# Patient Record
Sex: MALE | Race: WHITE | NOT HISPANIC OR LATINO | Employment: OTHER | URBAN - METROPOLITAN AREA
[De-identification: names, ages, dates, MRNs, and addresses within clinical notes are randomized per-mention and may not be internally consistent; named-entity substitution may affect disease eponyms.]

---

## 2017-01-20 ENCOUNTER — OFFICE VISIT (OUTPATIENT)
Dept: CARDIOLOGY | Facility: MEDICAL CENTER | Age: 67
End: 2017-01-20
Payer: MEDICARE

## 2017-01-20 VITALS
OXYGEN SATURATION: 96 % | BODY MASS INDEX: 38.3 KG/M2 | HEIGHT: 75 IN | DIASTOLIC BLOOD PRESSURE: 80 MMHG | SYSTOLIC BLOOD PRESSURE: 120 MMHG | WEIGHT: 308 LBS | HEART RATE: 64 BPM

## 2017-01-20 DIAGNOSIS — R00.2 PALPITATIONS: ICD-10-CM

## 2017-01-20 DIAGNOSIS — I10 ESSENTIAL HYPERTENSION: ICD-10-CM

## 2017-01-20 DIAGNOSIS — E78.5 DYSLIPIDEMIA: ICD-10-CM

## 2017-01-20 DIAGNOSIS — I25.10 CORONARY ARTERY DISEASE DUE TO CALCIFIED CORONARY LESION: ICD-10-CM

## 2017-01-20 DIAGNOSIS — I25.84 CORONARY ARTERY DISEASE DUE TO CALCIFIED CORONARY LESION: ICD-10-CM

## 2017-01-20 DIAGNOSIS — G47.33 OBSTRUCTIVE SLEEP APNEA: ICD-10-CM

## 2017-01-20 PROCEDURE — 99204 OFFICE O/P NEW MOD 45 MIN: CPT | Performed by: INTERNAL MEDICINE

## 2017-01-20 RX ORDER — ROSUVASTATIN CALCIUM 20 MG/1
20 TABLET, COATED ORAL EVERY EVENING
Qty: 30 TAB | Refills: 11 | Status: SHIPPED | OUTPATIENT
Start: 2017-01-20 | End: 2017-02-06 | Stop reason: SDUPTHER

## 2017-01-20 ASSESSMENT — ENCOUNTER SYMPTOMS
BLURRED VISION: 0
COUGH: 0
ABDOMINAL PAIN: 0
SPEECH CHANGE: 0
EYE DISCHARGE: 0
PALPITATIONS: 0
NAUSEA: 0
FALLS: 1
BRUISES/BLEEDS EASILY: 0
NERVOUS/ANXIOUS: 0
FEVER: 0
VOMITING: 0
LOSS OF CONSCIOUSNESS: 0
EYE PAIN: 0
MYALGIAS: 0
HEMOPTYSIS: 0
DEPRESSION: 0
WHEEZING: 0
CHILLS: 0

## 2017-01-20 NOTE — Clinical Note
[facility]   [address], [facility] [address]  [place] [address]  Phone: [phone]  Fax: [phone]              Kun Lisa  1950    Encounter Date: 1/20/2017    Marbin Haddad M.D.          PROGRESS NOTE:  Subjective:   Kun Lisa is a 66 y.o. male who presents today for new patient evaluation because of history of coronary artery disease. He also has hypertension and dyslipidemia in addition to type II diabetes.    In 1997, he apparently had a small heart attack. He underwent evaluation and ultimately coronary angiography. During that procedure he received a stent to the LAD.    He has had no chest discomfort. He does note that over the last few months he's had some intermittent palpitations. Since August, he noted palpitations about once every month or 2. These episodes only last about 10 seconds or so. He notes a rapid heartbeat which is associated with some lightheadedness. He's had no near-syncope or syncope.    He has had no dyspnea on exertion, PND or orthopnea. He is on CPAP therapy at night. He occasionally notes some edema.    Past Medical History   Diagnosis Date   • Past heart attack    • CAD (coronary artery disease)    • Hyperlipidemia    • Hypertension    • Palpitations    • Obstructive sleep apnea    • Diabetes (CMS-[facility])      Past Surgical History   Procedure Laterality Date   • Stent placement       LAD   • Angioplasty       Family History   Problem Relation Age of Onset   • Stroke Father 57     History   Smoking status   • Former Smoker -- 1.00 packs/day for 30 years   • Types: Cigarettes   • Quit date: 06/25/2002   Smokeless tobacco   • Never Used     Allergies   Allergen Reactions   • Metformin Unspecified     Stomach upset and acid reflux     Outpatient Encounter Prescriptions as of 1/20/2017   Medication Sig Dispense Refill   • GLIPIZIDE XL 10 MG TABLET SR 24 HR TAKE 1 TABLET ORALLY DAILY 90 Tab 0   • metoprolol SR (TOPROL XL)  "50 MG TABLET SR 24 HR Take 1 Tab by mouth every day. 90 Tab 2   • ramipril (ALTACE) 10 MG capsule Take 1 Cap by mouth every day. 90 Cap 2   • simvastatin (ZOCOR) 20 MG Tab Take 1 Tab by mouth every evening. 90 Tab 2   • sitagliptin (JANUVIA) 100 MG Tab Take 1 Tab by mouth every day. 90 Tab 2   • tramadol (ULTRAM) 50 MG Tab Take 1 Tab by mouth every four hours as needed. 90 Tab 2   • niacin 250 MG Tab Take 250 mg by mouth every day.     • aspirin (ASA) 81 MG Chew Tab chewable tablet Take 81 mg by mouth every day.     • Glucosamine-Chondroitin (GLUCOSAMINE CHONDROITIN COMPLX) 500-250 MG Cap Take  by mouth.     • nitroglycerin (NITROSTAT) 0.4 MG SL Tab Place 0.4 mg under tongue every 5 minutes as needed for Chest Pain.       No facility-administered encounter medications on file as of 1/20/2017.     Review of Systems   Constitutional: Positive for malaise/fatigue (on occasion he'll note fatigue with exertion.). Negative for fever and chills.   HENT: Negative for congestion.    Eyes: Negative for blurred vision, pain and discharge.   Respiratory: Negative for cough, hemoptysis and wheezing.    Cardiovascular: Negative for chest pain and palpitations.   Gastrointestinal: Negative for nausea, vomiting and abdominal pain.   Musculoskeletal: Positive for falls. Negative for myalgias and joint pain.   Skin: Negative for itching and rash.   Neurological: Negative for speech change and loss of consciousness.   Endo/Heme/Allergies: Does not bruise/bleed easily.   Psychiatric/Behavioral: Negative for depression. The patient is not nervous/anxious.    All other systems reviewed and are negative.       Objective:   /80 mmHg  Pulse 64  Ht 1.905 m (6' 3\")  Wt 139.708 kg (308 lb)  BMI 38.50 kg/m2  SpO2 96%    Physical Exam   Constitutional: He is oriented to person, place, and time. He appears well-developed. No distress.   HENT:   Mouth/Throat: Mucous membranes are normal.   Eyes: Conjunctivae and EOM are normal.   Neck: " No JVD present. No tracheal deviation present. No thyroid mass and no thyromegaly present.   Cardiovascular: Normal rate, regular rhythm and intact distal pulses.    No murmur heard.  Pulmonary/Chest: Effort normal and breath sounds normal. No respiratory distress. He exhibits no tenderness.   Abdominal: Soft. There is no tenderness.   Musculoskeletal: Normal range of motion. He exhibits edema (trace pretibial).   Neurological: He is alert and oriented to person, place, and time. He has normal strength. He displays no tremor.   Skin: Skin is warm and dry. He is not diaphoretic.   Psychiatric: He has a normal mood and affect. His behavior is normal.   Vitals reviewed.    Lab Results   Component Value Date/Time    CHOLESTEROL, 11/08/2016 09:17 AM    LDL 78 11/08/2016 09:17 AM    HDL 44 11/08/2016 09:17 AM    TRIGLYCERIDES 179* 11/08/2016 09:17 AM       Lab Results   Component Value Date/Time    SODIUM 135 11/08/2016 09:17 AM    POTASSIUM 4.9 11/08/2016 09:17 AM    CHLORIDE 102 11/08/2016 09:17 AM    CO2 26 11/08/2016 09:17 AM    GLUCOSE 170* 11/08/2016 09:17 AM    BUN 13 11/08/2016 09:17 AM    CREATININE 0.94 11/08/2016 09:17 AM     Lab Results   Component Value Date/Time    ALKALINE PHOSPHATASE 48 11/08/2016 09:17 AM    AST(SGOT) 18 11/08/2016 09:17 AM    ALT(SGPT) 29 11/08/2016 09:17 AM    TOTAL BILIRUBIN 0.6 11/08/2016 09:17 AM      No results found for: BNPBTYPENAT       Assessment:     1. Coronary artery disease due to calcified coronary lesion: LAD stent in 1997     2. Essential hypertension     3. Dyslipidemia     4. Palpitations         Medical Decision Making:  Today's Assessment / Status / Plan:     Mr. Lisa is having difficulty with palpitations which are significantly symptomatic. He'll be further evaluated with a event monitor and echocardiogram. He did have a exercise tolerance test which was probably a myocardial perfusion scan last year and we will obtain those results for review. His blood  pressure is under excellent control. His lipid status is under fair control though he is not on guideline mediated therapy. At this time, he will discontinue simvastatin and niacin. We will start him on rosuvastatin 20 mg daily. He will follow-up in about 6 weeks with lab work prior.    Given his obstructive sleep apnea and palpitations. He will also be evaluated for nocturnal hypoxemia with an overnight pulse oximetry.        Kit Torres PA-C  [address]  [address]  [facility] [address]  VIA In Basket

## 2017-01-20 NOTE — MR AVS SNAPSHOT
"        Kun Madsen Lisa   2017 10:15 AM   Office Visit   MRN: [id number]    Department:  [facility]   Dept Phone:  [phone]    Description:  Male : 1950   Provider:  Marbin Haddad M.D.           Reason for Visit     New Patient           Allergies as of 2017     Allergen Noted Reactions    Metformin 10/25/2016   Unspecified    Stomach upset and acid reflux      You were diagnosed with     Coronary artery disease due to calcified coronary lesion   [6291198]       Essential hypertension   [6079629]       Dyslipidemia   [741771]       Palpitations   [785.1.ICD-9-CM]       Obstructive sleep apnea   [181365]         Vital Signs     Blood Pressure Pulse Height Weight Body Mass Index Oxygen Saturation    120/80 mmHg 64 1.905 m (6' 3\") 139.708 kg (308 lb) 38.50 kg/m2 96%    Smoking Status                   Former Smoker           Basic Information     Date Of Birth Sex Race Ethnicity Preferred Language    1950 Male White Non- English      Problem List              ICD-10-CM Priority Class Noted - Resolved    Type 2 diabetes mellitus with neurologic complication (CMS-HCC) E11.49   10/25/2016 - Present    Dyslipidemia E78.5   10/25/2016 - Present    Essential hypertension I10   10/25/2016 - Present    Arrhythmia I49.9   10/25/2016 - Present    Neuropathy (CMS-HCC) G62.9   10/25/2016 - Present    Preventative health care Z00.00   10/25/2016 - Present    Left foot pain M79.672   10/25/2016 - Present    Morbid obesity due to excess calories (CMS-HCC) E66.01   10/27/2016 - Present    Obstructive sleep apnea syndrome G47.33   2016 - Present    Coronary artery disease due to calcified coronary lesion: LAD stent in  I25.10, I25.84   2017 - Present    Palpitations R00.2   2017 - Present      Health Maintenance        Date Due Completion Dates    RETINAL SCREENING 1969 ---    IMM DTaP/Tdap/Td Vaccine (1 - Tdap) 1970 ---    COLONOSCOPY 2001 ---    IMM " ZOSTER VACCINE 6/19/2011 ---    IMM PNEUMOCOCCAL 65+ (ADULT) LOW/MEDIUM RISK SERIES (1 of 2 - PCV13) 6/19/2016 ---    A1C SCREENING 5/8/2017 11/8/2016    FASTING LIPID PROFILE 11/8/2017 11/8/2016    URINE ACR / MICROALBUMIN 11/8/2017 11/8/2016    SERUM CREATININE 11/8/2017 11/8/2016    DIABETES MONOFILAMENT / LE EXAM 11/22/2017 11/22/2016            Current Immunizations     Influenza Vaccine Adult HD 10/25/2016  4:23 PM      Below and/or attached are the medications your provider expects you to take. Review all of your home medications and newly ordered medications with your provider and/or pharmacist. Follow medication instructions as directed by your provider and/or pharmacist. Please keep your medication list with you and share with your provider. Update the information when medications are discontinued, doses are changed, or new medications (including over-the-counter products) are added; and carry medication information at all times in the event of emergency situations     Allergies:  METFORMIN - Unspecified               Medications  Valid as of: January 20, 2017 - 10:58 AM    Generic Name Brand Name Tablet Size Instructions for use    Aspirin (Chew Tab) ASA 81 MG Take 81 mg by mouth every day.        GlipiZIDE (TABLET SR 24 HR) GLIPIZIDE XL 10 MG TAKE 1 TABLET ORALLY DAILY        Glucosamine-Chondroitin (Cap) Glucosamine-Chondroitin 500-250 MG Take  by mouth.        Metoprolol Succinate (TABLET SR 24 HR) TOPROL XL 50 MG Take 1 Tab by mouth every day.        Niacin (Tab) niacin 250 MG Take 250 mg by mouth every day.        Nitroglycerin (SL Tab) NITROSTAT 0.4 MG Place 0.4 mg under tongue every 5 minutes as needed for Chest Pain.        Ramipril (Cap) ALTACE 10 MG Take 1 Cap by mouth every day.        Rosuvastatin Calcium (Tab) CRESTOR 20 MG Take 1 Tab by mouth every evening.        Simvastatin (Tab) ZOCOR 20 MG Take 1 Tab by mouth every evening.        SITagliptin Phosphate (Tab) JANUVIA 100 MG Take 1 Tab by  mouth every day.        TraMADol HCl (Tab) ULTRAM 50 MG Take 1 Tab by mouth every four hours as needed.        .                 Medicines prescribed today were sent to:     [facility] #[id number] - [place], [place] - [address]    [address] [place] [address]    Phone: [phone] Fax: [phone]    Open 24 Hours?: No    [facility] - [place], [place] - [address] AT PORTAL TO REGISTERED [facility] S[facility]    [address] [facility] [address]    Phone: [phone] Fax: [phone]    Open 24 Hours?: No      Medication refill instructions:       If your prescription bottle indicates you have medication refills left, it is not necessary to call your provider’s office. Please contact your pharmacy and they will refill your medication.    If your prescription bottle indicates you do not have any refills left, you may request refills at any time through one of the following ways: The online [employer] system (except Urgent Care), by calling your provider’s office, or by asking your pharmacy to contact your provider’s office with a refill request. Medication refills are processed only during regular business hours and may not be available until the next business day. Your provider may request additional information or to have a follow-up visit with you prior to refilling your medication.   *Please Note: Medication refills are assigned a new Rx number when refilled electronically. Your pharmacy may indicate that no refills were authorized even though a new prescription for the same medication is available at the pharmacy. Please request the medicine by name with the pharmacy before contacting your provider for a refill.        Your To Do List     Future Labs/Procedures Complete By Expires    COMP METABOLIC PANEL  As directed 1/20/2018    Echocardiogram Comp w/o Cont  As directed 1/20/2018    Scheduling Instructions:    Within the next month    LIPID PROFILE  As directed 1/20/2018    [facility] /  Event Monitor  As directed 1/20/2018         [employer] Access Code: Activation code not generated  Current [employer] Status: Active

## 2017-01-20 NOTE — PROGRESS NOTES
Subjective:   Kun Lisa is a 66 y.o. male who presents today for new patient evaluation because of history of coronary artery disease. He also has hypertension and dyslipidemia in addition to type II diabetes.    In 1997, he apparently had a small heart attack. He underwent evaluation and ultimately coronary angiography. During that procedure he received a stent to the LAD.    He has had no chest discomfort. He does note that over the last few months he's had some intermittent palpitations. Since August, he noted palpitations about once every month or 2. These episodes only last about 10 seconds or so. He notes a rapid heartbeat which is associated with some lightheadedness. He's had no near-syncope or syncope.    He has had no dyspnea on exertion, PND or orthopnea. He is on CPAP therapy at night. He occasionally notes some edema.    Past Medical History   Diagnosis Date   • Past heart attack    • CAD (coronary artery disease)    • Hyperlipidemia    • Hypertension    • Palpitations    • Obstructive sleep apnea    • Diabetes (CMS-[facility])      Past Surgical History   Procedure Laterality Date   • Stent placement       LAD   • Angioplasty       Family History   Problem Relation Age of Onset   • Stroke Father 57     History   Smoking status   • Former Smoker -- 1.00 packs/day for 30 years   • Types: Cigarettes   • Quit date: 06/25/2002   Smokeless tobacco   • Never Used     Allergies   Allergen Reactions   • Metformin Unspecified     Stomach upset and acid reflux     Outpatient Encounter Prescriptions as of 1/20/2017   Medication Sig Dispense Refill   • GLIPIZIDE XL 10 MG TABLET SR 24 HR TAKE 1 TABLET ORALLY DAILY 90 Tab 0   • metoprolol SR (TOPROL XL) 50 MG TABLET SR 24 HR Take 1 Tab by mouth every day. 90 Tab 2   • ramipril (ALTACE) 10 MG capsule Take 1 Cap by mouth every day. 90 Cap 2   • simvastatin (ZOCOR) 20 MG Tab Take 1 Tab by mouth every evening. 90 Tab 2   • sitagliptin (JANUVIA) 100 MG Tab Take 1 Tab by  "mouth every day. 90 Tab 2   • tramadol (ULTRAM) 50 MG Tab Take 1 Tab by mouth every four hours as needed. 90 Tab 2   • niacin 250 MG Tab Take 250 mg by mouth every day.     • aspirin (ASA) 81 MG Chew Tab chewable tablet Take 81 mg by mouth every day.     • Glucosamine-Chondroitin (GLUCOSAMINE CHONDROITIN COMPLX) 500-250 MG Cap Take  by mouth.     • nitroglycerin (NITROSTAT) 0.4 MG SL Tab Place 0.4 mg under tongue every 5 minutes as needed for Chest Pain.       No facility-administered encounter medications on file as of 1/20/2017.     Review of Systems   Constitutional: Positive for malaise/fatigue (on occasion he'll note fatigue with exertion.). Negative for fever and chills.   HENT: Negative for congestion.    Eyes: Negative for blurred vision, pain and discharge.   Respiratory: Negative for cough, hemoptysis and wheezing.    Cardiovascular: Negative for chest pain and palpitations.   Gastrointestinal: Negative for nausea, vomiting and abdominal pain.   Musculoskeletal: Positive for falls. Negative for myalgias and joint pain.   Skin: Negative for itching and rash.   Neurological: Negative for speech change and loss of consciousness.   Endo/Heme/Allergies: Does not bruise/bleed easily.   Psychiatric/Behavioral: Negative for depression. The patient is not nervous/anxious.    All other systems reviewed and are negative.       Objective:   /80 mmHg  Pulse 64  Ht 1.905 m (6' 3\")  Wt 139.708 kg (308 lb)  BMI 38.50 kg/m2  SpO2 96%    Physical Exam   Constitutional: He is oriented to person, place, and time. He appears well-developed. No distress.   HENT:   Mouth/Throat: Mucous membranes are normal.   Eyes: Conjunctivae and EOM are normal.   Neck: No JVD present. No tracheal deviation present. No thyroid mass and no thyromegaly present.   Cardiovascular: Normal rate, regular rhythm and intact distal pulses.    No murmur heard.  Pulmonary/Chest: Effort normal and breath sounds normal. No respiratory distress. " He exhibits no tenderness.   Abdominal: Soft. There is no tenderness.   Musculoskeletal: Normal range of motion. He exhibits edema (trace pretibial).   Neurological: He is alert and oriented to person, place, and time. He has normal strength. He displays no tremor.   Skin: Skin is warm and dry. He is not diaphoretic.   Psychiatric: He has a normal mood and affect. His behavior is normal.   Vitals reviewed.    Lab Results   Component Value Date/Time    CHOLESTEROL, 11/08/2016 09:17 AM    LDL 78 11/08/2016 09:17 AM    HDL 44 11/08/2016 09:17 AM    TRIGLYCERIDES 179* 11/08/2016 09:17 AM       Lab Results   Component Value Date/Time    SODIUM 135 11/08/2016 09:17 AM    POTASSIUM 4.9 11/08/2016 09:17 AM    CHLORIDE 102 11/08/2016 09:17 AM    CO2 26 11/08/2016 09:17 AM    GLUCOSE 170* 11/08/2016 09:17 AM    BUN 13 11/08/2016 09:17 AM    CREATININE 0.94 11/08/2016 09:17 AM     Lab Results   Component Value Date/Time    ALKALINE PHOSPHATASE 48 11/08/2016 09:17 AM    AST(SGOT) 18 11/08/2016 09:17 AM    ALT(SGPT) 29 11/08/2016 09:17 AM    TOTAL BILIRUBIN 0.6 11/08/2016 09:17 AM      No results found for: BNPBTYPENAT       Assessment:     1. Coronary artery disease due to calcified coronary lesion: LAD stent in 1997     2. Essential hypertension     3. Dyslipidemia     4. Palpitations         Medical Decision Making:  Today's Assessment / Status / Plan:     Mr. Lisa is having difficulty with palpitations which are significantly symptomatic. He'll be further evaluated with a event monitor and echocardiogram. He did have a exercise tolerance test which was probably a myocardial perfusion scan last year and we will obtain those results for review. His blood pressure is under excellent control. His lipid status is under fair control though he is not on guideline mediated therapy. At this time, he will discontinue simvastatin and niacin. We will start him on rosuvastatin 20 mg daily. He will follow-up in about 6 weeks with  lab work prior.    Given his obstructive sleep apnea and palpitations. He will also be evaluated for nocturnal hypoxemia with an overnight pulse oximetry.

## 2017-01-24 ENCOUNTER — TELEPHONE (OUTPATIENT)
Dept: CARDIOLOGY | Facility: MEDICAL CENTER | Age: 67
End: 2017-01-24

## 2017-01-24 NOTE — TELEPHONE ENCOUNTER
Patient enrolled in the [employer] program. Left detailed message on voicemail.  >Pending Baseline.

## 2017-02-06 ENCOUNTER — NON-PROVIDER VISIT (OUTPATIENT)
Dept: CARDIOLOGY | Facility: MEDICAL CENTER | Age: 67
End: 2017-02-06
Attending: INTERNAL MEDICINE
Payer: MEDICARE

## 2017-02-06 DIAGNOSIS — E78.5 DYSLIPIDEMIA: ICD-10-CM

## 2017-02-06 DIAGNOSIS — I25.84 CORONARY ARTERY DISEASE DUE TO CALCIFIED CORONARY LESION: ICD-10-CM

## 2017-02-06 DIAGNOSIS — I25.10 CORONARY ARTERY DISEASE DUE TO CALCIFIED CORONARY LESION: ICD-10-CM

## 2017-02-06 DIAGNOSIS — I47.29 NSVT (NONSUSTAINED VENTRICULAR TACHYCARDIA) (HCC): ICD-10-CM

## 2017-02-06 DIAGNOSIS — I10 ESSENTIAL HYPERTENSION: ICD-10-CM

## 2017-02-06 DIAGNOSIS — R00.2 PALPITATIONS: ICD-10-CM

## 2017-02-06 RX ORDER — RAMIPRIL 10 MG/1
10 CAPSULE ORAL DAILY
Qty: 90 CAP | Refills: 3 | Status: SHIPPED | OUTPATIENT
Start: 2017-02-06 | End: 2017-08-29 | Stop reason: SDUPTHER

## 2017-02-06 RX ORDER — METOPROLOL SUCCINATE 50 MG/1
50 TABLET, EXTENDED RELEASE ORAL DAILY
Qty: 90 TAB | Refills: 3 | Status: SHIPPED | OUTPATIENT
Start: 2017-02-06 | End: 2017-05-10 | Stop reason: SDUPTHER

## 2017-02-06 RX ORDER — ROSUVASTATIN CALCIUM 20 MG/1
20 TABLET, COATED ORAL EVERY EVENING
Qty: 90 TAB | Refills: 3 | Status: SHIPPED | OUTPATIENT
Start: 2017-02-06 | End: 2017-06-02

## 2017-03-03 ENCOUNTER — HOSPITAL ENCOUNTER (OUTPATIENT)
Dept: CARDIOLOGY | Facility: MEDICAL CENTER | Age: 67
End: 2017-03-03
Attending: INTERNAL MEDICINE
Payer: MEDICARE

## 2017-03-03 DIAGNOSIS — R00.2 PALPITATIONS: ICD-10-CM

## 2017-03-03 DIAGNOSIS — I25.84 CORONARY ARTERY DISEASE DUE TO CALCIFIED CORONARY LESION: ICD-10-CM

## 2017-03-03 DIAGNOSIS — I25.10 CORONARY ARTERY DISEASE DUE TO CALCIFIED CORONARY LESION: ICD-10-CM

## 2017-03-03 DIAGNOSIS — I10 ESSENTIAL HYPERTENSION: ICD-10-CM

## 2017-03-03 PROCEDURE — 93306 TTE W/DOPPLER COMPLETE: CPT | Mod: 26 | Performed by: INTERNAL MEDICINE

## 2017-03-03 PROCEDURE — 93306 TTE W/DOPPLER COMPLETE: CPT

## 2017-03-07 LAB
LV EJECT FRACT  99904: 60
LV EJECT FRACT MOD 2C 99903: 70.79
LV EJECT FRACT MOD 4C 99902: 68.63
LV EJECT FRACT MOD BP 99901: 69.5

## 2017-03-10 DIAGNOSIS — I25.84 CORONARY ARTERY DISEASE DUE TO CALCIFIED CORONARY LESION: ICD-10-CM

## 2017-03-10 DIAGNOSIS — I25.10 CORONARY ARTERY DISEASE DUE TO CALCIFIED CORONARY LESION: ICD-10-CM

## 2017-03-10 DIAGNOSIS — E78.5 DYSLIPIDEMIA: ICD-10-CM

## 2017-03-10 DIAGNOSIS — I10 ESSENTIAL HYPERTENSION: ICD-10-CM

## 2017-03-14 ENCOUNTER — HOSPITAL ENCOUNTER (OUTPATIENT)
Dept: LAB | Facility: MEDICAL CENTER | Age: 67
End: 2017-03-14
Attending: INTERNAL MEDICINE
Payer: MEDICARE

## 2017-03-14 DIAGNOSIS — E78.5 DYSLIPIDEMIA: ICD-10-CM

## 2017-03-14 DIAGNOSIS — I25.10 CORONARY ARTERY DISEASE DUE TO CALCIFIED CORONARY LESION: ICD-10-CM

## 2017-03-14 DIAGNOSIS — R00.2 PALPITATIONS: ICD-10-CM

## 2017-03-14 DIAGNOSIS — I10 ESSENTIAL HYPERTENSION: ICD-10-CM

## 2017-03-14 DIAGNOSIS — I25.84 CORONARY ARTERY DISEASE DUE TO CALCIFIED CORONARY LESION: ICD-10-CM

## 2017-03-14 LAB
ALBUMIN SERPL BCP-MCNC: 4.1 G/DL (ref 3.2–4.9)
ALBUMIN/GLOB SERPL: 1.5 G/DL
ALP SERPL-CCNC: 50 U/L (ref 30–99)
ALT SERPL-CCNC: 30 U/L (ref 2–50)
ANION GAP SERPL CALC-SCNC: 9 MMOL/L (ref 0–11.9)
AST SERPL-CCNC: 18 U/L (ref 12–45)
BILIRUB SERPL-MCNC: 0.5 MG/DL (ref 0.1–1.5)
BUN SERPL-MCNC: 12 MG/DL (ref 8–22)
CALCIUM SERPL-MCNC: 9.2 MG/DL (ref 8.5–10.5)
CHLORIDE SERPL-SCNC: 101 MMOL/L (ref 96–112)
CHOLEST SERPL-MCNC: 151 MG/DL (ref 100–199)
CO2 SERPL-SCNC: 26 MMOL/L (ref 20–33)
CREAT SERPL-MCNC: 0.85 MG/DL (ref 0.5–1.4)
GLOBULIN SER CALC-MCNC: 2.8 G/DL (ref 1.9–3.5)
GLUCOSE SERPL-MCNC: 223 MG/DL (ref 65–99)
HDLC SERPL-MCNC: 36 MG/DL
LDLC SERPL CALC-MCNC: 55 MG/DL
POTASSIUM SERPL-SCNC: 4.3 MMOL/L (ref 3.6–5.5)
PROT SERPL-MCNC: 6.9 G/DL (ref 6–8.2)
SODIUM SERPL-SCNC: 136 MMOL/L (ref 135–145)
TRIGL SERPL-MCNC: 298 MG/DL (ref 0–149)

## 2017-03-14 PROCEDURE — 36415 COLL VENOUS BLD VENIPUNCTURE: CPT

## 2017-03-14 PROCEDURE — 80061 LIPID PANEL: CPT

## 2017-03-14 PROCEDURE — 80053 COMPREHEN METABOLIC PANEL: CPT

## 2017-03-24 ENCOUNTER — TELEPHONE (OUTPATIENT)
Dept: CARDIOLOGY | Facility: MEDICAL CENTER | Age: 67
End: 2017-03-24

## 2017-03-24 ENCOUNTER — OFFICE VISIT (OUTPATIENT)
Dept: CARDIOLOGY | Facility: MEDICAL CENTER | Age: 67
End: 2017-03-24
Payer: MEDICARE

## 2017-03-24 VITALS
BODY MASS INDEX: 39.04 KG/M2 | HEIGHT: 75 IN | WEIGHT: 314 LBS | DIASTOLIC BLOOD PRESSURE: 82 MMHG | SYSTOLIC BLOOD PRESSURE: 136 MMHG | HEART RATE: 60 BPM

## 2017-03-24 DIAGNOSIS — E78.5 DYSLIPIDEMIA: ICD-10-CM

## 2017-03-24 DIAGNOSIS — I25.10 CORONARY ARTERY DISEASE DUE TO CALCIFIED CORONARY LESION: ICD-10-CM

## 2017-03-24 DIAGNOSIS — I10 ESSENTIAL HYPERTENSION: ICD-10-CM

## 2017-03-24 DIAGNOSIS — I25.84 CORONARY ARTERY DISEASE DUE TO CALCIFIED CORONARY LESION: ICD-10-CM

## 2017-03-24 DIAGNOSIS — R00.2 PALPITATIONS: ICD-10-CM

## 2017-03-24 PROCEDURE — 1036F TOBACCO NON-USER: CPT | Performed by: INTERNAL MEDICINE

## 2017-03-24 PROCEDURE — G8482 FLU IMMUNIZE ORDER/ADMIN: HCPCS | Performed by: INTERNAL MEDICINE

## 2017-03-24 PROCEDURE — 4040F PNEUMOC VAC/ADMIN/RCVD: CPT | Mod: 8P | Performed by: INTERNAL MEDICINE

## 2017-03-24 PROCEDURE — G8432 DEP SCR NOT DOC, RNG: HCPCS | Performed by: INTERNAL MEDICINE

## 2017-03-24 PROCEDURE — G8598 ASA/ANTIPLAT THER USED: HCPCS | Performed by: INTERNAL MEDICINE

## 2017-03-24 PROCEDURE — G8419 CALC BMI OUT NRM PARAM NOF/U: HCPCS | Performed by: INTERNAL MEDICINE

## 2017-03-24 PROCEDURE — 99214 OFFICE O/P EST MOD 30 MIN: CPT | Performed by: INTERNAL MEDICINE

## 2017-03-24 PROCEDURE — 1101F PT FALLS ASSESS-DOCD LE1/YR: CPT | Mod: 8P | Performed by: INTERNAL MEDICINE

## 2017-03-24 PROCEDURE — 3017F COLORECTAL CA SCREEN DOC REV: CPT | Mod: 8P | Performed by: INTERNAL MEDICINE

## 2017-03-24 NOTE — PROGRESS NOTES
Subjective:   Kun Lisa is a 66 y.o. male who presents today for follow-up evaluation because of history of coronary artery disease. He also has hypertension and dyslipidemia in addition to type II diabetes.    In 1997, he apparently had a small heart attack. He underwent evaluation and ultimately coronary angiography. During that procedure he received a stent to the LAD.    He has had no chest discomfort. He does note that over the last few months he's had some intermittent palpitations. Since August, he noted palpitations about once every month or 2. These episodes only last about 10 seconds or so. He notes a rapid heartbeat which is associated with some lightheadedness. He's had no near-syncope or syncope.    He is noted no edema recently. He continues on CPAP therapy. He's had no dyspnea on exertion, PND or orthopnea.        Past Medical History   Diagnosis Date   • Past heart attack    • CAD (coronary artery disease)    • Hyperlipidemia    • Hypertension    • Palpitations    • Obstructive sleep apnea    • Diabetes (CMS-[facility])      Past Surgical History   Procedure Laterality Date   • Stent placement       LAD   • Angioplasty       Family History   Problem Relation Age of Onset   • Stroke Father 57     History   Smoking status   • Former Smoker -- 1.00 packs/day for 30 years   • Types: Cigarettes   • Quit date: 06/25/2002   Smokeless tobacco   • Never Used     Allergies   Allergen Reactions   • Metformin Unspecified     Stomach upset and acid reflux     Outpatient Encounter Prescriptions as of 3/24/2017   Medication Sig Dispense Refill   • rosuvastatin (CRESTOR) 20 MG Tab Take 1 Tab by mouth every evening. 90 Tab 3   • metoprolol SR (TOPROL XL) 50 MG TABLET SR 24 HR Take 1 Tab by mouth every day. 90 Tab 3   • ramipril (ALTACE) 10 MG capsule Take 1 Cap by mouth every day. 90 Cap 3   • GLIPIZIDE XL 10 MG TABLET SR 24 HR TAKE 1 TABLET ORALLY DAILY 90 Tab 0   • simvastatin (ZOCOR) 20 MG Tab Take 1 Tab by  "mouth every evening. 90 Tab 2   • sitagliptin (JANUVIA) 100 MG Tab Take 1 Tab by mouth every day. 90 Tab 2   • tramadol (ULTRAM) 50 MG Tab Take 1 Tab by mouth every four hours as needed. 90 Tab 2   • niacin 250 MG Tab Take 250 mg by mouth every day.     • aspirin (ASA) 81 MG Chew Tab chewable tablet Take 81 mg by mouth every day.     • Glucosamine-Chondroitin (GLUCOSAMINE CHONDROITIN COMPLX) 500-250 MG Cap Take  by mouth.     • nitroglycerin (NITROSTAT) 0.4 MG SL Tab Place 0.4 mg under tongue every 5 minutes as needed for Chest Pain.       No facility-administered encounter medications on file as of 3/24/2017.     ROS     Objective:   /82 mmHg  Pulse 60  Ht 1.905 m (6' 3\")  Wt 142.429 kg (314 lb)  BMI 39.25 kg/m2    Physical Exam   Neck: No JVD present.   Cardiovascular: Normal rate and regular rhythm.  Exam reveals no gallop.    No murmur heard.  Pulmonary/Chest: Effort normal. He has no rales.   Abdominal: Soft. There is no tenderness.   Musculoskeletal: He exhibits no edema.     Lab Results   Component Value Date/Time    CHOLESTEROL, 03/14/2017 08:46 AM    LDL 55 03/14/2017 08:46 AM    HDL 36* 03/14/2017 08:46 AM    TRIGLYCERIDES 298* 03/14/2017 08:46 AM       Lab Results   Component Value Date/Time    SODIUM 136 03/14/2017 08:46 AM    POTASSIUM 4.3 03/14/2017 08:46 AM    CHLORIDE 101 03/14/2017 08:46 AM    CO2 26 03/14/2017 08:46 AM    GLUCOSE 223* 03/14/2017 08:46 AM    BUN 12 03/14/2017 08:46 AM    CREATININE 0.85 03/14/2017 08:46 AM     Lab Results   Component Value Date/Time    ALKALINE PHOSPHATASE 50 03/14/2017 08:46 AM    AST(SGOT) 18 03/14/2017 08:46 AM    ALT(SGPT) 30 03/14/2017 08:46 AM    TOTAL BILIRUBIN 0.5 03/14/2017 08:46 AM          Assessment:     1. Essential hypertension     2. Palpitations     3. Coronary artery disease due to calcified coronary lesion: LAD stent in 1997     4. Dyslipidemia         Medical Decision Making:  Today's Assessment / Status / Plan:     Mr. Lisa is " clinically stable. He did have an event monitor and we are trying to get those results for review. His lipid status appears to be under fairly good control though he has not started rosuvastatin. He is going to start this within the next few days. He'll have lab work in about 6 weeks in follow-up in a couple of months. We did discuss getting activity tracker and 10,000 steps daily. We also discussed better aerobic conditioning at least 3 days a week.    Addendum: A partial number strips from the patient's event monitor were received. Full evaluation will be available next week. However, patient did have an episode of nonsustained VT. He'll be further evaluated with a myocardial perfusion scan.

## 2017-03-24 NOTE — Clinical Note
[facility]   [address] [facility] [address]  [place] [address]  Phone: [phone]  Fax: [phone]              uKn Lisa  1950    Encounter Date: 3/24/2017    Marbin Haddad M.D.          PROGRESS NOTE:  Subjective:   Kun Lisa is a 66 y.o. male who presents today for follow-up evaluation because of history of coronary artery disease. He also has hypertension and dyslipidemia in addition to type II diabetes.    In 1997, he apparently had a small heart attack. He underwent evaluation and ultimately coronary angiography. During that procedure he received a stent to the LAD.    He has had no chest discomfort. He does note that over the last few months he's had some intermittent palpitations. Since August, he noted palpitations about once every month or 2. These episodes only last about 10 seconds or so. He notes a rapid heartbeat which is associated with some lightheadedness. He's had no near-syncope or syncope.    He is noted no edema recently. He continues on CPAP therapy. He's had no dyspnea on exertion, PND or orthopnea.        Past Medical History   Diagnosis Date   • Past heart attack    • CAD (coronary artery disease)    • Hyperlipidemia    • Hypertension    • Palpitations    • Obstructive sleep apnea    • Diabetes (CMS-[facility])      Past Surgical History   Procedure Laterality Date   • Stent placement       LAD   • Angioplasty       Family History   Problem Relation Age of Onset   • Stroke Father 57     History   Smoking status   • Former Smoker -- 1.00 packs/day for 30 years   • Types: Cigarettes   • Quit date: 06/25/2002   Smokeless tobacco   • Never Used     Allergies   Allergen Reactions   • Metformin Unspecified     Stomach upset and acid reflux     Outpatient Encounter Prescriptions as of 3/24/2017   Medication Sig Dispense Refill   • rosuvastatin (CRESTOR) 20 MG Tab Take 1 Tab by mouth every evening. 90 Tab 3   • metoprolol SR (TOPROL  "XL) 50 MG TABLET SR 24 HR Take 1 Tab by mouth every day. 90 Tab 3   • ramipril (ALTACE) 10 MG capsule Take 1 Cap by mouth every day. 90 Cap 3   • GLIPIZIDE XL 10 MG TABLET SR 24 HR TAKE 1 TABLET ORALLY DAILY 90 Tab 0   • simvastatin (ZOCOR) 20 MG Tab Take 1 Tab by mouth every evening. 90 Tab 2   • sitagliptin (JANUVIA) 100 MG Tab Take 1 Tab by mouth every day. 90 Tab 2   • tramadol (ULTRAM) 50 MG Tab Take 1 Tab by mouth every four hours as needed. 90 Tab 2   • niacin 250 MG Tab Take 250 mg by mouth every day.     • aspirin (ASA) 81 MG Chew Tab chewable tablet Take 81 mg by mouth every day.     • Glucosamine-Chondroitin (GLUCOSAMINE CHONDROITIN COMPLX) 500-250 MG Cap Take  by mouth.     • nitroglycerin (NITROSTAT) 0.4 MG SL Tab Place 0.4 mg under tongue every 5 minutes as needed for Chest Pain.       No facility-administered encounter medications on file as of 3/24/2017.     ROS     Objective:   /82 mmHg  Pulse 60  Ht 1.905 m (6' 3\")  Wt 142.429 kg (314 lb)  BMI 39.25 kg/m2    Physical Exam   Neck: No JVD present.   Cardiovascular: Normal rate and regular rhythm.  Exam reveals no gallop.    No murmur heard.  Pulmonary/Chest: Effort normal. He has no rales.   Abdominal: Soft. There is no tenderness.   Musculoskeletal: He exhibits no edema.     Lab Results   Component Value Date/Time    CHOLESTEROL, 03/14/2017 08:46 AM    LDL 55 03/14/2017 08:46 AM    HDL 36* 03/14/2017 08:46 AM    TRIGLYCERIDES 298* 03/14/2017 08:46 AM       Lab Results   Component Value Date/Time    SODIUM 136 03/14/2017 08:46 AM    POTASSIUM 4.3 03/14/2017 08:46 AM    CHLORIDE 101 03/14/2017 08:46 AM    CO2 26 03/14/2017 08:46 AM    GLUCOSE 223* 03/14/2017 08:46 AM    BUN 12 03/14/2017 08:46 AM    CREATININE 0.85 03/14/2017 08:46 AM     Lab Results   Component Value Date/Time    ALKALINE PHOSPHATASE 50 03/14/2017 08:46 AM    AST(SGOT) 18 03/14/2017 08:46 AM    ALT(SGPT) 30 03/14/2017 08:46 AM    TOTAL BILIRUBIN 0.5 03/14/2017 08:46 " AM          Assessment:     1. Essential hypertension     2. Palpitations     3. Coronary artery disease due to calcified coronary lesion: LAD stent in 1997     4. Dyslipidemia         Medical Decision Making:  Today's Assessment / Status / Plan:     Mr. Lisa is clinically stable. He did have an event monitor and we are trying to get those results for review. His lipid status appears to be under fairly good control though he has not started rosuvastatin. He is going to start this within the next few days. He'll have lab work in about 6 weeks in follow-up in a couple of months. We did discuss getting activity tracker and 10,000 steps daily. We also discussed better aerobic conditioning at least 3 days a week.    Addendum: A partial number strips from the patient's event monitor were received. Full evaluation will be available next week. However, patient did have an episode of nonsustained VT. He'll be further evaluated with a myocardial perfusion scan.          Kit Torres PA-C  [address]  [address]  [place] [address]  VIA In Basket

## 2017-03-24 NOTE — MR AVS SNAPSHOT
"        Kun Madsen Lisa   3/24/2017 9:30 AM   Office Visit   MRN: [id number]    Department:  [facility]   Dept Phone:  [phone]    Description:  Male : 1950   Provider:  Marbin Haddad M.D.           Reason for Visit     Follow-Up           Allergies as of 3/24/2017     Allergen Noted Reactions    Metformin 10/25/2016   Unspecified    Stomach upset and acid reflux      You were diagnosed with     Essential hypertension   [7565744]       Palpitations   [785.1.ICD-9-CM]       Coronary artery disease due to calcified coronary lesion   [2225059]       Dyslipidemia   [627959]         Vital Signs     Blood Pressure Pulse Height Weight Body Mass Index Smoking Status    136/82 mmHg 60 1.905 m (6' 3\") 142.429 kg (314 lb) 39.25 kg/m2 Former Smoker      Basic Information     Date Of Birth Sex Race Ethnicity Preferred Language    1950 Male White Non- English      Your appointments     2017 11:45 AM   FOLLOW UP with Marbin Haddad M.D.   [facility] (--)    [address]  [place] [address]   [phone]              Problem List              ICD-10-CM Priority Class Noted - Resolved    Type 2 diabetes mellitus with neurologic complication (CMS-HCC) E11.49   10/25/2016 - Present    Dyslipidemia E78.5   10/25/2016 - Present    Essential hypertension I10   10/25/2016 - Present    Arrhythmia I49.9   10/25/2016 - Present    Neuropathy (CMS-HCC) G62.9   10/25/2016 - Present    Preventative health care Z00.00   10/25/2016 - Present    Left foot pain M79.672   10/25/2016 - Present    Morbid obesity due to excess calories (CMS-HCC) E66.01   10/27/2016 - Present    Obstructive sleep apnea syndrome G47.33   2016 - Present    Coronary artery disease due to calcified coronary lesion: LAD stent in  I25.10, I25.84   2017 - Present    Palpitations R00.2   2017 - Present      Health Maintenance        Date Due Completion Dates   " RETINAL SCREENING 6/19/1969 ---    IMM DTaP/Tdap/Td Vaccine (1 - Tdap) 6/19/1970 ---    COLONOSCOPY 6/19/2001 ---    IMM ZOSTER VACCINE 6/19/2011 ---    IMM PNEUMOCOCCAL 65+ (ADULT) LOW/MEDIUM RISK SERIES (1 of 2 - PCV13) 6/19/2016 ---    A1C SCREENING 5/8/2017 11/8/2016    URINE ACR / MICROALBUMIN 11/8/2017 11/8/2016    DIABETES MONOFILAMENT / LE EXAM 11/22/2017 11/22/2016    FASTING LIPID PROFILE 3/14/2018 3/14/2017, 11/8/2016    SERUM CREATININE 3/14/2018 3/14/2017, 11/8/2016            Current Immunizations     Influenza Vaccine Adult HD 10/25/2016  4:23 PM      Below and/or attached are the medications your provider expects you to take. Review all of your home medications and newly ordered medications with your provider and/or pharmacist. Follow medication instructions as directed by your provider and/or pharmacist. Please keep your medication list with you and share with your provider. Update the information when medications are discontinued, doses are changed, or new medications (including over-the-counter products) are added; and carry medication information at all times in the event of emergency situations     Allergies:  METFORMIN - Unspecified               Medications  Valid as of: March 24, 2017 -  9:58 AM    Generic Name Brand Name Tablet Size Instructions for use    Aspirin (Chew Tab) ASA 81 MG Take 81 mg by mouth every day.        GlipiZIDE (TABLET SR 24 HR) GLIPIZIDE XL 10 MG TAKE 1 TABLET ORALLY DAILY        Glucosamine-Chondroitin (Cap) Glucosamine-Chondroitin 500-250 MG Take  by mouth.        Metoprolol Succinate (TABLET SR 24 HR) TOPROL XL 50 MG Take 1 Tab by mouth every day.        Niacin (Tab) niacin 250 MG Take 250 mg by mouth every day.        Nitroglycerin (SL Tab) NITROSTAT 0.4 MG Place 0.4 mg under tongue every 5 minutes as needed for Chest Pain.        Ramipril (Cap) ALTACE 10 MG Take 1 Cap by mouth every day.        Rosuvastatin Calcium (Tab) CRESTOR 20 MG Take 1 Tab by mouth every  evening.        Simvastatin (Tab) ZOCOR 20 MG Take 1 Tab by mouth every evening.        SITagliptin Phosphate (Tab) JANUVIA 100 MG Take 1 Tab by mouth every day.        TraMADol HCl (Tab) ULTRAM 50 MG Take 1 Tab by mouth every four hours as needed.        .                 Medicines prescribed today were sent to:     [facility]Y #[id number] - [place], [place] - [address]    [address] [place] [address]    Phone: [phone] Fax: [phone]    Open 24 Hours?: No    [facility] [place], [place] - [address] AT PORTAL TO REGISTERED [facility] SITES    [address] [facility] [address]    Phone: [phone] Fax: [phone]    Open 24 Hours?: No      Medication refill instructions:       If your prescription bottle indicates you have medication refills left, it is not necessary to call your provider’s office. Please contact your pharmacy and they will refill your medication.    If your prescription bottle indicates you do not have any refills left, you may request refills at any time through one of the following ways: The online [employer] system (except Urgent Care), by calling your provider’s office, or by asking your pharmacy to contact your provider’s office with a refill request. Medication refills are processed only during regular business hours and may not be available until the next business day. Your provider may request additional information or to have a follow-up visit with you prior to refilling your medication.   *Please Note: Medication refills are assigned a new Rx number when refilled electronically. Your pharmacy may indicate that no refills were authorized even though a new prescription for the same medication is available at the pharmacy. Please request the medicine by name with the pharmacy before contacting your provider for a refill.        Your To Do List     Future Labs/Procedures Complete By Expires    HEPATIC FUNCTION PANEL  As directed 3/24/2018    LIPID  PROFILE  As directed 3/24/2018         [employer] Access Code: Activation code not generated  Current [employer] Status: Active

## 2017-03-24 NOTE — TELEPHONE ENCOUNTER
Partial [employer] report reviewed by Dr. Haddad. Per Dr. Haddad, patient needs to have a MPI and follow up after.    Called patient, he states that he recently had a stress test with Dr. Albin Sandhu in [place]. He would like Dr. Haddad to review that stress test before agreeing to have another one.    Records request faxed to [facility] at fax # [phone].    NESSA TREVNIO

## 2017-04-04 ENCOUNTER — HOSPITAL ENCOUNTER (OUTPATIENT)
Dept: RADIOLOGY | Facility: MEDICAL CENTER | Age: 67
End: 2017-04-04
Attending: INTERNAL MEDICINE
Payer: MEDICARE

## 2017-04-04 DIAGNOSIS — I25.84 CORONARY ARTERY DISEASE DUE TO CALCIFIED CORONARY LESION: ICD-10-CM

## 2017-04-04 DIAGNOSIS — I25.10 CORONARY ARTERY DISEASE DUE TO CALCIFIED CORONARY LESION: ICD-10-CM

## 2017-04-04 PROCEDURE — A9502 TC99M TETROFOSMIN: HCPCS

## 2017-04-04 PROCEDURE — 700111 HCHG RX REV CODE 636 W/ 250 OVERRIDE (IP)

## 2017-04-04 RX ORDER — REGADENOSON 0.08 MG/ML
INJECTION, SOLUTION INTRAVENOUS
Status: COMPLETED
Start: 2017-04-04 | End: 2017-04-04

## 2017-04-04 RX ADMIN — REGADENOSON 0.4 MG: 0.08 INJECTION, SOLUTION INTRAVENOUS at 08:49

## 2017-04-12 ENCOUNTER — TELEPHONE (OUTPATIENT)
Dept: CARDIOLOGY | Facility: MEDICAL CENTER | Age: 67
End: 2017-04-12

## 2017-04-12 NOTE — TELEPHONE ENCOUNTER
MPI from 4/4/2017 reviewed by Dr. Haddad. Per Dr. Haddad, MPI is abnormal and patient's appointment should be moved up.    Called patient, he states that he is feeling well and has previously had an abnormal MPI (report from 5/3/2016 is in media file).    Patient does agree to move his appointment up. Patient scheduled to see Dr. Haddad tomorrow (4/13/2017) at 3pm.    NESSA TREVINO

## 2017-04-13 ENCOUNTER — OFFICE VISIT (OUTPATIENT)
Dept: CARDIOLOGY | Facility: MEDICAL CENTER | Age: 67
End: 2017-04-13
Payer: MEDICARE

## 2017-04-13 VITALS
DIASTOLIC BLOOD PRESSURE: 70 MMHG | SYSTOLIC BLOOD PRESSURE: 118 MMHG | HEART RATE: 64 BPM | HEIGHT: 75 IN | BODY MASS INDEX: 38.79 KG/M2 | WEIGHT: 312 LBS

## 2017-04-13 DIAGNOSIS — I25.84 CORONARY ARTERY DISEASE DUE TO CALCIFIED CORONARY LESION: ICD-10-CM

## 2017-04-13 DIAGNOSIS — I10 ESSENTIAL HYPERTENSION: ICD-10-CM

## 2017-04-13 DIAGNOSIS — E78.5 DYSLIPIDEMIA: ICD-10-CM

## 2017-04-13 DIAGNOSIS — I25.10 CORONARY ARTERY DISEASE DUE TO CALCIFIED CORONARY LESION: ICD-10-CM

## 2017-04-13 DIAGNOSIS — I47.29 NONSUSTAINED VENTRICULAR TACHYCARDIA (HCC): ICD-10-CM

## 2017-04-13 DIAGNOSIS — R00.2 PALPITATIONS: ICD-10-CM

## 2017-04-13 PROCEDURE — G8598 ASA/ANTIPLAT THER USED: HCPCS | Performed by: INTERNAL MEDICINE

## 2017-04-13 PROCEDURE — 1036F TOBACCO NON-USER: CPT | Performed by: INTERNAL MEDICINE

## 2017-04-13 PROCEDURE — 1101F PT FALLS ASSESS-DOCD LE1/YR: CPT | Mod: 8P | Performed by: INTERNAL MEDICINE

## 2017-04-13 PROCEDURE — G8419 CALC BMI OUT NRM PARAM NOF/U: HCPCS | Performed by: INTERNAL MEDICINE

## 2017-04-13 PROCEDURE — 3017F COLORECTAL CA SCREEN DOC REV: CPT | Mod: 8P | Performed by: INTERNAL MEDICINE

## 2017-04-13 PROCEDURE — 4040F PNEUMOC VAC/ADMIN/RCVD: CPT | Mod: 8P | Performed by: INTERNAL MEDICINE

## 2017-04-13 PROCEDURE — G8432 DEP SCR NOT DOC, RNG: HCPCS | Performed by: INTERNAL MEDICINE

## 2017-04-13 PROCEDURE — 99214 OFFICE O/P EST MOD 30 MIN: CPT | Performed by: INTERNAL MEDICINE

## 2017-04-13 RX ORDER — METOPROLOL TARTRATE 50 MG/1
75 TABLET, FILM COATED ORAL 2 TIMES DAILY
Qty: 90 TAB | Refills: 11 | Status: SHIPPED | OUTPATIENT
Start: 2017-04-13 | End: 2017-05-10 | Stop reason: CLARIF

## 2017-04-13 NOTE — MR AVS SNAPSHOT
"        Kun Madsen Lisa   2017 3:00 PM   Office Visit   MRN: [id number]    Department:  [facility]   Dept Phone:  [phone]    Description:  Male : 1950   Provider:  Marbin Haddad M.D.           Reason for Visit     Follow-Up           Allergies as of 2017     Allergen Noted Reactions    Metformin 10/25/2016   Unspecified    Stomach upset and acid reflux      You were diagnosed with     Coronary artery disease due to calcified coronary lesion   [9228216]       Essential hypertension   [2770363]       Dyslipidemia   [971757]       Palpitations   [785.1.ICD-9-CM]       Nonsustained ventricular tachycardia (CMS-[facility])   [546117]         Vital Signs     Blood Pressure Pulse Height Weight Body Mass Index Smoking Status    118/70 mmHg 64 1.905 m (6' 3\") 141.522 kg (312 lb) 39.00 kg/m2 Former Smoker      Basic Information     Date Of Birth Sex Race Ethnicity Preferred Language    1950 Male White Non- English      Your appointments     2017  9:00 AM   PREVIOUS PATIENT with SALMA Augustine   [facility] (--)    [address]  [place] [address]   [phone]            2017 11:45 AM   FOLLOW UP with Marbin Haddad M.D.   [facility] (--)    [address]  [place] [address]   [phone]              Problem List              ICD-10-CM Priority Class Noted - Resolved    Type 2 diabetes mellitus with neurologic complication (CMS-HCC) E11.49   10/25/2016 - Present    Dyslipidemia E78.5   10/25/2016 - Present    Essential hypertension I10   10/25/2016 - Present    Arrhythmia I49.9   10/25/2016 - Present    Neuropathy (CMS-HCC) G62.9   10/25/2016 - Present    Preventative health care Z00.00   10/25/2016 - Present    Left foot pain M79.672   10/25/2016 - Present    Morbid obesity due to excess calories (CMS-HCC) E66.01   10/27/2016 - Present    Obstructive " sleep apnea syndrome G47.33   11/22/2016 - Present    Coronary artery disease due to calcified coronary lesion: LAD stent in 1997 I25.10, I25.84   1/20/2017 - Present    Palpitations R00.2   1/20/2017 - Present      Health Maintenance        Date Due Completion Dates    RETINAL SCREENING 6/19/1969 ---    IMM DTaP/Tdap/Td Vaccine (1 - Tdap) 6/19/1970 ---    COLONOSCOPY 6/19/2001 ---    IMM ZOSTER VACCINE 6/19/2011 ---    IMM PNEUMOCOCCAL 65+ (ADULT) LOW/MEDIUM RISK SERIES (1 of 2 - PCV13) 6/19/2016 ---    A1C SCREENING 5/8/2017 11/8/2016    URINE ACR / MICROALBUMIN 11/8/2017 11/8/2016    DIABETES MONOFILAMENT / LE EXAM 11/22/2017 11/22/2016    FASTING LIPID PROFILE 3/14/2018 3/14/2017, 11/8/2016    SERUM CREATININE 3/14/2018 3/14/2017, 11/8/2016            Current Immunizations     Influenza Vaccine Adult HD 10/25/2016  4:23 PM      Below and/or attached are the medications your provider expects you to take. Review all of your home medications and newly ordered medications with your provider and/or pharmacist. Follow medication instructions as directed by your provider and/or pharmacist. Please keep your medication list with you and share with your provider. Update the information when medications are discontinued, doses are changed, or new medications (including over-the-counter products) are added; and carry medication information at all times in the event of emergency situations     Allergies:  METFORMIN - Unspecified               Medications  Valid as of: April 13, 2017 -  3:26 PM    Generic Name Brand Name Tablet Size Instructions for use    Aspirin (Chew Tab) ASA 81 MG Take 81 mg by mouth every day.        GlipiZIDE (TABLET SR 24 HR) GLIPIZIDE XL 10 MG TAKE 1 TABLET ORALLY DAILY        Glucosamine-Chondroitin (Cap) Glucosamine-Chondroitin 500-250 MG Take  by mouth.        Metoprolol Tartrate (Tab) LOPRESSOR 50 MG Take 1.5 Tabs by mouth 2 times a day.        Niacin (Tab) niacin 250 MG Take 250 mg by mouth every  day.        Nitroglycerin (SL Tab) NITROSTAT 0.4 MG Place 0.4 mg under tongue every 5 minutes as needed for Chest Pain.        Ramipril (Cap) ALTACE 10 MG Take 1 Cap by mouth every day.        Rosuvastatin Calcium (Tab) CRESTOR 20 MG Take 1 Tab by mouth every evening.        Simvastatin (Tab) ZOCOR 20 MG Take 1 Tab by mouth every evening.        SITagliptin Phosphate (Tab) JANUVIA 100 MG Take 1 Tab by mouth every day.        TraMADol HCl (Tab) ULTRAM 50 MG Take 1 Tab by mouth every four hours as needed.        .                 Medicines prescribed today were sent to:     [facility]/PHARMACY #[id number] - [place], [place] - [address]    [address] [place] [address]    Phone: [phone] Fax: [phone]    Open 24 Hours?: No    [facility] - [place], [place] - [address] AT PORTAL TO REGISTERED [facility] SITES    [address] [facility] [address]    Phone: [phone] Fax: [phone]    Open 24 Hours?: No      Medication refill instructions:       If your prescription bottle indicates you have medication refills left, it is not necessary to call your provider’s office. Please contact your pharmacy and they will refill your medication.    If your prescription bottle indicates you do not have any refills left, you may request refills at any time through one of the following ways: The online [employer] system (except Urgent Care), by calling your provider’s office, or by asking your pharmacy to contact your provider’s office with a refill request. Medication refills are processed only during regular business hours and may not be available until the next business day. Your provider may request additional information or to have a follow-up visit with you prior to refilling your medication.   *Please Note: Medication refills are assigned a new Rx number when refilled electronically. Your pharmacy may indicate that no refills were authorized even though a new prescription for the same medication  is available at the pharmacy. Please request the medicine by name with the pharmacy before contacting your provider for a refill.           MyChart Access Code: Activation code not generated  Current MyChart Status: Active

## 2017-04-13 NOTE — Clinical Note
[facility]   [address]  [place] [address]  Phone: [phone]  Fax: [phone]              Kun Lisa  1950    Encounter Date: 4/13/2017    Marbin Haddad M.D.          PROGRESS NOTE:  Subjective:   Kun Lisa is a 66 y.o. male who presents today for follow-up evaluation because of history of coronary artery disease. He also has hypertension and dyslipidemia in addition to type II diabetes.    In 1997, he apparently had a small heart attack. He underwent evaluation and ultimately coronary angiography. During that procedure he received a stent to the LAD.    He was having palpitations and his event monitor showed some nonsustained VT. He has therefore undergone evaluation with myocardial perfusion scan.    He continues to have palpitations up to about 15 seconds. They are associated with some lightheadedness. He has had no syncopal episodes.    He's had no chest or neck discomfort. He denies any dyspnea on exertion, PND or orthopnea. He occasionally notes some edema.            Past Medical History   Diagnosis Date   • Past heart attack    • CAD (coronary artery disease)    • Hyperlipidemia    • Hypertension    • Palpitations    • Obstructive sleep apnea    • Diabetes (CMS-[facility])      Past Surgical History   Procedure Laterality Date   • Stent placement       LAD   • Angioplasty       Family History   Problem Relation Age of Onset   • Stroke Father 57     History   Smoking status   • Former Smoker -- 1.00 packs/day for 30 years   • Types: Cigarettes   • Quit date: 06/25/2002   Smokeless tobacco   • Never Used     Allergies   Allergen Reactions   • Metformin Unspecified     Stomach upset and acid reflux     Outpatient Encounter Prescriptions as of 4/13/2017   Medication Sig Dispense Refill   • rosuvastatin (CRESTOR) 20 MG Tab Take 1 Tab by mouth every evening. 90 Tab 3   • metoprolol SR (TOPROL XL) 50 MG TABLET SR 24 HR Take 1 Tab  "by mouth every day. 90 Tab 3   • ramipril (ALTACE) 10 MG capsule Take 1 Cap by mouth every day. 90 Cap 3   • GLIPIZIDE XL 10 MG TABLET SR 24 HR TAKE 1 TABLET ORALLY DAILY 90 Tab 0   • simvastatin (ZOCOR) 20 MG Tab Take 1 Tab by mouth every evening. 90 Tab 2   • sitagliptin (JANUVIA) 100 MG Tab Take 1 Tab by mouth every day. 90 Tab 2   • tramadol (ULTRAM) 50 MG Tab Take 1 Tab by mouth every four hours as needed. 90 Tab 2   • niacin 250 MG Tab Take 250 mg by mouth every day.     • aspirin (ASA) 81 MG Chew Tab chewable tablet Take 81 mg by mouth every day.     • Glucosamine-Chondroitin (GLUCOSAMINE CHONDROITIN COMPLX) 500-250 MG Cap Take  by mouth.     • nitroglycerin (NITROSTAT) 0.4 MG SL Tab Place 0.4 mg under tongue every 5 minutes as needed for Chest Pain.       No facility-administered encounter medications on file as of 4/13/2017.     ROS       Objective:   /70 mmHg  Pulse 64  Ht 1.905 m (6' 3\")  Wt 141.522 kg (312 lb)  BMI 39.00 kg/m2    Physical Exam   Neck: No JVD present.   Cardiovascular: Normal rate and regular rhythm.  Exam reveals no gallop.    No murmur heard.  Pulmonary/Chest: Effort normal. He has no rales.   Abdominal: Soft. There is no tenderness.   Musculoskeletal: He exhibits edema (trace to 1+ pretibial).     Lab Results   Component Value Date/Time    CHOLESTEROL, 03/14/2017 08:46 AM    LDL 55 03/14/2017 08:46 AM    HDL 36* 03/14/2017 08:46 AM    TRIGLYCERIDES 298* 03/14/2017 08:46 AM       Lab Results   Component Value Date/Time    SODIUM 136 03/14/2017 08:46 AM    POTASSIUM 4.3 03/14/2017 08:46 AM    CHLORIDE 101 03/14/2017 08:46 AM    CO2 26 03/14/2017 08:46 AM    GLUCOSE 223* 03/14/2017 08:46 AM    BUN 12 03/14/2017 08:46 AM    CREATININE 0.85 03/14/2017 08:46 AM     Lab Results   Component Value Date/Time    ALKALINE PHOSPHATASE 50 03/14/2017 08:46 AM    AST(SGOT) 18 03/14/2017 08:46 AM    ALT(SGPT) 30 03/14/2017 08:46 AM    TOTAL BILIRUBIN 0.5 03/14/2017 08:46 AM     "     Myocardial perfusion scan:  NUCLEAR IMAGING INTERPRETATION   Small sized, partially reversible, decreased uptake of moderate severity in    the apical inferior (RCA), mid inferior (RCA) segments during post stress    images.    Medium sized, partially reversible, decreased uptake of moderate severity in    the apical lateral (LCX), inferolateral (LCX) and mid anterolateral (LCX)    segments during post stress images.    Normal left ventricular wall motion.  LV ejection fraction = 62%.   ECG INTERPRETATION   Negative stress ECG for ischemia.        Assessment:     1. Coronary artery disease due to calcified coronary lesion: LAD stent in 1997     2. Essential hypertension     3. Dyslipidemia     4. Palpitations         Medical Decision Making:  Today's Assessment / Status / Plan:     Mr. Lisa is having difficulty with palpitations which are likely due to nonsustained ventricular tachycardia. His perfusion scan shows significant ischemia. At this time, he'll be scheduled for cardiac catheterization. We will increase metoprolol to 75 mg daily. We will try to obtain the cardiac catheterization within the next week.    Addendum: Mr. Lisa wants to think about whether or not he wants to proceed with cardiac catheterization and revascularization as necessary. We will increase his medical therapy and he will follow-up in a couple weeks.      Kit Torres PA-C  [address]  [address]  [place] [address]  VIA In Basket

## 2017-04-13 NOTE — PROGRESS NOTES
Subjective:   Kun Lisa is a 66 y.o. male who presents today for follow-up evaluation because of history of coronary artery disease. He also has hypertension and dyslipidemia in addition to type II diabetes.    In 1997, he apparently had a small heart attack. He underwent evaluation and ultimately coronary angiography. During that procedure he received a stent to the LAD.    He was having palpitations and his event monitor showed some nonsustained VT. He has therefore undergone evaluation with myocardial perfusion scan.    He continues to have palpitations up to about 15 seconds. They are associated with some lightheadedness. He has had no syncopal episodes.    He's had no chest or neck discomfort. He denies any dyspnea on exertion, PND or orthopnea. He occasionally notes some edema.            Past Medical History   Diagnosis Date   • Past heart attack    • CAD (coronary artery disease)    • Hyperlipidemia    • Hypertension    • Palpitations    • Obstructive sleep apnea    • Diabetes (CMS-[facility])      Past Surgical History   Procedure Laterality Date   • Stent placement       LAD   • Angioplasty       Family History   Problem Relation Age of Onset   • Stroke Father 57     History   Smoking status   • Former Smoker -- 1.00 packs/day for 30 years   • Types: Cigarettes   • Quit date: 06/25/2002   Smokeless tobacco   • Never Used     Allergies   Allergen Reactions   • Metformin Unspecified     Stomach upset and acid reflux     Outpatient Encounter Prescriptions as of 4/13/2017   Medication Sig Dispense Refill   • rosuvastatin (CRESTOR) 20 MG Tab Take 1 Tab by mouth every evening. 90 Tab 3   • metoprolol SR (TOPROL XL) 50 MG TABLET SR 24 HR Take 1 Tab by mouth every day. 90 Tab 3   • ramipril (ALTACE) 10 MG capsule Take 1 Cap by mouth every day. 90 Cap 3   • GLIPIZIDE XL 10 MG TABLET SR 24 HR TAKE 1 TABLET ORALLY DAILY 90 Tab 0   • simvastatin (ZOCOR) 20 MG Tab Take 1 Tab by mouth every evening. 90 Tab 2   •  "sitagliptin (JANUVIA) 100 MG Tab Take 1 Tab by mouth every day. 90 Tab 2   • tramadol (ULTRAM) 50 MG Tab Take 1 Tab by mouth every four hours as needed. 90 Tab 2   • niacin 250 MG Tab Take 250 mg by mouth every day.     • aspirin (ASA) 81 MG Chew Tab chewable tablet Take 81 mg by mouth every day.     • Glucosamine-Chondroitin (GLUCOSAMINE CHONDROITIN COMPLX) 500-250 MG Cap Take  by mouth.     • nitroglycerin (NITROSTAT) 0.4 MG SL Tab Place 0.4 mg under tongue every 5 minutes as needed for Chest Pain.       No facility-administered encounter medications on file as of 4/13/2017.     ROS       Objective:   /70 mmHg  Pulse 64  Ht 1.905 m (6' 3\")  Wt 141.522 kg (312 lb)  BMI 39.00 kg/m2    Physical Exam   Neck: No JVD present.   Cardiovascular: Normal rate and regular rhythm.  Exam reveals no gallop.    No murmur heard.  Pulmonary/Chest: Effort normal. He has no rales.   Abdominal: Soft. There is no tenderness.   Musculoskeletal: He exhibits edema (trace to 1+ pretibial).     Lab Results   Component Value Date/Time    CHOLESTEROL, 03/14/2017 08:46 AM    LDL 55 03/14/2017 08:46 AM    HDL 36* 03/14/2017 08:46 AM    TRIGLYCERIDES 298* 03/14/2017 08:46 AM       Lab Results   Component Value Date/Time    SODIUM 136 03/14/2017 08:46 AM    POTASSIUM 4.3 03/14/2017 08:46 AM    CHLORIDE 101 03/14/2017 08:46 AM    CO2 26 03/14/2017 08:46 AM    GLUCOSE 223* 03/14/2017 08:46 AM    BUN 12 03/14/2017 08:46 AM    CREATININE 0.85 03/14/2017 08:46 AM     Lab Results   Component Value Date/Time    ALKALINE PHOSPHATASE 50 03/14/2017 08:46 AM    AST(SGOT) 18 03/14/2017 08:46 AM    ALT(SGPT) 30 03/14/2017 08:46 AM    TOTAL BILIRUBIN 0.5 03/14/2017 08:46 AM     Myocardial perfusion scan:  NUCLEAR IMAGING INTERPRETATION   Small sized, partially reversible, decreased uptake of moderate severity in    the apical inferior (RCA), mid inferior (RCA) segments during post stress    images.    Medium sized, partially reversible, " decreased uptake of moderate severity in    the apical lateral (LCX), inferolateral (LCX) and mid anterolateral (LCX)    segments during post stress images.    Normal left ventricular wall motion.  LV ejection fraction = 62%.   ECG INTERPRETATION   Negative stress ECG for ischemia.        Assessment:     1. Coronary artery disease due to calcified coronary lesion: LAD stent in 1997     2. Essential hypertension     3. Dyslipidemia     4. Palpitations         Medical Decision Making:  Today's Assessment / Status / Plan:     Mr. Lisa is having difficulty with palpitations which are likely due to nonsustained ventricular tachycardia. His perfusion scan shows significant ischemia. At this time, he'll be scheduled for cardiac catheterization. We will increase metoprolol to 75 mg daily. We will try to obtain the cardiac catheterization within the next week.    Addendum: Mr. Lisa wants to think about whether or not he wants to proceed with cardiac catheterization and revascularization as necessary. We will increase his medical therapy and he will follow-up in a couple weeks.

## 2017-04-19 PROCEDURE — 93272 ECG/REVIEW INTERPRET ONLY: CPT | Performed by: INTERNAL MEDICINE

## 2017-04-19 NOTE — TELEPHONE ENCOUNTER
Had to contact our rep from the [employer] company as the final report was never posted to their website.  The final report was printed and given to Dr. Haddad for review.  EOS printed and given to the  Pending signature.

## 2017-04-20 ENCOUNTER — TELEPHONE (OUTPATIENT)
Dept: CARDIOLOGY | Facility: MEDICAL CENTER | Age: 67
End: 2017-04-20

## 2017-04-20 NOTE — TELEPHONE ENCOUNTER
Called patient and advised him of Dr. Haddad's answers to his questions. Patient verbalized understanding, he will keep his appointment for tomorrow.    NESSA TREVINO

## 2017-04-20 NOTE — TELEPHONE ENCOUNTER
----- Message from Marbin Haddad M.D. sent at 4/19/2017 12:54 PM PDT -----  Regarding: RE: MyChart Messages from Patient  #1. The reason for scheduling the patient with SM: This was because I have no appointments available for the next 3 months and I feel he should have the procedure sooner. If he decides to proceed with the procedure then SM can get it scheduled.     #2 The reason to proceed to coronary angiography is because nonsustained ventricular tachycardia this frequently caused by ischemia. This means not enough blood flow to the heart muscle. Cardiac catheterization will determine whether or not the patient is a candidate for revascularization therapy. This is the best therapy for ischemia causing nonsustained VT. If he is not a candidate, then he may need to undergo electrophysiologic evaluation. That could lead to a AICD.    #3 If the patient does not feel that his care is adequate and that our communication is not optimal. We will be glad to refer him to another cardiologist in our group or outside a group at his discretion.    ----- Message -----     From: Khadra Nathan R.N.     Sent: 4/19/2017  10:59 AM       To: Marbin Haddad M.D.  Subject: MyChart Messages from Patient                    Hi Kun Rachel sent the [facility] messages below with multiple questions about your recommendation that he have an angiogram.     He also wanted to see you for follow up an not an APRN, so he is scheduled to see you on 4/21/2017.    JNS RN    ++++++++++++++++++++++++++++++++++++++++++++++++++  ----- Message -----      From: Kun Lisa      Sent: 4/19/2017  10:37 AM        To: Jann Aguirre/Richi   Subject: Procedure Question                                 I have some questions about the procedures you recommended for me after my wearing of a heart monitor and a chemical stress test. When these questions are answered, I will be able to make a decision about this recommendation.   First, I  want to know why you scheduled an appointment for me with JOE RAMÍREZ on 4/27/17. If this is to answer my questions, I feel I should be meeting with my doctor rather than someone I've never met.   Tammie limits my message length, but I have more questions.     Here are my questions.     1) Is this procedure associated with my arrhythmia? I ask because I've learned that arrhythmia indicates an electrical problem, not a blood flow problem. My impression is that angioplasty and stent implants are meant to solve flow problems.     2) Is it possible that the surgeon will not be able to perform the procedure through my wrist, but may be forced to enter through my groin. If so, would this require an extended hospital stay.     More questions:  3)You have recommended angioplasty and two stent implants. This is after diagnosis through monitor and stress tests, could other issues exist in this arena after these tests that would require additional work.    Finally, I have problems with the communication between us, you and me. I feel that our communication has not been effective or complete. I need improved communication with you in order to trust your judgment. Please reschedule my appointment to meet with you, not JOE

## 2017-04-21 ENCOUNTER — OFFICE VISIT (OUTPATIENT)
Dept: CARDIOLOGY | Facility: MEDICAL CENTER | Age: 67
End: 2017-04-21
Payer: MEDICARE

## 2017-04-21 ENCOUNTER — TELEPHONE (OUTPATIENT)
Dept: CARDIOLOGY | Facility: MEDICAL CENTER | Age: 67
End: 2017-04-21

## 2017-04-21 VITALS
BODY MASS INDEX: 39.04 KG/M2 | WEIGHT: 314 LBS | HEART RATE: 60 BPM | HEIGHT: 75 IN | DIASTOLIC BLOOD PRESSURE: 78 MMHG | SYSTOLIC BLOOD PRESSURE: 122 MMHG

## 2017-04-21 DIAGNOSIS — R00.2 PALPITATIONS: ICD-10-CM

## 2017-04-21 DIAGNOSIS — I25.84 CORONARY ARTERY DISEASE DUE TO CALCIFIED CORONARY LESION: ICD-10-CM

## 2017-04-21 DIAGNOSIS — I10 ESSENTIAL HYPERTENSION: ICD-10-CM

## 2017-04-21 DIAGNOSIS — I25.10 CORONARY ARTERY DISEASE DUE TO CALCIFIED CORONARY LESION: ICD-10-CM

## 2017-04-21 DIAGNOSIS — E78.5 DYSLIPIDEMIA: ICD-10-CM

## 2017-04-21 PROCEDURE — 1101F PT FALLS ASSESS-DOCD LE1/YR: CPT | Mod: 8P | Performed by: INTERNAL MEDICINE

## 2017-04-21 PROCEDURE — G8419 CALC BMI OUT NRM PARAM NOF/U: HCPCS | Performed by: INTERNAL MEDICINE

## 2017-04-21 PROCEDURE — 99214 OFFICE O/P EST MOD 30 MIN: CPT | Performed by: INTERNAL MEDICINE

## 2017-04-21 PROCEDURE — G8432 DEP SCR NOT DOC, RNG: HCPCS | Performed by: INTERNAL MEDICINE

## 2017-04-21 PROCEDURE — 3017F COLORECTAL CA SCREEN DOC REV: CPT | Mod: 8P | Performed by: INTERNAL MEDICINE

## 2017-04-21 PROCEDURE — G8598 ASA/ANTIPLAT THER USED: HCPCS | Performed by: INTERNAL MEDICINE

## 2017-04-21 PROCEDURE — 1036F TOBACCO NON-USER: CPT | Performed by: INTERNAL MEDICINE

## 2017-04-21 PROCEDURE — 4040F PNEUMOC VAC/ADMIN/RCVD: CPT | Mod: 8P | Performed by: INTERNAL MEDICINE

## 2017-04-21 NOTE — Clinical Note
[facility]   [address], [address]  [place] [address]  Phone: [phone]  Fax: [phone]              Kun Lisa  1950    Encounter Date: 4/21/2017    Marbin Haddad M.D.          PROGRESS NOTE:  Subjective:   Kun Lisa is a 66 y.o. male who presents today for follow-up evaluation because of history of coronary artery disease. He also has hypertension and dyslipidemia in addition to type II diabetes.    In 1997, he apparently had a small heart attack. He underwent evaluation and ultimately coronary angiography. During that procedure he received a stent to the LAD.    He was having palpitations and his event monitor showed some nonsustained VT. He has therefore undergone evaluation with myocardial perfusion scan.    He continues to have palpitations up to about 15 seconds. They are associated with some lightheadedness. He has had no syncopal episodes.    He's had no chest or neck discomfort. He denies any dyspnea on exertion, PND or orthopnea. He occasionally notes some edema.    At the time of his last visit, coronary angiography and revascularization was recommended because of his nonsustained VT. However, he wanted to consider whether or not to proceed with this procedure. He returned today for follow-up in order to get answers for his multiple concerns.    Past Medical History   Diagnosis Date   • Past heart attack    • CAD (coronary artery disease)    • Hyperlipidemia    • Hypertension    • Palpitations    • Obstructive sleep apnea    • Diabetes (CMS-[facility])      Past Surgical History   Procedure Laterality Date   • Stent placement       LAD   • Angioplasty       Family History   Problem Relation Age of Onset   • Stroke Father 57     History   Smoking status   • Former Smoker -- 1.00 packs/day for 30 years   • Types: Cigarettes   • Quit date: 06/25/2002   Smokeless tobacco   • Never Used     Allergies   Allergen Reactions   • Metformin  "Unspecified     Stomach upset and acid reflux     Outpatient Encounter Prescriptions as of 4/21/2017   Medication Sig Dispense Refill   • metoprolol (LOPRESSOR) 50 MG Tab Take 1.5 Tabs by mouth 2 times a day. 90 Tab 11   • rosuvastatin (CRESTOR) 20 MG Tab Take 1 Tab by mouth every evening. 90 Tab 3   • ramipril (ALTACE) 10 MG capsule Take 1 Cap by mouth every day. 90 Cap 3   • GLIPIZIDE XL 10 MG TABLET SR 24 HR TAKE 1 TABLET ORALLY DAILY 90 Tab 0   • simvastatin (ZOCOR) 20 MG Tab Take 1 Tab by mouth every evening. 90 Tab 2   • sitagliptin (JANUVIA) 100 MG Tab Take 1 Tab by mouth every day. 90 Tab 2   • tramadol (ULTRAM) 50 MG Tab Take 1 Tab by mouth every four hours as needed. 90 Tab 2   • niacin 250 MG Tab Take 250 mg by mouth every day.     • aspirin (ASA) 81 MG Chew Tab chewable tablet Take 81 mg by mouth every day.     • Glucosamine-Chondroitin (GLUCOSAMINE CHONDROITIN COMPLX) 500-250 MG Cap Take  by mouth.     • nitroglycerin (NITROSTAT) 0.4 MG SL Tab Place 0.4 mg under tongue every 5 minutes as needed for Chest Pain.       No facility-administered encounter medications on file as of 4/21/2017.     ROS       Objective:   /78 mmHg  Pulse 60  Ht 1.905 m (6' 3\")  Wt 142.429 kg (314 lb)  BMI 39.25 kg/m2    Physical Exam   Neck: No JVD present.   Cardiovascular: Normal rate and regular rhythm.  Exam reveals no gallop.    No murmur heard.  Pulmonary/Chest: Effort normal. He has no rales.   Abdominal: Soft. There is no tenderness.   Musculoskeletal: He exhibits edema (trace to 1+ pretibial).     Lab Results   Component Value Date/Time    CHOLESTEROL, 03/14/2017 08:46 AM    LDL 55 03/14/2017 08:46 AM    HDL 36* 03/14/2017 08:46 AM    TRIGLYCERIDES 298* 03/14/2017 08:46 AM       Lab Results   Component Value Date/Time    SODIUM 136 03/14/2017 08:46 AM    POTASSIUM 4.3 03/14/2017 08:46 AM    CHLORIDE 101 03/14/2017 08:46 AM    CO2 26 03/14/2017 08:46 AM    GLUCOSE 223* 03/14/2017 08:46 AM    BUN 12 " 03/14/2017 08:46 AM    CREATININE 0.85 03/14/2017 08:46 AM     Lab Results   Component Value Date/Time    ALKALINE PHOSPHATASE 50 03/14/2017 08:46 AM    AST(SGOT) 18 03/14/2017 08:46 AM    ALT(SGPT) 30 03/14/2017 08:46 AM    TOTAL BILIRUBIN 0.5 03/14/2017 08:46 AM     Myocardial perfusion scan:  NUCLEAR IMAGING INTERPRETATION   Small sized, partially reversible, decreased uptake of moderate severity in    the apical inferior (RCA), mid inferior (RCA) segments during post stress    images.    Medium sized, partially reversible, decreased uptake of moderate severity in    the apical lateral (LCX), inferolateral (LCX) and mid anterolateral (LCX)    segments during post stress images.    Normal left ventricular wall motion.  LV ejection fraction = 62%.   ECG INTERPRETATION   Negative stress ECG for ischemia.        Assessment:     1. Coronary artery disease due to calcified coronary lesion: LAD stent in 1997     2. Essential hypertension     3. Dyslipidemia         Medical Decision Making:  Today's Assessment / Status / Plan:     Mr. Lisa is having difficulty with palpitations which are likely due to nonsustained ventricular tachycardia. His perfusion scan shows significant ischemia. We had an extensive discussion about the pathophysiology of ventricular dysrhythmias versus supraventricular dysrhythmias. We also discussed perfusion imaging and evidence of ischemia and/or infarction. In addition, Mr. Lisa was concerned about therapy for any possible findings. We discussed the possibility of percutaneous intervention, coronary angiography and further EP evaluation if no revascularization could be performed. After discussion,  has decided to proceed with coronary angiography and revascularization as necessary. This will be scheduled for next week.      Kit Torres, STU  [address]  [address]  [facility] [address]  VIA In Basket

## 2017-04-21 NOTE — MR AVS SNAPSHOT
"        Kun Lisa   2017 10:15 AM   Office Visit   MRN: [id number]    Department:  [facility]   Dept Phone:  [phone]    Description:  Male : 1950   Provider:  Marbin Haddad M.D.           Reason for Visit     Follow-Up           Allergies as of 2017     Allergen Noted Reactions    Metformin 10/25/2016   Unspecified    Stomach upset and acid reflux      You were diagnosed with     Coronary artery disease due to calcified coronary lesion   [0476770]       Essential hypertension   [5931195]       Dyslipidemia   [118364]         Vital Signs     Blood Pressure Pulse Height Weight Body Mass Index Smoking Status    122/78 mmHg 60 1.905 m (6' 3\") 142.429 kg (314 lb) 39.25 kg/m2 Former Smoker      Basic Information     Date Of Birth Sex Race Ethnicity Preferred Language    1950 Male White Non- English      Your appointments     2017  1:45 PM   Scheduled Pre Admission with [facility]   [facility] [place] (--)    [address]  [place] [address]   [phone]            2017  9:00 AM   PREVIOUS PATIENT with SALMA Augustine   [facility] (--)    [address], [facility]  [place] [address]   [phone]            2017 11:45 AM   FOLLOW UP with Marbin Haddad M.D.   [facility] (--)    [address], [address] [facility] [address]  [place] [address]   [phone]              Problem List              ICD-10-CM Priority Class Noted - Resolved    Type 2 diabetes mellitus with neurologic complication (CMS-[facility]) E11.49   10/25/2016 - Present    Dyslipidemia E78.5   10/25/2016 - Present    Essential hypertension I10   10/25/2016 - Present    Arrhythmia I49.9   10/25/2016 - Present    Neuropathy (CMS-[facility]) G62.9   10/25/2016 - Present    Preventative health care Z00.00   10/25/2016 - Present    Left foot pain M79.672   10/25/2016 - Present    Morbid obesity due to excess calories " (CMS-HCC) E66.01   10/27/2016 - Present    Obstructive sleep apnea syndrome G47.33   11/22/2016 - Present    Coronary artery disease due to calcified coronary lesion: LAD stent in 1997 I25.10, I25.84   1/20/2017 - Present    Palpitations R00.2   1/20/2017 - Present      Health Maintenance        Date Due Completion Dates    RETINAL SCREENING 6/19/1969 ---    IMM DTaP/Tdap/Td Vaccine (1 - Tdap) 6/19/1970 ---    COLONOSCOPY 6/19/2001 ---    IMM ZOSTER VACCINE 6/19/2011 ---    IMM PNEUMOCOCCAL 65+ (ADULT) LOW/MEDIUM RISK SERIES (1 of 2 - PCV13) 6/19/2016 ---    A1C SCREENING 5/8/2017 11/8/2016    URINE ACR / MICROALBUMIN 11/8/2017 11/8/2016    DIABETES MONOFILAMENT / LE EXAM 11/22/2017 11/22/2016    FASTING LIPID PROFILE 3/14/2018 3/14/2017, 11/8/2016    SERUM CREATININE 3/14/2018 3/14/2017, 11/8/2016            Current Immunizations     Influenza Vaccine Adult HD 10/25/2016  4:23 PM      Below and/or attached are the medications your provider expects you to take. Review all of your home medications and newly ordered medications with your provider and/or pharmacist. Follow medication instructions as directed by your provider and/or pharmacist. Please keep your medication list with you and share with your provider. Update the information when medications are discontinued, doses are changed, or new medications (including over-the-counter products) are added; and carry medication information at all times in the event of emergency situations     Allergies:  METFORMIN - Unspecified               Medications  Valid as of: April 21, 2017 - 10:56 AM    Generic Name Brand Name Tablet Size Instructions for use    Aspirin (Chew Tab) ASA 81 MG Take 81 mg by mouth every day.        GlipiZIDE (TABLET SR 24 HR) GLIPIZIDE XL 10 MG TAKE 1 TABLET ORALLY DAILY        Glucosamine-Chondroitin (Cap) Glucosamine-Chondroitin 500-250 MG Take  by mouth.        Metoprolol Tartrate (Tab) LOPRESSOR 50 MG Take 1.5 Tabs by mouth 2 times a day.           Niacin (Tab) niacin 250 MG Take 250 mg by mouth every day.        Nitroglycerin (SL Tab) NITROSTAT 0.4 MG Place 0.4 mg under tongue every 5 minutes as needed for Chest Pain.        Ramipril (Cap) ALTACE 10 MG Take 1 Cap by mouth every day.        Rosuvastatin Calcium (Tab) CRESTOR 20 MG Take 1 Tab by mouth every evening.        Simvastatin (Tab) ZOCOR 20 MG Take 1 Tab by mouth every evening.        SITagliptin Phosphate (Tab) JANUVIA 100 MG Take 1 Tab by mouth every day.        TraMADol HCl (Tab) ULTRAM 50 MG Take 1 Tab by mouth every four hours as needed.        .                 Medicines prescribed today were sent to:     [id number] - [place], [place] - [address]    [address] [place] [address]    Phone: [phone] Fax: [phone]    Open 24 Hours?: No    [facility] - [place], [place] - [address] AT PORTAL TO REGISTERED [facility] [address]    [address] [facility] [address]    Phone: [phone] Fax: [phone]    Open 24 Hours?: No      Medication refill instructions:       If your prescription bottle indicates you have medication refills left, it is not necessary to call your provider’s office. Please contact your pharmacy and they will refill your medication.    If your prescription bottle indicates you do not have any refills left, you may request refills at any time through one of the following ways: The online [employer] system (except Urgent Care), by calling your provider’s office, or by asking your pharmacy to contact your provider’s office with a refill request. Medication refills are processed only during regular business hours and may not be available until the next business day. Your provider may request additional information or to have a follow-up visit with you prior to refilling your medication.   *Please Note: Medication refills are assigned a new Rx number when refilled electronically. Your pharmacy may indicate that no refills were authorized  even though a new prescription for the same medication is available at the pharmacy. Please request the medicine by name with the pharmacy before contacting your provider for a refill.           MyChart Access Code: Activation code not generated  Current [employer] Status: Active

## 2017-04-21 NOTE — TELEPHONE ENCOUNTER
Patient is scheduled on 4-27-17 for [facility] w/poss with Dr. Avila at [facility]. Patient was told hold Glipizide and Januvia am of procedure. Patient was told to check in at 6:00am for a 7:30 procedure.

## 2017-04-21 NOTE — PROGRESS NOTES
Subjective:   Kun Lisa is a 66 y.o. male who presents today for follow-up evaluation because of history of coronary artery disease. He also has hypertension and dyslipidemia in addition to type II diabetes.    In 1997, he apparently had a small heart attack. He underwent evaluation and ultimately coronary angiography. During that procedure he received a stent to the LAD.    He was having palpitations and his event monitor showed some nonsustained VT. He has therefore undergone evaluation with myocardial perfusion scan.    He continues to have palpitations up to about 15 seconds. They are associated with some lightheadedness. He has had no syncopal episodes.    He's had no chest or neck discomfort. He denies any dyspnea on exertion, PND or orthopnea. He occasionally notes some edema.    At the time of his last visit, coronary angiography and revascularization was recommended because of his nonsustained VT. However, he wanted to consider whether or not to proceed with this procedure. He returned today for follow-up in order to get answers for his multiple concerns.    Past Medical History   Diagnosis Date   • Past heart attack    • CAD (coronary artery disease)    • Hyperlipidemia    • Hypertension    • Palpitations    • Obstructive sleep apnea    • Diabetes ([facility])      Past Surgical History   Procedure Laterality Date   • Stent placement       LAD   • Angioplasty       Family History   Problem Relation Age of Onset   • Stroke Father 57     History   Smoking status   • Former Smoker -- 1.00 packs/day for 30 years   • Types: Cigarettes   • Quit date: 06/25/2002   Smokeless tobacco   • Never Used     Allergies   Allergen Reactions   • Metformin Unspecified     Stomach upset and acid reflux     Outpatient Encounter Prescriptions as of 4/21/2017   Medication Sig Dispense Refill   • metoprolol (LOPRESSOR) 50 MG Tab Take 1.5 Tabs by mouth 2 times a day. 90 Tab 11   • rosuvastatin (CRESTOR) 20 MG Tab Take 1 Tab  "by mouth every evening. 90 Tab 3   • ramipril (ALTACE) 10 MG capsule Take 1 Cap by mouth every day. 90 Cap 3   • GLIPIZIDE XL 10 MG TABLET SR 24 HR TAKE 1 TABLET ORALLY DAILY 90 Tab 0   • simvastatin (ZOCOR) 20 MG Tab Take 1 Tab by mouth every evening. 90 Tab 2   • sitagliptin (JANUVIA) 100 MG Tab Take 1 Tab by mouth every day. 90 Tab 2   • tramadol (ULTRAM) 50 MG Tab Take 1 Tab by mouth every four hours as needed. 90 Tab 2   • niacin 250 MG Tab Take 250 mg by mouth every day.     • aspirin (ASA) 81 MG Chew Tab chewable tablet Take 81 mg by mouth every day.     • Glucosamine-Chondroitin (GLUCOSAMINE CHONDROITIN COMPLX) 500-250 MG Cap Take  by mouth.     • nitroglycerin (NITROSTAT) 0.4 MG SL Tab Place 0.4 mg under tongue every 5 minutes as needed for Chest Pain.       No facility-administered encounter medications on file as of 4/21/2017.     ROS       Objective:   /78 mmHg  Pulse 60  Ht 1.905 m (6' 3\")  Wt 142.429 kg (314 lb)  BMI 39.25 kg/m2    Physical Exam   Neck: No JVD present.   Cardiovascular: Normal rate and regular rhythm.  Exam reveals no gallop.    No murmur heard.  Pulmonary/Chest: Effort normal. He has no rales.   Abdominal: Soft. There is no tenderness.   Musculoskeletal: He exhibits edema (trace to 1+ pretibial).     Lab Results   Component Value Date/Time    CHOLESTEROL, 03/14/2017 08:46 AM    LDL 55 03/14/2017 08:46 AM    HDL 36* 03/14/2017 08:46 AM    TRIGLYCERIDES 298* 03/14/2017 08:46 AM       Lab Results   Component Value Date/Time    SODIUM 136 03/14/2017 08:46 AM    POTASSIUM 4.3 03/14/2017 08:46 AM    CHLORIDE 101 03/14/2017 08:46 AM    CO2 26 03/14/2017 08:46 AM    GLUCOSE 223* 03/14/2017 08:46 AM    BUN 12 03/14/2017 08:46 AM    CREATININE 0.85 03/14/2017 08:46 AM     Lab Results   Component Value Date/Time    ALKALINE PHOSPHATASE 50 03/14/2017 08:46 AM    AST(SGOT) 18 03/14/2017 08:46 AM    ALT(SGPT) 30 03/14/2017 08:46 AM    TOTAL BILIRUBIN 0.5 03/14/2017 08:46 AM "     Myocardial perfusion scan:  NUCLEAR IMAGING INTERPRETATION   Small sized, partially reversible, decreased uptake of moderate severity in    the apical inferior (RCA), mid inferior (RCA) segments during post stress    images.    Medium sized, partially reversible, decreased uptake of moderate severity in    the apical lateral (LCX), inferolateral (LCX) and mid anterolateral (LCX)    segments during post stress images.    Normal left ventricular wall motion.  LV ejection fraction = 62%.   ECG INTERPRETATION   Negative stress ECG for ischemia.        Assessment:     1. Coronary artery disease due to calcified coronary lesion: LAD stent in 1997     2. Essential hypertension     3. Dyslipidemia         Medical Decision Making:  Today's Assessment / Status / Plan:     Mr. Lisa is having difficulty with palpitations which are likely due to nonsustained ventricular tachycardia. His perfusion scan shows significant ischemia. We had an extensive discussion about the pathophysiology of ventricular dysrhythmias versus supraventricular dysrhythmias. We also discussed perfusion imaging and evidence of ischemia and/or infarction. In addition, Mr. Lisa was concerned about therapy for any possible findings. We discussed the possibility of percutaneous intervention, coronary angiography and further EP evaluation if no revascularization could be performed. After discussion,  has decided to proceed with coronary angiography and revascularization as necessary. This will be scheduled for next week.

## 2017-04-25 ENCOUNTER — HOSPITAL ENCOUNTER (OUTPATIENT)
Dept: RADIOLOGY | Facility: MEDICAL CENTER | Age: 67
End: 2017-04-25
Attending: INTERNAL MEDICINE | Admitting: INTERNAL MEDICINE
Payer: MEDICARE

## 2017-04-25 DIAGNOSIS — Z01.811 PRE-OPERATIVE RESPIRATORY EXAMINATION: ICD-10-CM

## 2017-04-25 DIAGNOSIS — Z01.810 PRE-OPERATIVE CARDIOVASCULAR EXAMINATION: ICD-10-CM

## 2017-04-25 DIAGNOSIS — Z01.812 PRE-OPERATIVE LABORATORY EXAMINATION: ICD-10-CM

## 2017-04-25 LAB
ANION GAP SERPL CALC-SCNC: 10 MMOL/L (ref 0–11.9)
APTT PPP: 25.7 SEC (ref 24.7–36)
BUN SERPL-MCNC: 16 MG/DL (ref 8–22)
CALCIUM SERPL-MCNC: 9.7 MG/DL (ref 8.5–10.5)
CHLORIDE SERPL-SCNC: 98 MMOL/L (ref 96–112)
CO2 SERPL-SCNC: 25 MMOL/L (ref 20–33)
CREAT SERPL-MCNC: 0.91 MG/DL (ref 0.5–1.4)
ERYTHROCYTE [DISTWIDTH] IN BLOOD BY AUTOMATED COUNT: 45.5 FL (ref 35.9–50)
GFR SERPL CREATININE-BSD FRML MDRD: >60 ML/MIN/1.73 M 2
GLUCOSE SERPL-MCNC: 204 MG/DL (ref 65–99)
HCT VFR BLD AUTO: 45.9 % (ref 42–52)
HGB BLD-MCNC: 16 G/DL (ref 14–18)
INR PPP: 0.95 (ref 0.87–1.13)
MCH RBC QN AUTO: 32.3 PG (ref 27–33)
MCHC RBC AUTO-ENTMCNC: 34.9 G/DL (ref 33.7–35.3)
MCV RBC AUTO: 92.7 FL (ref 81.4–97.8)
PLATELET # BLD AUTO: 236 K/UL (ref 164–446)
PMV BLD AUTO: 10.4 FL (ref 9–12.9)
POTASSIUM SERPL-SCNC: 4.1 MMOL/L (ref 3.6–5.5)
PROTHROMBIN TIME: 13 SEC (ref 12–14.6)
RBC # BLD AUTO: 4.95 M/UL (ref 4.7–6.1)
SODIUM SERPL-SCNC: 133 MMOL/L (ref 135–145)
WBC # BLD AUTO: 8.7 K/UL (ref 4.8–10.8)

## 2017-04-25 PROCEDURE — 36415 COLL VENOUS BLD VENIPUNCTURE: CPT

## 2017-04-25 PROCEDURE — 80048 BASIC METABOLIC PNL TOTAL CA: CPT

## 2017-04-25 PROCEDURE — 71020 DX-CHEST-2 VIEWS: CPT

## 2017-04-25 PROCEDURE — 85610 PROTHROMBIN TIME: CPT

## 2017-04-25 PROCEDURE — 85730 THROMBOPLASTIN TIME PARTIAL: CPT

## 2017-04-25 PROCEDURE — 85027 COMPLETE CBC AUTOMATED: CPT

## 2017-04-25 RX ORDER — BACLOFEN 20 MG/1
20 TABLET ORAL PRN
COMMUNITY
End: 2019-08-13 | Stop reason: SDUPTHER

## 2017-04-25 RX ORDER — DIPHENHYDRAMINE HCL 25 MG
50 TABLET ORAL NIGHTLY PRN
COMMUNITY
End: 2017-08-29

## 2017-04-26 LAB — EKG IMPRESSION: NORMAL

## 2017-04-27 ENCOUNTER — HOSPITAL ENCOUNTER (OUTPATIENT)
Facility: MEDICAL CENTER | Age: 67
End: 2017-04-28
Attending: INTERNAL MEDICINE | Admitting: INTERNAL MEDICINE
Payer: MEDICARE

## 2017-04-27 DIAGNOSIS — I25.10 CORONARY ARTERY DISEASE DUE TO CALCIFIED CORONARY LESION: ICD-10-CM

## 2017-04-27 DIAGNOSIS — I25.84 CORONARY ARTERY DISEASE DUE TO CALCIFIED CORONARY LESION: ICD-10-CM

## 2017-04-27 PROBLEM — R94.39 ABNORMAL NUCLEAR STRESS TEST: Status: ACTIVE | Noted: 2017-04-27

## 2017-04-27 LAB
EKG IMPRESSION: NORMAL
EKG IMPRESSION: NORMAL
GLUCOSE BLD-MCNC: 138 MG/DL (ref 65–99)
GLUCOSE BLD-MCNC: 184 MG/DL (ref 65–99)
GLUCOSE BLD-MCNC: 193 MG/DL (ref 65–99)
GLUCOSE BLD-MCNC: 196 MG/DL (ref 65–99)

## 2017-04-27 PROCEDURE — C9600 PERC DRUG-EL COR STENT SING: HCPCS | Mod: LD

## 2017-04-27 PROCEDURE — C9602 PERC D-E COR STENT ATHER S: HCPCS | Mod: LC

## 2017-04-27 PROCEDURE — 700102 HCHG RX REV CODE 250 W/ 637 OVERRIDE(OP): Performed by: NURSE PRACTITIONER

## 2017-04-27 PROCEDURE — G0378 HOSPITAL OBSERVATION PER HR: HCPCS

## 2017-04-27 PROCEDURE — A9270 NON-COVERED ITEM OR SERVICE: HCPCS | Performed by: NURSE PRACTITIONER

## 2017-04-27 PROCEDURE — 99153 MOD SED SAME PHYS/QHP EA: CPT

## 2017-04-27 PROCEDURE — 93010 ELECTROCARDIOGRAM REPORT: CPT | Mod: 76 | Performed by: INTERNAL MEDICINE

## 2017-04-27 PROCEDURE — 93005 ELECTROCARDIOGRAM TRACING: CPT | Performed by: INTERNAL MEDICINE

## 2017-04-27 PROCEDURE — 304952 HCHG R 2 PADS

## 2017-04-27 PROCEDURE — 700102 HCHG RX REV CODE 250 W/ 637 OVERRIDE(OP)

## 2017-04-27 PROCEDURE — 99152 MOD SED SAME PHYS/QHP 5/>YRS: CPT

## 2017-04-27 PROCEDURE — C1874 STENT, COATED/COV W/DEL SYS: HCPCS

## 2017-04-27 PROCEDURE — 93454 CORONARY ARTERY ANGIO S&I: CPT

## 2017-04-27 PROCEDURE — C1725 CATH, TRANSLUMIN NON-LASER: HCPCS

## 2017-04-27 PROCEDURE — C1769 GUIDE WIRE: HCPCS

## 2017-04-27 PROCEDURE — 82962 GLUCOSE BLOOD TEST: CPT

## 2017-04-27 PROCEDURE — A9270 NON-COVERED ITEM OR SERVICE: HCPCS

## 2017-04-27 PROCEDURE — 700111 HCHG RX REV CODE 636 W/ 250 OVERRIDE (IP)

## 2017-04-27 PROCEDURE — C1887 CATHETER, GUIDING: HCPCS

## 2017-04-27 PROCEDURE — C1894 INTRO/SHEATH, NON-LASER: HCPCS

## 2017-04-27 PROCEDURE — 700101 HCHG RX REV CODE 250

## 2017-04-27 PROCEDURE — 700105 HCHG RX REV CODE 258: Performed by: INTERNAL MEDICINE

## 2017-04-27 PROCEDURE — 307093 HCHG TR BAND RADIAL

## 2017-04-27 PROCEDURE — 360979 HCHG DIAGNOSTIC CATH

## 2017-04-27 RX ORDER — MIDAZOLAM HYDROCHLORIDE 1 MG/ML
INJECTION INTRAMUSCULAR; INTRAVENOUS
Status: COMPLETED
Start: 2017-04-27 | End: 2017-04-27

## 2017-04-27 RX ORDER — DEXTROSE MONOHYDRATE 25 G/50ML
25 INJECTION, SOLUTION INTRAVENOUS
Status: DISCONTINUED | OUTPATIENT
Start: 2017-04-27 | End: 2017-04-28 | Stop reason: HOSPADM

## 2017-04-27 RX ORDER — AMOXICILLIN 250 MG
2 CAPSULE ORAL 2 TIMES DAILY
Status: DISCONTINUED | OUTPATIENT
Start: 2017-04-27 | End: 2017-04-28 | Stop reason: HOSPADM

## 2017-04-27 RX ORDER — ASPIRIN 81 MG/1
81 TABLET, CHEWABLE ORAL DAILY
Status: DISCONTINUED | OUTPATIENT
Start: 2017-04-27 | End: 2017-04-28 | Stop reason: HOSPADM

## 2017-04-27 RX ORDER — ACETAMINOPHEN 325 MG/1
650 TABLET ORAL EVERY 6 HOURS PRN
Status: DISCONTINUED | OUTPATIENT
Start: 2017-04-27 | End: 2017-04-28 | Stop reason: HOSPADM

## 2017-04-27 RX ORDER — NITROGLYCERIN 0.4 MG/1
0.4 TABLET SUBLINGUAL
Status: DISCONTINUED | OUTPATIENT
Start: 2017-04-27 | End: 2017-04-28 | Stop reason: HOSPADM

## 2017-04-27 RX ORDER — BIVALIRUDIN 250 MG/5ML
INJECTION, POWDER, LYOPHILIZED, FOR SOLUTION INTRAVENOUS
Status: COMPLETED
Start: 2017-04-27 | End: 2017-04-27

## 2017-04-27 RX ORDER — ADENOSINE 3 MG/ML
INJECTION, SOLUTION INTRAVENOUS
Status: COMPLETED
Start: 2017-04-27 | End: 2017-04-27

## 2017-04-27 RX ORDER — HEPARIN SODIUM,PORCINE 1000/ML
VIAL (ML) INJECTION
Status: COMPLETED
Start: 2017-04-27 | End: 2017-04-27

## 2017-04-27 RX ORDER — VERAPAMIL HYDROCHLORIDE 2.5 MG/ML
INJECTION, SOLUTION INTRAVENOUS
Status: COMPLETED
Start: 2017-04-27 | End: 2017-04-27

## 2017-04-27 RX ORDER — BACLOFEN 10 MG/1
10 TABLET ORAL 3 TIMES DAILY PRN
Status: DISCONTINUED | OUTPATIENT
Start: 2017-04-27 | End: 2017-04-28 | Stop reason: HOSPADM

## 2017-04-27 RX ORDER — SODIUM CHLORIDE 9 MG/ML
INJECTION, SOLUTION INTRAVENOUS
Status: DISCONTINUED | OUTPATIENT
Start: 2017-04-27 | End: 2017-04-28 | Stop reason: HOSPADM

## 2017-04-27 RX ORDER — SODIUM CHLORIDE 9 MG/ML
INJECTION, SOLUTION INTRAVENOUS CONTINUOUS
Status: ACTIVE | OUTPATIENT
Start: 2017-04-27 | End: 2017-04-27

## 2017-04-27 RX ORDER — DIPHENHYDRAMINE HCL 25 MG
25 TABLET ORAL EVERY 6 HOURS PRN
Status: DISCONTINUED | OUTPATIENT
Start: 2017-04-27 | End: 2017-04-28 | Stop reason: HOSPADM

## 2017-04-27 RX ORDER — RAMIPRIL 5 MG/1
10 CAPSULE ORAL DAILY
Status: DISCONTINUED | OUTPATIENT
Start: 2017-04-28 | End: 2017-04-28 | Stop reason: HOSPADM

## 2017-04-27 RX ORDER — GLIPIZIDE 10 MG/1
10 TABLET, FILM COATED, EXTENDED RELEASE ORAL
Status: DISCONTINUED | OUTPATIENT
Start: 2017-04-27 | End: 2017-04-28 | Stop reason: HOSPADM

## 2017-04-27 RX ORDER — BISACODYL 10 MG
10 SUPPOSITORY, RECTAL RECTAL
Status: DISCONTINUED | OUTPATIENT
Start: 2017-04-27 | End: 2017-04-28 | Stop reason: HOSPADM

## 2017-04-27 RX ORDER — POLYETHYLENE GLYCOL 3350 17 G/17G
1 POWDER, FOR SOLUTION ORAL
Status: DISCONTINUED | OUTPATIENT
Start: 2017-04-27 | End: 2017-04-28 | Stop reason: HOSPADM

## 2017-04-27 RX ORDER — LIDOCAINE HYDROCHLORIDE 20 MG/ML
INJECTION, SOLUTION INFILTRATION; PERINEURAL
Status: COMPLETED
Start: 2017-04-27 | End: 2017-04-27

## 2017-04-27 RX ORDER — ROSUVASTATIN CALCIUM 20 MG/1
20 TABLET, COATED ORAL EVERY EVENING
Status: DISCONTINUED | OUTPATIENT
Start: 2017-04-27 | End: 2017-04-28 | Stop reason: HOSPADM

## 2017-04-27 RX ADMIN — BIVALIRUDIN 0.2 MG/KG/HR: 250 INJECTION, POWDER, LYOPHILIZED, FOR SOLUTION INTRAVENOUS at 09:59

## 2017-04-27 RX ADMIN — ASPIRIN 325 MG: 325 TABLET, DELAYED RELEASE ORAL at 06:30

## 2017-04-27 RX ADMIN — SITAGLIPTIN 100 MG: 100 TABLET, FILM COATED ORAL at 13:46

## 2017-04-27 RX ADMIN — LIDOCAINE HYDROCHLORIDE: 20 INJECTION, SOLUTION INFILTRATION; PERINEURAL at 08:31

## 2017-04-27 RX ADMIN — FENTANYL CITRATE 100 MCG: 50 INJECTION, SOLUTION INTRAMUSCULAR; INTRAVENOUS at 09:59

## 2017-04-27 RX ADMIN — VERAPAMIL HYDROCHLORIDE 5 MG: 2.5 INJECTION, SOLUTION INTRAVENOUS at 08:31

## 2017-04-27 RX ADMIN — ROSUVASTATIN CALCIUM 20 MG: 20 TABLET ORAL at 20:23

## 2017-04-27 RX ADMIN — BIVALIRUDIN 250 MG: 250 INJECTION, POWDER, LYOPHILIZED, FOR SOLUTION INTRAVENOUS at 09:58

## 2017-04-27 RX ADMIN — SODIUM CHLORIDE: 9 INJECTION, SOLUTION INTRAVENOUS at 13:04

## 2017-04-27 RX ADMIN — HEPARIN SODIUM: 1000 INJECTION, SOLUTION INTRAVENOUS; SUBCUTANEOUS at 08:31

## 2017-04-27 RX ADMIN — NITROGLYCERIN 10 ML: 20 INJECTION INTRAVENOUS at 08:31

## 2017-04-27 RX ADMIN — HEPARIN SODIUM 2000 UNITS: 200 INJECTION, SOLUTION INTRAVENOUS at 08:30

## 2017-04-27 RX ADMIN — TICAGRELOR 180 MG: 90 TABLET ORAL at 10:00

## 2017-04-27 RX ADMIN — MIDAZOLAM 2 MG: 1 INJECTION INTRAMUSCULAR; INTRAVENOUS at 09:59

## 2017-04-27 RX ADMIN — MIDAZOLAM 1 MG: 1 INJECTION INTRAMUSCULAR; INTRAVENOUS at 10:00

## 2017-04-27 ASSESSMENT — PAIN SCALES - GENERAL
PAINLEVEL_OUTOF10: 0

## 2017-04-27 NOTE — IP AVS SNAPSHOT
Magnetecs Access Code: Activation code not generated  Current Magnetecs Status: Active    [employer]  A secure, online tool to manage your health information     [employer]’s Magnetecs® is a secure, online tool that connects you to your personalized health information from the privacy of your home -- day or night - making it very easy for you to manage your healthcare. Once the activation process is completed, you can even access your medical information using the Magnetecs jolynn, which is available for free in the Apple Jolynn store or Google Play store.     Magnetecs provides the following levels of access (as shown below):   My Chart Features   [facility] Primary Care Doctor [facility]  Specialists [facility]  Urgent  Care Non-[facility]  Primary Care  Doctor   Email your healthcare team securely and privately 24/7 X X X X   Manage appointments: schedule your next appointment; view details of past/upcoming appointments X      Request prescription refills. X      View recent personal medical records, including lab and immunizations X X X X   View health record, including health history, allergies, medications X X X X   Read reports about your outpatient visits, procedures, consult and ER notes X X X X   See your discharge summary, which is a recap of your hospital and/or ER visit that includes your diagnosis, lab results, and care plan. X X       How to register for Magnetecs:  1. Go to  [email or web address].  2. Click on the Sign Up Now box, which takes you to the New Member Sign Up page. You will need to provide the following information:  a. Enter your Magnetecs Access Code exactly as it appears at the top of this page. (You will not need to use this code after you’ve completed the sign-up process. If you do not sign up before the expiration date, you must request a new code.)   b. Enter your date of birth.   c. Enter your home email address.   d. Click Submit, and follow the next screen’s instructions.  3. Create a Magnetecs ID. This will  be your [employer] login ID and cannot be changed, so think of one that is secure and easy to remember.  4. Create a [employer] password. You can change your password at any time.  5. Enter your Password Reset Question and Answer. This can be used at a later time if you forget your password.   6. Enter your e-mail address. This allows you to receive e-mail notifications when new information is available in [employer].  7. Click Sign Up. You can now view your health information.    For assistance activating your [employer] account, call [phone]

## 2017-04-27 NOTE — IP AVS SNAPSHOT
" Home Care Instructions                                                                                                                  Name:Kun Lisa  Medical Record Number:[id number]  CSN: [id number]    YOB: 1950   Age: 66 y.o.  Sex: male  HT:1.905 m (6' 3\") WT: 142.7 kg (314 lb 9.5 oz)          Admit Date: 4/27/2017     Discharge Date:   Today's Date: 4/28/2017  Attending Doctor:  Eric Avila M.D.                  Allergies:  Metformin            Discharge Instructions         Discharge Instructions    Discharged to home by car with friend. Discharged via wheelchair, hospital escort: Yes.  Special equipment needed: Not Applicable    Be sure to schedule a follow-up appointment with your primary care doctor or any specialists as instructed.     Discharge Plan:   Influenza Vaccine Indication: Not indicated: Previously immunized this influenza season and > 8 years of age    I understand that a diet low in cholesterol, fat, and sodium is recommended for good health. Unless I have been given specific instructions below for another diet, I accept this instruction as my diet prescription.   Other diet: Heart Healthy    Special Instructions:  Call and make an appointment with Cardiology office before 30 day free trial of brilinta runs out.   Diagnosis:  Acute Coronary Syndrome (ACS) is a diagnosis that encompasses cardiac-related chest pain and heart attack. ACS occurs when the blood flow to the heart muscle is severely reduced or cut off completely due to a slow process called atherosclerosis.  Atherosclerosis is a disease in which the coronary arteries become narrow from a buildup of fat, cholesterol, and other substances that combine to form plaque. If the plaque breaks, a blood clot will form and block the blood flow to the heart muscle. This lack of blood flow can cause damage or death to the heart muscle which is called a heart attack or Myocardial Infarction (MI). There are two different types " of MIs:  ST Elevation Myocardial Infarction or STEMI (the most severe type of heart attack) and Non-ST Elevation Myocardial Infarction or NSTEMI.    Treatment Plan:  · Cardiac Diet  - Low fat, low salt, low cholesterol   · Cardiac Rehab  - Your doctor has ordered you a referral to [facility].  Call [phone] to schedule an appointment.  · Attend my follow-up appointment with my Cardiologist.  · Take my medications as prescribed by my doctor  · Exercise daily  · lower my blood pressure    Medications:  Certain medications are used to treat ACS.  Remember to always take medications as prescribed and never stop talking medications unless told by your doctor.    You have been prescribed the following medicatons:    Aspirin - Aspirin is used as a blood thinning medication and you will require this medication indefinitely.  Anti-platelet/blood thinner - Your Anti-platelet/Blood thinning medication is called Ticagrelor, and is used in combination with aspirin to prevent clots from forming in your heart and/or around your stent.  Your doctor will determine how long you need to be on this medicine.    · Is patient discharged on Warfarin / Coumadin?   No     · Is patient Post Blood Transfusion?  No    Depression / Suicide Risk    As you are discharged from this [facility] facility, it is important to learn how to keep safe from harming yourself.    Recognize the warning signs:  · Abrupt changes in personality, positive or negative- including increase in energy   · Giving away possessions  · Change in eating patterns- significant weight changes-  positive or negative  · Change in sleeping patterns- unable to sleep or sleeping all the time   · Unwillingness or inability to communicate  · Depression  · Unusual sadness, discouragement and loneliness  · Talk of wanting to die  · Neglect of personal appearance   · Rebelliousness- reckless behavior  · Withdrawal from people/activities they love  · Confusion- inability to  concentrate     If you or a loved one observes any of these behaviors or has concerns about self-harm, here's what you can do:  · Talk about it- your feelings and reasons for harming yourself  · Remove any means that you might use to hurt yourself (examples: pills, rope, extension cords, firearm)  · Get professional help from the community (Mental Health, Substance Abuse, psychological counseling)  · Do not be alone:Call your Safe Contact- someone whom you trust who will be there for you.  · Call your local CRISIS HOTLINE 524-9936 or 105-796-2612  · Call your local Children's Mobile Crisis Response Team [place] (946) 721-2929 or www.Neurescue  · Call the toll free National Suicide Prevention Hotlines   · National Suicide Prevention Lifeline 508-418-SDHB (5370)  · National Hope Line Network 800-SUICIDE (755-1386)    Carotid Angioplasty With Stent, Care After  Refer to this sheet in the next few weeks. These instructions provide you with information about caring for yourself after your procedure. Your health care provider may also give you more specific instructions. Your treatment has been planned according to current medical practices, but problems sometimes occur. Call your health care provider if you have any problems or questions after your procedure.  WHAT TO EXPECT AFTER THE PROCEDURE  After your procedure, it is typical to have the following:  · Bruising at the catheter site that usually fades within 1-2 weeks.  · Blood collecting in the tissue (hematoma) that may be painful to the touch. It should usually decrease in size and tenderness within 1-2 weeks.  HOME CARE INSTRUCTIONS  · Take medicines only as directed by your health care provider. Blood thinners may be prescribed after your procedure to improve blood flow through the stent.  · You may shower 24-48 hours after the procedure or as directed by your health care provider. Remove the bandage (dressing) and gently wash the site with plain  soap and water. Pat the area dry with a clean towel. Do not rub the site, because this may cause bleeding.  · Do not take baths, swim, or use a hot tub until your health care provider approves.  · Check your insertion site every day for redness, swelling, or drainage.  · Do not apply powder or lotion to the site.  · Do not lift over 10 lb (4.5 kg) for 5 days after your procedure or as directed by your health care provider.  · Ask your health care provider when it is okay to:  ¨ Return to work or school.  ¨ Resume usual physical activities or sports.  ¨ Resume sexual activity.  · Eat a heart-healthy diet. This should include plenty of fresh fruits and vegetables. Meat should be lean cuts. Avoid the following types of food:  ¨ Food that is high in salt.  ¨ Canned or highly processed food.  ¨ Food that is high in saturated fat or sugar.  ¨ Fried food.  · Make any other lifestyle changes as recommended by your health care provider. These may include:  ¨ Not using any tobacco products, including cigarettes, chewing tobacco, or electronic cigarettes. If you need help quitting, ask your health care provider.  ¨ Managing your weight.  ¨ Getting regular exercise.  ¨ Managing your blood pressure.  ¨ Limiting your alcohol intake.  ¨ Managing other health problems, such as diabetes and sleep apnea.  · Keep all follow-up visits as directed by your health care provider. This is important.  SEEK MEDICAL CARE IF:  · You have a fever.  · You have chills.  · You have increased bleeding from the catheter insertion site. Hold pressure on the site.  SEEK IMMEDIATE MEDICAL CARE IF:  · You have vision changes or loss of vision.  · You have numbness or weakness on one side of your body.  · You have difficulty talking, or you have slurred speech or cannot speak (aphasia).  · You feel confused or have difficulty remembering.  · You have unusual pain at the catheter insertion site.  · You have redness, warmth, or swelling at the catheter  insertion site.  · You have drainage (other than a small amount of blood on the dressing) from the catheter insertion site.  · The catheter insertion site is bleeding, and the bleeding does not stop after 30 minutes of holding steady pressure on the site.  These symptoms may represent a serious problem that is an emergency. Do not wait to see if the symptoms will go away. Get medical help right away. Call your local emergency services (911 in [place] Do not drive yourself to the hospital.     This information is not intended to replace advice given to you by your health care provider. Make sure you discuss any questions you have with your health care provider.     Document Released: 03/09/2007 Document Revised: 01/08/2016 Document Reviewed: 07/06/2015  [employer] Interactive Patient Education ©2016 [employer]    Ticagrelor oral tablet  What is this medicine?  TICAGRELOR (ABBY ka GREL or) helps to prevent blood clots. This medicine is used to prevent heart attack, stroke, or other vascular events in people who have had a recent heart attack or who have severe chest pain.  This medicine may be used for other purposes; ask your health care provider or pharmacist if you have questions.  COMMON BRAND NAME(S): MICKEY  What should I tell my health care provider before I take this medicine?  They need to know if you have any of these conditions:  -bleeding disorder  -bleeding in the brain  -liver disease  -planned surgery  -stomach or intestinal ulcers  -stroke or transient ischemic attack  -an unusual or allergic reaction to ticagrelor, other medicines, foods, dyes, or preservatives  -pregnant or trying to get pregnant  -breast-feeding  How should I use this medicine?  Take this medicine by mouth with a glass of water. Follow the directions on the prescription label. You can take it with or without food. If it upsets your stomach, take it with food. Take your medicine at regular intervals. Do not take it more often than  directed. Do not stop taking except on your doctor's advice.  Talk to you pediatrician regarding the use of this medicine in children. Special care may be needed.  Overdosage: If you think you've taken too much of this medicine contact a poison control center or emergency room at once.  Overdosage: If you think you have taken too much of this medicine contact a poison control center or emergency room at once.  NOTE: This medicine is only for you. Do not share this medicine with others.  What if I miss a dose?  If you miss a dose, take it as soon as you can. If it is almost time for your next dose, take only that dose. Do not take double or extra doses.  What may interact with this medicine?  -certain antibiotics like clarithromycin and telithromycin  -certain medicines for fungal infections like itraconazole, ketoconazole, and voriconazole  -certain medicines for HIV infection like atazanavir, indinavir, nelfinavir, ritonavir, and saquinavir  -certain medicines for seizures like carbamazepine, phenobarbital, and phenytoin  -certain medicines that treat or prevent blood clots like warfarin  -dexamethasone  -digoxin  -lovastatin  -nefazodone  -rifampin  -simvastatin  This list may not describe all possible interactions. Give your health care provider a list of all the medicines, herbs, non-prescription drugs, or dietary supplements you use. Also tell them if you smoke, drink alcohol, or use illegal drugs. Some items may interact with your medicine.  What should I watch for while using this medicine?  Visit your doctor or health care professional for regular check ups. Do not stop taking you medicine unless your doctor tells you to.  If you are going to have surgery or dental work, tell your doctor or health care professional that you are taking this medicine.  You should take aspirin every day with this medicine. Do not take more than 100 mg each day. Talk to your doctor if you have questions.  What side effects may I  notice from receiving this medicine?  Side effects that you should report to your doctor or health care professional as soon as possible:  -allergic reactions like skin rash, itching or hives, swelling of the face, lips, or tongue  -black, tarry stools  -breathing problems  -fast or irregular heartbeat, feeling faint or light-headed, falls  -red or dark-brown urine  -red spots on the skin  -spitting up blood or brown material that looks like coffee grounds  -unusual bleeding from the eye, gums, or nose   Side effects that usually do not require medical attention (Report these to your doctor or health care professional if they continue or are bothersome.):  -breast enlargement in both males and females  -diarrhea  -dizziness  -headache  -tiredness  -upset stomach  This list may not describe all possible side effects. Call your doctor for medical advice about side effects. You may report side effects to FDA at 7-429-FDA-2313.  Where should I keep my medicine?  Keep out of the reach of children.  Store at room temperature of 59 to 86 degrees F (15 to 30 degrees C). Throw away any unused medicine after the expiration date.  NOTE: This sheet is a summary. It may not cover all possible information. If you have questions about this medicine, talk to your doctor, pharmacist, or health care provider.  © 2014, [employer]. (6/5/2013 1:25:07 PM)      Your appointments     Jun 02, 2017 11:45 AM   FOLLOW UP with Marbin Haddad M.D.   [facility] (--)    [address], [facility] [address]  [facility] [address]   [phone]                 Discharge Medication Instructions:    Below are the medications your physician expects you to take upon discharge:    Review all your home medications and newly ordered medications with your doctor and/or pharmacist. Follow medication instructions as directed by your doctor and/or pharmacist.    Please keep your medication list with you and share with  your physician.               Medication List      START taking these medications        Instructions    Morning Afternoon Evening Bedtime    ticagrelor 90 MG Tabs tablet   Last time this was given:  90 mg on 4/28/2017  9:00 AM   Commonly known as:  BRILINTA   Next Dose Due:  Tonight          Take 1 Tab by mouth 2 Times a Day.   Dose:  90 mg                          CONTINUE taking these medications        Instructions    Morning Afternoon Evening Bedtime    aspirin 81 MG Chew chewable tablet   Last time this was given:  81 mg on 4/28/2017  8:15 AM   Commonly known as:  ASA   Next Dose Due:  Tomorrow 4/29/2017        Take 81 mg by mouth every day.   Dose:  81 mg                        baclofen 20 MG tablet   Commonly known as:  LIORESAL        Take 20 mg by mouth as needed. Indications: Muscle Spasticity   Dose:  20 mg                        DAILY MULTIVITAMIN PO        Take 1 Tab by mouth every day.   Dose:  1 Tab                        diphenhydrAMINE 25 MG Tabs   Commonly known as:  BENADRYL        Take 50 mg by mouth at bedtime as needed for Sleep.   Dose:  50 mg                        GLIPIZIDE XL 10 MG Tb24   Last time this was given:  10 mg on 4/28/2017  5:50 AM   Generic drug:  glipiZIDE SR   Next Dose Due:  Tomorrow 4/29/2017        TAKE 1 TABLET ORALLY DAILY                        GLUCOSAMINE CHONDROITIN COMPLX 500-250 MG Caps   Generic drug:  Glucosamine-Chondroitin        Take 2 Tabs by mouth every day.   Dose:  2 Tab                        metoprolol 50 MG Tabs   Commonly known as:  LOPRESSOR        Take 1.5 Tabs by mouth 2 times a day.   Dose:  75 mg                        NITROSTAT 0.4 MG Subl   Generic drug:  nitroglycerin        Place 0.4 mg under tongue every 5 minutes as needed for Chest Pain.   Dose:  0.4 mg                        ramipril 10 MG capsule   Last time this was given:  10 mg on 4/28/2017  8:15 AM   Commonly known as:  ALTACE   Next Dose Due:  Tomorrow 4/29/2017        Take 1 Cap by  mouth every day.   Dose:  10 mg                        rosuvastatin 20 MG Tabs   Last time this was given:  20 mg on 4/27/2017  8:23 PM   Commonly known as:  CRESTOR   Next Dose Due:  Tonight 4/28/2017        Take 1 Tab by mouth every evening.   Dose:  20 mg                        sitagliptin 100 MG Tabs   Last time this was given:  100 mg on 4/27/2017  1:46 PM   Commonly known as:  JANUVIA   Next Dose Due:  Tomorrow 4/29/2017        Take 1 Tab by mouth every day.   Dose:  100 mg                        tramadol 50 MG Tabs   Commonly known as:  ULTRAM        Take 1 Tab by mouth every four hours as needed.   Dose:  50 mg                             Where to Get Your Medications      Information about where to get these medications is not yet available     ! Ask your nurse or doctor about these medications    - ticagrelor 90 MG Tabs tablet            Instructions           Diet / Nutrition:    Follow any diet instructions given to you by your doctor or the dietician, including how much salt (sodium) you are allowed each day.    If you are overweight, talk to your doctor about a weight reduction plan.    Activity:    Remain physically active following your doctor's instructions about exercise and activity.    Rest often.     Any time you become even a little tired or short of breath, SIT DOWN and rest.    Worsening Symptoms:    Report any of the following signs and symptoms to the doctor's office immediately:    *Pain of jaw, arm, or neck  *Chest pain not relieved by medication                               *Dizziness or loss of consciousness  *Difficulty breathing even when at rest   *More tired than usual                                       *Bleeding drainage or swelling of surgical site  *Swelling of feet, ankles, legs or stomach                 *Fever (>100ºF)  *Pink or blood tinged sputum  *Weight gain (3lbs/day or 5lbs /week)           *Shock from internal defibrillator (if applicable)  *Palpitations or  irregular heartbeats                *Cool and/or numb extremities    Stroke Awareness    Common Risk Factors for Stroke include:    Age  Atrial Fibrillation  Carotid Artery Stenosis  Diabetes Mellitus  Excessive alcohol consumption  High blood pressure  Overweight   Physical inactivity  Smoking    Warning signs and symptoms of a stroke include:    *Sudden numbness or weakness of the face, arm or leg (especially on one side of the body).  *Sudden confusion, trouble speaking or understanding.  *Sudden trouble seeing in one or both eyes.  *Sudden trouble walking, dizziness, loss of balance or coordination.Sudden severe headache with no known cause.    It is very important to get treatment quickly when a stroke occurs. If you experience any of the above warning signs, call 911 immediately.                   Disclaimer         Quit Smoking / Tobacco Use:    I understand the use of any tobacco products increases my chance of suffering from future heart disease or stroke and could cause other illnesses which may shorten my life. Quitting the use of tobacco products is the single most important thing I can do to improve my health. For further information on smoking / tobacco cessation call a Toll Free Quit Line at 1-552.605.4014 (*[employer] [place]) or 1-750.692.7918 (American Lung Association) or you can access the web based program at www.lungusa.org.    [place] Tobacco Users Help Line:  (866) 218-8501       Toll Free: 1-844.159.2284  Quit Tobacco Program [facility] (767)780-2613    Crisis Hotline:    [place] Crisis Hotline:  8-828-KSICRKS or 1-895.939.5285    [place] Crisis Hotline:    1-667.943.3163 or 695-839-5415    Discharge Survey:   Thank you for choosing [facility]. We hope we did everything we could to make your hospital stay a pleasant one. You may be receiving a phone survey and we would appreciate your time and participation in answering the questions. Your input is very  valuable to us in our efforts to improve our service to our patients and their families.        My signature on this form indicates that:    1. I have reviewed and understand the above information.  2. My questions regarding this information have been answered to my satisfaction.  3. I have formulated a plan with my discharge nurse to obtain my prescribed medications for home.                  Disclaimer         __________________________________                     __________       ________                       Patient Signature                                                 Date                    Time

## 2017-04-27 NOTE — IP AVS SNAPSHOT
" <p align=\"LEFT\"><IMG SRC=\"[email or web address]\" alt=\"Image\" WIDTH=\"50%\" HEIGHT=\"200\" BORDER=\"\"></p>                   Name:Kun Lisa  Medical Record Number:[id number]  CSN: [id number]    YOB: 1950   Age: 66 y.o.  Sex: male  HT:1.905 m (6' 3\") WT: 142.7 kg (314 lb 9.5 oz)          Admit Date: 4/27/2017     Discharge Date:   Today's Date: 4/28/2017  Attending Doctor:  Eric Avila M.D.                  Allergies:  Metformin          Your appointments     Jun 02, 2017 11:45 AM   FOLLOW UP with Marbin Haddad M.D.   [facility] (--)    [address], [facility] [address]  [facility] [address]   [phone]                 Medication List      Take these Medications        Instructions    aspirin 81 MG Chew chewable tablet   Commonly known as:  ASA    Take 81 mg by mouth every day.   Dose:  81 mg       baclofen 20 MG tablet   Commonly known as:  LIORESAL    Take 20 mg by mouth as needed. Indications: Muscle Spasticity   Dose:  20 mg       DAILY MULTIVITAMIN PO    Take 1 Tab by mouth every day.   Dose:  1 Tab       diphenhydrAMINE 25 MG Tabs   Commonly known as:  BENADRYL    Take 50 mg by mouth at bedtime as needed for Sleep.   Dose:  50 mg       GLIPIZIDE XL 10 MG Tb24   Generic drug:  glipiZIDE SR    TAKE 1 TABLET ORALLY DAILY       GLUCOSAMINE CHONDROITIN COMPLX 500-250 MG Caps   Generic drug:  Glucosamine-Chondroitin    Take 2 Tabs by mouth every day.   Dose:  2 Tab       metoprolol 50 MG Tabs   Commonly known as:  LOPRESSOR    Take 1.5 Tabs by mouth 2 times a day.   Dose:  75 mg       NITROSTAT 0.4 MG Subl   Generic drug:  nitroglycerin    Place 0.4 mg under tongue every 5 minutes as needed for Chest Pain.   Dose:  0.4 mg       ramipril 10 MG capsule   Commonly known as:  ALTACE    Take 1 Cap by mouth every day.   Dose:  10 mg       rosuvastatin 20 MG Tabs   Commonly known as:  CRESTOR    Take 1 Tab by mouth every evening.   Dose:  20 mg       sitagliptin " 100 MG Tabs   Commonly known as:  JANUVIA    Take 1 Tab by mouth every day.   Dose:  100 mg       ticagrelor 90 MG Tabs tablet   Commonly known as:  BRILINTA    Take 1 Tab by mouth 2 Times a Day.   Dose:  90 mg       tramadol 50 MG Tabs   Commonly known as:  ULTRAM    Take 1 Tab by mouth every four hours as needed.   Dose:  50 mg

## 2017-04-27 NOTE — PROCEDURES
DATE OF SERVICE:  04/27/2017    REFERRING PHYSICIAN:  Marbin Haddad MD    PROCEDURES:  1.  Coronary angiography.  2.  PTCA/stent placement of the distal circumflex artery.    PREPROCEDURE DIAGNOSES:  History of coronary artery disease with prior stent   placement, nonsustained ventricular tachycardia, abnormal myocardial perfusion   scan.    POSTPROCEDURE DIAGNOSES:  1.  Coronary artery disease including patent stent in the mid left anterior   descending artery with nonobstructive in-stent restenosis, high-grade distal   circumflex artery of a codominant system supplying excellent collaterals to   mid to distal right coronary artery with long chronic total occlusion of the   ostial to mid right coronary artery.  2.  Successful percutaneous transluminal coronary angioplasty/stent placement   of the distal circumflex artery with 2.5x16 mm Synergy drug-eluting stent.    INDICATION:  The patient is a 66-year-old male with past medical history   significant for remote stent placement with history of coronary artery   disease.  He has been experiencing nonsustained ventricular tachycardia and   underwent a myocardial perfusion study, which showed large inferior, lateral   and apical ischemia.  He was scheduled for cardiac catheterization.    DESCRIPTION OF PROCEDURE:  After informed consent was signed by the patient,   patient was brought to the cardiac catheterization laboratory.  He was prepped   and draped in usual sterile manner.  The right wrist area was anesthetized   with 2% Xylocaine.  A 6-Kenyan sheath was inserted into the right radial   artery using modified Seldinger technique.  A JL 3.5 catheter was positioned   into the left main coronary artery.  Coronary angiography was performed.  This   was exchanged for a JR4 catheter, which was positioned into the right   coronary artery.  Coronary angiography was performed.  This catheter was   exchanged for EBU 3.5 guide catheter, which was positioned into the left  main   coronary artery.  IV Angiomax was started.  A  150 wire was used to cross   the identified stenosis.  This stenosis was predilated with 2.5x8 mm Emerge   balloon.  A 2.5x16 mm Synergy drug-eluting stent was successfully positioned   and deployed.  This stent was postdilated with 2.5x12 mm NC Emerge balloon.    The patient tolerated the procedure well.  At the end of procedure, all wires,   balloons, guide, and sheaths were removed.  Hemoband was placed on the right   wrist.  He was given oral Brilinta and transferred to PPU in stable condition.    HEMODYNAMIC DATA:  Hemodynamic data shows aortic pressures of 100/70 with mean   of 80 mmHg.    AORTIC VALVE:  Aortic valve was not crossed.    LEFT VENTRICULOGRAM:  Left ventriculogram was not performed.    ANGIOGRAM:  Left main coronary artery:  Left main coronary artery is a short large-caliber   vessel free of disease.  Left anterior descending artery:  Left anterior descending artery is a long   moderate caliber vessel, which wraps around the apex.  Proximal portion of the   vessel, there is ectasia noted.  Mid portion of the vessel, there is a patent   stent with diffuse in-stent restenosis of 50%.  The remainder of the left   anterior descending artery is mildly tortuous and free of disease.  There are   moderate caliber first diagonal branch and 2 small caliber second and third   diagonal branches noted free of disease.  Left circumflex artery:  Left circumflex is a very large codominant vessel   with proximal ectasia/aneurysm.  There is an eccentric focal 30-40% stenosis   noted.  There is a small-to-moderate OM branch with ostial concentric 70-80%   stenosis.  Distally, there are small bifurcating left posterior lateral branch   noted free of disease, moderate caliber OM1 and OM2 branches noted free of   disease and distal circumflex containing concentric 90% stenosis.  Beyond this   stenosis, there is a moderate-to-large posterior descending artery  and   excellent collaterals to the [id number] right coronary artery.    PERCUTANEOUS INTERVENTION:  Distal circumflex artery concentric 90% stenosis   with 0% residual, pre-dilatation with 2.5x8 mm Emerge balloon stent with   2.5x16 mm Synergy drug-eluting stent, post-dilatation with 2.5x12 mm NC Emerge   balloon.    IMPRESSION:  1.  Coronary artery disease including patent stent in the mid left anterior   descending artery with nonobstructive in-stent restenosis, high-grade distal   circumflex artery of a codominant system supplying excellent collaterals to   mid to distal right coronary artery with long chronic total occlusion of the   ostial to mid right coronary artery.  2.  Successful percutaneous transluminal coronary angioplasty/stent placement   of the distal circumflex artery with 2.5x16 mm Synergy drug-eluting stent.    RECOMMENDATIONS:  Recommend medical therapy and consider PCI of the   small-to-moderate obtuse marginal branch if he experiences chest pain in the   future.       ____________________________________     MD WINNIE ADRIAN / CATHERINE    DD:  04/27/2017 10:05:31  DT:  04/27/2017 10:27:33    D#:  [id number]  Job#:  [id number]

## 2017-04-27 NOTE — PROGRESS NOTES
Pt need constant reminders to limit movement to right wrist s/p cardiac cath after hemoband removal. Pt with + right radial pulse, slight swelling.

## 2017-04-27 NOTE — OR NURSING
1012 patient arrived from cath lab s/p [facility] rt radial approach, pt aaox4, denies pain, s.o.b, vss, currently sb 50s poc discussed with patient agreeable.  1110 report given to nelly TREVINO [id number] bed 1 all questions answered patient transported to room with all his personal belongings.

## 2017-04-27 NOTE — IP AVS SNAPSHOT
4/28/2017    Kun Lisa  [id number]  [place] [address]    Dear Kun:    [facility] wants to ensure your discharge home is safe and you or your loved ones have had all of your questions answered regarding your care after you leave the hospital.    Below is a list of resources and contact information should you have any questions regarding your hospital stay, follow-up instructions, or active medical symptoms.    Questions or Concerns Regarding… Contact   Medical Questions Related to Your Discharge  (7 days a week, 8am-5pm) Contact a Nurse Care Coordinator   [phone]   Medical Questions Not Related to Your Discharge  (24 hours a day / 7 days a week)  Contact the Nurse Health Line   [phone]    Medications or Discharge Instructions Refer to your discharge packet   or contact your [facility] Primary Care Provider   [phone]   Follow-up Appointment(s) Schedule your appointment via [employer]   or contact Scheduling [phone]   Billing Review your statement via [employer]  or contact Billing [phone]   Medical Records Review your records via [employer]   or contact Medical Records [phone]     You may receive a telephone call within two days of discharge. This call is to make certain you understand your discharge instructions and have the opportunity to have any questions answered. You can also easily access your medical information, test results and upcoming appointments via the [employer] free online health management tool. You can learn more and sign up at [employer]. For assistance setting up your [employer] account, please call [phone].    Once again, we want to ensure your discharge home is safe and that you have a clear understanding of any next steps in your care. If you have any questions or concerns, please do not hesitate to contact us, we are here for you. Thank you for choosing [facility] for your healthcare needs.    Sincerely,    Your [facility] Healthcare Team

## 2017-04-27 NOTE — CARE PLAN
Problem: Nutritional:  Goal: Patient to verbalize or demonstrate understanding of diet  Outcome: MET Date Met:  04/27/17  Pt refused diet education. Pt wanted handout only.

## 2017-04-28 VITALS
HEART RATE: 58 BPM | RESPIRATION RATE: 18 BRPM | TEMPERATURE: 97.6 F | OXYGEN SATURATION: 97 % | SYSTOLIC BLOOD PRESSURE: 141 MMHG | WEIGHT: 314.6 LBS | DIASTOLIC BLOOD PRESSURE: 73 MMHG | HEIGHT: 75 IN | BODY MASS INDEX: 39.12 KG/M2

## 2017-04-28 LAB
ANION GAP SERPL CALC-SCNC: 7 MMOL/L (ref 0–11.9)
BUN SERPL-MCNC: 13 MG/DL (ref 8–22)
CALCIUM SERPL-MCNC: 9 MG/DL (ref 8.5–10.5)
CHLORIDE SERPL-SCNC: 100 MMOL/L (ref 96–112)
CO2 SERPL-SCNC: 22 MMOL/L (ref 20–33)
CREAT SERPL-MCNC: 0.85 MG/DL (ref 0.5–1.4)
ERYTHROCYTE [DISTWIDTH] IN BLOOD BY AUTOMATED COUNT: 44.1 FL (ref 35.9–50)
GFR SERPL CREATININE-BSD FRML MDRD: >60 ML/MIN/1.73 M 2
GLUCOSE BLD-MCNC: 160 MG/DL (ref 65–99)
GLUCOSE SERPL-MCNC: 191 MG/DL (ref 65–99)
HCT VFR BLD AUTO: 42 % (ref 42–52)
HGB BLD-MCNC: 14.8 G/DL (ref 14–18)
MCH RBC QN AUTO: 32.2 PG (ref 27–33)
MCHC RBC AUTO-ENTMCNC: 35.2 G/DL (ref 33.7–35.3)
MCV RBC AUTO: 91.5 FL (ref 81.4–97.8)
PLATELET # BLD AUTO: 191 K/UL (ref 164–446)
PMV BLD AUTO: 10.3 FL (ref 9–12.9)
POTASSIUM SERPL-SCNC: 4.7 MMOL/L (ref 3.6–5.5)
RBC # BLD AUTO: 4.59 M/UL (ref 4.7–6.1)
SODIUM SERPL-SCNC: 129 MMOL/L (ref 135–145)
WBC # BLD AUTO: 9.8 K/UL (ref 4.8–10.8)

## 2017-04-28 PROCEDURE — G0378 HOSPITAL OBSERVATION PER HR: HCPCS

## 2017-04-28 PROCEDURE — 85027 COMPLETE CBC AUTOMATED: CPT

## 2017-04-28 PROCEDURE — 700102 HCHG RX REV CODE 250 W/ 637 OVERRIDE(OP): Performed by: NURSE PRACTITIONER

## 2017-04-28 PROCEDURE — 80048 BASIC METABOLIC PNL TOTAL CA: CPT

## 2017-04-28 PROCEDURE — A9270 NON-COVERED ITEM OR SERVICE: HCPCS | Performed by: INTERNAL MEDICINE

## 2017-04-28 PROCEDURE — 82962 GLUCOSE BLOOD TEST: CPT

## 2017-04-28 PROCEDURE — 97535 SELF CARE MNGMENT TRAINING: CPT

## 2017-04-28 PROCEDURE — A9270 NON-COVERED ITEM OR SERVICE: HCPCS | Performed by: NURSE PRACTITIONER

## 2017-04-28 PROCEDURE — 36415 COLL VENOUS BLD VENIPUNCTURE: CPT

## 2017-04-28 PROCEDURE — 700102 HCHG RX REV CODE 250 W/ 637 OVERRIDE(OP): Performed by: INTERNAL MEDICINE

## 2017-04-28 RX ADMIN — RAMIPRIL 10 MG: 5 CAPSULE ORAL at 08:15

## 2017-04-28 RX ADMIN — TICAGRELOR 90 MG: 90 TABLET ORAL at 09:00

## 2017-04-28 RX ADMIN — GLIPIZIDE 10 MG: 10 TABLET, FILM COATED, EXTENDED RELEASE ORAL at 05:50

## 2017-04-28 RX ADMIN — ASPIRIN 81 MG: 81 TABLET, CHEWABLE ORAL at 08:15

## 2017-04-28 ASSESSMENT — LIFESTYLE VARIABLES
EVER_SMOKED: YES
ALCOHOL_USE: NO

## 2017-04-28 ASSESSMENT — PAIN SCALES - GENERAL: PAINLEVEL_OUTOF10: 0

## 2017-04-28 NOTE — PROGRESS NOTES
Assumed care at 0715. Bedside report received from Night RN Amanda. Patient's chart and MAR reviewed. 12 hour chart check complete. Assessment complete, pt has no complaints of pain at this time. Pt is awake walking around room. Pt is A & O x 4. Patient was updated on plan of care for the day. Questions answered and concerns addressed.  Pt denies any additional needs at this time. White board updated. Call light, phone and personal belongings within reach. Bed alarm on and working appropriately. Pt anxious to go home. Refused breakfast, states he wants to go home and has things to do today.

## 2017-04-28 NOTE — PROGRESS NOTES
Bedside report received by day URIEL Lopez. Patient lying in bed watching TV at this time. POC discussed, verbalized understanding. No immediate concerns for patient at this time. All safety measures in place.

## 2017-04-28 NOTE — PROGRESS NOTES
IV and tele box removed. Discharge instructions and medications discussed with patient, all questions answered, pt verbalized understanding. Follow up appointments scheduled. Discharge instructions, prescriptions and personal belongings with patient. Patient walked down to car by self. Patient off unit without incident.

## 2017-04-28 NOTE — PROGRESS NOTES
Cardiovascular Nurse Navigator ([id number]) Note:    Reviewed ACS medications:  Dual Antiplatelet Therapy (DAPT):  aspirin + ticagrelor    Beta-Blocker:  metoprolol--home medication  Statin:  rosuvastatin    Confirmed and/or placed referral to [facility] and follow-up appt. with Cardiology arranged.   Bedside nursing to continually provide patient education on ACS meds, signs and symptoms to monitor for, and risk factor modification. Also at discharge please complete the “ACS” special instructions on the AVS.  Thank you and please call with questions.

## 2017-04-28 NOTE — DISCHARGE INSTRUCTIONS
Discharge Instructions    Discharged to home by car with friend. Discharged via wheelchair, hospital escort: Yes.  Special equipment needed: Not Applicable    Be sure to schedule a follow-up appointment with your primary care doctor or any specialists as instructed.     Discharge Plan:   Influenza Vaccine Indication: Not indicated: Previously immunized this influenza season and > 8 years of age    I understand that a diet low in cholesterol, fat, and sodium is recommended for good health. Unless I have been given specific instructions below for another diet, I accept this instruction as my diet prescription.   Other diet: Heart Healthy    Special Instructions:  Call and make an appointment with Cardiology office before 30 day free trial of brilinta runs out.   Diagnosis:  Acute Coronary Syndrome (ACS) is a diagnosis that encompasses cardiac-related chest pain and heart attack. ACS occurs when the blood flow to the heart muscle is severely reduced or cut off completely due to a slow process called atherosclerosis.  Atherosclerosis is a disease in which the coronary arteries become narrow from a buildup of fat, cholesterol, and other substances that combine to form plaque. If the plaque breaks, a blood clot will form and block the blood flow to the heart muscle. This lack of blood flow can cause damage or death to the heart muscle which is called a heart attack or Myocardial Infarction (MI). There are two different types of MIs:  ST Elevation Myocardial Infarction or STEMI (the most severe type of heart attack) and Non-ST Elevation Myocardial Infarction or NSTEMI.    Treatment Plan:  · Cardiac Diet  - Low fat, low salt, low cholesterol   · Cardiac Rehab  - Your doctor has ordered you a referral to [facility].  Call [phone] to schedule an appointment.  · Attend my follow-up appointment with my Cardiologist.  · Take my medications as prescribed by my doctor  · Exercise daily  · lower my blood  pressure    Medications:  Certain medications are used to treat ACS.  Remember to always take medications as prescribed and never stop talking medications unless told by your doctor.    You have been prescribed the following medicatons:    Aspirin - Aspirin is used as a blood thinning medication and you will require this medication indefinitely.  Anti-platelet/blood thinner - Your Anti-platelet/Blood thinning medication is called Ticagrelor, and is used in combination with aspirin to prevent clots from forming in your heart and/or around your stent.  Your doctor will determine how long you need to be on this medicine.    · Is patient discharged on Warfarin / Coumadin?   No     · Is patient Post Blood Transfusion?  No    Depression / Suicide Risk    As you are discharged from this [facility], it is important to learn how to keep safe from harming yourself.    Recognize the warning signs:  · Abrupt changes in personality, positive or negative- including increase in energy   · Giving away possessions  · Change in eating patterns- significant weight changes-  positive or negative  · Change in sleeping patterns- unable to sleep or sleeping all the time   · Unwillingness or inability to communicate  · Depression  · Unusual sadness, discouragement and loneliness  · Talk of wanting to die  · Neglect of personal appearance   · Rebelliousness- reckless behavior  · Withdrawal from people/activities they love  · Confusion- inability to concentrate     If you or a loved one observes any of these behaviors or has concerns about self-harm, here's what you can do:  · Talk about it- your feelings and reasons for harming yourself  · Remove any means that you might use to hurt yourself (examples: pills, rope, extension cords, firearm)  · Get professional help from the community (Mental Health, Substance Abuse, psychological counseling)  · Do not be alone:Call your Safe Contact- someone whom you trust who will be there for  you.  · Call your local CRISIS HOTLINE 220-5163 or 636-230-3580  · Call your local Children's Mobile Crisis Response Team [place] (064) 323-0351 or www."[employer]  · Call the toll free National Suicide Prevention Hotlines   · National Suicide Prevention Lifeline 459-985-MLGT (9966)  · National Hope Line Network 800-SUICIDE (445-5879)    Carotid Angioplasty With Stent, Care After  Refer to this sheet in the next few weeks. These instructions provide you with information about caring for yourself after your procedure. Your health care provider may also give you more specific instructions. Your treatment has been planned according to current medical practices, but problems sometimes occur. Call your health care provider if you have any problems or questions after your procedure.  WHAT TO EXPECT AFTER THE PROCEDURE  After your procedure, it is typical to have the following:  · Bruising at the catheter site that usually fades within 1-2 weeks.  · Blood collecting in the tissue (hematoma) that may be painful to the touch. It should usually decrease in size and tenderness within 1-2 weeks.  HOME CARE INSTRUCTIONS  · Take medicines only as directed by your health care provider. Blood thinners may be prescribed after your procedure to improve blood flow through the stent.  · You may shower 24-48 hours after the procedure or as directed by your health care provider. Remove the bandage (dressing) and gently wash the site with plain soap and water. Pat the area dry with a clean towel. Do not rub the site, because this may cause bleeding.  · Do not take baths, swim, or use a hot tub until your health care provider approves.  · Check your insertion site every day for redness, swelling, or drainage.  · Do not apply powder or lotion to the site.  · Do not lift over 10 lb (4.5 kg) for 5 days after your procedure or as directed by your health care provider.  · Ask your health care provider when it is okay to:  ¨ Return to  work or school.  ¨ Resume usual physical activities or sports.  ¨ Resume sexual activity.  · Eat a heart-healthy diet. This should include plenty of fresh fruits and vegetables. Meat should be lean cuts. Avoid the following types of food:  ¨ Food that is high in salt.  ¨ Canned or highly processed food.  ¨ Food that is high in saturated fat or sugar.  ¨ Fried food.  · Make any other lifestyle changes as recommended by your health care provider. These may include:  ¨ Not using any tobacco products, including cigarettes, chewing tobacco, or electronic cigarettes. If you need help quitting, ask your health care provider.  ¨ Managing your weight.  ¨ Getting regular exercise.  ¨ Managing your blood pressure.  ¨ Limiting your alcohol intake.  ¨ Managing other health problems, such as diabetes and sleep apnea.  · Keep all follow-up visits as directed by your health care provider. This is important.  SEEK MEDICAL CARE IF:  · You have a fever.  · You have chills.  · You have increased bleeding from the catheter insertion site. Hold pressure on the site.  SEEK IMMEDIATE MEDICAL CARE IF:  · You have vision changes or loss of vision.  · You have numbness or weakness on one side of your body.  · You have difficulty talking, or you have slurred speech or cannot speak (aphasia).  · You feel confused or have difficulty remembering.  · You have unusual pain at the catheter insertion site.  · You have redness, warmth, or swelling at the catheter insertion site.  · You have drainage (other than a small amount of blood on the dressing) from the catheter insertion site.  · The catheter insertion site is bleeding, and the bleeding does not stop after 30 minutes of holding steady pressure on the site.  These symptoms may represent a serious problem that is an emergency. Do not wait to see if the symptoms will go away. Get medical help right away. Call your local emergency services (911 in [place] Do not drive yourself to the hospital.      This information is not intended to replace advice given to you by your health care provider. Make sure you discuss any questions you have with your health care provider.     Document Released: 03/09/2007 Document Revised: 01/08/2016 Document Reviewed: 07/06/2015  [employer] Interactive Patient Education ©2016 [employer]    Ticagrelor oral tablet  What is this medicine?  TICAGRELOR (ABBY ka GREL or) helps to prevent blood clots. This medicine is used to prevent heart attack, stroke, or other vascular events in people who have had a recent heart attack or who have severe chest pain.  This medicine may be used for other purposes; ask your health care provider or pharmacist if you have questions.  COMMON BRAND NAME(S): MICKEY  What should I tell my health care provider before I take this medicine?  They need to know if you have any of these conditions:  -bleeding disorder  -bleeding in the brain  -liver disease  -planned surgery  -stomach or intestinal ulcers  -stroke or transient ischemic attack  -an unusual or allergic reaction to ticagrelor, other medicines, foods, dyes, or preservatives  -pregnant or trying to get pregnant  -breast-feeding  How should I use this medicine?  Take this medicine by mouth with a glass of water. Follow the directions on the prescription label. You can take it with or without food. If it upsets your stomach, take it with food. Take your medicine at regular intervals. Do not take it more often than directed. Do not stop taking except on your doctor's advice.  Talk to you pediatrician regarding the use of this medicine in children. Special care may be needed.  Overdosage: If you think you've taken too much of this medicine contact a poison control center or emergency room at once.  Overdosage: If you think you have taken too much of this medicine contact a poison control center or emergency room at once.  NOTE: This medicine is only for you. Do not share this medicine with others.  What  if I miss a dose?  If you miss a dose, take it as soon as you can. If it is almost time for your next dose, take only that dose. Do not take double or extra doses.  What may interact with this medicine?  -certain antibiotics like clarithromycin and telithromycin  -certain medicines for fungal infections like itraconazole, ketoconazole, and voriconazole  -certain medicines for HIV infection like atazanavir, indinavir, nelfinavir, ritonavir, and saquinavir  -certain medicines for seizures like carbamazepine, phenobarbital, and phenytoin  -certain medicines that treat or prevent blood clots like warfarin  -dexamethasone  -digoxin  -lovastatin  -nefazodone  -rifampin  -simvastatin  This list may not describe all possible interactions. Give your health care provider a list of all the medicines, herbs, non-prescription drugs, or dietary supplements you use. Also tell them if you smoke, drink alcohol, or use illegal drugs. Some items may interact with your medicine.  What should I watch for while using this medicine?  Visit your doctor or health care professional for regular check ups. Do not stop taking you medicine unless your doctor tells you to.  If you are going to have surgery or dental work, tell your doctor or health care professional that you are taking this medicine.  You should take aspirin every day with this medicine. Do not take more than 100 mg each day. Talk to your doctor if you have questions.  What side effects may I notice from receiving this medicine?  Side effects that you should report to your doctor or health care professional as soon as possible:  -allergic reactions like skin rash, itching or hives, swelling of the face, lips, or tongue  -black, tarry stools  -breathing problems  -fast or irregular heartbeat, feeling faint or light-headed, falls  -red or dark-brown urine  -red spots on the skin  -spitting up blood or brown material that looks like coffee grounds  -unusual bleeding from the eye,  gums, or nose   Side effects that usually do not require medical attention (Report these to your doctor or health care professional if they continue or are bothersome.):  -breast enlargement in both males and females  -diarrhea  -dizziness  -headache  -tiredness  -upset stomach  This list may not describe all possible side effects. Call your doctor for medical advice about side effects. You may report side effects to FDA at 6-279-ZCK-2140.  Where should I keep my medicine?  Keep out of the reach of children.  Store at room temperature of 59 to 86 degrees F (15 to 30 degrees C). Throw away any unused medicine after the expiration date.  NOTE: This sheet is a summary. It may not cover all possible information. If you have questions about this medicine, talk to your doctor, pharmacist, or health care provider.  © 2014, [employer]. (6/5/2013 1:25:07 PM)

## 2017-04-30 NOTE — DISCHARGE SUMMARY
HPI & HOSPITAL COURSE  This is a 66 y.o. year old male here with a chief complaint of increased nonsustained ventricular tachycardias. Patient recently underwent a myocardial perfusion study which did show large inferior lateral and apical ischemia. Therefore patient was scheduled for a cardiac catheterization. Patient underwent successful cardiac catheterization. Please see Dr. Avila's procedure note below.    Patient has been monitored overnight. He states understanding to discharge instructions. His angiogram site does not have any signs of hematoma or bruit. There is no numbness or tingling.  He has had no chest pain and has had no ventricular dysrhythmias on the monitor. His vital signs and lab work have all been reviewed.    Therefore, he is discharged in good and stable condition with close outpatient follow-up.        DISCHARGE PROBLEM LIST  Active Problems:    Coronary artery disease POA: Unknown    Abnormal nuclear stress test POA: Unknown  Resolved Problems:    * No resolved hospital problems. *      FOLLOW UP  Future Appointments  Date Time Provider Department Center   5/10/2017 7:40 AM SALMA Augustine [facility] None   6/2/2017 11:45 AM Marbin Haddad M.D. CB None     Kit Torres PA-C  [address]  [address]  [facility] [address]  [phone]            MEDICATIONS ON DISCHARGE   Sloan Kun Johnrose   Home Medication Instructions SHAHNAZ:[id number]    Printed on:04/30/17 1530   Medication Information                      aspirin (ASA) 81 MG Chew Tab chewable tablet  Take 81 mg by mouth every day.             baclofen (LIORESAL) 20 MG tablet  Take 20 mg by mouth as needed. Indications: Muscle Spasticity             diphenhydrAMINE (BENADRYL) 25 MG Tab  Take 50 mg by mouth at bedtime as needed for Sleep.             GLIPIZIDE XL 10 MG TABLET SR 24 HR  TAKE 1 TABLET ORALLY DAILY             Glucosamine-Chondroitin (GLUCOSAMINE CHONDROITIN COMPLX) 500-250 MG Cap  Take 2 Tabs by mouth every  day.             metoprolol (LOPRESSOR) 50 MG Tab  Take 1.5 Tabs by mouth 2 times a day.             Multiple Vitamins-Minerals (DAILY MULTIVITAMIN PO)  Take 1 Tab by mouth every day.             nitroglycerin (NITROSTAT) 0.4 MG SL Tab  Place 0.4 mg under tongue every 5 minutes as needed for Chest Pain.             ramipril (ALTACE) 10 MG capsule  Take 1 Cap by mouth every day.             rosuvastatin (CRESTOR) 20 MG Tab  Take 1 Tab by mouth every evening.             sitagliptin (JANUVIA) 100 MG Tab  Take 1 Tab by mouth every day.             ticagrelor (BRILINTA) 90 MG Tab tablet  Take 1 Tab by mouth 2 Times a Day.             tramadol (ULTRAM) 50 MG Tab  Take 1 Tab by mouth every four hours as needed.                 DIET  No orders of the defined types were placed in this encounter.       ACTIVITY  As tolerated. and directed         PROCEDURES      PROCEDURES:  1.  Coronary angiography.  2.  PTCA/stent placement of the distal circumflex artery.    PREPROCEDURE DIAGNOSES:  History of coronary artery disease with prior stent    placement, nonsustained ventricular tachycardia, abnormal myocardial perfusion   scan.    POSTPROCEDURE DIAGNOSES:  1.  Coronary artery disease including patent stent in the mid left anterior    descending artery with nonobstructive in-stent restenosis, high-grade distal    circumflex artery of a codominant system supplying excellent collaterals to    mid to distal right coronary artery with long chronic total occlusion of the    ostial to mid right coronary artery.  2.  Successful percutaneous transluminal coronary angioplasty/stent placement    of the distal circumflex artery with 2.5x16 mm Synergy drug-eluting stent.    LABORATORY  Lab Results   Component Value Date/Time    SODIUM 129* 04/28/2017 02:31 AM    POTASSIUM 4.7 04/28/2017 02:31 AM    CHLORIDE 100 04/28/2017 02:31 AM    CO2 22 04/28/2017 02:31 AM    GLUCOSE 191* 04/28/2017 02:31 AM    BUN 13 04/28/2017 02:31 AM    CREATININE  0.85 04/28/2017 02:31 AM        Lab Results   Component Value Date/Time    WBC 9.8 04/28/2017 02:31 AM    HEMOGLOBIN 14.8 04/28/2017 02:31 AM    HEMATOCRIT 42.0 04/28/2017 02:31 AM    PLATELET COUNT 191 04/28/2017 02:31 AM        Total time of the discharge process exceeds 31 minutes.

## 2017-05-10 ENCOUNTER — PATIENT MESSAGE (OUTPATIENT)
Dept: CARDIOLOGY | Facility: MEDICAL CENTER | Age: 67
End: 2017-05-10

## 2017-05-10 ENCOUNTER — OFFICE VISIT (OUTPATIENT)
Dept: CARDIOLOGY | Facility: MEDICAL CENTER | Age: 67
End: 2017-05-10
Payer: MEDICARE

## 2017-05-10 VITALS
HEART RATE: 52 BPM | DIASTOLIC BLOOD PRESSURE: 78 MMHG | HEIGHT: 75 IN | OXYGEN SATURATION: 96 % | SYSTOLIC BLOOD PRESSURE: 126 MMHG | WEIGHT: 310 LBS | BODY MASS INDEX: 38.54 KG/M2

## 2017-05-10 DIAGNOSIS — E78.5 DYSLIPIDEMIA: ICD-10-CM

## 2017-05-10 DIAGNOSIS — I10 ESSENTIAL HYPERTENSION: ICD-10-CM

## 2017-05-10 DIAGNOSIS — E11.40 TYPE 2 DIABETES MELLITUS WITH DIABETIC NEUROPATHY, WITHOUT LONG-TERM CURRENT USE OF INSULIN (HCC): ICD-10-CM

## 2017-05-10 DIAGNOSIS — R00.2 PALPITATIONS: ICD-10-CM

## 2017-05-10 DIAGNOSIS — I25.84 CORONARY ARTERY DISEASE DUE TO CALCIFIED CORONARY LESION: Primary | ICD-10-CM

## 2017-05-10 DIAGNOSIS — I25.10 CORONARY ARTERY DISEASE DUE TO CALCIFIED CORONARY LESION: Primary | ICD-10-CM

## 2017-05-10 PROCEDURE — 4040F PNEUMOC VAC/ADMIN/RCVD: CPT | Mod: 8P | Performed by: NURSE PRACTITIONER

## 2017-05-10 PROCEDURE — G8432 DEP SCR NOT DOC, RNG: HCPCS | Performed by: NURSE PRACTITIONER

## 2017-05-10 PROCEDURE — G8598 ASA/ANTIPLAT THER USED: HCPCS | Performed by: NURSE PRACTITIONER

## 2017-05-10 PROCEDURE — 3017F COLORECTAL CA SCREEN DOC REV: CPT | Mod: 8P | Performed by: NURSE PRACTITIONER

## 2017-05-10 PROCEDURE — 99214 OFFICE O/P EST MOD 30 MIN: CPT | Performed by: NURSE PRACTITIONER

## 2017-05-10 PROCEDURE — G8419 CALC BMI OUT NRM PARAM NOF/U: HCPCS | Performed by: NURSE PRACTITIONER

## 2017-05-10 PROCEDURE — 1101F PT FALLS ASSESS-DOCD LE1/YR: CPT | Mod: 8P | Performed by: NURSE PRACTITIONER

## 2017-05-10 PROCEDURE — 1036F TOBACCO NON-USER: CPT | Performed by: NURSE PRACTITIONER

## 2017-05-10 PROCEDURE — 3046F HEMOGLOBIN A1C LEVEL >9.0%: CPT | Mod: 8P | Performed by: NURSE PRACTITIONER

## 2017-05-10 RX ORDER — METOPROLOL SUCCINATE 50 MG/1
50 TABLET, EXTENDED RELEASE ORAL DAILY
Qty: 90 TAB | Refills: 3 | Status: SHIPPED | OUTPATIENT
Start: 2017-05-10 | End: 2017-08-29 | Stop reason: SDUPTHER

## 2017-05-10 ASSESSMENT — ENCOUNTER SYMPTOMS
DIZZINESS: 1
PND: 0
MYALGIAS: 0
ABDOMINAL PAIN: 0
SHORTNESS OF BREATH: 0
WEAKNESS: 0
ORTHOPNEA: 0
PALPITATIONS: 0
CLAUDICATION: 0
COUGH: 0

## 2017-05-10 NOTE — MR AVS SNAPSHOT
"        Kun Madsen Lisa   5/10/2017 7:40 AM   Office Visit   MRN: [id number]    Department:  [facility]   Dept Phone:  [phone]    Description:  Male : 1950   Provider:  SALMA Augustine           Reason for Visit     Hospital Follow-up           Allergies as of 5/10/2017     Allergen Noted Reactions    Metformin 10/25/2016   Unspecified    Stomach upset and acid reflux      You were diagnosed with     Coronary artery disease due to calcified coronary lesion   [1753446]  -  Primary     Palpitations   [785.1.ICD-9-CM]       Essential hypertension   [6986086]       Dyslipidemia   [199521]       Type 2 diabetes mellitus with diabetic neuropathy, without long-term current use of insulin (CMS-HCC)   [5139807]         Vital Signs     Blood Pressure Pulse Height Weight Body Mass Index Oxygen Saturation    126/78 mmHg 52 1.905 m (6' 3\") 140.615 kg (310 lb) 38.75 kg/m2 96%    Smoking Status                   Former Smoker           Basic Information     Date Of Birth Sex Race Ethnicity Preferred Language    1950 Male White Non- English      Your appointments     2017 11:45 AM   FOLLOW UP with Marbin Haddad M.D.   [facility] (--)    [address], [facility] [address]  [facility] [id number] [phone]              Problem List              ICD-10-CM Priority Class Noted - Resolved    Type 2 diabetes mellitus with neurologic complication (CMS-HCC) E11.49   10/25/2016 - Present    Dyslipidemia E78.5   10/25/2016 - Present    Essential hypertension I10   10/25/2016 - Present    Arrhythmia I49.9   10/25/2016 - Present    Neuropathy (CMS-HCC) G62.9   10/25/2016 - Present    Preventative health care Z00.00   10/25/2016 - Present    Left foot pain M79.672   10/25/2016 - Present    Morbid obesity due to excess calories (CMS-HCC) E66.01   10/27/2016 - Present    Obstructive sleep apnea syndrome G47.33   2016 - Present    Coronary artery disease due " to calcified coronary lesion: LAD stent in 1997 I25.10, I25.84   1/20/2017 - Present    Palpitations R00.2   1/20/2017 - Present    Coronary artery disease I25.10   4/27/2017 - Present    Abnormal nuclear stress test R94.39   4/27/2017 - Present      Health Maintenance        Date Due Completion Dates    RETINAL SCREENING 6/19/1969 ---    IMM DTaP/Tdap/Td Vaccine (1 - Tdap) 6/19/1970 ---    COLONOSCOPY 6/19/2001 ---    IMM ZOSTER VACCINE 6/19/2011 ---    IMM PNEUMOCOCCAL 65+ (ADULT) LOW/MEDIUM RISK SERIES (1 of 2 - PCV13) 6/19/2016 ---    A1C SCREENING 5/8/2017 11/8/2016    URINE ACR / MICROALBUMIN 11/8/2017 11/8/2016    DIABETES MONOFILAMENT / LE EXAM 11/22/2017 11/22/2016    FASTING LIPID PROFILE 3/14/2018 3/14/2017, 11/8/2016    SERUM CREATININE 4/28/2018 4/28/2017, 4/25/2017, 3/14/2017, 11/8/2016            Current Immunizations     Influenza Vaccine Adult HD 10/25/2016  4:23 PM      Below and/or attached are the medications your provider expects you to take. Review all of your home medications and newly ordered medications with your provider and/or pharmacist. Follow medication instructions as directed by your provider and/or pharmacist. Please keep your medication list with you and share with your provider. Update the information when medications are discontinued, doses are changed, or new medications (including over-the-counter products) are added; and carry medication information at all times in the event of emergency situations     Allergies:  METFORMIN - Unspecified               Medications  Valid as of: May 10, 2017 -  8:49 AM    Generic Name Brand Name Tablet Size Instructions for use    Aspirin (Chew Tab) ASA 81 MG Take 81 mg by mouth every day.        Baclofen (Tab) LIORESAL 20 MG Take 20 mg by mouth as needed. Indications: Muscle Spasticity        Cholecalciferol (Tab) cholecalciferol 1000 UNIT Take 1,000 Units by mouth every day.        DiphenhydrAMINE HCl (Tab) BENADRYL 25 MG Take 50 mg by mouth at  bedtime as needed for Sleep.        GlipiZIDE (TABLET SR 24 HR) GLIPIZIDE XL 10 MG TAKE 1 TABLET ORALLY DAILY        Glucosamine-Chondroitin (Cap) Glucosamine-Chondroitin 500-250 MG Take 2 Tabs by mouth every day.        Metoprolol Succinate (TABLET SR 24 HR) TOPROL XL 50 MG Take 1 Tab by mouth every day.        Multiple Vitamins-Minerals   Take 1 Tab by mouth every day.        Nitroglycerin (SL Tab) NITROSTAT 0.4 MG Place 0.4 mg under tongue every 5 minutes as needed for Chest Pain.        Ramipril (Cap) ALTACE 10 MG Take 1 Cap by mouth every day.        Rosuvastatin Calcium (Tab) CRESTOR 20 MG Take 1 Tab by mouth every evening.        SITagliptin Phosphate (Tab) JANUVIA 100 MG Take 1 Tab by mouth every day.        Ticagrelor (Tab) BRILINTA 90 MG Take 1 Tab by mouth 2 Times a Day.        TraMADol HCl (Tab) ULTRAM 50 MG Take 1 Tab by mouth every four hours as needed.        .                 Medicines prescribed today were sent to:     [facility] - [place], [place] - [address] AT PORTAL TO [facility]    [address] [facility] [address]    Phone: [phone] Fax: [phone]    Open 24 Hours?: No      Medication refill instructions:       If your prescription bottle indicates you have medication refills left, it is not necessary to call your provider’s office. Please contact your pharmacy and they will refill your medication.    If your prescription bottle indicates you do not have any refills left, you may request refills at any time through one of the following ways: The online [employer] system (except Urgent Care), by calling your provider’s office, or by asking your pharmacy to contact your provider’s office with a refill request. Medication refills are processed only during regular business hours and may not be available until the next business day. Your provider may request additional information or to have a follow-up visit with you prior to refilling your  medication.   *Please Note: Medication refills are assigned a new Rx number when refilled electronically. Your pharmacy may indicate that no refills were authorized even though a new prescription for the same medication is available at the pharmacy. Please request the medicine by name with the pharmacy before contacting your provider for a refill.        Your To Do List     Future Labs/Procedures Complete By Expires    COMP METABOLIC PANEL  As directed 5/10/2018    HEMOGLOBIN A1C (Glycohemoglobin GHB Total/A1C with MBG Estimate)  As directed 5/10/2018    LIPID PROFILE  As directed 5/10/2018         Revizer Access Code: Activation code not generated  Current Revizer Status: Active

## 2017-05-10 NOTE — Clinical Note
[facility]   [address] [place] [address]  [place] [address]  Phone: [phone]  Fax: [phone]              Kun Lisa  1950    Encounter Date: 5/10/2017    SAMLA Augustine          PROGRESS NOTE:  Subjective:   Kun Lisa is a 66 y.o. male who presents today for hospital follow-up. He been having palpitations that were nonsustained VT which was felt to be due to ischemia. He underwent a nuclear stress test which was abnormal. Initially he did not want to go to the catheter lab but finally decided that he would. He was taken to the catheter lab on April 27, 2017 at which time he received a Synergy drug-eluting stent to his circumflex.    Since being out of the hospital, he has not had any of these fluttering sensations. He feels generally well with the exception of some lightheadedness particularly with position change. He feels that his heart rate is too low. He was asked to increase metoprolol to 75 mg daily.    In the chart, metoprolol tartrate 50 mg 1-1/2 tablets twice a day was ordered. He did not pickup this prescription. He had metoprolol succinate 50 mg tablets at home and has been taking one and a half of these. There was confusion between the patient and myself until we realized that he had not taken the twice a day version of the metoprolol. He would like to reduce the dose of metoprolol as he feels lightheadedness is due to a low heart rate.    He denies any chest tightness, heaviness or pressure. No shortness of breath. He is walking 3 times a week and tolerating it well.    He states his diabetes is not well-controlled because he is addicted to sugar. He is trying to work on this.    Past Medical History   Diagnosis Date   • Past heart attack      1997   • CAD (coronary artery disease)      stent   • Hyperlipidemia    • Hypertension    • Palpitations    • Obstructive sleep apnea      CPAP   • Diabetes (CMS-[facility])      oral  meds   • Arrhythmia    • Snoring    • Psychiatric problem      depression   • Diabetic neuropathy (CMS-HCC)      bilat feet   • Acid reflux    • Sleep apnea      CPAP     Past Surgical History   Procedure Laterality Date   • Stent placement  1997     LAD   • Angioplasty     • Cardiac cath  4/27/17     Synergy stent to Circ   • Cardiac cath  1997     LAD stent     Family History   Problem Relation Age of Onset   • Stroke Father 57     History   Smoking status   • Former Smoker -- 1.00 packs/day for 32 years   • Types: Cigarettes   • Quit date: 06/01/2003   Smokeless tobacco   • Never Used     Allergies   Allergen Reactions   • Metformin Unspecified     Stomach upset and acid reflux     Outpatient Encounter Prescriptions as of 5/10/2017   Medication Sig Dispense Refill   • vitamin D (CHOLECALCIFEROL) 1000 UNIT Tab Take 1,000 Units by mouth every day.     • ticagrelor (BRILINTA) 90 MG Tab tablet Take 1 Tab by mouth 2 Times a Day. 180 Tab 3   • metoprolol SR (TOPROL XL) 50 MG TABLET SR 24 HR Take 1 Tab by mouth every day. 90 Tab 3   • Multiple Vitamins-Minerals (DAILY MULTIVITAMIN PO) Take 1 Tab by mouth every day.     • diphenhydrAMINE (BENADRYL) 25 MG Tab Take 50 mg by mouth at bedtime as needed for Sleep.     • baclofen (LIORESAL) 20 MG tablet Take 20 mg by mouth as needed. Indications: Muscle Spasticity     • rosuvastatin (CRESTOR) 20 MG Tab Take 1 Tab by mouth every evening. 90 Tab 3   • ramipril (ALTACE) 10 MG capsule Take 1 Cap by mouth every day. 90 Cap 3   • GLIPIZIDE XL 10 MG TABLET SR 24 HR TAKE 1 TABLET ORALLY DAILY 90 Tab 0   • sitagliptin (JANUVIA) 100 MG Tab Take 1 Tab by mouth every day. 90 Tab 2   • tramadol (ULTRAM) 50 MG Tab Take 1 Tab by mouth every four hours as needed. 90 Tab 2   • aspirin (ASA) 81 MG Chew Tab chewable tablet Take 81 mg by mouth every day.     • Glucosamine-Chondroitin (GLUCOSAMINE CHONDROITIN COMPLX) 500-250 MG Cap Take 2 Tabs by mouth every day.     • nitroglycerin (NITROSTAT)  "0.4 MG SL Tab Place 0.4 mg under tongue every 5 minutes as needed for Chest Pain.     • [DISCONTINUED] ticagrelor (BRILINTA) 90 MG Tab tablet Take 1 Tab by mouth 2 Times a Day. 60 Tab 11   • [DISCONTINUED] metoprolol (LOPRESSOR) 50 MG Tab Take 1.5 Tabs by mouth 2 times a day. 90 Tab 11     No facility-administered encounter medications on file as of 5/10/2017.     Review of Systems   Constitutional: Negative for malaise/fatigue.   Respiratory: Negative for cough and shortness of breath.    Cardiovascular: Negative for chest pain, palpitations, orthopnea, claudication, leg swelling and PND.   Gastrointestinal: Negative for abdominal pain.   Musculoskeletal: Negative for myalgias.   Neurological: Positive for dizziness (lightheadedness). Negative for weakness.        Objective:   /78 mmHg  Pulse 52  Ht 1.905 m (6' 3\")  Wt 140.615 kg (310 lb)  BMI 38.75 kg/m2  SpO2 96%    Physical Exam   Constitutional: He is oriented to person, place, and time. He appears well-developed and well-nourished.   HENT:   Head: Normocephalic.   Eyes: Conjunctivae are normal.   Neck: No JVD present. No thyromegaly present.   Cardiovascular: Normal rate, regular rhythm and normal heart sounds.    Pulmonary/Chest: Effort normal and breath sounds normal. He has no wheezes. He has no rales.   Abdominal: Soft. Bowel sounds are normal. He exhibits no distension. There is no tenderness.   Central obesity.   Musculoskeletal: He exhibits no edema.   Neurological: He is alert and oriented to person, place, and time.   Skin: Skin is warm and dry.   Psychiatric: He has a normal mood and affect.     Results for JENNI DICKERSON (MRN [id number]) as of 5/10/2017 08:06   Ref. Range 4/28/2017 02:31   WBC Latest Ref Range: 4.8-10.8 K/uL 9.8   RBC Latest Ref Range: 4.70-6.10 M/uL 4.59 (L)   Hemoglobin Latest Ref Range: 14.0-18.0 g/dL 14.8   Hematocrit Latest Ref Range: 42.0-52.0 % 42.0   MCV Latest Ref Range: 81.4-97.8 fL 91.5   MCH Latest Ref " Range: 27.0-33.0 pg 32.2   MCHC Latest Ref Range: 33.7-35.3 g/dL 35.2   RDW Latest Ref Range: 35.9-50.0 fL 44.1   Platelet Count Latest Ref Range: 164-446 K/uL 191   MPV Latest Ref Range: 9.0-12.9 fL 10.3   Sodium Latest Ref Range: 135-145 mmol/L 129 (L)   Potassium Latest Ref Range: 3.6-5.5 mmol/L 4.7   Chloride Latest Ref Range:  mmol/L 100   Co2 Latest Ref Range: 20-33 mmol/L 22   Anion Gap Latest Ref Range: 0.0-11.9  7.0   Glucose Latest Ref Range: 65-99 mg/dL 191 (H)   Bun Latest Ref Range: 8-22 mg/dL 13   Creatinine Latest Ref Range: 0.50-1.40 mg/dL 0.85   GFR If  Latest Ref Range: >60 mL/min/1.73 m 2 >60   GFR If Non  Latest Ref Range: >60 mL/min/1.73 m 2 >60   Calcium Latest Ref Range: 8.5-10.5 mg/dL 9.0   Results for JENNI DICKERSON (MRN [id number]) as of 5/10/2017 08:06   Ref. Range 3/14/2017 08:46   Cholesterol,Tot Latest Ref Range: 100-199 mg/dL 151   Triglycerides Latest Ref Range: 0-149 mg/dL 298 (H)   HDL Latest Ref Range: >=40 mg/dL 36 (A)   LDL Latest Ref Range: <100 mg/dL 55     Assessment:     1. Coronary artery disease due to calcified coronary lesion: LAD stent in 1997  ticagrelor (BRILINTA) 90 MG Tab tablet    metoprolol SR (TOPROL XL) 50 MG TABLET SR 24 HR   2. Palpitations     3. Essential hypertension  metoprolol SR (TOPROL XL) 50 MG TABLET SR 24 HR   4. Dyslipidemia  metoprolol SR (TOPROL XL) 50 MG TABLET SR 24 HR    COMP METABOLIC PANEL    LIPID PROFILE   5. Type 2 diabetes mellitus with diabetic neuropathy, without long-term current use of insulin (CMS-HCC)  HEMOGLOBIN A1C (Glycohemoglobin GHB Total/A1C with MBG Estimate)       Medical Decision Making:  Today's Assessment / Status / Plan:     Coronary artery disease: Status post Synergy stent to the circumflex. He no longer has any fluttering and has no chest pain. His heart rate is in the low 50s and he feels somewhat lightheaded. We will reduce metoprolol succinate to 50 mg daily and have him take  it in the evening. He never picked up metoprolol tartrate.    Hypertension: Blood pressure is well controlled. His blood pressures consistently 140 or greater we will consider increasing ramipril.    Dyslipidemia: His triglycerides are elevated probably due to dietary indiscretion. Also his HDL which was previously 44 has dropped to 36. I've encouraged him to increase exercise to 5 days a week.    Diabetes: Poor control per his report. He will work with his primary care.    He will follow up as scheduled with Dr. Haddad on June 2, 2017 with lab work prior. He will follow-up sooner contact us using my chart if needed.    Collaborating Provider: Dr. Haddad.        No Recipients

## 2017-05-10 NOTE — PROGRESS NOTES
Subjective:   Kun Lisa is a 66 y.o. male who presents today for hospital follow-up. He been having palpitations that were nonsustained VT which was felt to be due to ischemia. He underwent a nuclear stress test which was abnormal. Initially he did not want to go to the catheter lab but finally decided that he would. He was taken to the catheter lab on April 27, 2017 at which time he received a Synergy drug-eluting stent to his circumflex.    Since being out of the hospital, he has not had any of these fluttering sensations. He feels generally well with the exception of some lightheadedness particularly with position change. He feels that his heart rate is too low. He was asked to increase metoprolol to 75 mg daily.    In the chart, metoprolol tartrate 50 mg 1-1/2 tablets twice a day was ordered. He did not pickup this prescription. He had metoprolol succinate 50 mg tablets at home and has been taking one and a half of these. There was confusion between the patient and myself until we realized that he had not taken the twice a day version of the metoprolol. He would like to reduce the dose of metoprolol as he feels lightheadedness is due to a low heart rate.    He denies any chest tightness, heaviness or pressure. No shortness of breath. He is walking 3 times a week and tolerating it well.    He states his diabetes is not well-controlled because he is addicted to sugar. He is trying to work on this.    Past Medical History   Diagnosis Date   • Past heart attack      1997   • CAD (coronary artery disease)      stent   • Hyperlipidemia    • Hypertension    • Palpitations    • Obstructive sleep apnea      CPAP   • Diabetes (CMS-HCC)      oral meds   • Arrhythmia    • Snoring    • Psychiatric problem      depression   • Diabetic neuropathy (CMS-HCC)      bilat feet   • Acid reflux    • Sleep apnea      CPAP     Past Surgical History   Procedure Laterality Date   • Stent placement  1997     LAD   • Angioplasty      • Cardiac cath  4/27/17     Synergy stent to Circ   • Cardiac cath  1997     LAD stent     Family History   Problem Relation Age of Onset   • Stroke Father 57     History   Smoking status   • Former Smoker -- 1.00 packs/day for 32 years   • Types: Cigarettes   • Quit date: 06/01/2003   Smokeless tobacco   • Never Used     Allergies   Allergen Reactions   • Metformin Unspecified     Stomach upset and acid reflux     Outpatient Encounter Prescriptions as of 5/10/2017   Medication Sig Dispense Refill   • vitamin D (CHOLECALCIFEROL) 1000 UNIT Tab Take 1,000 Units by mouth every day.     • ticagrelor (BRILINTA) 90 MG Tab tablet Take 1 Tab by mouth 2 Times a Day. 180 Tab 3   • metoprolol SR (TOPROL XL) 50 MG TABLET SR 24 HR Take 1 Tab by mouth every day. 90 Tab 3   • Multiple Vitamins-Minerals (DAILY MULTIVITAMIN PO) Take 1 Tab by mouth every day.     • diphenhydrAMINE (BENADRYL) 25 MG Tab Take 50 mg by mouth at bedtime as needed for Sleep.     • baclofen (LIORESAL) 20 MG tablet Take 20 mg by mouth as needed. Indications: Muscle Spasticity     • rosuvastatin (CRESTOR) 20 MG Tab Take 1 Tab by mouth every evening. 90 Tab 3   • ramipril (ALTACE) 10 MG capsule Take 1 Cap by mouth every day. 90 Cap 3   • GLIPIZIDE XL 10 MG TABLET SR 24 HR TAKE 1 TABLET ORALLY DAILY 90 Tab 0   • sitagliptin (JANUVIA) 100 MG Tab Take 1 Tab by mouth every day. 90 Tab 2   • tramadol (ULTRAM) 50 MG Tab Take 1 Tab by mouth every four hours as needed. 90 Tab 2   • aspirin (ASA) 81 MG Chew Tab chewable tablet Take 81 mg by mouth every day.     • Glucosamine-Chondroitin (GLUCOSAMINE CHONDROITIN COMPLX) 500-250 MG Cap Take 2 Tabs by mouth every day.     • nitroglycerin (NITROSTAT) 0.4 MG SL Tab Place 0.4 mg under tongue every 5 minutes as needed for Chest Pain.     • [DISCONTINUED] ticagrelor (BRILINTA) 90 MG Tab tablet Take 1 Tab by mouth 2 Times a Day. 60 Tab 11   • [DISCONTINUED] metoprolol (LOPRESSOR) 50 MG Tab Take 1.5 Tabs by mouth 2 times a  "day. 90 Tab 11     No facility-administered encounter medications on file as of 5/10/2017.     Review of Systems   Constitutional: Negative for malaise/fatigue.   Respiratory: Negative for cough and shortness of breath.    Cardiovascular: Negative for chest pain, palpitations, orthopnea, claudication, leg swelling and PND.   Gastrointestinal: Negative for abdominal pain.   Musculoskeletal: Negative for myalgias.   Neurological: Positive for dizziness (lightheadedness). Negative for weakness.        Objective:   /78 mmHg  Pulse 52  Ht 1.905 m (6' 3\")  Wt 140.615 kg (310 lb)  BMI 38.75 kg/m2  SpO2 96%    Physical Exam   Constitutional: He is oriented to person, place, and time. He appears well-developed and well-nourished.   HENT:   Head: Normocephalic.   Eyes: Conjunctivae are normal.   Neck: No JVD present. No thyromegaly present.   Cardiovascular: Normal rate, regular rhythm and normal heart sounds.    Pulmonary/Chest: Effort normal and breath sounds normal. He has no wheezes. He has no rales.   Abdominal: Soft. Bowel sounds are normal. He exhibits no distension. There is no tenderness.   Central obesity.   Musculoskeletal: He exhibits no edema.   Neurological: He is alert and oriented to person, place, and time.   Skin: Skin is warm and dry.   Psychiatric: He has a normal mood and affect.     Results for JENNI DICKERSON (MRN [id number]) as of 5/10/2017 08:06   Ref. Range 4/28/2017 02:31   WBC Latest Ref Range: 4.8-10.8 K/uL 9.8   RBC Latest Ref Range: 4.70-6.10 M/uL 4.59 (L)   Hemoglobin Latest Ref Range: 14.0-18.0 g/dL 14.8   Hematocrit Latest Ref Range: 42.0-52.0 % 42.0   MCV Latest Ref Range: 81.4-97.8 fL 91.5   MCH Latest Ref Range: 27.0-33.0 pg 32.2   MCHC Latest Ref Range: 33.7-35.3 g/dL 35.2   RDW Latest Ref Range: 35.9-50.0 fL 44.1   Platelet Count Latest Ref Range: 164-446 K/uL 191   MPV Latest Ref Range: 9.0-12.9 fL 10.3   Sodium Latest Ref Range: 135-145 mmol/L 129 (L)   Potassium Latest Ref " Range: 3.6-5.5 mmol/L 4.7   Chloride Latest Ref Range:  mmol/L 100   Co2 Latest Ref Range: 20-33 mmol/L 22   Anion Gap Latest Ref Range: 0.0-11.9  7.0   Glucose Latest Ref Range: 65-99 mg/dL 191 (H)   Bun Latest Ref Range: 8-22 mg/dL 13   Creatinine Latest Ref Range: 0.50-1.40 mg/dL 0.85   GFR If  Latest Ref Range: >60 mL/min/1.73 m 2 >60   GFR If Non  Latest Ref Range: >60 mL/min/1.73 m 2 >60   Calcium Latest Ref Range: 8.5-10.5 mg/dL 9.0   Results for JENNI DICKERSON (MRN [id number]) as of 5/10/2017 08:06   Ref. Range 3/14/2017 08:46   Cholesterol,Tot Latest Ref Range: 100-199 mg/dL 151   Triglycerides Latest Ref Range: 0-149 mg/dL 298 (H)   HDL Latest Ref Range: >=40 mg/dL 36 (A)   LDL Latest Ref Range: <100 mg/dL 55     Assessment:     1. Coronary artery disease due to calcified coronary lesion: LAD stent in 1997  ticagrelor (BRILINTA) 90 MG Tab tablet    metoprolol SR (TOPROL XL) 50 MG TABLET SR 24 HR   2. Palpitations     3. Essential hypertension  metoprolol SR (TOPROL XL) 50 MG TABLET SR 24 HR   4. Dyslipidemia  metoprolol SR (TOPROL XL) 50 MG TABLET SR 24 HR    COMP METABOLIC PANEL    LIPID PROFILE   5. Type 2 diabetes mellitus with diabetic neuropathy, without long-term current use of insulin (CMS-HCC)  HEMOGLOBIN A1C (Glycohemoglobin GHB Total/A1C with MBG Estimate)       Medical Decision Making:  Today's Assessment / Status / Plan:     Coronary artery disease: Status post Synergy stent to the circumflex. He no longer has any fluttering and has no chest pain. His heart rate is in the low 50s and he feels somewhat lightheaded. We will reduce metoprolol succinate to 50 mg daily and have him take it in the evening. He never picked up metoprolol tartrate.    Hypertension: Blood pressure is well controlled. His blood pressures consistently 140 or greater we will consider increasing ramipril.    Dyslipidemia: His triglycerides are elevated probably due to dietary  indiscretion. Also his HDL which was previously 44 has dropped to 36. I've encouraged him to increase exercise to 5 days a week.    Diabetes: Poor control per his report. He will work with his primary care.    He will follow up as scheduled with Dr. Haddad on June 2, 2017 with lab work prior. He will follow-up sooner contact us using my chart if needed.    Collaborating Provider: Dr. Haddad.

## 2017-05-30 DIAGNOSIS — G62.9 NEUROPATHY: ICD-10-CM

## 2017-06-01 RX ORDER — TRAMADOL HYDROCHLORIDE 50 MG/1
50 TABLET ORAL EVERY 4 HOURS PRN
Qty: 90 TAB | Refills: 1 | Status: SHIPPED
Start: 2017-06-01 | End: 2017-08-29 | Stop reason: SDUPTHER

## 2017-06-02 ENCOUNTER — OFFICE VISIT (OUTPATIENT)
Dept: CARDIOLOGY | Facility: MEDICAL CENTER | Age: 67
End: 2017-06-02
Payer: MEDICARE

## 2017-06-02 ENCOUNTER — HOSPITAL ENCOUNTER (OUTPATIENT)
Dept: LAB | Facility: MEDICAL CENTER | Age: 67
End: 2017-06-02
Attending: NURSE PRACTITIONER
Payer: MEDICARE

## 2017-06-02 VITALS
OXYGEN SATURATION: 94 % | WEIGHT: 310.4 LBS | SYSTOLIC BLOOD PRESSURE: 120 MMHG | HEIGHT: 75 IN | DIASTOLIC BLOOD PRESSURE: 78 MMHG | HEART RATE: 60 BPM | BODY MASS INDEX: 38.59 KG/M2

## 2017-06-02 DIAGNOSIS — E78.5 DYSLIPIDEMIA: ICD-10-CM

## 2017-06-02 DIAGNOSIS — I10 ESSENTIAL HYPERTENSION: ICD-10-CM

## 2017-06-02 DIAGNOSIS — E11.40 TYPE 2 DIABETES MELLITUS WITH DIABETIC NEUROPATHY, WITHOUT LONG-TERM CURRENT USE OF INSULIN (HCC): ICD-10-CM

## 2017-06-02 DIAGNOSIS — I47.29 NONSUSTAINED VENTRICULAR TACHYCARDIA (HCC): ICD-10-CM

## 2017-06-02 DIAGNOSIS — I25.10 CORONARY ARTERY DISEASE DUE TO CALCIFIED CORONARY LESION: ICD-10-CM

## 2017-06-02 DIAGNOSIS — I25.84 CORONARY ARTERY DISEASE DUE TO CALCIFIED CORONARY LESION: ICD-10-CM

## 2017-06-02 LAB
ALBUMIN SERPL BCP-MCNC: 4.1 G/DL (ref 3.2–4.9)
ALBUMIN/GLOB SERPL: 1.5 G/DL
ALP SERPL-CCNC: 45 U/L (ref 30–99)
ALT SERPL-CCNC: 33 U/L (ref 2–50)
ANION GAP SERPL CALC-SCNC: 6 MMOL/L (ref 0–11.9)
AST SERPL-CCNC: 22 U/L (ref 12–45)
BILIRUB SERPL-MCNC: 0.5 MG/DL (ref 0.1–1.5)
BUN SERPL-MCNC: 15 MG/DL (ref 8–22)
CALCIUM SERPL-MCNC: 9.2 MG/DL (ref 8.5–10.5)
CHLORIDE SERPL-SCNC: 103 MMOL/L (ref 96–112)
CHOLEST SERPL-MCNC: 122 MG/DL (ref 100–199)
CO2 SERPL-SCNC: 24 MMOL/L (ref 20–33)
CREAT SERPL-MCNC: 0.82 MG/DL (ref 0.5–1.4)
EST. AVERAGE GLUCOSE BLD GHB EST-MCNC: 192 MG/DL
GFR SERPL CREATININE-BSD FRML MDRD: >60 ML/MIN/1.73 M 2
GLOBULIN SER CALC-MCNC: 2.8 G/DL (ref 1.9–3.5)
GLUCOSE SERPL-MCNC: 227 MG/DL (ref 65–99)
HBA1C MFR BLD: 8.3 % (ref 0–5.6)
HDLC SERPL-MCNC: 38 MG/DL
LDLC SERPL CALC-MCNC: 38 MG/DL
POTASSIUM SERPL-SCNC: 4.5 MMOL/L (ref 3.6–5.5)
PROT SERPL-MCNC: 6.9 G/DL (ref 6–8.2)
SODIUM SERPL-SCNC: 133 MMOL/L (ref 135–145)
TRIGL SERPL-MCNC: 231 MG/DL (ref 0–149)

## 2017-06-02 PROCEDURE — G8419 CALC BMI OUT NRM PARAM NOF/U: HCPCS | Performed by: INTERNAL MEDICINE

## 2017-06-02 PROCEDURE — 4040F PNEUMOC VAC/ADMIN/RCVD: CPT | Mod: 8P | Performed by: INTERNAL MEDICINE

## 2017-06-02 PROCEDURE — 36415 COLL VENOUS BLD VENIPUNCTURE: CPT

## 2017-06-02 PROCEDURE — 80061 LIPID PANEL: CPT

## 2017-06-02 PROCEDURE — 1101F PT FALLS ASSESS-DOCD LE1/YR: CPT | Mod: 8P | Performed by: INTERNAL MEDICINE

## 2017-06-02 PROCEDURE — 3017F COLORECTAL CA SCREEN DOC REV: CPT | Mod: 8P | Performed by: INTERNAL MEDICINE

## 2017-06-02 PROCEDURE — 99214 OFFICE O/P EST MOD 30 MIN: CPT | Performed by: INTERNAL MEDICINE

## 2017-06-02 PROCEDURE — 83036 HEMOGLOBIN GLYCOSYLATED A1C: CPT | Mod: GA

## 2017-06-02 PROCEDURE — 1036F TOBACCO NON-USER: CPT | Performed by: INTERNAL MEDICINE

## 2017-06-02 PROCEDURE — G8432 DEP SCR NOT DOC, RNG: HCPCS | Performed by: INTERNAL MEDICINE

## 2017-06-02 PROCEDURE — 80053 COMPREHEN METABOLIC PANEL: CPT

## 2017-06-02 PROCEDURE — G8598 ASA/ANTIPLAT THER USED: HCPCS | Performed by: INTERNAL MEDICINE

## 2017-06-02 RX ORDER — ROSUVASTATIN CALCIUM 40 MG/1
40 TABLET, COATED ORAL DAILY
Qty: 90 TAB | Refills: 3 | Status: SHIPPED | OUTPATIENT
Start: 2017-06-02 | End: 2017-08-29

## 2017-06-02 NOTE — Clinical Note
[facility]   [address]  [place] [address]  Phone: [phone]  Fax: [phone]              Kun Lisa  1950    Encounter Date: 6/2/2017    Marbin Haddad M.D.          PROGRESS NOTE:  Subjective:   Kun Lisa is a 66 y.o. male who presents today for follow-up evaluation because of history of coronary artery disease. He also has hypertension and dyslipidemia in addition to type II diabetes.    He had difficulty with palpitations and nonsustained VT. He underwent evaluation with myocardial perfusion scan and ultimately coronary angiography. He received a drug-eluting stent to the circumflex. He had  of the RCA with good collateralization. His palpitations have resolved.    He has been having some difficulty with lightheadedness and his beta blocker therapy was reduced. He's not sure that this really helped his lightheadedness.    He's had no chest discomfort or dyspnea. He does have some chronic edema.    Past Medical History   Diagnosis Date   • Past heart attack      1997   • CAD (coronary artery disease)      stent   • Hyperlipidemia    • Hypertension    • Palpitations    • Obstructive sleep apnea      CPAP   • Diabetes (CMS-HCC)      oral meds   • Arrhythmia    • Snoring    • Psychiatric problem      depression   • Diabetic neuropathy (CMS-HCC)      bilat feet   • Acid reflux    • Sleep apnea      CPAP     Past Surgical History   Procedure Laterality Date   • Stent placement  1997     LAD   • Angioplasty     • Cardiac cath  4/27/17     Synergy stent to Circ   • Cardiac cath  1997     LAD stent     Family History   Problem Relation Age of Onset   • Stroke Father 57     History   Smoking status   • Former Smoker -- 1.00 packs/day for 32 years   • Types: Cigarettes   • Quit date: 06/01/2003   Smokeless tobacco   • Never Used     Allergies   Allergen Reactions   • Metformin Unspecified     Stomach upset and acid reflux      "    Outpatient Encounter Prescriptions as of 6/2/2017   Medication Sig Dispense Refill   • tramadol (ULTRAM) 50 MG Tab Take 1 Tab by mouth every four hours as needed. 90 Tab 1   • Non Formulary Request For [employer] System 1 Remstar Auto A-Flex CPAP: 1 headgear (Mirage Ortega II Nasal Pillow by [employer] (large size)); 1 package 6' tubing; 2 small filters (Every 3 months) 1 Each 3   • vitamin D (CHOLECALCIFEROL) 1000 UNIT Tab Take 1,000 Units by mouth every day.     • ticagrelor (BRILINTA) 90 MG Tab tablet Take 1 Tab by mouth 2 Times a Day. 180 Tab 3   • metoprolol SR (TOPROL XL) 50 MG TABLET SR 24 HR Take 1 Tab by mouth every day. 90 Tab 3   • Multiple Vitamins-Minerals (DAILY MULTIVITAMIN PO) Take 1 Tab by mouth every day.     • diphenhydrAMINE (BENADRYL) 25 MG Tab Take 50 mg by mouth at bedtime as needed for Sleep.     • baclofen (LIORESAL) 20 MG tablet Take 20 mg by mouth as needed. Indications: Muscle Spasticity     • rosuvastatin (CRESTOR) 20 MG Tab Take 1 Tab by mouth every evening. 90 Tab 3   • ramipril (ALTACE) 10 MG capsule Take 1 Cap by mouth every day. 90 Cap 3   • GLIPIZIDE XL 10 MG TABLET SR 24 HR TAKE 1 TABLET ORALLY DAILY 90 Tab 0   • sitagliptin (JANUVIA) 100 MG Tab Take 1 Tab by mouth every day. 90 Tab 2   • aspirin (ASA) 81 MG Chew Tab chewable tablet Take 81 mg by mouth every day.     • Glucosamine-Chondroitin (GLUCOSAMINE CHONDROITIN COMPLX) 500-250 MG Cap Take 2 Tabs by mouth every day.     • nitroglycerin (NITROSTAT) 0.4 MG SL Tab Place 0.4 mg under tongue every 5 minutes as needed for Chest Pain.       No facility-administered encounter medications on file as of 6/2/2017.     ROS       Objective:   /78 mmHg  Pulse 60  Ht 1.905 m (6' 3\")  Wt 140.797 kg (310 lb 6.4 oz)  BMI 38.80 kg/m2  SpO2 94%    Physical Exam   Neck: No JVD present.   Cardiovascular: Normal rate and regular rhythm.  Exam reveals no gallop.    No murmur heard.  Pulmonary/Chest: Effort normal. He has no rales. "   Abdominal: Soft. There is no tenderness.   Musculoskeletal: He exhibits edema (trace to 1+ pretibial).     Lab Results   Component Value Date/Time    CHOLESTEROL, 03/14/2017 08:46 AM    LDL 55 03/14/2017 08:46 AM    HDL 36* 03/14/2017 08:46 AM    TRIGLYCERIDES 298* 03/14/2017 08:46 AM       Lab Results   Component Value Date/Time    SODIUM 129* 04/28/2017 02:31 AM    POTASSIUM 4.7 04/28/2017 02:31 AM    CHLORIDE 100 04/28/2017 02:31 AM    CO2 22 04/28/2017 02:31 AM    GLUCOSE 191* 04/28/2017 02:31 AM    BUN 13 04/28/2017 02:31 AM    CREATININE 0.85 04/28/2017 02:31 AM     Lab Results   Component Value Date/Time    ALKALINE PHOSPHATASE 50 03/14/2017 08:46 AM    AST(SGOT) 18 03/14/2017 08:46 AM    ALT(SGPT) 30 03/14/2017 08:46 AM    TOTAL BILIRUBIN 0.5 03/14/2017 08:46 AM     Myocardial perfusion scan:  NUCLEAR IMAGING INTERPRETATION   Small sized, partially reversible, decreased uptake of moderate severity in    the apical inferior (RCA), mid inferior (RCA) segments during post stress    images.    Medium sized, partially reversible, decreased uptake of moderate severity in    the apical lateral (LCX), inferolateral (LCX) and mid anterolateral (LCX)    segments during post stress images.    Normal left ventricular wall motion.  LV ejection fraction = 62%.   ECG INTERPRETATION   Negative stress ECG for ischemia.    Cardiac catheterization:  POSTPROCEDURE DIAGNOSES:  1.  Coronary artery disease including patent stent in the mid left anterior    descending artery with nonobstructive in-stent restenosis, high-grade distal    circumflex artery of a codominant system supplying excellent collaterals to    mid to distal right coronary artery with long chronic total occlusion of the    ostial to mid right coronary artery.  2.  Successful percutaneous transluminal coronary angioplasty/stent placement    of the distal circumflex artery with 2.5x16 mm Synergy drug-eluting stent.        Assessment:     1. Coronary artery  disease due to calcified coronary lesion: LAD stent in 1997     2. Essential hypertension     3. Dyslipidemia         Medical Decision Making:  Today's Assessment / Status / Plan:     Mr. Lisa is having difficulty with lightheadedness. At this time, we will have him reduce metoprolol ER to 25 mg at bedtime. In addition, if he becomes lightheaded and would like him to take his blood pressure and pulse. His palpitations have resolved and I suspect so has his nonsustained VT. However, we will obtain a 24-hour Holter to verify there is no significant dysrhythmias. His blood pressure appears to be under excellent control. We will increase her rosuvastatin to 40 mg daily. He will have lab work in about 6 weeks and follow-up in a couple of months.      Kit Torres PA-C  [address]  [address]  [facility] [address]  VIA In Basket

## 2017-06-02 NOTE — MR AVS SNAPSHOT
"        Kun Madsen Lisa   2017 11:45 AM   Office Visit   MRN: [id number]    Department:  [facility]   Dept Phone:  [phone]    Description:  Male : 1950   Provider:  Marbin Haddad M.D.           Reason for Visit     Follow-Up           Allergies as of 2017     Allergen Noted Reactions    Metformin 10/25/2016   Unspecified    Stomach upset and acid reflux      Vital Signs     Blood Pressure Pulse Height Weight Body Mass Index Oxygen Saturation    120/78 mmHg 60 1.905 m (6' 3\") 140.797 kg (310 lb 6.4 oz) 38.80 kg/m2 94%    Smoking Status                   Former Smoker           Basic Information     Date Of Birth Sex Race Ethnicity Preferred Language    1950 Male White Non- English      Problem List              ICD-10-CM Priority Class Noted - Resolved    Type 2 diabetes mellitus with neurologic complication (CMS-HCC) E11.49   10/25/2016 - Present    Dyslipidemia E78.5   10/25/2016 - Present    Essential hypertension I10   10/25/2016 - Present    Arrhythmia I49.9   10/25/2016 - Present    Neuropathy (CMS-HCC) G62.9   10/25/2016 - Present    Preventative health care Z00.00   10/25/2016 - Present    Left foot pain M79.672   10/25/2016 - Present    Morbid obesity due to excess calories (CMS-HCC) E66.01   10/27/2016 - Present    Obstructive sleep apnea syndrome G47.33   2016 - Present    Coronary artery disease due to calcified coronary lesion: LAD stent in  I25.10, I25.84   2017 - Present    Palpitations R00.2   2017 - Present    Coronary artery disease I25.10   2017 - Present    Abnormal nuclear stress test R94.39   2017 - Present      Health Maintenance        Date Due Completion Dates    RETINAL SCREENING 1969 ---    IMM DTaP/Tdap/Td Vaccine (1 - Tdap) 1970 ---    COLONOSCOPY 2001 ---    IMM ZOSTER VACCINE 2011 ---    IMM PNEUMOCOCCAL 65+ (ADULT) LOW/MEDIUM RISK SERIES (1 of 2 - PCV13) 2016 ---    A1C SCREENING 2017 " 11/8/2016    URINE ACR / MICROALBUMIN 11/8/2017 11/8/2016    DIABETES MONOFILAMENT / LE EXAM 11/22/2017 11/22/2016    FASTING LIPID PROFILE 3/14/2018 3/14/2017, 11/8/2016    SERUM CREATININE 4/28/2018 4/28/2017, 4/25/2017, 3/14/2017, 11/8/2016            Current Immunizations     Influenza Vaccine Adult HD 10/25/2016  4:23 PM      Below and/or attached are the medications your provider expects you to take. Review all of your home medications and newly ordered medications with your provider and/or pharmacist. Follow medication instructions as directed by your provider and/or pharmacist. Please keep your medication list with you and share with your provider. Update the information when medications are discontinued, doses are changed, or new medications (including over-the-counter products) are added; and carry medication information at all times in the event of emergency situations     Allergies:  METFORMIN - Unspecified               Medications  Valid as of: June 02, 2017 - 11:52 AM    Generic Name Brand Name Tablet Size Instructions for use    Aspirin (Chew Tab) ASA 81 MG Take 81 mg by mouth every day.        Baclofen (Tab) LIORESAL 20 MG Take 20 mg by mouth as needed. Indications: Muscle Spasticity        Cholecalciferol (Tab) cholecalciferol 1000 UNIT Take 1,000 Units by mouth every day.        DiphenhydrAMINE HCl (Tab) BENADRYL 25 MG Take 50 mg by mouth at bedtime as needed for Sleep.        GlipiZIDE (TABLET SR 24 HR) GLIPIZIDE XL 10 MG TAKE 1 TABLET ORALLY DAILY        Glucosamine-Chondroitin (Cap) Glucosamine-Chondroitin 500-250 MG Take 2 Tabs by mouth every day.        Metoprolol Succinate (TABLET SR 24 HR) TOPROL XL 50 MG Take 1 Tab by mouth every day.        Multiple Vitamins-Minerals   Take 1 Tab by mouth every day.        Nitroglycerin (SL Tab) NITROSTAT 0.4 MG Place 0.4 mg under tongue every 5 minutes as needed for Chest Pain.        Non Formulary Request Non Formulary Request  For [employer]  System 1 Remstar Auto A-Flex CPAP: 1 headgear (Mirage Ortega II Nasal Pillow by [employer] (large size)); 1 package 6' tubing; 2 small filters (Every 3 months)        Ramipril (Cap) ALTACE 10 MG Take 1 Cap by mouth every day.        Rosuvastatin Calcium (Tab) CRESTOR 20 MG Take 1 Tab by mouth every evening.        SITagliptin Phosphate (Tab) JANUVIA 100 MG Take 1 Tab by mouth every day.        Ticagrelor (Tab) BRILINTA 90 MG Take 1 Tab by mouth 2 Times a Day.        TraMADol HCl (Tab) ULTRAM 50 MG Take 1 Tab by mouth every four hours as needed.        .                 Medicines prescribed today were sent to:     [facility] [place] [place] - [address] AT [address] TO [facility]    [address] [facility] [address]    Phone: [phone] Fax: [phone]    Open 24 Hours?: No      Medication refill instructions:       If your prescription bottle indicates you have medication refills left, it is not necessary to call your provider’s office. Please contact your pharmacy and they will refill your medication.    If your prescription bottle indicates you do not have any refills left, you may request refills at any time through one of the following ways: The online Phantom system (except Urgent Care), by calling your provider’s office, or by asking your pharmacy to contact your provider’s office with a refill request. Medication refills are processed only during regular business hours and may not be available until the next business day. Your provider may request additional information or to have a follow-up visit with you prior to refilling your medication.   *Please Note: Medication refills are assigned a new Rx number when refilled electronically. Your pharmacy may indicate that no refills were authorized even though a new prescription for the same medication is available at the pharmacy. Please request the medicine by name with the pharmacy before contacting your provider for a  refill.           MyChart Access Code: Activation code not generated  Current Qgiv Status: Active

## 2017-06-02 NOTE — PROGRESS NOTES
Subjective:   Kun Lisa is a 66 y.o. male who presents today for follow-up evaluation because of history of coronary artery disease. He also has hypertension and dyslipidemia in addition to type II diabetes.    He had difficulty with palpitations and nonsustained VT. He underwent evaluation with myocardial perfusion scan and ultimately coronary angiography. He received a drug-eluting stent to the circumflex. He had  of the RCA with good collateralization. His palpitations have resolved.    He has been having some difficulty with lightheadedness and his beta blocker therapy was reduced. He's not sure that this really helped his lightheadedness.    He's had no chest discomfort or dyspnea. He does have some chronic edema.    Past Medical History   Diagnosis Date   • Past heart attack      1997   • CAD (coronary artery disease)      stent   • Hyperlipidemia    • Hypertension    • Palpitations    • Obstructive sleep apnea      CPAP   • Diabetes (CMS-HCC)      oral meds   • Arrhythmia    • Snoring    • Psychiatric problem      depression   • Diabetic neuropathy (CMS-HCC)      bilat feet   • Acid reflux    • Sleep apnea      CPAP     Past Surgical History   Procedure Laterality Date   • Stent placement  1997     LAD   • Angioplasty     • Cardiac cath  4/27/17     Synergy stent to Circ   • Cardiac cath  1997     LAD stent     Family History   Problem Relation Age of Onset   • Stroke Father 57     History   Smoking status   • Former Smoker -- 1.00 packs/day for 32 years   • Types: Cigarettes   • Quit date: 06/01/2003   Smokeless tobacco   • Never Used     Allergies   Allergen Reactions   • Metformin Unspecified     Stomach upset and acid reflux     Outpatient Encounter Prescriptions as of 6/2/2017   Medication Sig Dispense Refill   • tramadol (ULTRAM) 50 MG Tab Take 1 Tab by mouth every four hours as needed. 90 Tab 1   • Non Formulary Request For Fernando Respironics System 1 Remstar Auto A-Flex CPAP: 1 headgear  "(Mirage Ortega II Nasal Pillow by [employer] (large size)); 1 package 6' tubing; 2 small filters (Every 3 months) 1 Each 3   • vitamin D (CHOLECALCIFEROL) 1000 UNIT Tab Take 1,000 Units by mouth every day.     • ticagrelor (BRILINTA) 90 MG Tab tablet Take 1 Tab by mouth 2 Times a Day. 180 Tab 3   • metoprolol SR (TOPROL XL) 50 MG TABLET SR 24 HR Take 1 Tab by mouth every day. 90 Tab 3   • Multiple Vitamins-Minerals (DAILY MULTIVITAMIN PO) Take 1 Tab by mouth every day.     • diphenhydrAMINE (BENADRYL) 25 MG Tab Take 50 mg by mouth at bedtime as needed for Sleep.     • baclofen (LIORESAL) 20 MG tablet Take 20 mg by mouth as needed. Indications: Muscle Spasticity     • rosuvastatin (CRESTOR) 20 MG Tab Take 1 Tab by mouth every evening. 90 Tab 3   • ramipril (ALTACE) 10 MG capsule Take 1 Cap by mouth every day. 90 Cap 3   • GLIPIZIDE XL 10 MG TABLET SR 24 HR TAKE 1 TABLET ORALLY DAILY 90 Tab 0   • sitagliptin (JANUVIA) 100 MG Tab Take 1 Tab by mouth every day. 90 Tab 2   • aspirin (ASA) 81 MG Chew Tab chewable tablet Take 81 mg by mouth every day.     • Glucosamine-Chondroitin (GLUCOSAMINE CHONDROITIN COMPLX) 500-250 MG Cap Take 2 Tabs by mouth every day.     • nitroglycerin (NITROSTAT) 0.4 MG SL Tab Place 0.4 mg under tongue every 5 minutes as needed for Chest Pain.       No facility-administered encounter medications on file as of 6/2/2017.     ROS       Objective:   /78 mmHg  Pulse 60  Ht 1.905 m (6' 3\")  Wt 140.797 kg (310 lb 6.4 oz)  BMI 38.80 kg/m2  SpO2 94%    Physical Exam   Neck: No JVD present.   Cardiovascular: Normal rate and regular rhythm.  Exam reveals no gallop.    No murmur heard.  Pulmonary/Chest: Effort normal. He has no rales.   Abdominal: Soft. There is no tenderness.   Musculoskeletal: He exhibits edema (trace to 1+ pretibial).     Lab Results   Component Value Date/Time    CHOLESTEROL, 03/14/2017 08:46 AM    LDL 55 03/14/2017 08:46 AM    HDL 36* 03/14/2017 08:46 AM    TRIGLYCERIDES " 298* 03/14/2017 08:46 AM       Lab Results   Component Value Date/Time    SODIUM 129* 04/28/2017 02:31 AM    POTASSIUM 4.7 04/28/2017 02:31 AM    CHLORIDE 100 04/28/2017 02:31 AM    CO2 22 04/28/2017 02:31 AM    GLUCOSE 191* 04/28/2017 02:31 AM    BUN 13 04/28/2017 02:31 AM    CREATININE 0.85 04/28/2017 02:31 AM     Lab Results   Component Value Date/Time    ALKALINE PHOSPHATASE 50 03/14/2017 08:46 AM    AST(SGOT) 18 03/14/2017 08:46 AM    ALT(SGPT) 30 03/14/2017 08:46 AM    TOTAL BILIRUBIN 0.5 03/14/2017 08:46 AM     Myocardial perfusion scan:  NUCLEAR IMAGING INTERPRETATION   Small sized, partially reversible, decreased uptake of moderate severity in    the apical inferior (RCA), mid inferior (RCA) segments during post stress    images.    Medium sized, partially reversible, decreased uptake of moderate severity in    the apical lateral (LCX), inferolateral (LCX) and mid anterolateral (LCX)    segments during post stress images.    Normal left ventricular wall motion.  LV ejection fraction = 62%.   ECG INTERPRETATION   Negative stress ECG for ischemia.    Cardiac catheterization:  POSTPROCEDURE DIAGNOSES:  1.  Coronary artery disease including patent stent in the mid left anterior    descending artery with nonobstructive in-stent restenosis, high-grade distal    circumflex artery of a codominant system supplying excellent collaterals to    mid to distal right coronary artery with long chronic total occlusion of the    ostial to mid right coronary artery.  2.  Successful percutaneous transluminal coronary angioplasty/stent placement    of the distal circumflex artery with 2.5x16 mm Synergy drug-eluting stent.        Assessment:     1. Coronary artery disease due to calcified coronary lesion: LAD stent in 1997     2. Essential hypertension     3. Dyslipidemia         Medical Decision Making:  Today's Assessment / Status / Plan:     Mr. Lisa is having difficulty with lightheadedness. At this time, we will have him  reduce metoprolol ER to 25 mg at bedtime. In addition, if he becomes lightheaded and would like him to take his blood pressure and pulse. His palpitations have resolved and I suspect so has his nonsustained VT. However, we will obtain a 24-hour Holter to verify there is no significant dysrhythmias. His blood pressure appears to be under excellent control. We will increase her rosuvastatin to 40 mg daily. He will have lab work in about 6 weeks and follow-up in a couple of months.

## 2017-06-14 ENCOUNTER — PATIENT OUTREACH (OUTPATIENT)
Dept: HEALTH INFORMATION MANAGEMENT | Facility: OTHER | Age: 67
End: 2017-06-14

## 2017-06-14 NOTE — PROGRESS NOTES
6/14/17  -   Outcome: Left Message    WebIZ Checked & Epic Updated:  yes    HealthConnect Verified: no    Attempt # 1

## 2017-06-19 ENCOUNTER — NON-PROVIDER VISIT (OUTPATIENT)
Dept: CARDIOLOGY | Facility: MEDICAL CENTER | Age: 67
End: 2017-06-19
Attending: INTERNAL MEDICINE
Payer: MEDICARE

## 2017-06-19 ENCOUNTER — TELEPHONE (OUTPATIENT)
Dept: CARDIOLOGY | Facility: MEDICAL CENTER | Age: 67
End: 2017-06-19

## 2017-06-19 DIAGNOSIS — I47.29 NONSUSTAINED VENTRICULAR TACHYCARDIA (HCC): ICD-10-CM

## 2017-06-19 DIAGNOSIS — I49.3 PVC (PREMATURE VENTRICULAR CONTRACTION): ICD-10-CM

## 2017-06-19 DIAGNOSIS — R00.1 SINUS BRADYCARDIA: ICD-10-CM

## 2017-06-19 DIAGNOSIS — I49.9 CARDIAC ARRHYTHMIA, UNSPECIFIED CARDIAC ARRHYTHMIA TYPE: ICD-10-CM

## 2017-06-19 NOTE — TELEPHONE ENCOUNTER
Called spoke with patient regarding Brilentya samples. Per pt states he had got 48 pills with lot Number [id number] exp 10/19

## 2017-06-21 NOTE — PROGRESS NOTES
Communication Preference Obtained: yes     Review Care Team: yes    Annual Wellness Visit Scheduling  1. Scheduling Status:Scheduled     Care Gap Scheduling (Attempt to Schedule EACH Overdue Care Gap!)     Health Maintenance Due   Topic Date Due   • Annual Wellness Visit  Scheduled   • RETINAL SCREENING  Pt states that his last eye exam was in March 2016 in [place] with Dr. SALCEDO and he prefers to find his own optometrist.   • IMM DTaP/Tdap/Td Vaccine (1 - Tdap)    • COLONOSCOPY  Will discuss care gaps with PCP   • IMM ZOSTER VACCINE     • IMM PNEUMOCOCCAL 65+ (ADULT) LOW/MEDIUM RISK SERIES (1 of 2 - PCV13)          THE EMPTY JOINT Activation: already active  THE EMPTY JOINT Jolynn: yes  Virtual Visits: yes  Opt In to Text Messages: yes

## 2017-06-26 DIAGNOSIS — I49.9 CARDIAC ARRHYTHMIA, UNSPECIFIED CARDIAC ARRHYTHMIA TYPE: ICD-10-CM

## 2017-06-26 DIAGNOSIS — I47.29 NONSUSTAINED VENTRICULAR TACHYCARDIA (HCC): ICD-10-CM

## 2017-06-27 ENCOUNTER — OFFICE VISIT (OUTPATIENT)
Dept: MEDICAL GROUP | Facility: MEDICAL CENTER | Age: 67
End: 2017-06-27
Payer: MEDICARE

## 2017-06-27 ENCOUNTER — TELEPHONE (OUTPATIENT)
Dept: CARDIOLOGY | Facility: MEDICAL CENTER | Age: 67
End: 2017-06-27

## 2017-06-27 VITALS
OXYGEN SATURATION: 97 % | WEIGHT: 312 LBS | HEART RATE: 64 BPM | HEIGHT: 75 IN | DIASTOLIC BLOOD PRESSURE: 72 MMHG | TEMPERATURE: 96.6 F | SYSTOLIC BLOOD PRESSURE: 118 MMHG | BODY MASS INDEX: 38.79 KG/M2

## 2017-06-27 DIAGNOSIS — Z00.00 PREVENTATIVE HEALTH CARE: ICD-10-CM

## 2017-06-27 DIAGNOSIS — E11.40 TYPE 2 DIABETES MELLITUS WITH DIABETIC NEUROPATHY, WITHOUT LONG-TERM CURRENT USE OF INSULIN (HCC): ICD-10-CM

## 2017-06-27 DIAGNOSIS — G47.33 OBSTRUCTIVE SLEEP APNEA SYNDROME: ICD-10-CM

## 2017-06-27 DIAGNOSIS — I25.10 CORONARY ARTERY DISEASE DUE TO CALCIFIED CORONARY LESION: ICD-10-CM

## 2017-06-27 DIAGNOSIS — M79.672 LEFT FOOT PAIN: ICD-10-CM

## 2017-06-27 DIAGNOSIS — I10 ESSENTIAL HYPERTENSION: ICD-10-CM

## 2017-06-27 DIAGNOSIS — R00.2 PALPITATIONS: ICD-10-CM

## 2017-06-27 DIAGNOSIS — E66.01 MORBID OBESITY DUE TO EXCESS CALORIES (HCC): ICD-10-CM

## 2017-06-27 DIAGNOSIS — R94.39 ABNORMAL NUCLEAR STRESS TEST: ICD-10-CM

## 2017-06-27 DIAGNOSIS — I49.9 CARDIAC ARRHYTHMIA, UNSPECIFIED CARDIAC ARRHYTHMIA TYPE: ICD-10-CM

## 2017-06-27 DIAGNOSIS — E78.5 DYSLIPIDEMIA: ICD-10-CM

## 2017-06-27 DIAGNOSIS — G62.9 NEUROPATHY: ICD-10-CM

## 2017-06-27 DIAGNOSIS — Z00.00 MEDICARE ANNUAL WELLNESS VISIT, INITIAL: Primary | ICD-10-CM

## 2017-06-27 DIAGNOSIS — Z12.11 SCREEN FOR COLON CANCER: ICD-10-CM

## 2017-06-27 DIAGNOSIS — I25.84 CORONARY ARTERY DISEASE DUE TO CALCIFIED CORONARY LESION: ICD-10-CM

## 2017-06-27 LAB — EKG IMPRESSION: NORMAL

## 2017-06-27 PROCEDURE — 93224 XTRNL ECG REC UP TO 48 HRS: CPT | Performed by: INTERNAL MEDICINE

## 2017-06-27 PROCEDURE — G0438 PPPS, INITIAL VISIT: HCPCS | Performed by: NURSE PRACTITIONER

## 2017-06-27 ASSESSMENT — PATIENT HEALTH QUESTIONNAIRE - PHQ9: CLINICAL INTERPRETATION OF PHQ2 SCORE: 1

## 2017-06-27 NOTE — MR AVS SNAPSHOT
"        Kun Madsen Lisa   2017 4:20 PM   Office Visit   MRN: [id number]    Department:  [facility]   Dept Phone:  [phone]    Description:  Male : 1950   Provider:  WILLIAM Simpson           Reason for Visit     Annual Exam initial      Allergies as of 2017     Allergen Noted Reactions    Metformin 10/25/2016   Unspecified    Stomach upset and acid reflux      You were diagnosed with     Abnormal nuclear stress test   [751998]       Type 2 diabetes mellitus with diabetic neuropathy, without long-term current use of insulin (CMS-HCC)   [8086445]       Preventative health care   [879676]       Palpitations   [785.1.ICD-9-CM]       Obstructive sleep apnea syndrome   [841346]         Vital Signs     Blood Pressure Pulse Temperature Height Weight Body Mass Index    118/72 mmHg 50 35.9 °C (96.6 °F) 1.905 m (6' 3\") 141.522 kg (312 lb) 39.00 kg/m2    Oxygen Saturation Smoking Status                97% Former Smoker          Basic Information     Date Of Birth Sex Race Ethnicity Preferred Language    1950 Male White Non- English      Problem List              ICD-10-CM Priority Class Noted - Resolved    Type 2 diabetes mellitus with neurologic complication (CMS-HCC) E11.49   10/25/2016 - Present    Dyslipidemia E78.5   10/25/2016 - Present    Essential hypertension I10   10/25/2016 - Present    Arrhythmia I49.9   10/25/2016 - Present    Neuropathy (CMS-HCC) G62.9   10/25/2016 - Present    Preventative health care Z00.00   10/25/2016 - Present    Left foot pain M79.672   10/25/2016 - Present    Morbid obesity due to excess calories (CMS-HCC) E66.01   10/27/2016 - Present    Obstructive sleep apnea syndrome G47.33   2016 - Present    Coronary artery disease due to calcified coronary lesion: LAD stent in  I25.10, I25.84   2017 - Present    Palpitations R00.2   2017 - Present    Coronary artery disease I25.10   2017 - Present    Abnormal nuclear " stress test R94.39   4/27/2017 - Present      Health Maintenance        Date Due Completion Dates    RETINAL SCREENING 6/19/1969 ---    IMM DTaP/Tdap/Td Vaccine (1 - Tdap) 6/19/1970 ---    COLONOSCOPY 6/19/2001 ---    IMM ZOSTER VACCINE 6/19/2011 ---    IMM PNEUMOCOCCAL 65+ (ADULT) LOW/MEDIUM RISK SERIES (1 of 2 - PCV13) 6/19/2016 ---    URINE ACR / MICROALBUMIN 11/8/2017 11/8/2016    DIABETES MONOFILAMENT / LE EXAM 11/22/2017 11/22/2016    A1C SCREENING 12/2/2017 6/2/2017, 11/8/2016    FASTING LIPID PROFILE 6/2/2018 6/2/2017, 3/14/2017, 11/8/2016    SERUM CREATININE 6/2/2018 6/2/2017, 4/28/2017, 4/25/2017, 3/14/2017, 11/8/2016            Current Immunizations     13-VALENT PCV PREVNAR 5/1/2016    Influenza Vaccine Adult HD 10/25/2016  4:23 PM    SHINGLES VACCINE 5/1/2016    Tdap Vaccine 5/1/2016      Below and/or attached are the medications your provider expects you to take. Review all of your home medications and newly ordered medications with your provider and/or pharmacist. Follow medication instructions as directed by your provider and/or pharmacist. Please keep your medication list with you and share with your provider. Update the information when medications are discontinued, doses are changed, or new medications (including over-the-counter products) are added; and carry medication information at all times in the event of emergency situations     Allergies:  METFORMIN - Unspecified               Medications  Valid as of: June 27, 2017 -  4:58 PM    Generic Name Brand Name Tablet Size Instructions for use    Aspirin (Chew Tab) ASA 81 MG Take 81 mg by mouth every day.        Baclofen (Tab) LIORESAL 20 MG Take 20 mg by mouth as needed. Indications: Muscle Spasticity        Cholecalciferol (Tab) cholecalciferol 1000 UNIT Take 1,000 Units by mouth every day.        DiphenhydrAMINE HCl (Tab) BENADRYL 25 MG Take 50 mg by mouth at bedtime as needed for Sleep.        GlipiZIDE (TABLET SR 24 HR) GLIPIZIDE XL 10 MG  TAKE 1 TABLET ORALLY DAILY        Glucosamine-Chondroitin (Cap) Glucosamine-Chondroitin 500-250 MG Take 2 Tabs by mouth every day.        Ibuprofen-Diphenhydramine Cit   Take  by mouth.        Metoprolol Succinate (TABLET SR 24 HR) TOPROL XL 50 MG Take 1 Tab by mouth every day.        Multiple Vitamins-Minerals   Take 1 Tab by mouth every day.        Nitroglycerin (SL Tab) NITROSTAT 0.4 MG Place 0.4 mg under tongue every 5 minutes as needed for Chest Pain.        Non Formulary Request Non Formulary Request  For Fernando Respironics System 1 Remstar Auto A-Flex CPAP: 1 headgear (Mirage Ortega II Nasal Pillow by [employer] (large size)); 1 package 6' tubing; 2 small filters (Every 3 months)        Ramipril (Cap) ALTACE 10 MG Take 1 Cap by mouth every day.        Rosuvastatin Calcium (Tab) CRESTOR 40 MG Take 1 Tab by mouth every day.        SITagliptin Phosphate (Tab) JANUVIA 100 MG Take 1 Tab by mouth every day.        Ticagrelor (Tab) BRILINTA 90 MG Take 1 Tab by mouth 2 Times a Day.        TraMADol HCl (Tab) ULTRAM 50 MG Take 1 Tab by mouth every four hours as needed.        .                 Medicines prescribed today were sent to:     [facility] - [place], [place] - [address] AT PORTAL TO [facility] S[address]    [address] [facility] [address]    Phone: [phone] Fax: [phone]    Open 24 Hours?: No      Medication refill instructions:       If your prescription bottle indicates you have medication refills left, it is not necessary to call your provider’s office. Please contact your pharmacy and they will refill your medication.    If your prescription bottle indicates you do not have any refills left, you may request refills at any time through one of the following ways: The online [employer] system (except Urgent Care), by calling your provider’s office, or by asking your pharmacy to contact your provider’s office with a refill request. Medication refills are processed only  during regular business hours and may not be available until the next business day. Your provider may request additional information or to have a follow-up visit with you prior to refilling your medication.   *Please Note: Medication refills are assigned a new Rx number when refilled electronically. Your pharmacy may indicate that no refills were authorized even though a new prescription for the same medication is available at the pharmacy. Please request the medicine by name with the pharmacy before contacting your provider for a refill.           RaySat Access Code: Activation code not generated  Current RaySat Status: Active

## 2017-06-27 NOTE — TELEPHONE ENCOUNTER
Left patient a voicemail with instructions to call the office for test results (holter monitor 6/19/2017).    JNS RN      ===============================================  Interpretive Statements   *                Monitoring started at 9:29 AM and continued for 47 hours 59   minutes. The cardiac rhythm is Sinus. The average   heart rate was 63 BPM. The minimum heart rate was 39 BPM,   occurring at 3:59:28 AM. The maximum heart rate   was 111 BPM, occurring at 6:19:33 PM. The longest R-R   interval was 2.0 seconds occurring at 7:25:59 AM D1.   *                Ventricular ectopic activity consisted of 7,589 beats, of   which, 1 multifocal triplet, 26 multifocal couplets,   3,616 multifocal single PVCs, 3,654 multifocal interpolated   PVCs, 9 single endiastolic ventricular beats,   16 were in bigeminy, 265 were in trigeminy.   *                The patient's rhythm included 24 hours 21 minutes 48 seconds   of sinus bradycardia. The slowest single   episode of bradycardia occurred at 3:58:13 AM D2, lasting 3   minutes 19 seconds, with minimum heart rate   of 39 BPM.   *                The patient's rhythm included 3 minutes 50 seconds of sinus   tachycardia.   *                Supraventricular ectopic activity consisted of 1 atrial run,   the longest and fastest supraventricular run occurred   at 11:36:10 PM D1 consisting of 3 beats, with maximum heart   rate of 150 BPM, 8 atrial couplets, 9 late beats,   133 single PACs, 3 were in bigeminy.   *                No patient diary was returned with this monitor.       Electronically Signed On 6- 11:02:28 PDT by Marbin Haddad MD

## 2017-06-28 NOTE — PROGRESS NOTES
CC:   Medicare Annual Wellness Visit    HPI:  Kun is a 67 y.o. here for Medicare Annual Wellness Visit    Patient Active Problem List    Diagnosis Date Noted   • Coronary artery disease 04/27/2017   • Abnormal nuclear stress test 04/27/2017   • Coronary artery disease due to calcified coronary lesion: LAD stent in 1997 01/20/2017   • Palpitations 01/20/2017   • Obstructive sleep apnea syndrome 11/22/2016   • Morbid obesity due to excess calories (CMS-HCC) 10/27/2016   • Type 2 diabetes mellitus with neurologic complication (CMS-HCC) 10/25/2016   • Dyslipidemia 10/25/2016   • Essential hypertension 10/25/2016   • Arrhythmia 10/25/2016   • Neuropathy (CMS-HCC) 10/25/2016   • Preventative health care 10/25/2016     Current Outpatient Prescriptions   Medication Sig Dispense Refill   • Ibuprofen-Diphenhydramine Cit (ADVIL PM PO) Take  by mouth.     • rosuvastatin (CRESTOR) 40 MG tablet Take 1 Tab by mouth every day. 90 Tab 3   • tramadol (ULTRAM) 50 MG Tab Take 1 Tab by mouth every four hours as needed. 90 Tab 1   • Non Formulary Request For Fernando Respironics System 1 Remstar Auto A-Flex CPAP: 1 headgear (Mirage Ortega II Nasal Pillow by [employer] (large size)); 1 package 6' tubing; 2 small filters (Every 3 months) 1 Each 3   • vitamin D (CHOLECALCIFEROL) 1000 UNIT Tab Take 1,000 Units by mouth every day.     • ticagrelor (BRILINTA) 90 MG Tab tablet Take 1 Tab by mouth 2 Times a Day. 180 Tab 3   • metoprolol SR (TOPROL XL) 50 MG TABLET SR 24 HR Take 1 Tab by mouth every day. 90 Tab 3   • Multiple Vitamins-Minerals (DAILY MULTIVITAMIN PO) Take 1 Tab by mouth every day.     • diphenhydrAMINE (BENADRYL) 25 MG Tab Take 50 mg by mouth at bedtime as needed for Sleep.     • baclofen (LIORESAL) 20 MG tablet Take 20 mg by mouth as needed. Indications: Muscle Spasticity     • ramipril (ALTACE) 10 MG capsule Take 1 Cap by mouth every day. 90 Cap 3   • GLIPIZIDE XL 10 MG TABLET SR 24 HR TAKE 1 TABLET ORALLY DAILY 90 Tab 0   •  sitagliptin (JANUVIA) 100 MG Tab Take 1 Tab by mouth every day. 90 Tab 2   • aspirin (ASA) 81 MG Chew Tab chewable tablet Take 81 mg by mouth every day.     • Glucosamine-Chondroitin (GLUCOSAMINE CHONDROITIN COMPLX) 500-250 MG Cap Take 2 Tabs by mouth every day.     • nitroglycerin (NITROSTAT) 0.4 MG SL Tab Place 0.4 mg under tongue every 5 minutes as needed for Chest Pain.       No current facility-administered medications for this visit.      Current supplements: no  Chronic narcotic pain medicines: no  Allergies: Metformin  Exercise: as ry  Current social contact/activities: Has support of family and friends      Screening:  Depression Screening    Little interest or pleasure in doing things?  0 - not at all  Feeling down, depressed , or hopeless? 1 - several days  Trouble falling or staying asleep, or sleeping too much?     Feeling tired or having little energy?     Poor appetite or overeating?     Feeling bad about yourself - or that you are a failure or have let yourself or your family down?    Trouble concentrating on things, such as reading the newspaper or watching television?    Moving or speaking so slowly that other people could have noticed.  Or the opposite - being so fidgety or restless that you have been moving around a lot more than usual?     Thoughts that you would be better off dead, or of hurting yourself?     Patient Health Questionnaire Score:    If depressive symptoms identified deferred to follow up visit unless specifically addressed in assessment and plan.    Interpretation of PHQ-9 Total Score   Score Severity   1-4 Minimal Depression   5-9 Mild Depression   10-14 Moderate Depression   15-19 Moderately Severe Depression   20-27 Severe Depression    Screening for Cognitive Impairment    Three Minute Recall (banana, sunrise, fence)  3/3    Draw clock face with all 12 numbers set to the hand to show 10 minures past 11 o'clock  1 5  If cognitive concerns identified deferred to follow up  visit unless specifically addressed in assessment and plan.    Fall Risk Assessment    Has the patient had two or more falls in the last year or any fall with injury in the last year?  Yes  If Fall Risk identified deferred to follow up visit unless specifically addressed in assessment and plan.    Safety Assessment    Throw rugs on floor.  Yes  Handrails on all stairs.  Yes  Good lighting in all hallways.  Yes  Difficulty hearing.  No  Patient counseled about all safety risks that were identified.    Functional Assessment ADLs    Are there any barriers preventing you from cooking for yourself or meeting nutritional needs?  No.    Are there any barriers preventing you from driving safely or obtaining transportation?  No.    Are there any barriers preventing you from using a telephone or calling for help?  No.    Are there any barriers preventing you from shopping?  No.    Are there any barriers preventing you from taking care of your own finances?  No.    Are there any barriers preventing you from managing your medications?  No.    Are currently engaing any exercise or physical activity?  Yes.       Health Maintenance Summary                Annual Wellness Visit Overdue 1950     RETINAL SCREENING Overdue 6/19/1969     IMM DTaP/Tdap/Td Vaccine Overdue 6/19/1970     IMM ZOSTER VACCINE Overdue 6/19/2011     IMM PNEUMOCOCCAL 65+ (ADULT) LOW/MEDIUM RISK SERIES Overdue 6/19/2016     COLONOSCOPY Postponed 6/26/2018 Originally 6/19/2001. Patient Refused    URINE ACR / MICROALBUMIN Next Due 11/8/2017      Done 11/8/2016 MICROALBUMIN CREAT RATIO URINE    DIABETES MONOFILAMENT / LE EXAM Next Due 11/22/2017      Done 11/22/2016 AMB DIABETIC MONOFILAMENT LOWER EXTREMITY EXAM    A1C SCREENING Next Due 12/2/2017      Done 6/2/2017 HEMOGLOBIN A1C (A)     Patient has more history with this topic...    FASTING LIPID PROFILE Next Due 6/2/2018      Done 6/2/2017 LIPID PROFILE (A)     Patient has more history with this topic...     "SERUM CREATININE Next Due 6/2/2018      Done 6/2/2017 COMP METABOLIC PANEL (A)     Patient has more history with this topic...          Patient Care Team:  Kit Torres PA-C as PCP - General (Family Medicine)  Marbin Haddad M.D. as Consulting Physician (Cardiology)        Social History   Substance Use Topics   • Smoking status: Former Smoker -- 1.00 packs/day for 32 years     Types: Cigarettes     Quit date: 06/01/2003   • Smokeless tobacco: Never Used   • Alcohol Use: 0.0 oz/week     0 Standard drinks or equivalent per week      Comment: rarely       Family History   Problem Relation Age of Onset   • Stroke Father 57   • Arthritis Mother    • Stroke Mother      He  has a past medical history of Past heart attack; CAD (coronary artery disease); Hyperlipidemia; Hypertension; Palpitations; Obstructive sleep apnea; Diabetes (CMS-HCC); Arrhythmia; Snoring; Psychiatric problem; Diabetic neuropathy (CMS-HCC); Acid reflux; and Sleep apnea.   Past Surgical History   Procedure Laterality Date   • Stent placement  1997     LAD   • Angioplasty     • Cardiac cath  4/27/17     Synergy stent to Circ   • Cardiac cath  1997     LAD stent       ROS:    Ostomy or other tubes or amputations: no  Chronic oxygen use no  Last eye exam 2016  : Denies incontinence.   Gait: Uses no assistive device   Hearing excellent.    Dentition good    Exam: Blood pressure 118/72, pulse 64, temperature 35.9 °C (96.6 °F), height 1.905 m (6' 3\"), weight 141.522 kg (312 lb), SpO2 97 %. Body mass index is 39 kg/(m^2).  Alert, oriented in no acute distress.  Eye contact is good, speech goal directed, affect calm      Assessment and Plan. The following treatment and monitoring plan is recommended for all problems as listed below:   Abnormal nuclear stress test  Second stent placed 4/27/2017  Followed by cardiology q 6 months  Denies cardiac sxs  Continue with current medications     Type 2 diabetes mellitus with neurologic complication " (CMS-HCC)  Chronic condition managed with current medical regimen  6/2/2017   Glucose 227 (H) 65 - 99 mg/dL Final     Glycohemoglobin 8.3 (H) 0.0 - 5.6 % Final   Home testing not done   Continue with current meds  Followed by Kit Torres PA-C.        Preventative health care  Managed by Kit Torres PA-C     Palpitations  Followed by cardiology q 6 months  Denies cardiac sxs  Continue with current medications     Obstructive sleep apnea syndrome  CPAP nocly  Followed by pulm yearly    Neuropathy (CMS-HCC)  Chronic condition managed with current medical regimen  Stable per review with mario feet affected   Continue with current meds  Followed by Kit Torres PA-C.        Morbid obesity due to excess calories (CMS-HCC)  Patient aware of relationship between weight and health and working on diet  Followed by Kit Torres PA-C.     Left foot pain  Has neuropathy mario  duplicate    Essential hypertension  Chronic condition managed with current medical regimen  Stable per review   Continue with current meds  Followed by Kit Torres PA-C.        Dyslipidemia  Chronic condition managed with current medical regimen  Labs reviewed    Continue with current meds  Followed by Kit Torres PA-C.        Coronary artery disease  Followed by cardiology q 6 months  Denies cardiac sxs  Continue with current medications     Coronary artery disease due to calcified coronary lesion: LAD stent in 1997  Followed by cardiology q 6 months  Second stent placed 4/2017  Denies cardiac sxs  Continue with current medications     Arrhythmia  Followed by cardiology q 6 months  Denies cardiac sxs  Continue with current medications         Health Care Screening recommendations reviewed with patient today: per Patient Instructions  DPA/Advanced directive: .has an advanced directive - a copy has been provided    Next office visit for recheck of chronic medical conditions is due with Kit Torres PA-C in 6 months    Declines  retinal screening today  FIT test ordered, kit provided

## 2017-06-28 NOTE — PATIENT INSTRUCTIONS
Continue with care through Kit Torres PA-C.  Next Medicare Annual Wellness Visit is due in one year.    Continue care with specialists you are seeing for your chronic problems.    Attached is some preventative information for you to read.          Fall Prevention and Home Safety  Falls cause injuries and can affect all age groups. It is possible to prevent falls.   HOW TO PREVENT FALLS  · Wear shoes with rubber soles that do not have an opening for your toes.   · Keep the inside and outside of your house well lit.   · Use night lights throughout your home.   · Remove clutter from floors.   · Clean up floor spills.   · Remove throw rugs or fasten them to the floor with carpet tape.   · Do not place electrical cords across pathways.   · Put grab bars by your tub, shower, and toilet. Do not use towel bars as grab bars.   · Put handrails on both sides of the stairway. Fix loose handrails.   · Do not climb on stools or stepladders, if possible.   · Do not wax your floors.   · Repair uneven or unsafe sidewalks, walkways, or stairs.   · Keep items you use a lot within reach.   · Be aware of pets.   · Keep emergency numbers next to the telephone.   · Put smoke detectors in your home and near bedrooms.   Ask your doctor what other things you can do to prevent falls.  Document Released: 10/14/2010 Document Revised: 06/18/2013 Document Reviewed: 03/19/2013  ExitCare® Patient Information ©2013 [employer].    Exercise to Stay Healthy      Exercise helps you become and stay healthy.  EXERCISE IDEAS AND TIPS  Choose exercises that:  · You enjoy.   · Fit into your day.   You do not need to exercise really hard to be healthy. You can do exercises at a slow or medium level and stay healthy. You can:  · Stretch before and after working out.   · Try yoga, Pilates, or bin chi.   · Lift weights.   · Walk fast, swim, jog, run, climb stairs, bicycle, dance, or rollerskate.   · Take aerobic classes.   Exercises that burn about 150  calories:  · Running 1 ½ miles in 15 minutes.   · Playing volleyball for 45 to 60 minutes.   · Washing and waxing a car for 45 to 60 minutes.   · Playing touch football for 45 minutes.   · Walking 1 ¾ miles in 35 minutes.   · Pushing a stroller 1 ½ miles in 30 minutes.   · Playing basketball for 30 minutes.   · Raking leaves for 30 minutes.   · Bicycling 5 miles in 30 minutes.   · Walking 2 miles in 30 minutes.   · Dancing for 30 minutes.   · Shoveling snow for 15 minutes.   · Swimming laps for 20 minutes.   · Walking up stairs for 15 minutes.   · Bicycling 4 miles in 15 minutes.   · Gardening for 30 to 45 minutes.   · Jumping rope for 15 minutes.   · Washing windows or floors for 45 to 60 minutes.     One suggestion is to start walking for 10 minutes after each meal.  This will help with digestion and help you to metabolize your meal.       For Weight Loss -   Recommend portion sizes with more fruits/vegetables/high fiber foods.  Stay away from white bread/pastas/white rice/white potatoes.  Increase Fluid intake to 6-8 glasses of water daily.   Eliminate soda's (diet and regular) from your fluid intake.      Document Released: 01/20/2012 Document Revised: 03/11/2013 Document Reviewed: 01/20/2012  ExitCare® Patient Information ©2013 [employer].    Fat and Cholesterol Control Diet  Cholesterol is a wax-like substance. It comes from your liver and is found in certain foods. There is good (HDL) and bad (LDL) cholesterol. Too much cholesterol in your blood can affect your heart. Certain foods can lower or raise your cholesterol. Eat foods that are low in cholesterol.  Saturated and trans fats are bad fats found in foods that will raise your cholesterol. Do not eat foods that are high in saturated and trans fats.  FOODS HIGHER IN SATURATED AND TRANS FATS  · Dairy products, such as whole milk, eggs, cheese, cream, and butter.   · Fatty meats, such as hot dogs, sausage, and salami.   · Fried foods.   · Trans fats which  are found in margarine and pre-made cookies, crackers, and baked goods.   · Tropical oils, such as coconut and palm oils.   Read package labels at the store. Do not buy products that use saturated or trans fats or hydrogenated oils. Find foods labeled:  · Low-fat.   · Low-saturated fat.   · Trans-fat-free.   · Low-cholesterol.   FOODS LOWER IN CHOLESTEROL  ·  Fruit.   · Vegetables.   · Beans, peas, and lentils.   · Fish.   · Lean meat, such as chicken (without skin) or ground turkey.   · Grains, such as barley, rice, couscous, bulgur wheat, and pasta.   · Heart-healthy tub margarine.   PREPARING YOUR FOOD  · Broil, bake, steam, or roast foods. Do not gillette food.   · Use non-stick cooking sprays.   · Use lemon or herbs to flavor food instead of using butter or stick margarine.   · Use nonfat yogurt, salsa, or low-fat dressings for salads.   Document Released: 06/18/2013 Document Reviewed: 06/18/2013  ExitCare® Patient Information ©2013 [employer].    Recommend annual flu vaccine.  Next due in Fall, 2015.  If you decide not to have the flu vaccine, recommend good handwashing and staying out of crowds during flu season.

## 2017-06-29 NOTE — TELEPHONE ENCOUNTER
Called patient again, advised him of holter monitor results. Patient scheduled for follow up with Dr. Haddad on 8/22/2017 at 8:15am.    NESSA TREVINO

## 2017-08-10 ENCOUNTER — TELEPHONE (OUTPATIENT)
Dept: CARDIOLOGY | Facility: MEDICAL CENTER | Age: 67
End: 2017-08-10

## 2017-08-10 NOTE — TELEPHONE ENCOUNTER
Message left on pt home number to have lipid and cmp drawn at least 3 days prior to appt. C/B if labs drawn elsewhere.

## 2017-08-15 ENCOUNTER — HOSPITAL ENCOUNTER (OUTPATIENT)
Facility: MEDICAL CENTER | Age: 67
End: 2017-08-15
Attending: PHYSICIAN ASSISTANT
Payer: MEDICARE

## 2017-08-15 PROCEDURE — 82274 ASSAY TEST FOR BLOOD FECAL: CPT

## 2017-08-16 ENCOUNTER — HOSPITAL ENCOUNTER (OUTPATIENT)
Dept: LAB | Facility: MEDICAL CENTER | Age: 67
End: 2017-08-16
Attending: PHYSICIAN ASSISTANT
Payer: MEDICARE

## 2017-08-16 ENCOUNTER — HOSPITAL ENCOUNTER (OUTPATIENT)
Dept: LAB | Facility: MEDICAL CENTER | Age: 67
End: 2017-08-16
Attending: INTERNAL MEDICINE
Payer: MEDICARE

## 2017-08-16 DIAGNOSIS — E78.5 DYSLIPIDEMIA: ICD-10-CM

## 2017-08-16 DIAGNOSIS — I25.84 CORONARY ARTERY DISEASE DUE TO CALCIFIED CORONARY LESION: ICD-10-CM

## 2017-08-16 DIAGNOSIS — I25.10 CORONARY ARTERY DISEASE DUE TO CALCIFIED CORONARY LESION: ICD-10-CM

## 2017-08-16 DIAGNOSIS — I10 ESSENTIAL HYPERTENSION: ICD-10-CM

## 2017-08-16 LAB
ALBUMIN SERPL BCP-MCNC: 3.8 G/DL (ref 3.2–4.9)
ALBUMIN/GLOB SERPL: 1.4 G/DL
ALP SERPL-CCNC: 44 U/L (ref 30–99)
ALT SERPL-CCNC: 26 U/L (ref 2–50)
ANION GAP SERPL CALC-SCNC: 8 MMOL/L (ref 0–11.9)
AST SERPL-CCNC: 20 U/L (ref 12–45)
BILIRUB SERPL-MCNC: 0.4 MG/DL (ref 0.1–1.5)
BUN SERPL-MCNC: 11 MG/DL (ref 8–22)
CALCIUM SERPL-MCNC: 9.2 MG/DL (ref 8.5–10.5)
CHLORIDE SERPL-SCNC: 104 MMOL/L (ref 96–112)
CHOLEST SERPL-MCNC: 105 MG/DL (ref 100–199)
CO2 SERPL-SCNC: 24 MMOL/L (ref 20–33)
CREAT SERPL-MCNC: 0.92 MG/DL (ref 0.5–1.4)
GFR SERPL CREATININE-BSD FRML MDRD: >60 ML/MIN/1.73 M 2
GLOBULIN SER CALC-MCNC: 2.8 G/DL (ref 1.9–3.5)
GLUCOSE SERPL-MCNC: 173 MG/DL (ref 65–99)
HDLC SERPL-MCNC: 36 MG/DL
LDLC SERPL CALC-MCNC: 36 MG/DL
POTASSIUM SERPL-SCNC: 4.3 MMOL/L (ref 3.6–5.5)
PROT SERPL-MCNC: 6.6 G/DL (ref 6–8.2)
SODIUM SERPL-SCNC: 136 MMOL/L (ref 135–145)
TRIGL SERPL-MCNC: 165 MG/DL (ref 0–149)

## 2017-08-16 PROCEDURE — 80053 COMPREHEN METABOLIC PANEL: CPT

## 2017-08-16 PROCEDURE — 36415 COLL VENOUS BLD VENIPUNCTURE: CPT

## 2017-08-16 PROCEDURE — 80061 LIPID PANEL: CPT

## 2017-08-18 DIAGNOSIS — Z12.11 SCREEN FOR COLON CANCER: ICD-10-CM

## 2017-08-18 LAB — HEMOCCULT STL QL IA: NEGATIVE

## 2017-08-21 ENCOUNTER — TELEPHONE (OUTPATIENT)
Dept: MEDICAL GROUP | Facility: MEDICAL CENTER | Age: 67
End: 2017-08-21

## 2017-08-21 NOTE — TELEPHONE ENCOUNTER
----- Message from Kit Torres PA-C sent at 8/20/2017  6:51 PM PDT -----  Please contact Kun.  No blood found in stool.  F/u next year to repeat Fit test or send to GI for colonoscopy.  Thank you.    Kit

## 2017-08-21 NOTE — TELEPHONE ENCOUNTER
Phone Number Called: [phone]      Message: Left message for patient to call us back to go over lab results.     Left Message for patient to call back: yes

## 2017-08-21 NOTE — TELEPHONE ENCOUNTER
1. Caller Name: Pt                                         Call Back Number: [phone] (home)       Patient approves a detailed voicemail message: no    Patient returned Brown's call about his FIT test Results. Notified him everything was normal and scheduled him for an annual exam 8/29/17. Patient declined Health  visit.

## 2017-08-22 ENCOUNTER — OFFICE VISIT (OUTPATIENT)
Dept: CARDIOLOGY | Facility: MEDICAL CENTER | Age: 67
End: 2017-08-22
Payer: MEDICARE

## 2017-08-22 VITALS
HEART RATE: 60 BPM | WEIGHT: 302 LBS | BODY MASS INDEX: 37.55 KG/M2 | DIASTOLIC BLOOD PRESSURE: 66 MMHG | SYSTOLIC BLOOD PRESSURE: 126 MMHG | OXYGEN SATURATION: 97 % | HEIGHT: 75 IN

## 2017-08-22 DIAGNOSIS — I25.84 CORONARY ARTERY DISEASE DUE TO CALCIFIED CORONARY LESION: ICD-10-CM

## 2017-08-22 DIAGNOSIS — I25.10 CORONARY ARTERY DISEASE DUE TO CALCIFIED CORONARY LESION: ICD-10-CM

## 2017-08-22 DIAGNOSIS — E78.5 DYSLIPIDEMIA: ICD-10-CM

## 2017-08-22 DIAGNOSIS — I10 ESSENTIAL HYPERTENSION: ICD-10-CM

## 2017-08-22 PROCEDURE — 99214 OFFICE O/P EST MOD 30 MIN: CPT | Performed by: INTERNAL MEDICINE

## 2017-08-22 NOTE — PROGRESS NOTES
Subjective:   Kun Lisa is a 67 y.o. male who presents today for follow-up evaluation because of history of coronary artery disease. He also has hypertension and dyslipidemia in addition to type II diabetes.    He had difficulty with palpitations and nonsustained VT. He underwent evaluation with myocardial perfusion scan and ultimately coronary angiography. He received a drug-eluting stent to the circumflex. He had  of the RCA with good collateralization. His palpitations have resolved.    He was having some difficulty with orthostatic lightheadedness and we reduced metoprolol ER to 25 mg daily. With this his lightheadedness has improved significantly and he rarely has any difficulty.    He's had no anginal type chest pain, dyspnea on exertion, PND or orthopnea. He is on CPAP therapy at night. He notes some mild pretibial edema. He's noted no palpitations or lightheadedness.    Past Medical History   Diagnosis Date   • Past heart attack      1997   • CAD (coronary artery disease)      stent   • Hyperlipidemia    • Hypertension    • Palpitations    • Obstructive sleep apnea      CPAP   • Diabetes (CMS-HCC)      oral meds   • Arrhythmia    • Snoring    • Psychiatric problem      depression   • Diabetic neuropathy (CMS-HCC)      bilat feet   • Acid reflux    • Sleep apnea      CPAP     Past Surgical History   Procedure Laterality Date   • Stent placement  1997     LAD   • Angioplasty     • Cardiac cath  4/27/17     Synergy stent to Circ   • Cardiac cath  1997     LAD stent     Family History   Problem Relation Age of Onset   • Stroke Father 57   • Arthritis Mother    • Stroke Mother      History   Smoking status   • Former Smoker -- 1.00 packs/day for 32 years   • Types: Cigarettes   • Quit date: 06/01/2003   Smokeless tobacco   • Never Used     Allergies   Allergen Reactions   • Metformin Unspecified     Stomach upset and acid reflux     Outpatient Encounter Prescriptions as of 8/22/2017   Medication Sig  "Dispense Refill   • Ibuprofen-Diphenhydramine Cit (ADVIL PM PO) Take  by mouth.     • rosuvastatin (CRESTOR) 40 MG tablet Take 1 Tab by mouth every day. 90 Tab 3   • tramadol (ULTRAM) 50 MG Tab Take 1 Tab by mouth every four hours as needed. 90 Tab 1   • Non Formulary Request For Fernando Respironics System 1 Remstar Auto A-Flex CPAP: 1 headgear (Mirage Ortega II Nasal Pillow by [employer] (large size)); 1 package 6' tubing; 2 small filters (Every 3 months) 1 Each 3   • vitamin D (CHOLECALCIFEROL) 1000 UNIT Tab Take 1,000 Units by mouth every day.     • ticagrelor (BRILINTA) 90 MG Tab tablet Take 1 Tab by mouth 2 Times a Day. 180 Tab 3   • metoprolol SR (TOPROL XL) 50 MG TABLET SR 24 HR Take 1 Tab by mouth every day. (Patient taking differently: Take 25 mg by mouth every day.) 90 Tab 3   • Multiple Vitamins-Minerals (DAILY MULTIVITAMIN PO) Take 1 Tab by mouth every day.     • diphenhydrAMINE (BENADRYL) 25 MG Tab Take 50 mg by mouth at bedtime as needed for Sleep.     • baclofen (LIORESAL) 20 MG tablet Take 20 mg by mouth as needed. Indications: Muscle Spasticity     • ramipril (ALTACE) 10 MG capsule Take 1 Cap by mouth every day. 90 Cap 3   • GLIPIZIDE XL 10 MG TABLET SR 24 HR TAKE 1 TABLET ORALLY DAILY 90 Tab 0   • sitagliptin (JANUVIA) 100 MG Tab Take 1 Tab by mouth every day. 90 Tab 2   • aspirin (ASA) 81 MG Chew Tab chewable tablet Take 81 mg by mouth every day.     • Glucosamine-Chondroitin (GLUCOSAMINE CHONDROITIN COMPLX) 500-250 MG Cap Take 2 Tabs by mouth every day.     • nitroglycerin (NITROSTAT) 0.4 MG SL Tab Place 0.4 mg under tongue every 5 minutes as needed for Chest Pain.       No facility-administered encounter medications on file as of 8/22/2017.     ROS       Objective:   /66 mmHg  Pulse 60  Ht 1.905 m (6' 3\")  Wt 136.986 kg (302 lb)  BMI 37.75 kg/m2  SpO2 97%    Physical Exam   Neck: No JVD present.   Cardiovascular: Normal rate and regular rhythm.  Exam reveals no gallop.    No murmur " heard.  Pulmonary/Chest: Effort normal. He has no rales.   Abdominal: Soft. There is no tenderness.   Musculoskeletal: He exhibits edema (trace to 1+ pretibial).     Lab Results   Component Value Date/Time    CHOLESTEROL, 08/16/2017 08:22 AM    LDL 36 08/16/2017 08:22 AM    HDL 36* 08/16/2017 08:22 AM    TRIGLYCERIDES 165* 08/16/2017 08:22 AM       Lab Results   Component Value Date/Time    SODIUM 136 08/16/2017 08:22 AM    POTASSIUM 4.3 08/16/2017 08:22 AM    CHLORIDE 104 08/16/2017 08:22 AM    CO2 24 08/16/2017 08:22 AM    GLUCOSE 173* 08/16/2017 08:22 AM    BUN 11 08/16/2017 08:22 AM    CREATININE 0.92 08/16/2017 08:22 AM     Lab Results   Component Value Date/Time    ALKALINE PHOSPHATASE 44 08/16/2017 08:22 AM    AST(SGOT) 20 08/16/2017 08:22 AM    ALT(SGPT) 26 08/16/2017 08:22 AM    TOTAL BILIRUBIN 0.4 08/16/2017 08:22 AM     Myocardial perfusion scan:  NUCLEAR IMAGING INTERPRETATION   Small sized, partially reversible, decreased uptake of moderate severity in    the apical inferior (RCA), mid inferior (RCA) segments during post stress    images.    Medium sized, partially reversible, decreased uptake of moderate severity in    the apical lateral (LCX), inferolateral (LCX) and mid anterolateral (LCX)    segments during post stress images.    Normal left ventricular wall motion.  LV ejection fraction = 62%.   ECG INTERPRETATION   Negative stress ECG for ischemia.    Cardiac catheterization:  POSTPROCEDURE DIAGNOSES:  1.  Coronary artery disease including patent stent in the mid left anterior    descending artery with nonobstructive in-stent restenosis, high-grade distal    circumflex artery of a codominant system supplying excellent collaterals to    mid to distal right coronary artery with long chronic total occlusion of the    ostial to mid right coronary artery.  2.  Successful percutaneous transluminal coronary angioplasty/stent placement    of the distal circumflex artery with 2.5x16 mm Synergy  drug-eluting stent.        Assessment:     1. Coronary artery disease due to calcified coronary lesion: LAD stent in 1997     2. Essential hypertension     3. Dyslipidemia         Medical Decision Making:  Today's Assessment / Status / Plan:     Mr. Lisa is having is clinically stable. His orthostatic lightheadedness has largely resolved. He is essentially asymptomatic from a cardiovascular standpoint. His blood pressure and lipids status are under excellent control. He will continue to try to lose weight. He has lost 10 pounds since June. I feel he can follow up in about 6 months with repeat lab work.

## 2017-08-22 NOTE — Clinical Note
[facility]   [address] [place] [address] [address]  [place] [address]  Phone: [phone]  Fax: [phone]              Kun Lisa  1950    Encounter Date: 8/22/2017    Marbin Haddad M.D.          PROGRESS NOTE:  Subjective:   Kun Lisa is a 67 y.o. male who presents today for follow-up evaluation because of history of coronary artery disease. He also has hypertension and dyslipidemia in addition to type II diabetes.    He had difficulty with palpitations and nonsustained VT. He underwent evaluation with myocardial perfusion scan and ultimately coronary angiography. He received a drug-eluting stent to the circumflex. He had  of the RCA with good collateralization. His palpitations have resolved.    He was having some difficulty with orthostatic lightheadedness and we reduced metoprolol ER to 25 mg daily. With this his lightheadedness has improved significantly and he rarely has any difficulty.    He's had no anginal type chest pain, dyspnea on exertion, PND or orthopnea. He is on CPAP therapy at night. He notes some mild pretibial edema. He's noted no palpitations or lightheadedness.    Past Medical History   Diagnosis Date   • Past heart attack      1997   • CAD (coronary artery disease)      stent   • Hyperlipidemia    • Hypertension    • Palpitations    • Obstructive sleep apnea      CPAP   • Diabetes (CMS-HCC)      oral meds   • Arrhythmia    • Snoring    • Psychiatric problem      depression   • Diabetic neuropathy (CMS-HCC)      bilat feet   • Acid reflux    • Sleep apnea      CPAP     Past Surgical History   Procedure Laterality Date   • Stent placement  1997     LAD   • Angioplasty     • Cardiac cath  4/27/17     Synergy stent to Circ   • Cardiac cath  1997     LAD stent     Family History   Problem Relation Age of Onset   • Stroke Father 57   • Arthritis Mother    • Stroke Mother      History   Smoking status   • Former Smoker -- 1.00  packs/day for 32 years   • Types: Cigarettes   • Quit date: 06/01/2003   Smokeless tobacco   • Never Used     Allergies   Allergen Reactions   • Metformin Unspecified     Stomach upset and acid reflux     Outpatient Encounter Prescriptions as of 8/22/2017   Medication Sig Dispense Refill   • Ibuprofen-Diphenhydramine Cit (ADVIL PM PO) Take  by mouth.     • rosuvastatin (CRESTOR) 40 MG tablet Take 1 Tab by mouth every day. 90 Tab 3   • tramadol (ULTRAM) 50 MG Tab Take 1 Tab by mouth every four hours as needed. 90 Tab 1   • Non Formulary Request For Fernando Respironics System 1 Remstar Auto A-Flex CPAP: 1 headgear (Mirage Ortega II Nasal Pillow by [employer] (large size)); 1 package 6' tubing; 2 small filters (Every 3 months) 1 Each 3   • vitamin D (CHOLECALCIFEROL) 1000 UNIT Tab Take 1,000 Units by mouth every day.     • ticagrelor (BRILINTA) 90 MG Tab tablet Take 1 Tab by mouth 2 Times a Day. 180 Tab 3   • metoprolol SR (TOPROL XL) 50 MG TABLET SR 24 HR Take 1 Tab by mouth every day. (Patient taking differently: Take 25 mg by mouth every day.) 90 Tab 3   • Multiple Vitamins-Minerals (DAILY MULTIVITAMIN PO) Take 1 Tab by mouth every day.     • diphenhydrAMINE (BENADRYL) 25 MG Tab Take 50 mg by mouth at bedtime as needed for Sleep.     • baclofen (LIORESAL) 20 MG tablet Take 20 mg by mouth as needed. Indications: Muscle Spasticity     • ramipril (ALTACE) 10 MG capsule Take 1 Cap by mouth every day. 90 Cap 3   • GLIPIZIDE XL 10 MG TABLET SR 24 HR TAKE 1 TABLET ORALLY DAILY 90 Tab 0   • sitagliptin (JANUVIA) 100 MG Tab Take 1 Tab by mouth every day. 90 Tab 2   • aspirin (ASA) 81 MG Chew Tab chewable tablet Take 81 mg by mouth every day.     • Glucosamine-Chondroitin (GLUCOSAMINE CHONDROITIN COMPLX) 500-250 MG Cap Take 2 Tabs by mouth every day.     • nitroglycerin (NITROSTAT) 0.4 MG SL Tab Place 0.4 mg under tongue every 5 minutes as needed for Chest Pain.       No facility-administered encounter medications on file as of  "8/22/2017.     ROS       Objective:   /66 mmHg  Pulse 60  Ht 1.905 m (6' 3\")  Wt 136.986 kg (302 lb)  BMI 37.75 kg/m2  SpO2 97%    Physical Exam   Neck: No JVD present.   Cardiovascular: Normal rate and regular rhythm.  Exam reveals no gallop.    No murmur heard.  Pulmonary/Chest: Effort normal. He has no rales.   Abdominal: Soft. There is no tenderness.   Musculoskeletal: He exhibits edema (trace to 1+ pretibial).     Lab Results   Component Value Date/Time    CHOLESTEROL, 08/16/2017 08:22 AM    LDL 36 08/16/2017 08:22 AM    HDL 36* 08/16/2017 08:22 AM    TRIGLYCERIDES 165* 08/16/2017 08:22 AM       Lab Results   Component Value Date/Time    SODIUM 136 08/16/2017 08:22 AM    POTASSIUM 4.3 08/16/2017 08:22 AM    CHLORIDE 104 08/16/2017 08:22 AM    CO2 24 08/16/2017 08:22 AM    GLUCOSE 173* 08/16/2017 08:22 AM    BUN 11 08/16/2017 08:22 AM    CREATININE 0.92 08/16/2017 08:22 AM     Lab Results   Component Value Date/Time    ALKALINE PHOSPHATASE 44 08/16/2017 08:22 AM    AST(SGOT) 20 08/16/2017 08:22 AM    ALT(SGPT) 26 08/16/2017 08:22 AM    TOTAL BILIRUBIN 0.4 08/16/2017 08:22 AM     Myocardial perfusion scan:  NUCLEAR IMAGING INTERPRETATION   Small sized, partially reversible, decreased uptake of moderate severity in    the apical inferior (RCA), mid inferior (RCA) segments during post stress    images.    Medium sized, partially reversible, decreased uptake of moderate severity in    the apical lateral (LCX), inferolateral (LCX) and mid anterolateral (LCX)    segments during post stress images.    Normal left ventricular wall motion.  LV ejection fraction = 62%.   ECG INTERPRETATION   Negative stress ECG for ischemia.    Cardiac catheterization:  POSTPROCEDURE DIAGNOSES:  1.  Coronary artery disease including patent stent in the mid left anterior    descending artery with nonobstructive in-stent restenosis, high-grade distal    circumflex artery of a codominant system supplying excellent " collaterals to    mid to distal right coronary artery with long chronic total occlusion of the    ostial to mid right coronary artery.  2.  Successful percutaneous transluminal coronary angioplasty/stent placement    of the distal circumflex artery with 2.5x16 mm Synergy drug-eluting stent.        Assessment:     1. Coronary artery disease due to calcified coronary lesion: LAD stent in 1997     2. Essential hypertension     3. Dyslipidemia         Medical Decision Making:  Today's Assessment / Status / Plan:     Mr. Lisa is having is clinically stable. His orthostatic lightheadedness has largely resolved. He is essentially asymptomatic from a cardiovascular standpoint. His blood pressure and lipids status are under excellent control. He will continue to try to lose weight. He has lost 10 pounds since June. I feel he can follow up in about 6 months with repeat lab work.      Kit Torres PA-C  [address]  [address]  [place] [address]  VIA In Basket

## 2017-08-22 NOTE — MR AVS SNAPSHOT
"        Kun Madsen Sloan   2017 8:00 AM   Office Visit   MRN: [id number]    Department:  [facility]   Dept Phone:  [phone]    Description:  Male : 1950   Provider:  Marbin Haddad M.D.           Reason for Visit     Follow-Up           Allergies as of 2017     Allergen Noted Reactions    Metformin 10/25/2016   Unspecified    Stomach upset and acid reflux      You were diagnosed with     Coronary artery disease due to calcified coronary lesion   [3259313]       Essential hypertension   [8819281]       Dyslipidemia   [339462]         Vital Signs     Blood Pressure Pulse Height Weight Body Mass Index Oxygen Saturation    126/66 mmHg 60 1.905 m (6' 3\") 136.986 kg (302 lb) 37.75 kg/m2 97%    Smoking Status                   Former Smoker           Basic Information     Date Of Birth Sex Race Ethnicity Preferred Language    1950 Male White Non- English      Your appointments     Aug 29, 2017  9:00 AM   Established Patient with Kit Torres PA-C   [facility])    [address]  [address]  [place] [id number] [phone]           You will be receiving a confirmation call a few days before your appointment from our automated call confirmation system.              Problem List              ICD-10-CM Priority Class Noted - Resolved    Type 2 diabetes mellitus with neurologic complication (CMS-[facility]) E11.49   10/25/2016 - Present    Dyslipidemia E78.5   10/25/2016 - Present    Essential hypertension I10   10/25/2016 - Present    Arrhythmia I49.9   10/25/2016 - Present    Neuropathy (CMS-[facility]) G62.9   10/25/2016 - Present    Preventative health care Z00.00   10/25/2016 - Present    Morbid obesity due to excess calories (CMS-[facility]) E66.01   10/27/2016 - Present    Obstructive sleep apnea syndrome G47.33   2016 - Present    Coronary artery disease due to calcified coronary lesion: Stent to distal circumflex, patent LAD stent and "  of RCA in April 2017 I25.10, I25.84   1/20/2017 - Present    Palpitations R00.2   1/20/2017 - Present    Coronary artery disease I25.10   4/27/2017 - Present    Abnormal nuclear stress test R94.39   4/27/2017 - Present      Health Maintenance        Date Due Completion Dates    RETINAL SCREENING 6/19/1969 ---    IMM INFLUENZA (1) 9/1/2017 10/25/2016    IMM PNEUMOCOCCAL 65+ (ADULT) LOW/MEDIUM RISK SERIES (2 of 2 - PPSV23) 10/2/2017 (Originally 5/1/2017) 5/1/2016    COLONOSCOPY 6/26/2018 (Originally 6/19/2001) ---    URINE ACR / MICROALBUMIN 11/8/2017 11/8/2016    DIABETES MONOFILAMENT / LE EXAM 11/22/2017 11/22/2016    A1C SCREENING 12/2/2017 6/2/2017, 11/8/2016    FASTING LIPID PROFILE 8/16/2018 8/16/2017, 6/2/2017, 3/14/2017, 11/8/2016    SERUM CREATININE 8/16/2018 8/16/2017, 6/2/2017, 4/28/2017, 4/25/2017, 3/14/2017, 11/8/2016    IMM DTaP/Tdap/Td Vaccine (2 - Td) 5/1/2026 5/1/2016            Current Immunizations     13-VALENT PCV PREVNAR 5/1/2016    Influenza Vaccine Adult HD 10/25/2016  4:23 PM    SHINGLES VACCINE 5/1/2016    Tdap Vaccine 5/1/2016      Below and/or attached are the medications your provider expects you to take. Review all of your home medications and newly ordered medications with your provider and/or pharmacist. Follow medication instructions as directed by your provider and/or pharmacist. Please keep your medication list with you and share with your provider. Update the information when medications are discontinued, doses are changed, or new medications (including over-the-counter products) are added; and carry medication information at all times in the event of emergency situations     Allergies:  METFORMIN - Unspecified               Medications  Valid as of: August 22, 2017 -  8:36 AM    Generic Name Brand Name Tablet Size Instructions for use    Aspirin (Chew Tab) ASA 81 MG Take 81 mg by mouth every day.        Baclofen (Tab) LIORESAL 20 MG Take 20 mg by mouth as needed. Indications:  Muscle Spasticity        Cholecalciferol (Tab) cholecalciferol 1000 UNIT Take 1,000 Units by mouth every day.        DiphenhydrAMINE HCl (Tab) BENADRYL 25 MG Take 50 mg by mouth at bedtime as needed for Sleep.        GlipiZIDE (TABLET SR 24 HR) GLIPIZIDE XL 10 MG TAKE 1 TABLET ORALLY DAILY        Glucosamine-Chondroitin (Cap) Glucosamine-Chondroitin 500-250 MG Take 2 Tabs by mouth every day.        Ibuprofen-Diphenhydramine Cit   Take  by mouth.        Metoprolol Succinate (TABLET SR 24 HR) TOPROL XL 50 MG Take 1 Tab by mouth every day.        Multiple Vitamins-Minerals   Take 1 Tab by mouth every day.        Nitroglycerin (SL Tab) NITROSTAT 0.4 MG Place 0.4 mg under tongue every 5 minutes as needed for Chest Pain.        Non Formulary Request Non Formulary Request  For Fernando Respironics System 1 Remstar Auto A-Flex CPAP: 1 headgear (Mirage Ortega II Nasal Pillow by [employer] (large size)); 1 package 6' tubing; 2 small filters (Every 3 months)        Ramipril (Cap) ALTACE 10 MG Take 1 Cap by mouth every day.        Rosuvastatin Calcium (Tab) CRESTOR 40 MG Take 1 Tab by mouth every day.        SITagliptin Phosphate (Tab) JANUVIA 100 MG Take 1 Tab by mouth every day.        Ticagrelor (Tab) BRILINTA 90 MG Take 1 Tab by mouth 2 Times a Day.        TraMADol HCl (Tab) ULTRAM 50 MG Take 1 Tab by mouth every four hours as needed.        .                 Medicines prescribed today were sent to:     [facility] - [place], [place] - [address] AT PORTAL TO REGISTERED [facility]    [address] [facility] [address]    Phone: [phone] Fax: [phone]    Open 24 Hours?: No      Medication refill instructions:       If your prescription bottle indicates you have medication refills left, it is not necessary to call your provider’s office. Please contact your pharmacy and they will refill your medication.    If your prescription bottle indicates you do not have any refills left, you may request  refills at any time through one of the following ways: The online [employer] system (except Urgent Care), by calling your provider’s office, or by asking your pharmacy to contact your provider’s office with a refill request. Medication refills are processed only during regular business hours and may not be available until the next business day. Your provider may request additional information or to have a follow-up visit with you prior to refilling your medication.   *Please Note: Medication refills are assigned a new Rx number when refilled electronically. Your pharmacy may indicate that no refills were authorized even though a new prescription for the same medication is available at the pharmacy. Please request the medicine by name with the pharmacy before contacting your provider for a refill.        Your To Do List     Future Labs/Procedures Complete By Expires    COMP METABOLIC PANEL  As directed 8/22/2018    LIPID PROFILE  As directed 8/22/2018         [employer] Access Code: Activation code not generated  Current [employer] Status: Active

## 2017-08-29 ENCOUNTER — OFFICE VISIT (OUTPATIENT)
Dept: MEDICAL GROUP | Facility: MEDICAL CENTER | Age: 67
End: 2017-08-29
Payer: MEDICARE

## 2017-08-29 ENCOUNTER — NON-PROVIDER VISIT (OUTPATIENT)
Dept: MEDICAL GROUP | Facility: MEDICAL CENTER | Age: 67
End: 2017-08-29
Payer: MEDICARE

## 2017-08-29 VITALS
RESPIRATION RATE: 16 BRPM | WEIGHT: 306.2 LBS | BODY MASS INDEX: 38.07 KG/M2 | OXYGEN SATURATION: 96 % | DIASTOLIC BLOOD PRESSURE: 76 MMHG | SYSTOLIC BLOOD PRESSURE: 118 MMHG | HEART RATE: 62 BPM | TEMPERATURE: 97.2 F | HEIGHT: 75 IN

## 2017-08-29 DIAGNOSIS — E78.5 DYSLIPIDEMIA: ICD-10-CM

## 2017-08-29 DIAGNOSIS — E11.9 TYPE 2 DIABETES MELLITUS WITHOUT COMPLICATION, WITHOUT LONG-TERM CURRENT USE OF INSULIN (HCC): ICD-10-CM

## 2017-08-29 DIAGNOSIS — G62.9 NEUROPATHY: ICD-10-CM

## 2017-08-29 DIAGNOSIS — I10 ESSENTIAL HYPERTENSION: ICD-10-CM

## 2017-08-29 DIAGNOSIS — I25.10 CORONARY ARTERY DISEASE DUE TO CALCIFIED CORONARY LESION: ICD-10-CM

## 2017-08-29 DIAGNOSIS — I25.84 CORONARY ARTERY DISEASE DUE TO CALCIFIED CORONARY LESION: ICD-10-CM

## 2017-08-29 DIAGNOSIS — E11.40 TYPE 2 DIABETES MELLITUS WITH DIABETIC NEUROPATHY, WITHOUT LONG-TERM CURRENT USE OF INSULIN (HCC): ICD-10-CM

## 2017-08-29 PROCEDURE — 92250 FUNDUS PHOTOGRAPHY W/I&R: CPT | Performed by: PHYSICIAN ASSISTANT

## 2017-08-29 PROCEDURE — 99214 OFFICE O/P EST MOD 30 MIN: CPT | Performed by: PHYSICIAN ASSISTANT

## 2017-08-29 RX ORDER — GLIPIZIDE 10 MG/1
10 TABLET, FILM COATED, EXTENDED RELEASE ORAL DAILY
Qty: 90 TAB | Refills: 3 | Status: SHIPPED | OUTPATIENT
Start: 2017-08-29 | End: 2018-07-02

## 2017-08-29 RX ORDER — GLIPIZIDE 10 MG/1
10 TABLET, FILM COATED, EXTENDED RELEASE ORAL DAILY
Qty: 90 TAB | Refills: 3 | Status: SHIPPED | OUTPATIENT
Start: 2017-08-29 | End: 2017-08-29 | Stop reason: SDUPTHER

## 2017-08-29 RX ORDER — TRAMADOL HYDROCHLORIDE 50 MG/1
50 TABLET ORAL EVERY 4 HOURS PRN
Qty: 180 TAB | Refills: 1 | Status: SHIPPED | OUTPATIENT
Start: 2017-08-29 | End: 2017-11-27

## 2017-08-29 RX ORDER — GLIPIZIDE 5 MG/1
5 TABLET, FILM COATED, EXTENDED RELEASE ORAL DAILY
Qty: 90 TAB | Refills: 3 | Status: SHIPPED | OUTPATIENT
Start: 2017-08-29 | End: 2018-07-02

## 2017-08-29 RX ORDER — RAMIPRIL 10 MG/1
10 CAPSULE ORAL DAILY
Qty: 90 CAP | Refills: 3 | Status: SHIPPED | OUTPATIENT
Start: 2017-08-29 | End: 2018-04-25

## 2017-08-29 RX ORDER — CHLORAL HYDRATE 500 MG
1000 CAPSULE ORAL DAILY
COMMUNITY
End: 2019-07-16

## 2017-08-29 RX ORDER — METOPROLOL SUCCINATE 25 MG/1
25 TABLET, EXTENDED RELEASE ORAL DAILY
Qty: 90 TAB | Refills: 3 | Status: SHIPPED | OUTPATIENT
Start: 2017-08-29 | End: 2018-11-30

## 2017-08-29 RX ORDER — ROSUVASTATIN CALCIUM 20 MG/1
20 TABLET, COATED ORAL EVERY EVENING
Qty: 90 TAB | Refills: 3 | Status: SHIPPED | OUTPATIENT
Start: 2017-08-29 | End: 2018-04-25

## 2017-08-29 NOTE — ASSESSMENT & PLAN NOTE
Cholesterol profile performed in June showed his LDL is well controlled. Triglycerides elevated at 165. He is currently on Crestor 20 mg not 40 mg daily.

## 2017-08-29 NOTE — PROGRESS NOTES
Kun Lisa is a 67 y.o. male here for a non-provider visit for retinal eye exam     If abnormal was an in office provider notified today (if so, indicate provider)? No  Routed to PCP? No

## 2017-08-29 NOTE — PROGRESS NOTES
Subjective:   Kun Lisa is a 67 y.o. male here today forMedication refills and discussed several of his chronic medical conditions.    Type 2 diabetes mellitus with neurologic complication (CMS-[facility])  This is a 67-year-old male comes in today feeling well. He is requesting refills of his medications. He is exercising regularly by golfing. Trying to watch his diet. It appears that cardiology ordered a hemoglobin A1c that was 8.3. Previously it was 7.3. He is taking glipizide 10 mg daily. Also on Januvia 100 mg daily. Has not had a annual retinal scan.    Neuropathy (CMS-HCC)  He is requesting an increase in his dose of tramadol which he will take twice daily. He was sleepy was given 90 pills for 90 days.    Essential hypertension  He was seen by Dr. Rhodes. Overall he is in good health. He is on metoprolol at half the dose now at 25 mg daily. Takes Altace 10 mg daily. Requesting refills.    Dyslipidemia  Cholesterol profile performed in June showed his LDL is well controlled. Triglycerides elevated at 165. He is currently on Crestor 20 mg not 40 mg daily.       Current medicines (including changes today)  Current Outpatient Prescriptions   Medication Sig Dispense Refill   • Omega-3 Fatty Acids (FISH OIL) 1000 MG Cap capsule Take 1,000 mg by mouth 3 times a day, with meals.     • tramadol (ULTRAM) 50 MG Tab Take 1 Tab by mouth every four hours as needed for up to 90 days. 180 Tab 1   • ramipril (ALTACE) 10 MG capsule Take 1 Cap by mouth every day. 90 Cap 3   • metoprolol SR (TOPROL XL) 25 MG TABLET SR 24 HR Take 1 Tab by mouth every day. 90 Tab 3   • sitagliptin (JANUVIA) 100 MG Tab Take 1 Tab by mouth every day. 90 Tab 3   • rosuvastatin (CRESTOR) 20 MG Tab Take 1 Tab by mouth every evening. 90 Tab 3   • glipiZIDE SR (GLIPIZIDE XL) 10 MG TABLET SR 24 HR Take 1 Tab by mouth every day. 90 Tab 3   • glipiZIDE SR (GLUCOTROL) 5 MG TABLET SR 24 HR Take 1 Tab by mouth every day. Take with 10 mg ER Glipizide dose. 90  "Tab 3   • Ibuprofen-Diphenhydramine Cit (ADVIL PM PO) Take  by mouth.     • Non Formulary Request For Fernando Respironics System 1 Remstar Auto A-Flex CPAP: 1 headgear (Mirage Ortega II Nasal Pillow by [employer] (large size)); 1 package 6' tubing; 2 small filters (Every 3 months) 1 Each 3   • ticagrelor (BRILINTA) 90 MG Tab tablet Take 1 Tab by mouth 2 Times a Day. 180 Tab 3   • Multiple Vitamins-Minerals (DAILY MULTIVITAMIN PO) Take 1 Tab by mouth every day.     • aspirin (ASA) 81 MG Chew Tab chewable tablet Take 81 mg by mouth every day.     • Glucosamine-Chondroitin (GLUCOSAMINE CHONDROITIN COMPLX) 500-250 MG Cap Take 2 Tabs by mouth every day.     • vitamin D (CHOLECALCIFEROL) 1000 UNIT Tab Take 1,000 Units by mouth every day.     • baclofen (LIORESAL) 20 MG tablet Take 20 mg by mouth as needed. Indications: Muscle Spasticity     • nitroglycerin (NITROSTAT) 0.4 MG SL Tab Place 0.4 mg under tongue every 5 minutes as needed for Chest Pain.       No current facility-administered medications for this visit.      He  has a past medical history of Acid reflux; Arrhythmia; CAD (coronary artery disease); Diabetes (CMS-[facility]); Diabetic neuropathy (CMS-[facility]); Hyperlipidemia; Hypertension; Obstructive sleep apnea; Palpitations; Past heart attack; Psychiatric problem; Sleep apnea; and Snoring.    ROS   No chest pain, no shortness of breath, no abdominal pain and all other systems were reviewed and are negative.       Objective:     Blood pressure 118/76, pulse 62, temperature 36.2 °C (97.2 °F), resp. rate 16, height 1.905 m (6' 3\"), weight (!) 138.9 kg (306 lb 3.2 oz), SpO2 96 %. Body mass index is 38.27 kg/m².   Physical Exam:  Constitutional: Alert, no distress.  Skin: Warm, dry, good turgor, no rashes in visible areas.  Eye: Equal, round and reactive, conjunctiva clear, lids normal.  ENMT: Lips without lesions, good dentition, oropharynx clear.  Neck: Trachea midline, no masses.   Lymph: No cervical or supraclavicular " lymphadenopathy  Respiratory: Unlabored respiratory effort, lungs Appear clear, no wheezes.  Cardiovascular:Regular rate and rhythm, no edema.  Psych: Alert and oriented x3, normal affect and mood.        Assessment and Plan:   The following treatment plan was discussed    1. Neuropathy (CMS-HCC)  Chronic condition. Provided prescription for tramadol 50 mg with 2 tablets daily as needed. Sent to mail order.  - tramadol (ULTRAM) 50 MG Tab; Take 1 Tab by mouth every four hours as needed for up to 90 days.  Dispense: 180 Tab; Refill: 1    2. Type 2 diabetes mellitus with diabetic neuropathy, without long-term current use of insulin (CMS-HCC)  Chronic condition. Discussed with diabetes not at a controlled level. Advised continued exercise. Referred to nutrition services. Ordered retinal scan in clinic. Renewed medications and increase glipizide to 15 mg extended release daily.  - POCT Retinal Eye Exam  - REFERRAL TO NUTRITION SERVICES  - HEMOGLOBIN A1C; Future  -sitagliptin (JANUVIA) 100 MG Tab; Take 1 Tab by mouth every day.  Dispense: 90 Tab; Refill: 3    3. Essential hypertension  Chronic condition and control. Continue blood pressure medications as directed. Renewed a couple of them. Follow up with his cardiologist needed.  - ramipril (ALTACE) 10 MG capsule; Take 1 Cap by mouth every day.  Dispense: 90 Cap; Refill: 3  - metoprolol SR (TOPROL XL) 25 MG TABLET SR 24 HR; Take 1 Tab by mouth every day.  Dispense: 90 Tab; Refill: 3    5. Dyslipidemia  Chronic condition. Renewed Crestor 20 mg daily.  - ramipril (ALTACE) 10 MG capsule; Take 1 Cap by mouth every day.  Dispense: 90 Cap; Refill: 3  - metoprolol SR (TOPROL XL) 25 MG TABLET SR 24 HR; Take 1 Tab by mouth every day.  Dispense: 90 Tab; Refill: 3      Followup: Return in about 3 months (around 11/29/2017), or if symptoms worsen or fail to improve.    Please note that this dictation was created using voice recognition software. I have made every reasonable attempt  to correct obvious errors, but I expect that there are errors of grammar and possibly content that I did not discover before finalizing the note.

## 2017-08-29 NOTE — ASSESSMENT & PLAN NOTE
He is requesting an increase in his dose of tramadol which he will take twice daily. He was sleepy was given 90 pills for 90 days.

## 2017-08-29 NOTE — ASSESSMENT & PLAN NOTE
This is a 67-year-old male comes in today feeling well. He is requesting refills of his medications. He is exercising regularly by golfing. Trying to watch his diet. It appears that cardiology ordered a hemoglobin A1c that was 8.3. Previously it was 7.3. He is taking glipizide 10 mg daily. Also on Januvia 100 mg daily. Has not had a annual retinal scan.

## 2017-08-29 NOTE — ASSESSMENT & PLAN NOTE
He was seen by Dr. Rhodes. Overall he is in good health. He is on metoprolol at half the dose now at 25 mg daily. Takes Altace 10 mg daily. Requesting refills.

## 2017-09-15 ENCOUNTER — TELEPHONE (OUTPATIENT)
Dept: MEDICAL GROUP | Facility: MEDICAL CENTER | Age: 67
End: 2017-09-15

## 2017-09-15 NOTE — TELEPHONE ENCOUNTER
"----- Message from JEREMIAS Mandujano sent at 9/15/2017  9:38 AM PDT -----  Regarding: FW: Contact my doctor  Contact: [phone]      ----- Message -----  From: Kun Lisa  Sent: 9/14/2017   6:58 AM  To: Karmen Whitney  Subject: Contact my doctor                                Topic: Bill/Statement    THIS IS AN URGENT MESSAGE FOR MY PCP THAT I NEED TO HAVE RESOLVED TODAY!    This new \"MyChart\" is not allowing me to contact my physician about the fact that [facility] will not fill my brand new prescription for Tramadol HCL (50mg) (180 doses) because [facility] needs to have Kit contact them about this prescription.    I am leaving town on vacation next week and I NEED THIS PRESCRIPTION FILLED before I leave.    I want Kit to contact them about this prescription that was updated to a 90-day quantity.at our appointment last month AND  I need to  an emergency locally before my trip begins on Tuesday.  "

## 2017-09-29 ENCOUNTER — TELEPHONE (OUTPATIENT)
Dept: MEDICAL GROUP | Facility: MEDICAL CENTER | Age: 67
End: 2017-09-29

## 2017-09-29 DIAGNOSIS — Z23 INFLUENZA VACCINE NEEDED: ICD-10-CM

## 2017-10-02 ENCOUNTER — NON-PROVIDER VISIT (OUTPATIENT)
Dept: MEDICAL GROUP | Facility: MEDICAL CENTER | Age: 67
End: 2017-10-02
Payer: MEDICARE

## 2017-10-02 DIAGNOSIS — Z23 NEED FOR INFLUENZA VACCINATION: ICD-10-CM

## 2017-10-02 PROCEDURE — 90662 IIV NO PRSV INCREASED AG IM: CPT | Performed by: PHYSICIAN ASSISTANT

## 2017-10-02 PROCEDURE — G0008 ADMIN INFLUENZA VIRUS VAC: HCPCS | Performed by: PHYSICIAN ASSISTANT

## 2017-12-11 RX ORDER — TRAMADOL HYDROCHLORIDE 50 MG/1
TABLET ORAL
Qty: 180 TAB | Refills: 0 | Status: SHIPPED
Start: 2017-12-11 | End: 2018-03-12 | Stop reason: SDUPTHER

## 2018-01-01 NOTE — ASSESSMENT & PLAN NOTE
CPAP nocly  Followed by pulm yearly  
Chronic condition managed with current medical regimen  6/2/2017   Glucose 227 (H) 65 - 99 mg/dL Final     Glycohemoglobin 8.3 (H) 0.0 - 5.6 % Final   Home testing not done   Continue with current meds  Followed by Kit Torres PA-C.      
Chronic condition managed with current medical regimen  Labs reviewed    Continue with current meds  Followed by Kit Torres PA-C.      
Chronic condition managed with current medical regimen  Stable per review   Continue with current meds  Followed by Kit Torres PA-C.      
Chronic condition managed with current medical regimen  Stable per review with mario feet affected   Continue with current meds  Followed by Kit Torres PA-C.      
Followed by cardiology q 6 months  Denies cardiac sxs  Continue with current medications   
Followed by cardiology q 6 months  Second stent placed 4/2017  Denies cardiac sxs  Continue with current medications   
Has neuropathy mario  duplicate  
Managed by Kit Torres PA-C   
Patient aware of relationship between weight and health and working on diet  Followed by Kit Torres PA-C.   
Second stent placed 4/27/2017  Followed by cardiology q 6 months  Denies cardiac sxs  Continue with current medications   
Statement Selected

## 2018-02-22 DIAGNOSIS — I25.10 CORONARY ARTERY DISEASE DUE TO CALCIFIED CORONARY LESION: ICD-10-CM

## 2018-02-22 DIAGNOSIS — I25.84 CORONARY ARTERY DISEASE DUE TO CALCIFIED CORONARY LESION: ICD-10-CM

## 2018-02-22 DIAGNOSIS — E78.5 DYSLIPIDEMIA: ICD-10-CM

## 2018-02-22 DIAGNOSIS — I10 ESSENTIAL HYPERTENSION: ICD-10-CM

## 2018-02-23 RX ORDER — METOPROLOL SUCCINATE 50 MG/1
TABLET, EXTENDED RELEASE ORAL
Qty: 90 TAB | Refills: 1 | Status: SHIPPED | OUTPATIENT
Start: 2018-02-23 | End: 2018-03-01

## 2018-02-26 ENCOUNTER — TELEPHONE (OUTPATIENT)
Dept: MEDICAL GROUP | Facility: MEDICAL CENTER | Age: 68
End: 2018-02-26

## 2018-02-26 DIAGNOSIS — E11.9 CONTROLLED TYPE 2 DIABETES MELLITUS WITHOUT COMPLICATION, WITHOUT LONG-TERM CURRENT USE OF INSULIN (HCC): ICD-10-CM

## 2018-02-26 DIAGNOSIS — E10.9 CONTROLLED TYPE 1 DIABETES MELLITUS WITHOUT COMPLICATION, WITH LONG-TERM CURRENT USE OF INSULIN (HCC): ICD-10-CM

## 2018-03-01 ENCOUNTER — HOSPITAL ENCOUNTER (OUTPATIENT)
Facility: MEDICAL CENTER | Age: 68
End: 2018-03-01
Attending: PHYSICIAN ASSISTANT
Payer: MEDICARE

## 2018-03-01 ENCOUNTER — OFFICE VISIT (OUTPATIENT)
Dept: MEDICAL GROUP | Facility: MEDICAL CENTER | Age: 68
End: 2018-03-01
Payer: MEDICARE

## 2018-03-01 VITALS
RESPIRATION RATE: 16 BRPM | HEIGHT: 75 IN | SYSTOLIC BLOOD PRESSURE: 126 MMHG | WEIGHT: 298.4 LBS | OXYGEN SATURATION: 97 % | TEMPERATURE: 97.3 F | BODY MASS INDEX: 37.1 KG/M2 | DIASTOLIC BLOOD PRESSURE: 72 MMHG | HEART RATE: 68 BPM

## 2018-03-01 DIAGNOSIS — E11.40 TYPE 2 DIABETES MELLITUS WITH DIABETIC NEUROPATHY, WITHOUT LONG-TERM CURRENT USE OF INSULIN (HCC): ICD-10-CM

## 2018-03-01 DIAGNOSIS — E66.9 OBESITY (BMI 35.0-39.9 WITHOUT COMORBIDITY): ICD-10-CM

## 2018-03-01 DIAGNOSIS — E11.8 TYPE 2 DIABETES MELLITUS WITH COMPLICATION, WITHOUT LONG-TERM CURRENT USE OF INSULIN (HCC): ICD-10-CM

## 2018-03-01 LAB
HBA1C MFR BLD: 7 % (ref ?–5.8)
INT CON NEG: NEGATIVE
INT CON POS: POSITIVE

## 2018-03-01 PROCEDURE — 82570 ASSAY OF URINE CREATININE: CPT

## 2018-03-01 PROCEDURE — 99214 OFFICE O/P EST MOD 30 MIN: CPT | Performed by: PHYSICIAN ASSISTANT

## 2018-03-01 PROCEDURE — 99000 SPECIMEN HANDLING OFFICE-LAB: CPT | Performed by: PHYSICIAN ASSISTANT

## 2018-03-01 PROCEDURE — 83036 HEMOGLOBIN GLYCOSYLATED A1C: CPT | Performed by: PHYSICIAN ASSISTANT

## 2018-03-01 PROCEDURE — 82043 UR ALBUMIN QUANTITATIVE: CPT

## 2018-03-01 NOTE — ASSESSMENT & PLAN NOTE
This is a 67-year-old male who is here today to discuss his diabetes. Last A1c was 8.3. At that time I increased glipizide to 15 mg a day. He states he is trying to eat healthier. Does consume about 3 glasses of diet soda a week. Denies any exacerbations of his neuropathy. He states that glipizide is in the same side effects as metformin. Dyspepsia and diarrhea. He takes Januvia daily.

## 2018-03-01 NOTE — PROGRESS NOTES
Subjective:   Kun Lisa is a 67 y.o. male here today for diabetes.    Type 2 diabetes mellitus with neurologic complication (CMS-HCC)  This is a 67-year-old male who is here today to discuss his diabetes. Last A1c was 8.3. At that time I increased glipizide to 15 mg a day. He states he is trying to eat healthier. Does consume about 3 glasses of diet soda a week. Denies any exacerbations of his neuropathy. He states that glipizide is in the same side effects as metformin. Dyspepsia and diarrhea. He takes Januvia daily.       Current medicines (including changes today)  Current Outpatient Prescriptions   Medication Sig Dispense Refill   • tramadol (ULTRAM) 50 MG Tab TAKE 1 TABLET BY MOUTH EVERY 4 HOURS AS NEEDED. FOR A 90 DAY SUPPLY 180 Tab 0   • Omega-3 Fatty Acids (FISH OIL) 1000 MG Cap capsule Take 1,000 mg by mouth 3 times a day, with meals.     • ramipril (ALTACE) 10 MG capsule Take 1 Cap by mouth every day. 90 Cap 3   • metoprolol SR (TOPROL XL) 25 MG TABLET SR 24 HR Take 1 Tab by mouth every day. 90 Tab 3   • sitagliptin (JANUVIA) 100 MG Tab Take 1 Tab by mouth every day. 90 Tab 3   • rosuvastatin (CRESTOR) 20 MG Tab Take 1 Tab by mouth every evening. 90 Tab 3   • glipiZIDE SR (GLIPIZIDE XL) 10 MG TABLET SR 24 HR Take 1 Tab by mouth every day. 90 Tab 3   • glipiZIDE SR (GLUCOTROL) 5 MG TABLET SR 24 HR Take 1 Tab by mouth every day. Take with 10 mg ER Glipizide dose. 90 Tab 3   • Ibuprofen-Diphenhydramine Cit (ADVIL PM PO) Take  by mouth.     • vitamin D (CHOLECALCIFEROL) 1000 UNIT Tab Take 1,000 Units by mouth every day.     • ticagrelor (BRILINTA) 90 MG Tab tablet Take 1 Tab by mouth 2 Times a Day. 180 Tab 3   • Multiple Vitamins-Minerals (DAILY MULTIVITAMIN PO) Take 1 Tab by mouth every day.     • Glucosamine-Chondroitin (GLUCOSAMINE CHONDROITIN COMPLX) 500-250 MG Cap Take 2 Tabs by mouth every day.     • Non Formulary Request For Wellcores System 1 Remstar Auto A-Flex CPAP: 1 headgear  "(Mirage Ortega II Nasal Pillow by [employer] (large size)); 1 package 6' tubing; 2 small filters (Every 3 months) 1 Each 3   • baclofen (LIORESAL) 20 MG tablet Take 20 mg by mouth as needed. Indications: Muscle Spasticity     • aspirin (ASA) 81 MG Chew Tab chewable tablet Take 81 mg by mouth every day.     • nitroglycerin (NITROSTAT) 0.4 MG SL Tab Place 0.4 mg under tongue every 5 minutes as needed for Chest Pain.       No current facility-administered medications for this visit.      He  has a past medical history of Acid reflux; Arrhythmia; CAD (coronary artery disease); Diabetes (CMS-HCC); Diabetic neuropathy (CMS-HCC); Hyperlipidemia; Hypertension; Obstructive sleep apnea; Palpitations; Past heart attack; Psychiatric problem; Sleep apnea; and Snoring.    Social History and Family History were reviewed and updated.    ROS   No chest pain, no shortness of breath, no abdominal pain and all other systems were reviewed and are negative.       Objective:     Blood pressure 126/72, pulse 68, temperature 36.3 °C (97.3 °F), resp. rate 16, height 1.905 m (6' 3\"), weight (!) 135.4 kg (298 lb 6.4 oz), SpO2 97 %. Body mass index is 37.3 kg/m².   Physical Exam:  Constitutional: Alert, no distress.  Skin: Warm, dry, good turgor, no rashes in visible areas.  Eye: Equal, round and reactive, conjunctiva clear, lids normal.  ENMT: Lips without lesions, good dentition, oropharynx clear.  Neck: Trachea midline, no masses.   Lymph: No cervical or supraclavicular lymphadenopathy  Respiratory: Unlabored respiratory effort, lungs appear clear, no wheezes.  Cardiovascular: Normal S1, S2, no murmur, no edema.  Abdomen: Soft, non-tender, no masses.  Psych: Alert and oriented x3, normal affect and mood.        Assessment and Plan:   The following treatment plan was discussed    1. Type 2 diabetes mellitus with diabetic neuropathy, without long-term current use of insulin (CMS-HCC)  Chronic condition. A1c currently at 7.0. Very happy with his " control. Continue medication as directed. Did refer to endocrinology given his significant side effects taking glipizide. Advised to stop sodium use.  - REFERRAL TO ENDOCRINOLOGY  - MICROALBUMIN CREAT RATIO URINE (CLINIC COLLECT); Future  - POCT Hemoglobin A1C    2. Obesity (BMI 35.0-39.9 without comorbidity)  Advised to eat healthier. We'll monitor.  - Patient identified as having weight management issue.  Appropriate orders and counseling given.    Total 25 minutes face-to-face time spent with patient, with greater than 50% of the total time discussing patient's issues and symptoms as listed above in assessment and plan, as well as managing coordination of care for future evaluation and treatment.        Followup: No Follow-up on file.    Please note that this dictation was created using voice recognition software. I have made every reasonable attempt to correct obvious errors, but I expect that there are errors of grammar and possibly content that I did not discover before finalizing the note.

## 2018-03-02 LAB
CREAT UR-MCNC: 74.2 MG/DL
MICROALBUMIN UR-MCNC: 0.8 MG/DL
MICROALBUMIN/CREAT UR: 11 MG/G (ref 0–30)

## 2018-03-12 DIAGNOSIS — G62.9 NEUROPATHY: ICD-10-CM

## 2018-03-12 RX ORDER — TRAMADOL HYDROCHLORIDE 50 MG/1
TABLET ORAL
Qty: 180 TAB | Refills: 0 | Status: SHIPPED
Start: 2018-03-12 | End: 2018-03-22 | Stop reason: SDUPTHER

## 2018-03-12 RX ORDER — TRAMADOL HYDROCHLORIDE 50 MG/1
50 TABLET ORAL EVERY 6 HOURS PRN
Qty: 180 TAB | Refills: 0 | Status: CANCELLED
Start: 2018-03-12 | End: 2018-04-11

## 2018-03-22 DIAGNOSIS — G62.9 NEUROPATHY: ICD-10-CM

## 2018-03-22 RX ORDER — TRAMADOL HYDROCHLORIDE 50 MG/1
TABLET ORAL
Qty: 180 TAB | Refills: 0 | Status: SHIPPED
Start: 2018-03-22 | End: 2018-06-19 | Stop reason: SDUPTHER

## 2018-04-24 DIAGNOSIS — I10 ESSENTIAL HYPERTENSION: ICD-10-CM

## 2018-04-24 DIAGNOSIS — I25.10 CORONARY ARTERY DISEASE DUE TO CALCIFIED CORONARY LESION: ICD-10-CM

## 2018-04-24 DIAGNOSIS — E78.5 DYSLIPIDEMIA: ICD-10-CM

## 2018-04-24 DIAGNOSIS — I25.84 CORONARY ARTERY DISEASE DUE TO CALCIFIED CORONARY LESION: ICD-10-CM

## 2018-04-25 RX ORDER — RAMIPRIL 10 MG/1
CAPSULE ORAL
Qty: 90 CAP | Refills: 1 | Status: SHIPPED | OUTPATIENT
Start: 2018-04-25 | End: 2019-07-15

## 2018-04-25 RX ORDER — ROSUVASTATIN CALCIUM 20 MG/1
TABLET, COATED ORAL
Qty: 90 TAB | Refills: 1 | Status: SHIPPED | OUTPATIENT
Start: 2018-04-25 | End: 2019-07-15

## 2018-06-17 ENCOUNTER — PATIENT MESSAGE (OUTPATIENT)
Dept: MEDICAL GROUP | Facility: MEDICAL CENTER | Age: 68
End: 2018-06-17

## 2018-06-18 ENCOUNTER — PATIENT MESSAGE (OUTPATIENT)
Dept: MEDICAL GROUP | Facility: MEDICAL CENTER | Age: 68
End: 2018-06-18

## 2018-06-19 DIAGNOSIS — G62.9 NEUROPATHY: ICD-10-CM

## 2018-06-19 RX ORDER — TRAMADOL HYDROCHLORIDE 50 MG/1
TABLET ORAL
Qty: 180 TAB | Refills: 0 | Status: SHIPPED | OUTPATIENT
Start: 2018-06-19 | End: 2018-06-19 | Stop reason: SDUPTHER

## 2018-06-19 RX ORDER — TRAMADOL HYDROCHLORIDE 50 MG/1
TABLET ORAL
Qty: 180 TAB | Refills: 0 | Status: SHIPPED
Start: 2018-06-19 | End: 2018-08-30 | Stop reason: SDUPTHER

## 2018-06-20 NOTE — TELEPHONE ENCOUNTER
Was the patient seen in the last year in this department? Yes     Does patient have an active prescription for medications requested? No     Received Request Via: Patient     Pt placed on the schedule for 07/03/18, notified via email that we do need to see Pt in office. Pt did state he will run out of medication as of 06/21/18.

## 2018-06-21 DIAGNOSIS — G62.9 NEUROPATHY: ICD-10-CM

## 2018-06-22 RX ORDER — TRAMADOL HYDROCHLORIDE 50 MG/1
TABLET ORAL
Qty: 180 TAB | Refills: 0 | OUTPATIENT
Start: 2018-06-22 | End: 2018-07-21

## 2018-06-22 NOTE — TELEPHONE ENCOUNTER
Spoke to pt by Phone to notify this did go to mail order. Pt understanding and states he has enough medication to get him by until his last day of vacation. Pt will receive medication upon returning home and will be seen in office on July 3rd. Pt would like to get a 90 day supply of Tramadol during his office visit to avoid any further delay with medication.

## 2018-06-22 NOTE — TELEPHONE ENCOUNTER
Rx went to mail order pharmacy on accident. Pt has already left for out of town travel. Notified Pt via email and offered my help in any way I can.

## 2018-07-02 ENCOUNTER — OFFICE VISIT (OUTPATIENT)
Dept: ENDOCRINOLOGY | Facility: MEDICAL CENTER | Age: 68
End: 2018-07-02
Payer: MEDICARE

## 2018-07-02 VITALS
SYSTOLIC BLOOD PRESSURE: 120 MMHG | HEART RATE: 64 BPM | DIASTOLIC BLOOD PRESSURE: 62 MMHG | WEIGHT: 295.4 LBS | HEIGHT: 75 IN | OXYGEN SATURATION: 96 % | BODY MASS INDEX: 36.73 KG/M2

## 2018-07-02 DIAGNOSIS — E11.40 TYPE 2 DIABETES MELLITUS WITH DIABETIC NEUROPATHY, WITHOUT LONG-TERM CURRENT USE OF INSULIN (HCC): ICD-10-CM

## 2018-07-02 DIAGNOSIS — I10 ESSENTIAL HYPERTENSION: ICD-10-CM

## 2018-07-02 DIAGNOSIS — E66.01 MORBID OBESITY DUE TO EXCESS CALORIES (HCC): ICD-10-CM

## 2018-07-02 LAB
HBA1C MFR BLD: 7.5 % (ref ?–5.8)
INT CON NEG: NORMAL
INT CON POS: NORMAL

## 2018-07-02 PROCEDURE — 99204 OFFICE O/P NEW MOD 45 MIN: CPT | Performed by: PHYSICIAN ASSISTANT

## 2018-07-02 PROCEDURE — 83036 HEMOGLOBIN GLYCOSYLATED A1C: CPT | Performed by: PHYSICIAN ASSISTANT

## 2018-07-02 NOTE — PROGRESS NOTES
New Patient Consult Note  Referred by: Kit Torres P.A.-C.    Reason for consult: Diabetes Management Type 2    HPI:  Kun Lisa is a 68 y.o. old patient who is seeing us today for diabetes care.  This is a pleasant patient with diabetes and I appreciate the opportunity to participate in the care of this patient.  This is a new patient with me today.    Labs of 3/1/18 HbA1c is 7.0, GFR >60  Labs of 11/8/16 HbA1c was 7.3    BG Diary:7/2/2018  In the AM:  Did not bring    Has been Diabetic since 10+ years  Has a Glucagon pen at home: no    1. Type 2 diabetes mellitus with diabetic neuropathy, without long-term current use of insulin (HCC)  This is a new patient with me on 7/2/18  He is on:  1.  Januvia  2.  Glipizide (This medication is on the BEERS list and is a medication we try not to prescribe to our older population of patients)  Glipizide also causes weight gain and potential for Hypoglycemia.  He is 294 pounds and I would like to see him loose 90 pounds and changing off Glipizide is a good first start.      Metformin: He states burns his anus when he is on this medication.  Last tried he thinks about 10 years ago.  This would have been the IR form and potentially we could try XR.    He is under Ok control but we would like this to be better and he is maxed out on his two current med and Glipizide should be removed and Januvia a DPP4 is a weak medication drug class for the treatment of T2 Diabetes.     2. Essential hypertension  He is doing well with this    3. Morbid obesity due to excess calories (HCC)  We discussed this at length and he seems a little disinterested in loosing weight yet agrees he should?  It was a very indifferent conversation it seemed on his part.  I did go over pairing protein with carbs today and smaller portion sizes.     He asked many questions about everything.  I did my best to answer his questions.  I asked him several times if he had any more questions and at the end he  said no.         ROS:   Constitutional: No change in weight , No fatigue, No night sweats.  HEENT: No Headache.  Eyes:  No blurred vision, No visual changes.  Cardiac: No chest pain, No palpitations.  Resp: No shortness of breath, No cough,   Gastro: No nausea or vomiting, No diarrhea.  : No polyuria, No polydipsia, No chronic UTI's.  Lower extremities: No lower leg edema bilateral.  All other systems were reviewed and were negative.    Past Medical History:  Patient Active Problem List    Diagnosis Date Noted   • Obesity (BMI 35.0-39.9 without comorbidity) ([facility]) 03/01/2018   • Coronary artery disease 04/27/2017   • Abnormal nuclear stress test 04/27/2017   • Coronary artery disease due to calcified coronary lesion: Stent to distal circumflex, patent LAD stent and  of RCA in April 2017 01/20/2017   • Palpitations 01/20/2017   • Obstructive sleep apnea syndrome 11/22/2016   • Morbid obesity due to excess calories ([facility]) 10/27/2016   • Type 2 diabetes mellitus with neurologic complication ([facility]) 10/25/2016   • Dyslipidemia 10/25/2016   • Essential hypertension 10/25/2016   • Arrhythmia 10/25/2016   • Neuropathy ([facility]) 10/25/2016   • Preventative health care 10/25/2016       Past Surgical History:  Past Surgical History:   Procedure Laterality Date   • CARDIAC CATH  4/27/17    Synergy stent to Circ   • STENT PLACEMENT  1997    LAD   • CARDIAC CATH  1997    LAD stent   • ANGIOPLASTY         Allergies:  Metformin    Social History:  Social History     Social History   • Marital status: Single     Spouse name: N/A   • Number of children: N/A   • Years of education: N/A     Occupational History   • Not on file.     Social History Main Topics   • Smoking status: Former Smoker     Packs/day: 1.00     Years: 32.00     Types: Cigarettes     Quit date: 6/1/2003   • Smokeless tobacco: Never Used   • Alcohol use 0.0 oz/week      Comment: rarely   • Drug use: Yes     Frequency: 7.0 times per week     Types: Marijuana       "Comment: medical marijuana   • Sexual activity: No     Other Topics Concern   • Not on file     Social History Narrative   • No narrative on file       Family History:  Family History   Problem Relation Age of Onset   • Stroke Father 57   • Arthritis Mother    • Stroke Mother        Medications:    Current Outpatient Prescriptions:   •  glucose blood (CHARITY CONTOUR NEXT TEST) strip, 1 Strip by Other route 2 times a day, with meals., Disp: 50 Strip, Rfl: 11  •  tramadol (ULTRAM) 50 MG Tab, TAKE 1 TABLET BY MOUTH EVERY 4 HOURS AS NEEDED. FOR A 90 DAY SUPPLY, Disp: 180 Tab, Rfl: 0  •  ramipril (ALTACE) 10 MG capsule, TAKE 1 CAPSULE DAILY, Disp: 90 Cap, Rfl: 1  •  rosuvastatin (CRESTOR) 20 MG Tab, TAKE 1 TABLET EVERY EVENING, Disp: 90 Tab, Rfl: 1  •  Omega-3 Fatty Acids (FISH OIL) 1000 MG Cap capsule, Take 1,000 mg by mouth 3 times a day, with meals., Disp: , Rfl:   •  metoprolol SR (TOPROL XL) 25 MG TABLET SR 24 HR, Take 1 Tab by mouth every day., Disp: 90 Tab, Rfl: 3  •  vitamin D (CHOLECALCIFEROL) 1000 UNIT Tab, Take 1,000 Units by mouth every day., Disp: , Rfl:   •  Multiple Vitamins-Minerals (DAILY MULTIVITAMIN PO), Take 1 Tab by mouth every day., Disp: , Rfl:   •  Glucosamine-Chondroitin (GLUCOSAMINE CHONDROITIN COMPLX) 500-250 MG Cap, Take 2 Tabs by mouth every day., Disp: , Rfl:   •  Ibuprofen-Diphenhydramine Cit (ADVIL PM PO), Take  by mouth., Disp: , Rfl:   •  baclofen (LIORESAL) 20 MG tablet, Take 20 mg by mouth as needed. Indications: Muscle Spasticity, Disp: , Rfl:   •  nitroglycerin (NITROSTAT) 0.4 MG SL Tab, Place 0.4 mg under tongue every 5 minutes as needed for Chest Pain., Disp: , Rfl:       Physical Examination:   Vital signs: /62   Pulse 64   Ht 1.905 m (6' 3\")   Wt (!) 134 kg (295 lb 6.4 oz)   SpO2 96%   BMI 36.92 kg/m²   General: No distress, cooperative, well dressed and well nourished.   Eyes: No scleral icterus or discharge, No hyposphagma  ENMT: Normal on external inspection of nose, " lips, No nasal drainage   Neck: No abnormal masses on inspection  Resp: Normal effort, Bilateral clear to auscultation, No wheezing, No rales  CVS: Regular rate and rhythm, S1 S2 normal, No murmur. No gallop  Extremities: No edema bilateral extremities  Neuro: Alert and oriented  Skin: No rash, No Ulcers seen  Psych: Normal mood and affect      Assessment and Plan:    1. Type 2 diabetes mellitus with diabetic neuropathy, without long-term current use of insulin (HCC)    We need to get better control and change out Glipizide  STOP:  Januvia and Glipizide    Start:  1.  Ozepmic 0.25 once a week for there weeks.  Then INcrease to 0.5 7/23/18     He should get very good control just on the GLP1 once a week.  If I need to add anything else it will be to try ER Metformin or an SGLT2.    Ozempic is a very strong GLP-1 and should easily control his diabetes alone    2. Essential hypertension  This is under good control and requires no changes    3. Morbid obesity due to excess calories (HCC)  Again when I discussed weight loss it seems is though he could take it or leave it.  His BMI is 36.    I still discussed food with him today.  Eating well balanced    Again I answered all his questions as best that I could I described how all the medications work and I asked him several times if he had any more questions.      ADDENDUM:  7/6/18 6:52am  He sent a message by my chart and states he will not be starting the Ozempic.  This is unfortunate for his diabetes treatment and I hate to see him left on Glipizide a medication on the BEERS list but this is his choice.  I went over these medications and there ill effects or lack of effects multiple times during the office visit.  I do not believe I have any additional information I can add to the conversation that would be helpful in changing his mind to switch his medications.  It would be to his benefit to change and get under better control and too loose some weight but patients  absolutely have a right to choose there treatment.     Return in about 1 month (around 8/2/2018).    Blood glucose log: Check BG in the morning when wake up, before lunch or dinner and before bed.  So three times a day.  Always bring BG diary to the next office visit.     This patient during there office visit was started on new medication OZEMPIC.  Side effects of new medications were discussed with the patient today in the office. The patient was supplied paperwork on this new medication.    Thank you kindly for allowing me to participate in the diabetes care plan for this patient.    Matt Marlow PA-C, [facility]  Board Certified - Advanced Diabetes Management  07/02/18    CC:   Kit Torres P.A.-C.

## 2018-07-03 ENCOUNTER — OFFICE VISIT (OUTPATIENT)
Dept: MEDICAL GROUP | Facility: MEDICAL CENTER | Age: 68
End: 2018-07-03
Payer: MEDICARE

## 2018-07-03 VITALS
HEIGHT: 75 IN | TEMPERATURE: 96.5 F | DIASTOLIC BLOOD PRESSURE: 64 MMHG | OXYGEN SATURATION: 96 % | WEIGHT: 294 LBS | BODY MASS INDEX: 36.56 KG/M2 | HEART RATE: 54 BPM | SYSTOLIC BLOOD PRESSURE: 126 MMHG

## 2018-07-03 DIAGNOSIS — Z23 NEED FOR 23-POLYVALENT PNEUMOCOCCAL POLYSACCHARIDE VACCINE: ICD-10-CM

## 2018-07-03 DIAGNOSIS — Z12.11 COLON CANCER SCREENING: ICD-10-CM

## 2018-07-03 DIAGNOSIS — G62.9 NEUROPATHY: ICD-10-CM

## 2018-07-03 DIAGNOSIS — E11.40 TYPE 2 DIABETES MELLITUS WITH DIABETIC NEUROPATHY, WITHOUT LONG-TERM CURRENT USE OF INSULIN (HCC): ICD-10-CM

## 2018-07-03 DIAGNOSIS — Z23 NEED FOR SHINGLES VACCINE: ICD-10-CM

## 2018-07-03 PROCEDURE — 99214 OFFICE O/P EST MOD 30 MIN: CPT | Mod: 25 | Performed by: PHYSICIAN ASSISTANT

## 2018-07-03 PROCEDURE — 90732 PPSV23 VACC 2 YRS+ SUBQ/IM: CPT | Performed by: PHYSICIAN ASSISTANT

## 2018-07-03 PROCEDURE — G0009 ADMIN PNEUMOCOCCAL VACCINE: HCPCS | Performed by: PHYSICIAN ASSISTANT

## 2018-07-03 ASSESSMENT — PATIENT HEALTH QUESTIONNAIRE - PHQ9: CLINICAL INTERPRETATION OF PHQ2 SCORE: 0

## 2018-07-03 NOTE — PROGRESS NOTES
Subjective:   Kun Lisa is a 68 y.o. male here today for neuropathy and type 2 diabetes.    Neuropathy ([facility])  This is a 68-year-old male who is here today to discuss tramadol use. Neuropathy of his extremities. He has not signed a controlled substance agreement. On the 21st a prescription was sent to [facility]. He uses the mail order service. He is provided a 90 day supply. Medication controls the pain from his neuropathy.    Type 2 diabetes mellitus with neurologic complication (CMS-[facility])  Recently saw endocrinology. A1c is 7.5. Slightly elevated.       Current medicines (including changes today)  Current Outpatient Prescriptions   Medication Sig Dispense Refill   • Zoster Vac Recomb Adjuvanted (SHINGRIX) 50 MCG Recon Susp 0.5 mL by Intramuscular route Once for 1 dose. 0.5 mL 0   • glucose blood (CHARITY CONTOUR NEXT TEST) strip 1 Strip by Other route 2 times a day, with meals. 50 Strip 11   • tramadol (ULTRAM) 50 MG Tab TAKE 1 TABLET BY MOUTH EVERY 4 HOURS AS NEEDED. FOR A 90 DAY SUPPLY 180 Tab 0   • ramipril (ALTACE) 10 MG capsule TAKE 1 CAPSULE DAILY 90 Cap 1   • rosuvastatin (CRESTOR) 20 MG Tab TAKE 1 TABLET EVERY EVENING 90 Tab 1   • Omega-3 Fatty Acids (FISH OIL) 1000 MG Cap capsule Take 1,000 mg by mouth 3 times a day, with meals.     • metoprolol SR (TOPROL XL) 25 MG TABLET SR 24 HR Take 1 Tab by mouth every day. 90 Tab 3   • Ibuprofen-Diphenhydramine Cit (ADVIL PM PO) Take  by mouth.     • vitamin D (CHOLECALCIFEROL) 1000 UNIT Tab Take 1,000 Units by mouth every day.     • Multiple Vitamins-Minerals (DAILY MULTIVITAMIN PO) Take 1 Tab by mouth every day.     • baclofen (LIORESAL) 20 MG tablet Take 20 mg by mouth as needed. Indications: Muscle Spasticity     • Glucosamine-Chondroitin (GLUCOSAMINE CHONDROITIN COMPLX) 500-250 MG Cap Take 2 Tabs by mouth every day.     • nitroglycerin (NITROSTAT) 0.4 MG SL Tab Place 0.4 mg under tongue every 5 minutes as needed for Chest Pain.       No current  "facility-administered medications for this visit.      He  has a past medical history of Acid reflux; Arrhythmia; CAD (coronary artery disease); Diabetes (CMS-[facility]) ([facility]); Diabetic neuropathy (CMS-[facility]) ([facility]); Hyperlipidemia; Hypertension; Obstructive sleep apnea; Palpitations; Past heart attack; Psychiatric problem; Sleep apnea; and Snoring.    Social History and Family History were reviewed and updated.    ROS   No chest pain, no shortness of breath, no abdominal pain and all other systems were reviewed and are negative.       Objective:     Blood pressure 126/64, pulse (!) 54, temperature 35.8 °C (96.5 °F), height 1.905 m (6' 3\"), weight (!) 133.4 kg (294 lb), SpO2 96 %. Body mass index is 36.75 kg/m².   Physical Exam:  Constitutional: Alert, no distress.  Skin: Warm, dry, good turgor, no rashes in visible areas.  Eye: Equal, round and reactive, conjunctiva clear, lids normal.  ENMT: Lips without lesions, good dentition, oropharynx clear.  Neck: Trachea midline, no masses.   Lymph: No cervical or supraclavicular lymphadenopathy  Respiratory: Unlabored respiratory effort, lungs appear clear, no wheezes.  Cardiovascular: Normal S1, S2, no murmur, no edema.  Psych: Alert and oriented x3, normal affect and mood.        Assessment and Plan:   The following treatment plan was discussed    1. Neuropathy (HCC)  Chronic condition. BMP reviewed. Medication is appropriate for patient. Controlled substance agreement was signed. No prescription provided today. Last prescription was filled on the . He will return in 3 weeks prior to medication  in for refill that we will send to [facility]. He understands and will comply.  - Controlled Substance Treatment Agreement    2. Type 2 diabetes mellitus with diabetic neuropathy, without long-term current use of insulin (HCC)  Chronic condition. Worsening control. Recent A1c is 7.5. Kidney medications as directed. Follow with endocrinology as needed.    3. Colon cancer " screening  Referred to GI for colonoscopy for colorectal cancer screening.  - REFERRAL TO GI FOR COLONOSCOPY    4. Need for 23-polyvalent pneumococcal polysaccharide vaccine  Administered pneumococcal 23 today. No comp medications.  - PneumoVax PPV23 =>3yo    5. Need for shingles vaccine  Provided vaccine prescription. Discussed with medication and its appropriateness. We'll need a second dose. Follow with a pharmacy.  - Zoster Vac Recomb Adjuvanted (SHINGRIX) 50 MCG Recon Susp; 0.5 mL by Intramuscular route Once for 1 dose.  Dispense: 0.5 mL; Refill: 0    Patient was seen for 25 minutes face to face of which > 50% of appointment time was spent on counseling and coordination of care regarding the above.    Followup: Return in about 3 months (around 10/3/2018), or if symptoms worsen or fail to improve.    Please note that this dictation was created using voice recognition software. I have made every reasonable attempt to correct obvious errors, but I expect that there are errors of grammar and possibly content that I did not discover before finalizing the note.

## 2018-07-03 NOTE — ASSESSMENT & PLAN NOTE
This is a 68-year-old male who is here today to discuss tramadol use. Neuropathy of his extremities. He has not signed a controlled substance agreement. On the 21st a prescription was sent to [facility]. He uses the mail order service. He is provided a 90 day supply. Medication controls the pain from his neuropathy.

## 2018-08-13 RX ORDER — GLIPIZIDE 10 MG/1
TABLET, FILM COATED, EXTENDED RELEASE ORAL
Qty: 90 TAB | Refills: 3 | Status: SHIPPED | OUTPATIENT
Start: 2018-08-13 | End: 2018-09-04 | Stop reason: SDUPTHER

## 2018-08-30 DIAGNOSIS — G62.9 NEUROPATHY: ICD-10-CM

## 2018-08-30 RX ORDER — TRAMADOL HYDROCHLORIDE 50 MG/1
TABLET ORAL
Qty: 180 TAB | Refills: 0 | Status: SHIPPED
Start: 2018-09-22 | End: 2018-10-22 | Stop reason: SDUPTHER

## 2018-08-30 RX ORDER — TRAMADOL HYDROCHLORIDE 50 MG/1
TABLET ORAL
Qty: 180 TAB | Refills: 0 | Status: CANCELLED | OUTPATIENT
Start: 2018-08-30 | End: 2018-09-29

## 2018-08-30 RX ORDER — TRAMADOL HYDROCHLORIDE 50 MG/1
TABLET ORAL
Qty: 180 TAB | Refills: 0 | Status: SHIPPED
Start: 2018-08-30 | End: 2018-09-29

## 2018-09-04 RX ORDER — GLIPIZIDE 10 MG/1
10 TABLET, FILM COATED, EXTENDED RELEASE ORAL
Qty: 90 TAB | Refills: 3 | Status: SHIPPED | OUTPATIENT
Start: 2018-09-04 | End: 2019-02-28

## 2018-09-04 RX ORDER — GLIPIZIDE 10 MG/1
10 TABLET, FILM COATED, EXTENDED RELEASE ORAL
Qty: 90 TAB | Refills: 3 | Status: SHIPPED | OUTPATIENT
Start: 2018-09-04 | End: 2018-09-04 | Stop reason: SDUPTHER

## 2018-09-04 NOTE — TELEPHONE ENCOUNTER
Was the patient seen in the last year in this department? Yes    Does patient have an active prescription for medications requested? No     Received Request Via: Patient     Pt sent a [employer] message requesting reill. Original fill was cancelled per Pt when trying a different medication from ENDO.

## 2018-10-08 ENCOUNTER — OFFICE VISIT (OUTPATIENT)
Dept: MEDICAL GROUP | Facility: MEDICAL CENTER | Age: 68
End: 2018-10-08
Payer: MEDICARE

## 2018-10-08 ENCOUNTER — HOSPITAL ENCOUNTER (OUTPATIENT)
Dept: LAB | Facility: MEDICAL CENTER | Age: 68
End: 2018-10-08
Attending: PHYSICIAN ASSISTANT
Payer: MEDICARE

## 2018-10-08 VITALS
OXYGEN SATURATION: 93 % | BODY MASS INDEX: 36.33 KG/M2 | DIASTOLIC BLOOD PRESSURE: 60 MMHG | WEIGHT: 292.2 LBS | TEMPERATURE: 97.6 F | HEART RATE: 50 BPM | SYSTOLIC BLOOD PRESSURE: 117 MMHG | HEIGHT: 75 IN

## 2018-10-08 DIAGNOSIS — E11.40 TYPE 2 DIABETES MELLITUS WITH DIABETIC NEUROPATHY, WITHOUT LONG-TERM CURRENT USE OF INSULIN (HCC): ICD-10-CM

## 2018-10-08 DIAGNOSIS — Z23 NEEDS FLU SHOT: ICD-10-CM

## 2018-10-08 DIAGNOSIS — E66.9 OBESITY (BMI 35.0-39.9 WITHOUT COMORBIDITY): ICD-10-CM

## 2018-10-08 LAB
ALBUMIN SERPL BCP-MCNC: 4.3 G/DL (ref 3.2–4.9)
ALBUMIN/GLOB SERPL: 1.5 G/DL
ALP SERPL-CCNC: 42 U/L (ref 30–99)
ALT SERPL-CCNC: 24 U/L (ref 2–50)
ANION GAP SERPL CALC-SCNC: 7 MMOL/L (ref 0–11.9)
AST SERPL-CCNC: 18 U/L (ref 12–45)
BILIRUB SERPL-MCNC: 0.6 MG/DL (ref 0.1–1.5)
BUN SERPL-MCNC: 13 MG/DL (ref 8–22)
CALCIUM SERPL-MCNC: 9.6 MG/DL (ref 8.5–10.5)
CHLORIDE SERPL-SCNC: 104 MMOL/L (ref 96–112)
CHOLEST SERPL-MCNC: 142 MG/DL (ref 100–199)
CO2 SERPL-SCNC: 26 MMOL/L (ref 20–33)
CREAT SERPL-MCNC: 0.96 MG/DL (ref 0.5–1.4)
FASTING STATUS PATIENT QL REPORTED: NORMAL
GLOBULIN SER CALC-MCNC: 2.8 G/DL (ref 1.9–3.5)
GLUCOSE SERPL-MCNC: 143 MG/DL (ref 65–99)
HDLC SERPL-MCNC: 45 MG/DL
LDLC SERPL CALC-MCNC: 67 MG/DL
POTASSIUM SERPL-SCNC: 4.6 MMOL/L (ref 3.6–5.5)
PROT SERPL-MCNC: 7.1 G/DL (ref 6–8.2)
SODIUM SERPL-SCNC: 137 MMOL/L (ref 135–145)
TRIGL SERPL-MCNC: 149 MG/DL (ref 0–149)

## 2018-10-08 PROCEDURE — 80053 COMPREHEN METABOLIC PANEL: CPT

## 2018-10-08 PROCEDURE — 36415 COLL VENOUS BLD VENIPUNCTURE: CPT

## 2018-10-08 PROCEDURE — 99214 OFFICE O/P EST MOD 30 MIN: CPT | Mod: 25 | Performed by: PHYSICIAN ASSISTANT

## 2018-10-08 PROCEDURE — 80061 LIPID PANEL: CPT

## 2018-10-08 PROCEDURE — G0008 ADMIN INFLUENZA VIRUS VAC: HCPCS | Performed by: PHYSICIAN ASSISTANT

## 2018-10-08 PROCEDURE — 90662 IIV NO PRSV INCREASED AG IM: CPT | Performed by: PHYSICIAN ASSISTANT

## 2018-10-08 NOTE — PROGRESS NOTES
Subjective:   Kun Lisa is a 68 y.o. male here today for type 2 diabetes and influenza vaccination.    Type 2 diabetes mellitus with neurologic complication (CMS-HCC)  This is a 68-year-old male who is here today to discuss getting a monofilament testing. Does have a history of type 2 diabetes. Last A1c was 7.5. Does have chronic neuropathy and will use tramadol for the pain.       Current medicines (including changes today)  Current Outpatient Prescriptions   Medication Sig Dispense Refill   • glipiZIDE SR (GLUCOTROL) 10 MG TABLET SR 24 HR Take 1 Tab by mouth every day. 90 Tab 3   • tramadol (ULTRAM) 50 MG Tab TAKE 1 TABLET BY MOUTH EVERY 4 HOURS AS NEEDED. FOR A 90 DAY SUPPLY 180 Tab 0   • glucose blood (CHARITY CONTOUR NEXT TEST) strip 1 Strip by Other route 2 times a day, with meals. 50 Strip 11   • ramipril (ALTACE) 10 MG capsule TAKE 1 CAPSULE DAILY 90 Cap 1   • rosuvastatin (CRESTOR) 20 MG Tab TAKE 1 TABLET EVERY EVENING 90 Tab 1   • Omega-3 Fatty Acids (FISH OIL) 1000 MG Cap capsule Take 1,000 mg by mouth 3 times a day, with meals.     • metoprolol SR (TOPROL XL) 25 MG TABLET SR 24 HR Take 1 Tab by mouth every day. 90 Tab 3   • Ibuprofen-Diphenhydramine Cit (ADVIL PM PO) Take  by mouth.     • vitamin D (CHOLECALCIFEROL) 1000 UNIT Tab Take 1,000 Units by mouth every day.     • Multiple Vitamins-Minerals (DAILY MULTIVITAMIN PO) Take 1 Tab by mouth every day.     • baclofen (LIORESAL) 20 MG tablet Take 20 mg by mouth as needed. Indications: Muscle Spasticity     • Glucosamine-Chondroitin (GLUCOSAMINE CHONDROITIN COMPLX) 500-250 MG Cap Take 2 Tabs by mouth every day.     • nitroglycerin (NITROSTAT) 0.4 MG SL Tab Place 0.4 mg under tongue every 5 minutes as needed for Chest Pain.       No current facility-administered medications for this visit.      He  has a past medical history of Acid reflux; Arrhythmia; CAD (coronary artery disease); Diabetes ([facility]); Diabetic neuropathy ([facility]); Hyperlipidemia;  "Hypertension; Obstructive sleep apnea; Palpitations; Past heart attack; Psychiatric problem; Sleep apnea; and Snoring.    Social History and Family History were reviewed and updated.    ROS   No chest pain, no shortness of breath, no abdominal pain and all other systems were reviewed and are negative.       Objective:     Blood pressure 117/60, pulse (!) 50, temperature 36.4 °C (97.6 °F), height 1.905 m (6' 3\"), weight (!) 132.5 kg (292 lb 3.2 oz), SpO2 93 %. Body mass index is 36.52 kg/m².   Physical Exam:  Constitutional: Alert, no distress.  Skin: Warm, dry, good turgor, no rashes in visible areas.  Eye: Equal, round and reactive, conjunctiva clear, lids normal.  ENMT: Lips without lesions, good dentition, oropharynx clear.  Neck: Trachea midline, no masses.   Lymph: No cervical or supraclavicular lymphadenopathy  Respiratory: Unlabored respiratory effort, lungs appear clear, no wheezes.  Cardiovascular: Regular rate and rhythm, no edema.  Psych: Alert and oriented x3, normal affect and mood.    Monofilament testing with a 10 gram force: sensation intact: intact bilaterally  Visual Inspection: Feet without maceration, ulcers, fissures.  Pedal pulses: intact bilaterally    Assessment and Plan:   The following treatment plan was discussed    1. Type 2 diabetes mellitus with diabetic neuropathy, without long-term current use of insulin (HCC)  Chronic condition. Stable. Follow with endocrinology. Continue medications as directed. Ordered CMP and lipid panel. Monofilament testing was unremarkable.  - COMP METABOLIC PANEL; Future  - LIPID PROFILE; Future    2. Needs flu shot  Administered without complaints today. Discuss hepatitis B vaccine but he declined. He has been vaccinated in the past. Discussed new shingles vaccine which she will order from the pharmacy.  - INFLUENZA VACCINE, HIGH DOSE (65+ ONLY)    3. Obesity (BMI 35.0-39.9 without comorbidity) (HCC)  Chronic condition. Continue to monitor.  - Patient " identified as having weight management issue.  Appropriate orders and counseling given.    Patient was seen for 25 minutes face to face of which > 50% of appointment time was spent on counseling and coordination of care regarding the above.      Followup: Return in about 3 months (around 1/8/2019), or if symptoms worsen or fail to improve.    Please note that this dictation was created using voice recognition software. I have made every reasonable attempt to correct obvious errors, but I expect that there are errors of grammar and possibly content that I did not discover before finalizing the note.

## 2018-10-08 NOTE — ASSESSMENT & PLAN NOTE
This is a 68-year-old male who is here today to discuss getting a monofilament testing. Does have a history of type 2 diabetes. Last A1c was 7.5. Does have chronic neuropathy and will use tramadol for the pain.

## 2018-10-08 NOTE — LETTER
[facility]  Kit Torres P.A.-C.  [address]  [place] [address]  Fax: [phone]   Authorization for Release/Disclosure of   Protected Health Information   Name: JENNI LISA : 1950 SSN: [id number]   Address: [address] [place] [id number] Phone:    [phone] (home)    I authorize the entity listed below to release/disclose the PHI below to:   Critical access hospital/Kit Torres P.A.-C. and Kit Torres P.A.-C.   Provider or Entity Name:  Dr. Carter, [facility], [facility], [facility]   Phone:      Fax:     Reason for request: continuity of care   Information to be released:    [X  ] LAST COLONOSCOPY,  including any PATH REPORT and follow-up  [  ] LAST FIT/COLOGUARD RESULT [  ] LAST DEXA  [  ] LAST MAMMOGRAM  [  ] LAST PAP  [  ] LAST LABS [  ] RETINA EXAM REPORT  [  ] IMMUNIZATION RECORDS  [  ] Release all info      [  ] Check here and initial the line next to each item to release ALL health information INCLUDING  _____ Care and treatment for drug and / or alcohol abuse  _____ HIV testing, infection status, or AIDS  _____ Genetic Testing    DATES OF SERVICE OR TIME PERIOD TO BE DISCLOSED: _____________  I understand and acknowledge that:  * This Authorization may be revoked at any time by you in writing, except if your health information has already been used or disclosed.  * Your health information that will be used or disclosed as a result of you signing this authorization could be re-disclosed by the recipient. If this occurs, your re-disclosed health information may no longer be protected by State or Federal laws.  * You may refuse to sign this Authorization. Your refusal will not affect your ability to obtain treatment.  * This Authorization becomes effective upon signing and will  on (date) __________.      If no date is indicated, this Authorization will  one (1) year from the signature date.    Name: Jenni Lisa    Signature:   Date:     10/8/2018       PLEASE FAX REQUESTED RECORDS BACK TO: [phone]

## 2018-10-22 ENCOUNTER — TELEPHONE (OUTPATIENT)
Dept: MEDICAL GROUP | Facility: MEDICAL CENTER | Age: 68
End: 2018-10-22

## 2018-10-22 NOTE — TELEPHONE ENCOUNTER
[facility] evon is contacting us because the patient is requesting a medication refill on Januvia Tab 100 MG, I was not able to find it in their chart.

## 2018-10-31 DIAGNOSIS — E11.40 TYPE 2 DIABETES MELLITUS WITH DIABETIC NEUROPATHY, WITHOUT LONG-TERM CURRENT USE OF INSULIN (HCC): ICD-10-CM

## 2018-10-31 RX ORDER — GLIPIZIDE 5 MG/1
5 TABLET, FILM COATED, EXTENDED RELEASE ORAL DAILY
Qty: 90 TAB | Refills: 3 | Status: SHIPPED | OUTPATIENT
Start: 2018-10-31 | End: 2019-04-12

## 2018-11-01 DIAGNOSIS — G47.33 OBSTRUCTIVE SLEEP APNEA SYNDROME: ICD-10-CM

## 2018-11-02 DIAGNOSIS — E11.9 TYPE 2 DIABETES MELLITUS WITHOUT COMPLICATION, WITHOUT LONG-TERM CURRENT USE OF INSULIN (HCC): ICD-10-CM

## 2018-11-21 ENCOUNTER — TELEPHONE (OUTPATIENT)
Dept: HEALTH INFORMATION MANAGEMENT | Facility: OTHER | Age: 68
End: 2018-11-21

## 2018-11-21 DIAGNOSIS — Z13.5 SCREENING FOR DIABETIC RETINOPATHY: ICD-10-CM

## 2018-11-21 DIAGNOSIS — E11.40 TYPE 2 DIABETES MELLITUS WITH DIABETIC NEUROPATHY, WITHOUT LONG-TERM CURRENT USE OF INSULIN (HCC): ICD-10-CM

## 2018-11-21 NOTE — TELEPHONE ENCOUNTER
Good morning Kit,    This patient is requesting a referral for a retinal screening so that she may see an ophthalmologist.     This patient is overdue for health maintenance topics that need orders so he can complete them.     Please review the pended orders in  to sign or make adjustments as appropriate.    Close the encounter when complete.  If declining these orders, please document a reason and route to the Outreach MA pool (p [facility]).  I will call the patient to cancel the appointment.    Thank you.

## 2018-11-28 DIAGNOSIS — E78.5 DYSLIPIDEMIA: ICD-10-CM

## 2018-11-28 DIAGNOSIS — I25.84 CORONARY ARTERY DISEASE DUE TO CALCIFIED CORONARY LESION: ICD-10-CM

## 2018-11-28 DIAGNOSIS — I25.10 CORONARY ARTERY DISEASE DUE TO CALCIFIED CORONARY LESION: ICD-10-CM

## 2018-11-28 DIAGNOSIS — I10 ESSENTIAL HYPERTENSION: ICD-10-CM

## 2018-11-30 RX ORDER — METOPROLOL SUCCINATE 50 MG/1
50 TABLET, EXTENDED RELEASE ORAL DAILY
Qty: 90 TAB | Refills: 0 | Status: SHIPPED | OUTPATIENT
Start: 2018-11-30 | End: 2019-02-19 | Stop reason: SDUPTHER

## 2018-12-27 ENCOUNTER — HOSPITAL ENCOUNTER (OUTPATIENT)
Dept: LAB | Facility: MEDICAL CENTER | Age: 68
End: 2018-12-27
Attending: EMERGENCY MEDICINE
Payer: MEDICARE

## 2018-12-27 ENCOUNTER — TELEPHONE (OUTPATIENT)
Dept: URGENT CARE | Facility: MEDICAL CENTER | Age: 68
End: 2018-12-27

## 2018-12-27 ENCOUNTER — OFFICE VISIT (OUTPATIENT)
Dept: URGENT CARE | Facility: MEDICAL CENTER | Age: 68
End: 2018-12-27
Payer: MEDICARE

## 2018-12-27 VITALS
BODY MASS INDEX: 36.88 KG/M2 | OXYGEN SATURATION: 97 % | HEIGHT: 75 IN | SYSTOLIC BLOOD PRESSURE: 138 MMHG | TEMPERATURE: 98.2 F | HEART RATE: 60 BPM | DIASTOLIC BLOOD PRESSURE: 74 MMHG | WEIGHT: 296.6 LBS

## 2018-12-27 DIAGNOSIS — R10.84 GENERALIZED ABDOMINAL PAIN: ICD-10-CM

## 2018-12-27 LAB
ALBUMIN SERPL BCP-MCNC: 4.1 G/DL (ref 3.2–4.9)
ALBUMIN/GLOB SERPL: 1.3 G/DL
ALP SERPL-CCNC: 52 U/L (ref 30–99)
ALT SERPL-CCNC: 23 U/L (ref 2–50)
ANION GAP SERPL CALC-SCNC: 10 MMOL/L (ref 0–11.9)
APPEARANCE UR: CLEAR
AST SERPL-CCNC: 18 U/L (ref 12–45)
BASOPHILS # BLD AUTO: 0.5 % (ref 0–1.8)
BASOPHILS # BLD: 0.06 K/UL (ref 0–0.12)
BILIRUB SERPL-MCNC: 0.8 MG/DL (ref 0.1–1.5)
BILIRUB UR STRIP-MCNC: NEGATIVE MG/DL
BUN SERPL-MCNC: 15 MG/DL (ref 8–22)
CALCIUM SERPL-MCNC: 9 MG/DL (ref 8.4–10.2)
CHLORIDE SERPL-SCNC: 99 MMOL/L (ref 96–112)
CO2 SERPL-SCNC: 23 MMOL/L (ref 20–33)
COLOR UR AUTO: YELLOW
CREAT SERPL-MCNC: 0.96 MG/DL (ref 0.5–1.4)
EOSINOPHIL # BLD AUTO: 0.01 K/UL (ref 0–0.51)
EOSINOPHIL NFR BLD: 0.1 % (ref 0–6.9)
ERYTHROCYTE [DISTWIDTH] IN BLOOD BY AUTOMATED COUNT: 45.7 FL (ref 35.9–50)
GLOBULIN SER CALC-MCNC: 3.1 G/DL (ref 1.9–3.5)
GLUCOSE SERPL-MCNC: 208 MG/DL (ref 65–99)
GLUCOSE UR STRIP.AUTO-MCNC: 250 MG/DL
HCT VFR BLD AUTO: 44.2 % (ref 42–52)
HGB BLD-MCNC: 15.7 G/DL (ref 14–18)
IMM GRANULOCYTES # BLD AUTO: 0.05 K/UL (ref 0–0.11)
IMM GRANULOCYTES NFR BLD AUTO: 0.4 % (ref 0–0.9)
KETONES UR STRIP.AUTO-MCNC: NEGATIVE MG/DL
LEUKOCYTE ESTERASE UR QL STRIP.AUTO: NEGATIVE
LIPASE SERPL-CCNC: >400 U/L (ref 7–58)
LYMPHOCYTES # BLD AUTO: 3 K/UL (ref 1–4.8)
LYMPHOCYTES NFR BLD: 26.7 % (ref 22–41)
MCH RBC QN AUTO: 32.7 PG (ref 27–33)
MCHC RBC AUTO-ENTMCNC: 35.5 G/DL (ref 33.7–35.3)
MCV RBC AUTO: 92.1 FL (ref 81.4–97.8)
MONOCYTES # BLD AUTO: 0.87 K/UL (ref 0–0.85)
MONOCYTES NFR BLD AUTO: 7.7 % (ref 0–13.4)
NEUTROPHILS # BLD AUTO: 7.25 K/UL (ref 1.82–7.42)
NEUTROPHILS NFR BLD: 64.6 % (ref 44–72)
NITRITE UR QL STRIP.AUTO: NEGATIVE
NRBC # BLD AUTO: 0 K/UL
NRBC BLD-RTO: 0 /100 WBC
PH UR STRIP.AUTO: 5.5 [PH] (ref 5–8)
PLATELET # BLD AUTO: 182 K/UL (ref 164–446)
PMV BLD AUTO: 9.9 FL (ref 9–12.9)
POTASSIUM SERPL-SCNC: 4.1 MMOL/L (ref 3.6–5.5)
PROT SERPL-MCNC: 7.2 G/DL (ref 6–8.2)
PROT UR QL STRIP: NEGATIVE MG/DL
RBC # BLD AUTO: 4.8 M/UL (ref 4.7–6.1)
RBC UR QL AUTO: NEGATIVE
SODIUM SERPL-SCNC: 132 MMOL/L (ref 135–145)
SP GR UR STRIP.AUTO: >=1.03
UROBILINOGEN UR STRIP-MCNC: 0.2 MG/DL
WBC # BLD AUTO: 11.2 K/UL (ref 4.8–10.8)

## 2018-12-27 PROCEDURE — 36415 COLL VENOUS BLD VENIPUNCTURE: CPT

## 2018-12-27 PROCEDURE — 81002 URINALYSIS NONAUTO W/O SCOPE: CPT | Performed by: EMERGENCY MEDICINE

## 2018-12-27 PROCEDURE — 85025 COMPLETE CBC W/AUTO DIFF WBC: CPT

## 2018-12-27 PROCEDURE — 83690 ASSAY OF LIPASE: CPT

## 2018-12-27 PROCEDURE — 80053 COMPREHEN METABOLIC PANEL: CPT

## 2018-12-27 PROCEDURE — 99214 OFFICE O/P EST MOD 30 MIN: CPT | Performed by: EMERGENCY MEDICINE

## 2018-12-27 PROCEDURE — 83013 H PYLORI (C-13) BREATH: CPT

## 2018-12-28 ENCOUNTER — HOSPITAL ENCOUNTER (EMERGENCY)
Facility: MEDICAL CENTER | Age: 68
End: 2018-12-28
Attending: EMERGENCY MEDICINE
Payer: MEDICARE

## 2018-12-28 ENCOUNTER — APPOINTMENT (OUTPATIENT)
Dept: RADIOLOGY | Facility: MEDICAL CENTER | Age: 68
End: 2018-12-28
Attending: EMERGENCY MEDICINE
Payer: MEDICARE

## 2018-12-28 VITALS
WEIGHT: 292.33 LBS | RESPIRATION RATE: 16 BRPM | HEIGHT: 75 IN | OXYGEN SATURATION: 93 % | BODY MASS INDEX: 36.35 KG/M2 | TEMPERATURE: 97.7 F | DIASTOLIC BLOOD PRESSURE: 73 MMHG | HEART RATE: 61 BPM | SYSTOLIC BLOOD PRESSURE: 142 MMHG

## 2018-12-28 DIAGNOSIS — R10.13 EPIGASTRIC ABDOMINAL PAIN: ICD-10-CM

## 2018-12-28 DIAGNOSIS — I71.40 ABDOMINAL AORTIC ANEURYSM (AAA) WITHOUT RUPTURE (HCC): ICD-10-CM

## 2018-12-28 DIAGNOSIS — K85.90 ACUTE PANCREATITIS, UNSPECIFIED COMPLICATION STATUS, UNSPECIFIED PANCREATITIS TYPE: Primary | ICD-10-CM

## 2018-12-28 LAB
ALBUMIN SERPL BCP-MCNC: 3.9 G/DL (ref 3.2–4.9)
ALBUMIN/GLOB SERPL: 1.3 G/DL
ALP SERPL-CCNC: 50 U/L (ref 30–99)
ALT SERPL-CCNC: 25 U/L (ref 2–50)
ANION GAP SERPL CALC-SCNC: 7 MMOL/L (ref 0–11.9)
AST SERPL-CCNC: 18 U/L (ref 12–45)
BASOPHILS # BLD AUTO: 0.4 % (ref 0–1.8)
BASOPHILS # BLD: 0.04 K/UL (ref 0–0.12)
BILIRUB SERPL-MCNC: 1 MG/DL (ref 0.1–1.5)
BUN SERPL-MCNC: 11 MG/DL (ref 8–22)
CALCIUM SERPL-MCNC: 9.1 MG/DL (ref 8.4–10.2)
CHLORIDE SERPL-SCNC: 99 MMOL/L (ref 96–112)
CO2 SERPL-SCNC: 25 MMOL/L (ref 20–33)
CREAT SERPL-MCNC: 0.92 MG/DL (ref 0.5–1.4)
EOSINOPHIL # BLD AUTO: 0.01 K/UL (ref 0–0.51)
EOSINOPHIL NFR BLD: 0.1 % (ref 0–6.9)
ERYTHROCYTE [DISTWIDTH] IN BLOOD BY AUTOMATED COUNT: 44.6 FL (ref 35.9–50)
GLOBULIN SER CALC-MCNC: 2.9 G/DL (ref 1.9–3.5)
GLUCOSE SERPL-MCNC: 213 MG/DL (ref 65–99)
HCT VFR BLD AUTO: 44 % (ref 42–52)
HGB BLD-MCNC: 16 G/DL (ref 14–18)
IMM GRANULOCYTES # BLD AUTO: 0.02 K/UL (ref 0–0.11)
IMM GRANULOCYTES NFR BLD AUTO: 0.2 % (ref 0–0.9)
LIPASE SERPL-CCNC: >400 U/L (ref 7–58)
LYMPHOCYTES # BLD AUTO: 2.51 K/UL (ref 1–4.8)
LYMPHOCYTES NFR BLD: 25 % (ref 22–41)
MCH RBC QN AUTO: 33 PG (ref 27–33)
MCHC RBC AUTO-ENTMCNC: 36.4 G/DL (ref 33.7–35.3)
MCV RBC AUTO: 90.7 FL (ref 81.4–97.8)
MONOCYTES # BLD AUTO: 0.82 K/UL (ref 0–0.85)
MONOCYTES NFR BLD AUTO: 8.2 % (ref 0–13.4)
NEUTROPHILS # BLD AUTO: 6.63 K/UL (ref 1.82–7.42)
NEUTROPHILS NFR BLD: 66.1 % (ref 44–72)
NRBC # BLD AUTO: 0 K/UL
NRBC BLD-RTO: 0 /100 WBC
PLATELET # BLD AUTO: 173 K/UL (ref 164–446)
PMV BLD AUTO: 10.2 FL (ref 9–12.9)
POTASSIUM SERPL-SCNC: 4.4 MMOL/L (ref 3.6–5.5)
PROT SERPL-MCNC: 6.8 G/DL (ref 6–8.2)
RBC # BLD AUTO: 4.85 M/UL (ref 4.7–6.1)
SODIUM SERPL-SCNC: 131 MMOL/L (ref 135–145)
UREA BREATH TEST QL: NEGATIVE
WBC # BLD AUTO: 10 K/UL (ref 4.8–10.8)

## 2018-12-28 PROCEDURE — 700117 HCHG RX CONTRAST REV CODE 255: Performed by: EMERGENCY MEDICINE

## 2018-12-28 PROCEDURE — 85025 COMPLETE CBC W/AUTO DIFF WBC: CPT

## 2018-12-28 PROCEDURE — 83690 ASSAY OF LIPASE: CPT

## 2018-12-28 PROCEDURE — 74174 CTA ABD&PLVS W/CONTRAST: CPT

## 2018-12-28 PROCEDURE — 36415 COLL VENOUS BLD VENIPUNCTURE: CPT

## 2018-12-28 PROCEDURE — 80053 COMPREHEN METABOLIC PANEL: CPT

## 2018-12-28 PROCEDURE — 99285 EMERGENCY DEPT VISIT HI MDM: CPT

## 2018-12-28 PROCEDURE — 76705 ECHO EXAM OF ABDOMEN: CPT

## 2018-12-28 RX ADMIN — IOHEXOL 100 ML: 350 INJECTION, SOLUTION INTRAVENOUS at 13:30

## 2018-12-28 ASSESSMENT — ENCOUNTER SYMPTOMS
PALPITATIONS: 0
NERVOUS/ANXIOUS: 1
COUGH: 0
SENSORY CHANGE: 0
FEVER: 0
CHILLS: 0
EYE DISCHARGE: 0
SPEECH CHANGE: 0
NAUSEA: 1
ABDOMINAL PAIN: 1

## 2018-12-28 ASSESSMENT — PAIN DESCRIPTION - DESCRIPTORS: DESCRIPTORS: ACHING;TENDER

## 2018-12-28 NOTE — ED NOTES
Med Rec completed per patient  Allergies reviewed  No ORAL antibiotics in last 30 days    Patient takes both Glipizide strengths

## 2018-12-28 NOTE — PROGRESS NOTES
Subjective:      Kun Lisa is a 68 y.o. male who presents with Dysmenorrhea (stomach craps/ gas/ always feel full X2-3 days)            HPI  Pt with sever pain with eating for the past 2-3 days currently comfortable, Not a drinker, or abuser of tobacco, NSAIDs or caffeine., has used THC daily for years. Pt c/o sever pain during postprandial attacks  PMH:  has a past medical history of Acid reflux; Arrhythmia; CAD (coronary artery disease); Diabetes (HCC); Diabetic neuropathy (HCC); Hyperlipidemia; Hypertension; Obstructive sleep apnea; Palpitations; Past heart attack; Psychiatric problem; Sleep apnea; and Snoring.  MEDS:   Current Outpatient Prescriptions:   •  metoprolol SR (TOPROL XL) 50 MG TABLET SR 24 HR, Take 1 Tab by mouth every day. Needs to be seen for further refills. Thank you, Disp: 90 Tab, Rfl: 0  •  SITagliptin (JANUVIA) 100 MG Tab, Take 1 Tab by mouth every day., Disp: 90 Tab, Rfl: 3  •  glipiZIDE SR (GLUCOTROL) 5 MG TABLET SR 24 HR, Take 1 Tab by mouth every day. Take with 10 mg ER Glipizide dose., Disp: 90 Tab, Rfl: 3  •  tramadol (ULTRAM) 50 MG Tab, TAKE 1 TABLET BY MOUTH EVERY 4 HOURS AS NEEDED. FOR A 90 DAY SUPPLY, Disp: 180 Tab, Rfl: 1  •  glipiZIDE SR (GLUCOTROL) 10 MG TABLET SR 24 HR, Take 1 Tab by mouth every day., Disp: 90 Tab, Rfl: 3  •  glucose blood (CHARITY CONTOUR NEXT TEST) strip, 1 Strip by Other route 2 times a day, with meals., Disp: 50 Strip, Rfl: 11  •  ramipril (ALTACE) 10 MG capsule, TAKE 1 CAPSULE DAILY, Disp: 90 Cap, Rfl: 1  •  rosuvastatin (CRESTOR) 20 MG Tab, TAKE 1 TABLET EVERY EVENING, Disp: 90 Tab, Rfl: 1  •  Omega-3 Fatty Acids (FISH OIL) 1000 MG Cap capsule, Take 1,000 mg by mouth 3 times a day, with meals., Disp: , Rfl:   •  Ibuprofen-Diphenhydramine Cit (ADVIL PM PO), Take  by mouth., Disp: , Rfl:   •  vitamin D (CHOLECALCIFEROL) 1000 UNIT Tab, Take 1,000 Units by mouth every day., Disp: , Rfl:   •  Multiple Vitamins-Minerals (DAILY MULTIVITAMIN PO), Take 1 Tab  "by mouth every day., Disp: , Rfl:   •  baclofen (LIORESAL) 20 MG tablet, Take 20 mg by mouth as needed. Indications: Muscle Spasticity, Disp: , Rfl:   •  Glucosamine-Chondroitin (GLUCOSAMINE CHONDROITIN COMPLX) 500-250 MG Cap, Take 2 Tabs by mouth every day., Disp: , Rfl:   •  nitroglycerin (NITROSTAT) 0.4 MG SL Tab, Place 0.4 mg under tongue every 5 minutes as needed for Chest Pain., Disp: , Rfl:   ALLERGIES:   Allergies   Allergen Reactions   • Metformin Unspecified     Stomach upset and acid reflux     SURGHX:   Past Surgical History:   Procedure Laterality Date   • CARDIAC CATH  4/27/17    Synergy stent to Circ   • STENT PLACEMENT  1997    LAD   • CARDIAC CATH  1997    LAD stent   • ANGIOPLASTY       SOCHX:  reports that he quit smoking about 15 years ago. His smoking use included Cigarettes. He has a 32.00 pack-year smoking history. He has never used smokeless tobacco. He reports that he uses drugs, including Marijuana, about 7 times per week. He reports that he does not drink alcohol.  FH: Reviewed with patient, not pertinent to this visit.   Review of Systems   Constitutional: Negative for chills and fever.   HENT: Negative for congestion.    Eyes: Negative for discharge.   Respiratory: Negative for cough.    Cardiovascular: Negative for chest pain and palpitations.   Gastrointestinal: Positive for abdominal pain and nausea.   Skin: Negative for rash.   Neurological: Negative for sensory change and speech change.   Psychiatric/Behavioral: The patient is nervous/anxious.           Objective:     /74   Pulse 60   Temp 36.8 °C (98.2 °F) (Temporal)   Ht 1.905 m (6' 3\")   Wt (!) 134.5 kg (296 lb 9.6 oz)   SpO2 97%   BMI 37.07 kg/m²      Physical Exam   Constitutional: He appears well-developed and well-nourished. No distress.   HENT:   Head: Normocephalic and atraumatic.   Right Ear: External ear normal.   Left Ear: External ear normal.   Eyes: Conjunctivae are normal. No scleral icterus. "   Cardiovascular: Normal rate and regular rhythm.    Pulmonary/Chest: Effort normal. No respiratory distress. He has no wheezes.   Abdominal: He exhibits no distension. There is no tenderness. There is no guarding.   Neurological: He is alert. Coordination normal.   Skin: Skin is warm and dry. He is not diaphoretic.   Psychiatric: He has a normal mood and affect. His behavior is normal.   Nursing note and vitals reviewed.           Lipase 400+    Glucose 208  Assessment/Plan:     1. Generalized abdominal pain    Pt will get w.u for Gall stone and PUD  Will return for US tomorrow.    Addendum  Labs show elevated Lipase consistent with pancreatitis which likely points to Gall stones ( Pt non drinker) I called pt he is going to go to the ED,  I called and spoke to the EDMD at [facility].  - US-RUQ; Future  - CBC WITH DIFFERENTIAL; Future  - COMP METABOLIC PANEL; Future  - H. PYLORI, UREA BREATH TEST, ADULT; Future  - POCT Urinalysis    2. Pancreatitis

## 2018-12-28 NOTE — ED TRIAGE NOTES
Chief Complaint   Patient presents with   • Abdominal Pain     seen in ED for same 12/27. Pt reports dx of pancreatitis

## 2018-12-28 NOTE — DISCHARGE INSTRUCTIONS
Follow-up with primary care on Monday for reevaluation, repeat lab testing, close blood pressure monitoring and assistance with referral to vascular surgery.  Follow-up with vascular surgery for reevaluation and treatment of abdominal aortic aneurysm.     Continue home medications as previously indicated, including tramadol as needed for breakthrough pain.    Clear liquid diet for 3-4 days, then advance to bland, low salt, as tolerated.    Return to the emergency department for persistent or worsening abdominal pain, vomiting, fever, syncope or other new concerns.

## 2018-12-28 NOTE — ED NOTES
MD re-evaluated pt  Cleared for d/c  dischg instructions given to pt  Verbally understands instructions  D/c'ed to home in NAD  To return if worsening symptoms

## 2018-12-28 NOTE — ED PROVIDER NOTES
ED Provider Note    CHIEF COMPLAINT  Chief Complaint   Patient presents with   • Abdominal Pain     seen in ED for same 12/27. Pt reports dx of pancreatitis        HPI  Kun Lisa is a 68 y.o. male who presents to the emergency department for epigastric abdominal pain.  Patient was referred by urgent care after labs from yesterday resulted with elevated lipase.  Patient describes epigastric abdominal pain, mostly postprandial, for 2-3 days.  Patient describes more mild symptoms for the last 1-2 months, but much worse the last 2-3 days.  Nausea without vomiting.  No diarrhea or constipation.  No fever or chills.      Patient denies any pain at this time.    REVIEW OF SYSTEMS  See HPI for further details. All other systems are negative.     PAST MEDICAL HISTORY   has a past medical history of Acid reflux; Arrhythmia; CAD (coronary artery disease); Diabetes (HCC); Diabetic neuropathy (HCC); Hyperlipidemia; Hypertension; Obstructive sleep apnea; Palpitations; Past heart attack; Psychiatric problem; Sleep apnea; and Snoring.    SOCIAL HISTORY  Social History     Social History Main Topics   • Smoking status: Former Smoker     Packs/day: 1.00     Years: 32.00     Types: Cigarettes     Quit date: 6/1/2003   • Smokeless tobacco: Never Used   • Alcohol use No      Comment: rarely   • Drug use: Yes     Frequency: 7.0 times per week     Types: Marijuana      Comment: medical marijuana   • Sexual activity: No       SURGICAL HISTORY   has a past surgical history that includes stent placement (1997); angioplasty; cardiac cath (4/27/17); and cardiac cath (1997).    CURRENT MEDICATIONS  Home Medications     Reviewed by Radha Flores (Pharmacy Tech) on 12/28/18 at 1043  Med List Status: Complete   Medication Last Dose Status   baclofen (LIORESAL) 20 MG tablet 2 DAYS Active   glipiZIDE SR (GLUCOTROL) 10 MG TABLET SR 24 HR 12/27/2018 Active   glipiZIDE SR (GLUCOTROL) 5 MG TABLET SR 24 HR 12/27/2018 Active  "  Glucosamine-Chondroitin (GLUCOSAMINE CHONDROITIN COMPLX) 500-250 MG Cap 12/27/2018 Active   metoprolol SR (TOPROL XL) 50 MG TABLET SR 24 HR 12/27/2018 Active   Multiple Vitamins-Minerals (DAILY MULTIVITAMIN PO) 12/27/2018 Active   Omega-3 Fatty Acids (FISH OIL) 1000 MG Cap capsule 12/27/2018 Active   ramipril (ALTACE) 10 MG capsule 12/28/2018 Active   rosuvastatin (CRESTOR) 20 MG Tab 12/27/2018 Active   SITagliptin (JANUVIA) 100 MG Tab 12/27/2018 Active   tramadol (ULTRAM) 50 MG Tab 12/28/2018 Active                ALLERGIES  Allergies   Allergen Reactions   • Metformin Unspecified     Stomach upset and acid reflux       PHYSICAL EXAM  VITAL SIGNS: /73   Pulse 61   Temp 36.1 °C (96.9 °F) (Temporal)   Resp 16   Ht 1.905 m (6' 3\")   Wt (!) 132.6 kg (292 lb 5.3 oz)   SpO2 93%   BMI 36.54 kg/m²   Pulse ox interpretation: I interpret this pulse ox as normal.  Constitutional: Alert in no apparent distress.  HENT: Normocephalic, atraumatic. Bilateral external ears normal, Nose normal. Moist mucous membranes.    Eyes: Pupils are equal and reactive, Conjunctiva normal.   Neck: Normal range of motion, Supple  Lymphatic: No lymphadenopathy noted.   Cardiovascular: Regular rate and rhythm, no murmurs. Distal pulses intact.    Thorax & Lungs: Normal breath sounds.  No wheezing/rales/ronchi. No increased work of breathing  Abdomen: Obese, soft, nondistended.  Minimal tenderness to palpation epigastric region without rebound, guarding or peritonitis.  No Mitchell tenderness.  No palpable or pulsatile mass.  No CVA tenderness to percussion.  Skin: Warm, Dry.   Musculoskeletal: Good range of motion in all major joints.  Neurologic: Alert and oriented x4.  Ambulates independently.  Psychiatric: Affect normal, Judgment normal, Mood normal.       DIAGNOSTIC STUDIES / PROCEDURES    LABS  Results for orders placed or performed during the hospital encounter of 12/28/18   CBC WITH DIFFERENTIAL   Result Value Ref Range    WBC " 10.0 4.8 - 10.8 K/uL    RBC 4.85 4.70 - 6.10 M/uL    Hemoglobin 16.0 14.0 - 18.0 g/dL    Hematocrit 44.0 42.0 - 52.0 %    MCV 90.7 81.4 - 97.8 fL    MCH 33.0 27.0 - 33.0 pg    MCHC 36.4 (H) 33.7 - 35.3 g/dL    RDW 44.6 35.9 - 50.0 fL    Platelet Count 173 164 - 446 K/uL    MPV 10.2 9.0 - 12.9 fL    Neutrophils-Polys 66.10 44.00 - 72.00 %    Lymphocytes 25.00 22.00 - 41.00 %    Monocytes 8.20 0.00 - 13.40 %    Eosinophils 0.10 0.00 - 6.90 %    Basophils 0.40 0.00 - 1.80 %    Immature Granulocytes 0.20 0.00 - 0.90 %    Nucleated RBC 0.00 /100 WBC    Neutrophils (Absolute) 6.63 1.82 - 7.42 K/uL    Lymphs (Absolute) 2.51 1.00 - 4.80 K/uL    Monos (Absolute) 0.82 0.00 - 0.85 K/uL    Eos (Absolute) 0.01 0.00 - 0.51 K/uL    Baso (Absolute) 0.04 0.00 - 0.12 K/uL    Immature Granulocytes (abs) 0.02 0.00 - 0.11 K/uL    NRBC (Absolute) 0.00 K/uL   COMP METABOLIC PANEL   Result Value Ref Range    Sodium 131 (L) 135 - 145 mmol/L    Potassium 4.4 3.6 - 5.5 mmol/L    Chloride 99 96 - 112 mmol/L    Co2 25 20 - 33 mmol/L    Anion Gap 7.0 0.0 - 11.9    Glucose 213 (H) 65 - 99 mg/dL    Bun 11 8 - 22 mg/dL    Creatinine 0.92 0.50 - 1.40 mg/dL    Calcium 9.1 8.4 - 10.2 mg/dL    AST(SGOT) 18 12 - 45 U/L    ALT(SGPT) 25 2 - 50 U/L    Alkaline Phosphatase 50 30 - 99 U/L    Total Bilirubin 1.0 0.1 - 1.5 mg/dL    Albumin 3.9 3.2 - 4.9 g/dL    Total Protein 6.8 6.0 - 8.2 g/dL    Globulin 2.9 1.9 - 3.5 g/dL    A-G Ratio 1.3 g/dL   LIPASE   Result Value Ref Range    Lipase >400 (H) 7 - 58 U/L   ESTIMATED GFR   Result Value Ref Range    GFR If African American >60 >60 mL/min/1.73 m 2    GFR If Non African American >60 >60 mL/min/1.73 m 2     RADIOLOGY  CTA ABDOMEN PELVIS W & W/O POST PROCESS   Final Result      1.  Infrarenal abdominal aortic aneurysm with a maximum diameter of 5.3 cm.      2.  Peripancreatic inflammatory changes are consistent with known pancreatitis.      3.  Enlarged peripancreatic lymph node, likely reactive.      4.   Diverticulosis.      US-RUQ   Final Result      1.  No sonographic evidence for biliary obstruction      2.  Distal aortic aneurysm with a maximum diameter of 5.5 cm.      3.  Increased hepatic echotexture is likely related to fatty infiltration. Hepatocellular disease can have a similar appearance.          COURSE & MEDICAL DECISION MAKING  Nursing notes and vital signs were reviewed. (See chart for details)  The patients records were reviewed, history was obtained from the patient;     ED evaluation for epigastric abdominal pain does demonstrate a pancreatitis, lipase greater than 400, unchanged from yesterday.  Labs are unremarkable, no leukocytosis, left shift or bandemia.  No further electrolytic derangement.  Normal LFTs.  Right upper quadrant ultrasound is unrevealing, no biliary obstruction or gallstones.  Unknown cause of pancreatitis, patient does not drink alcohol, has not smoked tobacco in 30+ years, no new medications.  Incidentally, patient is noted to have a 5.5 cm distal aortic aneurysm.  Subsequent CT confirms infrarenal abdominal aortic aneurysm at 5.3 cm, no evidence for rupture or leaking.  Suspect incidental finding, unlikely that this is contributing to epigastric abdominal pain, however given size, close vascular surgery follow-up is recommended.  And for this evaluation, adrienne pancreatic inflammatory changes without mass, cyst or abscess, lymphadenopathy is noted here as well.  No other intra-abdominal pathology.  Hemodynamically stable without fever or tachycardia, patient was never hypotensive.  Blood pressures well controlled.  Abdominal exam is benign.  Tolerating oral fluids without nausea or vomiting.    2:05 PM Dr. Gonzalez, vascular surgery, is aware the patient and agreeable to outpatient follow-up.    Patient is stable for discharge at this time, anticipatory guidance provided, continue home medications including previously prescribed tramadol for breakthrough pain, clear liquid diet  for 3-4 days,, close follow-up is encouraged, and strict ED return instructions have been detailed for persistent or worsening pain, vomiting, fever or other new concerns. Patient is agreeable to the disposition and plan.    Patient's blood pressure was elevated in the emergency department, and has been referred to primary care for close monitoring.    FINAL IMPRESSION  (K85.90) Acute pancreatitis, unspecified complication status, unspecified pancreatitis type  (primary encounter diagnosis)  (R10.13) Epigastric abdominal pain  (I71.4) Abdominal aortic aneurysm (AAA) without rupture (HCC)      Electronically signed by: Khadra Garcia, 12/28/2018 2:17 PM      This dictation was created using voice recognition software. The accuracy of the dictation is limited to the abilities of the software. I expect there may be some errors of grammar and possibly content. The nursing notes were reviewed and certain aspects of this information were incorporated into this note.

## 2018-12-28 NOTE — ED NOTES
ERP at bedside. Pt agrees with plan of care discussed by ERP. AIDET acknowledged with patient. Con in low position, side rail up for pt safety. Call light within reach. Will continue to monitor.

## 2018-12-31 ENCOUNTER — HOSPITAL ENCOUNTER (OUTPATIENT)
Dept: LAB | Facility: MEDICAL CENTER | Age: 68
End: 2018-12-31
Attending: PHYSICIAN ASSISTANT
Payer: MEDICARE

## 2018-12-31 DIAGNOSIS — K85.90 ACUTE PANCREATITIS, UNSPECIFIED COMPLICATION STATUS, UNSPECIFIED PANCREATITIS TYPE: ICD-10-CM

## 2018-12-31 LAB — LIPASE SERPL-CCNC: 242 U/L (ref 11–82)

## 2018-12-31 PROCEDURE — 36415 COLL VENOUS BLD VENIPUNCTURE: CPT

## 2018-12-31 PROCEDURE — 83690 ASSAY OF LIPASE: CPT

## 2019-01-08 ENCOUNTER — OFFICE VISIT (OUTPATIENT)
Dept: CARDIOLOGY | Facility: MEDICAL CENTER | Age: 69
End: 2019-01-08
Payer: COMMERCIAL

## 2019-01-08 VITALS
BODY MASS INDEX: 36.41 KG/M2 | HEART RATE: 60 BPM | HEIGHT: 75 IN | DIASTOLIC BLOOD PRESSURE: 62 MMHG | WEIGHT: 292.8 LBS | OXYGEN SATURATION: 94 % | SYSTOLIC BLOOD PRESSURE: 100 MMHG

## 2019-01-08 DIAGNOSIS — I25.84 CORONARY ARTERY DISEASE DUE TO CALCIFIED CORONARY LESION: ICD-10-CM

## 2019-01-08 DIAGNOSIS — E78.5 DYSLIPIDEMIA: ICD-10-CM

## 2019-01-08 DIAGNOSIS — I25.10 CORONARY ARTERY DISEASE DUE TO CALCIFIED CORONARY LESION: ICD-10-CM

## 2019-01-08 DIAGNOSIS — I10 ESSENTIAL HYPERTENSION: ICD-10-CM

## 2019-01-08 PROCEDURE — 99214 OFFICE O/P EST MOD 30 MIN: CPT | Performed by: INTERNAL MEDICINE

## 2019-01-08 NOTE — PROGRESS NOTES
Chief Complaint   Patient presents with   • Coronary Artery Disease   • HTN (Controlled)       Subjective:   Kun Lisa is a 68 y.o. male who presents today for follow-up evaluation because of history of coronary artery disease. He also has hypertension and dyslipidemia in addition to type II diabetes. Stent to distal circumflex, patent LAD stent and  of RCA in April 2017.     Last month he was seen in the emergency room and diagnosed as having pancreatitis.  In addition, he had an abdominal CT scan and ultrasound.  He was found to have an abdominal aortic aneurysm which appeared to be greater than 5 cm in diameter.  He did see vascular surgery and was told the aneurysm was 4.8 cm.    He has no dyspnea on exertion on a flat walk.  He has had no PND orthopnea but does have some mild edema.  He is noted no palpitations.  He does have some difficulty with orthostatic lightheadedness.        Past Medical History:   Diagnosis Date   • Acid reflux    • Arrhythmia    • CAD (coronary artery disease)     stent   • Diabetes (HCC)     oral meds   • Diabetic neuropathy (HCC)     bilat feet   • Hyperlipidemia    • Hypertension    • Obstructive sleep apnea     CPAP   • Palpitations    • Past heart attack     1997   • Psychiatric problem     depression   • Sleep apnea     CPAP   • Snoring      Past Surgical History:   Procedure Laterality Date   • CARDIAC CATH  4/27/17    Synergy stent to Circ   • STENT PLACEMENT  1997    LAD   • CARDIAC CATH  1997    LAD stent   • ANGIOPLASTY       Family History   Problem Relation Age of Onset   • Stroke Father 57   • Arthritis Mother    • Stroke Mother      Social History     Social History   • Marital status: Single     Spouse name: N/A   • Number of children: N/A   • Years of education: N/A     Occupational History   • Not on file.     Social History Main Topics   • Smoking status: Former Smoker     Packs/day: 1.00     Years: 32.00     Types: Cigarettes     Quit date: 6/1/2003   •  "Smokeless tobacco: Never Used   • Alcohol use No      Comment: rarely   • Drug use: Yes     Frequency: 7.0 times per week     Types: Marijuana      Comment: medical marijuana   • Sexual activity: No     Other Topics Concern   • Not on file     Social History Narrative   • No narrative on file     Allergies   Allergen Reactions   • Metformin Unspecified     Stomach upset and acid reflux     Outpatient Encounter Prescriptions as of 1/8/2019   Medication Sig Dispense Refill   • metoprolol SR (TOPROL XL) 50 MG TABLET SR 24 HR Take 1 Tab by mouth every day. Needs to be seen for further refills. Thank you 90 Tab 0   • SITagliptin (JANUVIA) 100 MG Tab Take 1 Tab by mouth every day. 90 Tab 3   • glipiZIDE SR (GLUCOTROL) 5 MG TABLET SR 24 HR Take 1 Tab by mouth every day. Take with 10 mg ER Glipizide dose. 90 Tab 3   • glipiZIDE SR (GLUCOTROL) 10 MG TABLET SR 24 HR Take 1 Tab by mouth every day. 90 Tab 3   • ramipril (ALTACE) 10 MG capsule TAKE 1 CAPSULE DAILY 90 Cap 1   • rosuvastatin (CRESTOR) 20 MG Tab TAKE 1 TABLET EVERY EVENING 90 Tab 1   • Omega-3 Fatty Acids (FISH OIL) 1000 MG Cap capsule Take 1,000 mg by mouth every day.     • Multiple Vitamins-Minerals (DAILY MULTIVITAMIN PO) Take 1 Tab by mouth every day.     • baclofen (LIORESAL) 20 MG tablet Take 20 mg by mouth as needed. Indications: Muscle Spasticity     • Glucosamine-Chondroitin (GLUCOSAMINE CHONDROITIN COMPLX) 500-250 MG Cap Take 2 Tabs by mouth every day.     • tramadol (ULTRAM) 50 MG Tab TAKE 1 TABLET BY MOUTH EVERY 4 HOURS AS NEEDED. FOR A 90 DAY SUPPLY 180 Tab 1     No facility-administered encounter medications on file as of 1/8/2019.      ROS     Objective:   /62 (BP Location: Left arm, Patient Position: Sitting, BP Cuff Size: Adult)   Pulse 60   Ht 1.905 m (6' 3\")   Wt (!) 132.8 kg (292 lb 12.8 oz)   SpO2 94%   BMI 36.60 kg/m²     Physical Exam   Constitutional: He appears well-developed and well-nourished.   Neck: No JVD present. "   Cardiovascular: Normal rate and regular rhythm.    No murmur heard.  Pulmonary/Chest: Effort normal and breath sounds normal. He has no rales.   Abdominal: Soft. There is no tenderness.   Musculoskeletal: He exhibits no edema.     Lab Results   Component Value Date/Time    CHOLSTRLTOT 142 10/08/2018 11:50 AM    LDL 67 10/08/2018 11:50 AM    HDL 45 10/08/2018 11:50 AM    TRIGLYCERIDE 149 10/08/2018 11:50 AM       Lab Results   Component Value Date/Time    SODIUM 131 (L) 12/28/2018 10:53 AM    POTASSIUM 4.4 12/28/2018 10:53 AM    CHLORIDE 99 12/28/2018 10:53 AM    CO2 25 12/28/2018 10:53 AM    GLUCOSE 213 (H) 12/28/2018 10:53 AM    BUN 11 12/28/2018 10:53 AM    CREATININE 0.92 12/28/2018 10:53 AM     Lab Results   Component Value Date/Time    ALKPHOSPHAT 50 12/28/2018 10:53 AM    ASTSGOT 18 12/28/2018 10:53 AM    ALTSGPT 25 12/28/2018 10:53 AM    TBILIRUBIN 1.0 12/28/2018 10:53 AM      No results found for: BNPBTYPENAT       Abdominal ultrasound:    1.  Infrarenal abdominal aortic aneurysm with a maximum diameter of 5.3 cm.    2.  Peripancreatic inflammatory changes are consistent with known pancreatitis.    3.  Enlarged peripancreatic lymph node, likely reactive.    4.  Diverticulosis.   Reading Provider Reading Date   Verna Rabago M.D. Dec 28, 2018           Assessment:     1. Coronary artery disease due to calcified coronary lesion: Stent to distal circumflex, patent LAD stent and  of RCA in April 2017     2. Dyslipidemia     3. Essential hypertension         Medical Decision Making:  Today's Assessment / Status / Plan:     Mr. Lisa is clinically stable.  He does have an abdominal aortic aneurysm.  There appears to be some confusion as to the size of his aneurysm.  His abdominal ultrasound and CTA state that it is greater than 5 cm.  He states that the vascular surgeon told him it was 4.8 cm.  We will give him a copy of his procedures and asked him to discuss this with his vascular surgeon.  His lipid  status appears to be under fair control.  We may ultimately obtain a high-sensitivity C-reactive protein.  However, currently he is still recovering from pancreatitis and we will defer that for now.  Given his coronary artery disease and diabetes I feel he should be considered for a SG LT-2 inhibitor.  Jardiance or 1 of the other alternatives.  Will obtain a pharmacotherapy consult.  He will follow-up in 6 months with repeat lab work.

## 2019-01-08 NOTE — LETTER
[facility]   [address], [facility] [facility] [address]  [place] [address]  Phone: [phone]  Fax: [phone]              Kun Lisa  1950    Encounter Date: 1/8/2019    Marbin Haddad M.D.          PROGRESS NOTE:  Chief Complaint   Patient presents with   • Coronary Artery Disease   • HTN (Controlled)       Subjective:   Kun Lisa is a 68 y.o. male who presents today for follow-up evaluation because of history of coronary artery disease. He also has hypertension and dyslipidemia in addition to type II diabetes. Stent to distal circumflex, patent LAD stent and  of RCA in April 2017.     Last month he was seen in the emergency room and diagnosed as having pancreatitis.  In addition, he had an abdominal CT scan and ultrasound.  He was found to have an abdominal aortic aneurysm which appeared to be greater than 5 cm in diameter.  He did see vascular surgery and was told the aneurysm was 4.8 cm.    He has no dyspnea on exertion on a flat walk.  He has had no PND orthopnea but does have some mild edema.  He is noted no palpitations.  He does have some difficulty with orthostatic lightheadedness.        Past Medical History:   Diagnosis Date   • Acid reflux    • Arrhythmia    • CAD (coronary artery disease)     stent   • Diabetes (HCC)     oral meds   • Diabetic neuropathy (HCC)     bilat feet   • Hyperlipidemia    • Hypertension    • Obstructive sleep apnea     CPAP   • Palpitations    • Past heart attack     1997   • Psychiatric problem     depression   • Sleep apnea     CPAP   • Snoring      Past Surgical History:   Procedure Laterality Date   • CARDIAC CATH  4/27/17    Synergy stent to Circ   • STENT PLACEMENT  1997    LAD   • CARDIAC CATH  1997    LAD stent   • ANGIOPLASTY       Family History   Problem Relation Age of Onset   • Stroke Father 57   • Arthritis Mother    • Stroke Mother      Social History     Social History   • Marital status: Single    Spouse name: N/A   • Number of children: N/A   • Years of education: N/A     Occupational History   • Not on file.     Social History Main Topics   • Smoking status: Former Smoker     Packs/day: 1.00     Years: 32.00     Types: Cigarettes     Quit date: 6/1/2003   • Smokeless tobacco: Never Used   • Alcohol use No      Comment: rarely   • Drug use: Yes     Frequency: 7.0 times per week     Types: Marijuana      Comment: medical marijuana   • Sexual activity: No     Other Topics Concern   • Not on file     Social History Narrative   • No narrative on file     Allergies   Allergen Reactions   • Metformin Unspecified     Stomach upset and acid reflux     Outpatient Encounter Prescriptions as of 1/8/2019   Medication Sig Dispense Refill   • metoprolol SR (TOPROL XL) 50 MG TABLET SR 24 HR Take 1 Tab by mouth every day. Needs to be seen for further refills. Thank you 90 Tab 0   • SITagliptin (JANUVIA) 100 MG Tab Take 1 Tab by mouth every day. 90 Tab 3   • glipiZIDE SR (GLUCOTROL) 5 MG TABLET SR 24 HR Take 1 Tab by mouth every day. Take with 10 mg ER Glipizide dose. 90 Tab 3   • glipiZIDE SR (GLUCOTROL) 10 MG TABLET SR 24 HR Take 1 Tab by mouth every day. 90 Tab 3   • ramipril (ALTACE) 10 MG capsule TAKE 1 CAPSULE DAILY 90 Cap 1   • rosuvastatin (CRESTOR) 20 MG Tab TAKE 1 TABLET EVERY EVENING 90 Tab 1   • Omega-3 Fatty Acids (FISH OIL) 1000 MG Cap capsule Take 1,000 mg by mouth every day.     • Multiple Vitamins-Minerals (DAILY MULTIVITAMIN PO) Take 1 Tab by mouth every day.     • baclofen (LIORESAL) 20 MG tablet Take 20 mg by mouth as needed. Indications: Muscle Spasticity     • Glucosamine-Chondroitin (GLUCOSAMINE CHONDROITIN COMPLX) 500-250 MG Cap Take 2 Tabs by mouth every day.     • tramadol (ULTRAM) 50 MG Tab TAKE 1 TABLET BY MOUTH EVERY 4 HOURS AS NEEDED. FOR A 90 DAY SUPPLY 180 Tab 1     No facility-administered encounter medications on file as of 1/8/2019.      ROS     Objective:   /62 (BP Location: Left  "arm, Patient Position: Sitting, BP Cuff Size: Adult)   Pulse 60   Ht 1.905 m (6' 3\")   Wt (!) 132.8 kg (292 lb 12.8 oz)   SpO2 94%   BMI 36.60 kg/m²      Physical Exam   Constitutional: He appears well-developed and well-nourished.   Neck: No JVD present.   Cardiovascular: Normal rate and regular rhythm.    No murmur heard.  Pulmonary/Chest: Effort normal and breath sounds normal. He has no rales.   Abdominal: Soft. There is no tenderness.   Musculoskeletal: He exhibits no edema.     Lab Results   Component Value Date/Time    CHOLSTRLTOT 142 10/08/2018 11:50 AM    LDL 67 10/08/2018 11:50 AM    HDL 45 10/08/2018 11:50 AM    TRIGLYCERIDE 149 10/08/2018 11:50 AM       Lab Results   Component Value Date/Time    SODIUM 131 (L) 12/28/2018 10:53 AM    POTASSIUM 4.4 12/28/2018 10:53 AM    CHLORIDE 99 12/28/2018 10:53 AM    CO2 25 12/28/2018 10:53 AM    GLUCOSE 213 (H) 12/28/2018 10:53 AM    BUN 11 12/28/2018 10:53 AM    CREATININE 0.92 12/28/2018 10:53 AM     Lab Results   Component Value Date/Time    ALKPHOSPHAT 50 12/28/2018 10:53 AM    ASTSGOT 18 12/28/2018 10:53 AM    ALTSGPT 25 12/28/2018 10:53 AM    TBILIRUBIN 1.0 12/28/2018 10:53 AM      No results found for: BNPBTYPENAT       Abdominal ultrasound:    1.  Infrarenal abdominal aortic aneurysm with a maximum diameter of 5.3 cm.    2.  Peripancreatic inflammatory changes are consistent with known pancreatitis.    3.  Enlarged peripancreatic lymph node, likely reactive.    4.  Diverticulosis.   Reading Provider Reading Date   Verna Rabago M.D. Dec 28, 2018           Assessment:     1. Coronary artery disease due to calcified coronary lesion: Stent to distal circumflex, patent LAD stent and  of RCA in April 2017     2. Dyslipidemia     3. Essential hypertension         Medical Decision Making:  Today's Assessment / Status / Plan:     Mr. Lisa is clinically stable.  He does have an abdominal aortic aneurysm.  There appears to be some confusion as to the size " of his aneurysm.  His abdominal ultrasound and CTA state that is greater than 5 cm.  He states that the vascular surgeon told him it was 4.8 cm.  We will give him a copy of his procedures and asked him to discuss this with his vascular surgeon.  His lipid status appears to be under fair control.  We may ultimately obtain a high-sensitivity C-reactive protein.  However currently he is still recovering from pancreatitis and we will defer that for now.  Given his coronary artery disease and diabetes I feel he should be considered for a SG LT-2 inhibitor.  Jardiance or 1 of the other alternatives.  Will obtain a pharmacotherapy consult.  He will follow-up in 6 months with repeat lab work.      Kit Torres, PASHLYN.-C.  [address]  [address]  [facility] [address]  VIA In Basket

## 2019-01-10 ENCOUNTER — TELEPHONE (OUTPATIENT)
Dept: VASCULAR LAB | Facility: MEDICAL CENTER | Age: 69
End: 2019-01-10

## 2019-01-14 ENCOUNTER — OFFICE VISIT (OUTPATIENT)
Dept: MEDICAL GROUP | Facility: MEDICAL CENTER | Age: 69
End: 2019-01-14
Payer: COMMERCIAL

## 2019-01-14 ENCOUNTER — HOSPITAL ENCOUNTER (OUTPATIENT)
Dept: LAB | Facility: MEDICAL CENTER | Age: 69
End: 2019-01-14
Attending: INTERNAL MEDICINE
Payer: COMMERCIAL

## 2019-01-14 VITALS
BODY MASS INDEX: 35.56 KG/M2 | RESPIRATION RATE: 16 BRPM | DIASTOLIC BLOOD PRESSURE: 62 MMHG | OXYGEN SATURATION: 95 % | SYSTOLIC BLOOD PRESSURE: 110 MMHG | HEART RATE: 60 BPM | HEIGHT: 75 IN | WEIGHT: 286 LBS

## 2019-01-14 DIAGNOSIS — Z12.11 ENCOUNTER FOR FIT (FECAL IMMUNOCHEMICAL TEST) SCREENING: ICD-10-CM

## 2019-01-14 DIAGNOSIS — I10 ESSENTIAL HYPERTENSION: ICD-10-CM

## 2019-01-14 DIAGNOSIS — I25.10 CORONARY ARTERY DISEASE DUE TO CALCIFIED CORONARY LESION: ICD-10-CM

## 2019-01-14 DIAGNOSIS — I71.40 ABDOMINAL AORTIC ANEURYSM (AAA) 3.0 CM TO 5.5 CM IN DIAMETER IN MALE (HCC): ICD-10-CM

## 2019-01-14 DIAGNOSIS — E78.5 DYSLIPIDEMIA: ICD-10-CM

## 2019-01-14 DIAGNOSIS — I25.84 CORONARY ARTERY DISEASE DUE TO CALCIFIED CORONARY LESION: ICD-10-CM

## 2019-01-14 DIAGNOSIS — K85.00 IDIOPATHIC ACUTE PANCREATITIS WITHOUT INFECTION OR NECROSIS: ICD-10-CM

## 2019-01-14 LAB
ALBUMIN SERPL BCP-MCNC: 4.4 G/DL (ref 3.2–4.9)
ALBUMIN/GLOB SERPL: 1.6 G/DL
ALP SERPL-CCNC: 47 U/L (ref 30–99)
ALT SERPL-CCNC: 22 U/L (ref 2–50)
ANION GAP SERPL CALC-SCNC: 9 MMOL/L (ref 0–11.9)
AST SERPL-CCNC: 16 U/L (ref 12–45)
BILIRUB SERPL-MCNC: 0.5 MG/DL (ref 0.1–1.5)
BUN SERPL-MCNC: 12 MG/DL (ref 8–22)
CALCIUM SERPL-MCNC: 9.7 MG/DL (ref 8.5–10.5)
CHLORIDE SERPL-SCNC: 104 MMOL/L (ref 96–112)
CHOLEST SERPL-MCNC: 131 MG/DL (ref 100–199)
CO2 SERPL-SCNC: 25 MMOL/L (ref 20–33)
CREAT SERPL-MCNC: 1.03 MG/DL (ref 0.5–1.4)
CRP SERPL HS-MCNC: 1 MG/L (ref 0–7.5)
FASTING STATUS PATIENT QL REPORTED: NORMAL
GLOBULIN SER CALC-MCNC: 2.8 G/DL (ref 1.9–3.5)
GLUCOSE SERPL-MCNC: 163 MG/DL (ref 65–99)
HDLC SERPL-MCNC: 37 MG/DL
LDLC SERPL CALC-MCNC: 57 MG/DL
POTASSIUM SERPL-SCNC: 4.3 MMOL/L (ref 3.6–5.5)
PROT SERPL-MCNC: 7.2 G/DL (ref 6–8.2)
SODIUM SERPL-SCNC: 138 MMOL/L (ref 135–145)
TRIGL SERPL-MCNC: 187 MG/DL (ref 0–149)

## 2019-01-14 PROCEDURE — 86141 C-REACTIVE PROTEIN HS: CPT

## 2019-01-14 PROCEDURE — 99214 OFFICE O/P EST MOD 30 MIN: CPT | Performed by: PHYSICIAN ASSISTANT

## 2019-01-14 PROCEDURE — 80053 COMPREHEN METABOLIC PANEL: CPT

## 2019-01-14 PROCEDURE — 36415 COLL VENOUS BLD VENIPUNCTURE: CPT

## 2019-01-14 PROCEDURE — 80061 LIPID PANEL: CPT

## 2019-01-14 ASSESSMENT — PATIENT HEALTH QUESTIONNAIRE - PHQ9
5. POOR APPETITE OR OVEREATING: 0 - NOT AT ALL
CLINICAL INTERPRETATION OF PHQ2 SCORE: 1
SUM OF ALL RESPONSES TO PHQ QUESTIONS 1-9: 7

## 2019-01-14 NOTE — PROGRESS NOTES
Subjective:   Kun Lisa is a 68 y.o. male here today for pancreatitis and AAA.    Idiopathic acute pancreatitis without infection or necrosis  This is a 68-year-old male who was diagnosed with acute otitis a few weeks ago and was seen at the ED.  He had abdominal pain.  Eventually the symptoms improved.  I ordered a lipase level which was trending lower.  He denies any pain today.  Unknown cause for the pancreatitis.  CT scan showed the following:    Impression       1.  Infrarenal abdominal aortic aneurysm with a maximum diameter of 5.3 cm.    2.  Peripancreatic inflammatory changes are consistent with known pancreatitis.    3.  Enlarged peripancreatic lymph node, likely reactive.    4.  Diverticulosis.         Abdominal aortic aneurysm (AAA) 3.0 cm to 5.5 cm in diameter in male (HCC)  Incidental finding of an abdominal aortic aneurysm approximated to be 5.3 cm.  He was seen by vascular and [facility] and Dr. Iglesias measured it below 5 cm.  His cardiologist Dr. Rhodes told him that the measurement was in a 3D measurement in the 5 range.  He sees Dr. Iglesias in 6 months.       Current medicines (including changes today)  Current Outpatient Prescriptions   Medication Sig Dispense Refill   • metoprolol SR (TOPROL XL) 50 MG TABLET SR 24 HR Take 1 Tab by mouth every day. Needs to be seen for further refills. Thank you 90 Tab 0   • SITagliptin (JANUVIA) 100 MG Tab Take 1 Tab by mouth every day. 90 Tab 3   • glipiZIDE SR (GLUCOTROL) 5 MG TABLET SR 24 HR Take 1 Tab by mouth every day. Take with 10 mg ER Glipizide dose. 90 Tab 3   • tramadol (ULTRAM) 50 MG Tab TAKE 1 TABLET BY MOUTH EVERY 4 HOURS AS NEEDED. FOR A 90 DAY SUPPLY 180 Tab 1   • glipiZIDE SR (GLUCOTROL) 10 MG TABLET SR 24 HR Take 1 Tab by mouth every day. 90 Tab 3   • ramipril (ALTACE) 10 MG capsule TAKE 1 CAPSULE DAILY 90 Cap 1   • rosuvastatin (CRESTOR) 20 MG Tab TAKE 1 TABLET EVERY EVENING 90 Tab 1   • Omega-3 Fatty Acids (FISH OIL) 1000 MG  "Cap capsule Take 1,000 mg by mouth every day.     • Multiple Vitamins-Minerals (DAILY MULTIVITAMIN PO) Take 1 Tab by mouth every day.     • baclofen (LIORESAL) 20 MG tablet Take 20 mg by mouth as needed. Indications: Muscle Spasticity     • Glucosamine-Chondroitin (GLUCOSAMINE CHONDROITIN COMPLX) 500-250 MG Cap Take 2 Tabs by mouth every day.       No current facility-administered medications for this visit.      He  has a past medical history of Acid reflux; Arrhythmia; CAD (coronary artery disease); Diabetes (HCC); Diabetic neuropathy (HCC); Hyperlipidemia; Hypertension; Obstructive sleep apnea; Palpitations; Past heart attack; Psychiatric problem; Sleep apnea; and Snoring.    Social History and Family History were reviewed and updated.    ROS   No chest pain, no shortness of breath, no abdominal pain and all other systems were reviewed and are negative.       Objective:     Blood pressure 110/62, pulse 60, resp. rate 16, height 1.905 m (6' 3\"), weight (!) 129.7 kg (286 lb), SpO2 95 %. Body mass index is 35.75 kg/m².   Physical Exam:  Constitutional: Alert, no distress.  Skin: Warm, dry, good turgor, no rashes in visible areas.  Eye: Equal, round and reactive, conjunctiva clear, lids normal.  ENMT: Lips without lesions, good dentition, oropharynx clear.  Neck: Trachea midline, no masses.   Lymph: No cervical or supraclavicular lymphadenopathy  Respiratory: Unlabored respiratory effort, lungs clear to auscultation, no wheezes, no ronchi.  Cardiovascular: Normal S1, S2, no murmur, no edema.  Abdomen: Soft, non-tender, no masses.  Psych: Alert and oriented x3, normal affect and mood.        Assessment and Plan:   The following treatment plan was discussed    1. Idiopathic acute pancreatitis without infection or necrosis  Acute, new onset condition.  Resolved.  Ordered lipase level.  Referred to GI for further evaluation.  Discussed likely idiopathic.  - REFERRAL TO GASTROENTEROLOGY  - LIPASE; Future    2. Abdominal " aortic aneurysm (AAA) 3.0 cm to 5.5 cm in diameter in male (HCC)  Chronic condition finding through CT scan.  Follow with vascular in 6 months.      3. Encounter for FIT (fecal immunochemical test) screening  Ordered testing.  Discussed contacting insurance regarding cost.  - COLOGUARD (FIT DNA)    In follow-up will need to do an A1c.    Followup: Return in about 3 months (around 4/14/2019), or if symptoms worsen or fail to improve, for A1c.    Please note that this dictation was created using voice recognition software. I have made every reasonable attempt to correct obvious errors, but I expect that there are errors of grammar and possibly content that I did not discover before finalizing the note.

## 2019-01-14 NOTE — ASSESSMENT & PLAN NOTE
This is a 68-year-old male who was diagnosed with acute otitis a few weeks ago and was seen at the ED.  He had abdominal pain.  Eventually the symptoms improved.  I ordered a lipase level which was trending lower.  He denies any pain today.  Unknown cause for the pancreatitis.  CT scan showed the following:    Impression       1.  Infrarenal abdominal aortic aneurysm with a maximum diameter of 5.3 cm.    2.  Peripancreatic inflammatory changes are consistent with known pancreatitis.    3.  Enlarged peripancreatic lymph node, likely reactive.    4.  Diverticulosis.

## 2019-01-14 NOTE — ASSESSMENT & PLAN NOTE
Incidental finding of an abdominal aortic aneurysm approximated to be 5.3 cm.  He was seen by vascular and [facility] and Dr. Iglesias measured it below 5 cm.  His cardiologist Dr. Rhodes told him that the measurement was in a 3D measurement in the 5 range.  He sees Dr. Iglesias in 6 months.

## 2019-02-07 ENCOUNTER — HOSPITAL ENCOUNTER (OUTPATIENT)
Dept: LAB | Facility: MEDICAL CENTER | Age: 69
End: 2019-02-07
Attending: PHYSICIAN ASSISTANT
Payer: COMMERCIAL

## 2019-02-07 DIAGNOSIS — K85.00 IDIOPATHIC ACUTE PANCREATITIS WITHOUT INFECTION OR NECROSIS: ICD-10-CM

## 2019-02-07 LAB — LIPASE SERPL-CCNC: 47 U/L (ref 11–82)

## 2019-02-07 PROCEDURE — 36415 COLL VENOUS BLD VENIPUNCTURE: CPT

## 2019-02-07 PROCEDURE — 83690 ASSAY OF LIPASE: CPT

## 2019-02-13 ENCOUNTER — TELEPHONE (OUTPATIENT)
Dept: VASCULAR LAB | Facility: MEDICAL CENTER | Age: 69
End: 2019-02-13

## 2019-02-13 NOTE — TELEPHONE ENCOUNTER
[facility]     LM for pt to call clinic and establish care for dyslipidemia and HTN.    Mario Malik, PharmD

## 2019-02-19 ENCOUNTER — HOSPITAL ENCOUNTER (OUTPATIENT)
Dept: LAB | Facility: MEDICAL CENTER | Age: 69
End: 2019-02-19
Attending: INTERNAL MEDICINE
Payer: COMMERCIAL

## 2019-02-19 DIAGNOSIS — I10 ESSENTIAL HYPERTENSION: ICD-10-CM

## 2019-02-19 DIAGNOSIS — I25.84 CORONARY ARTERY DISEASE DUE TO CALCIFIED CORONARY LESION: ICD-10-CM

## 2019-02-19 DIAGNOSIS — I25.10 CORONARY ARTERY DISEASE DUE TO CALCIFIED CORONARY LESION: ICD-10-CM

## 2019-02-19 DIAGNOSIS — E78.5 DYSLIPIDEMIA: ICD-10-CM

## 2019-02-19 PROCEDURE — 82787 IGG 1 2 3 OR 4 EACH: CPT | Mod: 91

## 2019-02-19 PROCEDURE — 36415 COLL VENOUS BLD VENIPUNCTURE: CPT

## 2019-02-21 LAB
IGG1 SER-MCNC: 614 MG/DL (ref 240–1118)
IGG2 SER-MCNC: 182 MG/DL (ref 124–549)
IGG3 SER-MCNC: 14 MG/DL (ref 21–134)
IGG4 SER-MCNC: 28 MG/DL (ref 1–123)

## 2019-02-22 RX ORDER — METOPROLOL SUCCINATE 50 MG/1
50 TABLET, EXTENDED RELEASE ORAL DAILY
Qty: 90 TAB | Refills: 3 | Status: SHIPPED | OUTPATIENT
Start: 2019-02-22 | End: 2020-02-12

## 2019-02-27 ENCOUNTER — NON-PROVIDER VISIT (OUTPATIENT)
Dept: MEDICAL GROUP | Facility: PHYSICIAN GROUP | Age: 69
End: 2019-02-27
Payer: COMMERCIAL

## 2019-02-27 ENCOUNTER — ANTICOAGULATION MONITORING (OUTPATIENT)
Dept: MEDICAL GROUP | Facility: PHYSICIAN GROUP | Age: 69
End: 2019-02-27

## 2019-02-27 PROCEDURE — 99402 PREV MED CNSL INDIV APPRX 30: CPT | Performed by: FAMILY MEDICINE

## 2019-02-27 NOTE — NON-PROVIDER
New Patient Consult Note  Primary care physician: Kit Torres P.A.-C.    Reason for consult: Management of Uncontrolled Type 2 Diabetes  Start time: 1400  End time:14:40  HPI:  Kun Lisa is a 68 y.o. old patient who comes in today for evaluation of above stated problem.    Most Recent HbA1c:   Lab Results   Component Value Date/Time    HBA1C 7.5 07/02/2018 03:30 PM        Current Diabetes Regimen:    Metformin : pt did not tolerate secondary to diarrhea    Other: Glipizide 15mg po daily, Januvia 100mg po daily    Before Breakfast: pt does not test FSBG        ROS:  Constitutional: No weight loss  Cardiac: No palpitations or racing heart  Resp: No shortness of breath  Neuro: No numbness or tinging in feet  Endo: No heat or cold intolerance, no polyuria or polydipsia  All other systems were reviewed and were negative.    Past Medical History:  Patient Active Problem List    Diagnosis Date Noted   • Idiopathic acute pancreatitis without infection or necrosis 01/14/2019   • Abdominal aortic aneurysm (AAA) 3.0 cm to 5.5 cm in diameter in male ([facility]) 01/14/2019   • Obesity (BMI 35.0-39.9 without comorbidity) ([facility]) 03/01/2018   • Coronary artery disease 04/27/2017   • Abnormal nuclear stress test 04/27/2017   • Coronary artery disease due to calcified coronary lesion: Stent to distal circumflex, patent LAD stent and  of RCA in April 2017 01/20/2017   • Palpitations 01/20/2017   • Obstructive sleep apnea syndrome 11/22/2016   • Type 2 diabetes mellitus with neurologic complication ([facility]) 10/25/2016   • Dyslipidemia 10/25/2016   • Essential hypertension 10/25/2016   • Arrhythmia 10/25/2016   • Neuropathy ([facility]) 10/25/2016       Past Surgical History:  Past Surgical History:   Procedure Laterality Date   • CARDIAC CATH  4/27/17    Synergy stent to Circ   • STENT PLACEMENT  1997    LAD   • CARDIAC CATH  1997    LAD stent   • ANGIOPLASTY         Allergies:  Metformin    Social History:  Social History     Social  History   • Marital status: Single     Spouse name: N/A   • Number of children: N/A   • Years of education: N/A     Occupational History   • Not on file.     Social History Main Topics   • Smoking status: Former Smoker     Packs/day: 1.00     Years: 32.00     Types: Cigarettes     Quit date: 6/1/2003   • Smokeless tobacco: Never Used   • Alcohol use No      Comment: rarely   • Drug use: Yes     Frequency: 7.0 times per week     Types: Marijuana      Comment: medical marijuana   • Sexual activity: No     Other Topics Concern   • Not on file     Social History Narrative   • No narrative on file       Family History:  Family History   Problem Relation Age of Onset   • Stroke Father 57   • Arthritis Mother    • Stroke Mother        Medications:    Current Outpatient Prescriptions:   •  glipiZIDE SR (GLUCOTROL) 10 MG TABLET SR 24 HR, Take 1 Tab by mouth every day., Disp: 90 Tab, Rfl: 3  •  metoprolol SR (TOPROL XL) 50 MG TABLET SR 24 HR, Take 1 Tab by mouth every day., Disp: 90 Tab, Rfl: 3  •  rosuvastatin (CRESTOR) 20 MG Tab, TAKE 1 TABLET EVERY EVENING, Disp: 90 Tab, Rfl: 1  •  ramipril (ALTACE) 10 MG capsule, TAKE 1 CAPSULE DAILY, Disp: 90 Cap, Rfl: 1  •  SITagliptin (JANUVIA) 100 MG Tab, Take 1 Tab by mouth every day., Disp: 90 Tab, Rfl: 3  •  glipiZIDE SR (GLUCOTROL) 5 MG TABLET SR 24 HR, Take 1 Tab by mouth every day. Take with 10 mg ER Glipizide dose., Disp: 90 Tab, Rfl: 3  •  ramipril (ALTACE) 10 MG capsule, TAKE 1 CAPSULE DAILY, Disp: 90 Cap, Rfl: 1  •  rosuvastatin (CRESTOR) 20 MG Tab, TAKE 1 TABLET EVERY EVENING, Disp: 90 Tab, Rfl: 1  •  Omega-3 Fatty Acids (FISH OIL) 1000 MG Cap capsule, Take 1,000 mg by mouth every day., Disp: , Rfl:   •  Multiple Vitamins-Minerals (DAILY MULTIVITAMIN PO), Take 1 Tab by mouth every day., Disp: , Rfl:   •  baclofen (LIORESAL) 20 MG tablet, Take 20 mg by mouth as needed. Indications: Muscle Spasticity, Disp: , Rfl:   •  Glucosamine-Chondroitin (GLUCOSAMINE CHONDROITIN COMPLX)  500-250 MG Cap, Take 2 Tabs by mouth every day., Disp: , Rfl:     Labs: Reviewed    Physical Examination:  Vital signs: There were no vitals taken for this visit. There is no height or weight on file to calculate BMI.  General: No apparent distress, cooperative  Eyes: No scleral icterus or discharge  ENMT: Normal on external inspection of nose, lips, normal thyroid exam  Neck: No abnormal masses on inspection  Resp: Normal effort, clear to auscultation bilaterally   CVS: Regular rate and rhythm, S1 S2 normal, no murmur   Extremities: No edema  Abdomen: abdominal obesity present  Neuro: Alert and oriented  Skin: No rash  Psych: Normal mood and affect, intact memory and able to make informed decisions    Assessment and Plan:    Pt can not tolerate metformin secondary to diarrhea  Pt is tolerating Januvia 100mg po daily  Will dc glipizide  Will begin Jardiance 10mg po daily, will further optimize to 25mg po daily as tolerated, for added cardiovascular protection      Return in about 2 weeks (around 3/13/2019).    Thank you for allowing me to participate in the care of this patient.    Evelia Melara  02/27/19    CC:   Kit Torres P.A.-C.    This note was created using voice recognition software (Dragon). The accuracy of the dictation is limited by the abilities of the software. I have reviewed the note prior to signing, however some errors in grammar and context are still possible. If you have any questions related to this note please do not hesitate to contact our office.

## 2019-03-13 ENCOUNTER — NON-PROVIDER VISIT (OUTPATIENT)
Dept: MEDICAL GROUP | Facility: PHYSICIAN GROUP | Age: 69
End: 2019-03-13
Payer: COMMERCIAL

## 2019-03-13 ENCOUNTER — ANTICOAGULATION MONITORING (OUTPATIENT)
Dept: MEDICAL GROUP | Facility: PHYSICIAN GROUP | Age: 69
End: 2019-03-13

## 2019-03-13 PROCEDURE — 99402 PREV MED CNSL INDIV APPRX 30: CPT | Performed by: FAMILY MEDICINE

## 2019-03-13 PROCEDURE — 82962 GLUCOSE BLOOD TEST: CPT | Performed by: FAMILY MEDICINE

## 2019-03-13 NOTE — NON-PROVIDER
New Patient Consult Note  Primary care physician: Kit Torres P.A.-C.  Start time:10:49   End time: 11:19    Reason for consult: Management of Uncontrolled Type 2 Diabetes    HPI:  Kun Lisa is a 68 y.o. old patient who comes in today for evaluation of above stated problem.    Most Recent HbA1c:   Lab Results   Component Value Date/Time    HBA1C 7.5 07/02/2018 03:30 PM        Current Diabetes Regimen:    SGLT-2 Inhibitor:  Empagliflozin 10 mg once daily   Metformin not tolerated secondary to diarrhea  Januvia 100mg po bid    Before Breakfast: pt espana not test FSBG  Before Lunch:  Before Dinner:  Before Bedtime:  Other times:  Hypoglycemia:  None      ROS:  Constitutional: No weight loss  Cardiac: No palpitations or racing heart  Resp: No shortness of breath  Neuro: No numbness or tinging in feet  Endo: No heat or cold intolerance, no polyuria or polydipsia  All other systems were reviewed and were negative.    Past Medical History:  Patient Active Problem List    Diagnosis Date Noted   • Idiopathic acute pancreatitis without infection or necrosis 01/14/2019   • Abdominal aortic aneurysm (AAA) 3.0 cm to 5.5 cm in diameter in male ([facility]) 01/14/2019   • Obesity (BMI 35.0-39.9 without comorbidity) ([facility]) 03/01/2018   • Coronary artery disease 04/27/2017   • Abnormal nuclear stress test 04/27/2017   • Coronary artery disease due to calcified coronary lesion: Stent to distal circumflex, patent LAD stent and  of RCA in April 2017 01/20/2017   • Palpitations 01/20/2017   • Obstructive sleep apnea syndrome 11/22/2016   • Type 2 diabetes mellitus with neurologic complication ([facility]) 10/25/2016   • Dyslipidemia 10/25/2016   • Essential hypertension 10/25/2016   • Arrhythmia 10/25/2016   • Neuropathy ([facility]) 10/25/2016       Past Surgical History:  Past Surgical History:   Procedure Laterality Date   • CARDIAC CATH  4/27/17    Synergy stent to Circ   • STENT PLACEMENT  1997    LAD   • CARDIAC CATH  1997    LAD stent    • ANGIOPLASTY         Allergies:  Metformin    Social History:  Social History     Social History   • Marital status: Single     Spouse name: N/A   • Number of children: N/A   • Years of education: N/A     Occupational History   • Not on file.     Social History Main Topics   • Smoking status: Former Smoker     Packs/day: 1.00     Years: 32.00     Types: Cigarettes     Quit date: 6/1/2003   • Smokeless tobacco: Never Used   • Alcohol use No      Comment: rarely   • Drug use: Yes     Frequency: 7.0 times per week     Types: Marijuana      Comment: medical marijuana   • Sexual activity: No     Other Topics Concern   • Not on file     Social History Narrative   • No narrative on file       Family History:  Family History   Problem Relation Age of Onset   • Stroke Father 57   • Arthritis Mother    • Stroke Mother        Medications:    Current Outpatient Prescriptions:   •  Empagliflozin (JARDIANCE) 25 MG Tab, Take 1 tablet by mouth every day., Disp: 90 Tab, Rfl: 3  •  metoprolol SR (TOPROL XL) 50 MG TABLET SR 24 HR, Take 1 Tab by mouth every day., Disp: 90 Tab, Rfl: 3  •  rosuvastatin (CRESTOR) 20 MG Tab, TAKE 1 TABLET EVERY EVENING, Disp: 90 Tab, Rfl: 1  •  ramipril (ALTACE) 10 MG capsule, TAKE 1 CAPSULE DAILY, Disp: 90 Cap, Rfl: 1  •  SITagliptin (JANUVIA) 100 MG Tab, Take 1 Tab by mouth every day., Disp: 90 Tab, Rfl: 3  •  glipiZIDE SR (GLUCOTROL) 5 MG TABLET SR 24 HR, Take 1 Tab by mouth every day. Take with 10 mg ER Glipizide dose., Disp: 90 Tab, Rfl: 3  •  ramipril (ALTACE) 10 MG capsule, TAKE 1 CAPSULE DAILY, Disp: 90 Cap, Rfl: 1  •  rosuvastatin (CRESTOR) 20 MG Tab, TAKE 1 TABLET EVERY EVENING, Disp: 90 Tab, Rfl: 1  •  Omega-3 Fatty Acids (FISH OIL) 1000 MG Cap capsule, Take 1,000 mg by mouth every day., Disp: , Rfl:   •  Multiple Vitamins-Minerals (DAILY MULTIVITAMIN PO), Take 1 Tab by mouth every day., Disp: , Rfl:   •  baclofen (LIORESAL) 20 MG tablet, Take 20 mg by mouth as needed. Indications: Muscle  Spasticity, Disp: , Rfl:   •  Glucosamine-Chondroitin (GLUCOSAMINE CHONDROITIN COMPLX) 500-250 MG Cap, Take 2 Tabs by mouth every day., Disp: , Rfl:     Labs: Reviewed    Physical Examination:  Vital signs: There were no vitals taken for this visit. There is no height or weight on file to calculate BMI.  General: No apparent distress, cooperative  Eyes: No scleral icterus or discharge  ENMT: Normal on external inspection of nose, lips, normal thyroid exam  Neck: No abnormal masses on inspection  Resp: Normal effort, clear to auscultation bilaterally   CVS: Regular rate and rhythm, S1 S2 normal, no murmur   Extremities: No edema  Abdomen: abdominal obesity present  Neuro: Alert and oriented  Skin: No rash  Psych: Normal mood and affect, intact memory and able to make informed decisions    Assessment and Plan:    Pt is tolerating Jardiance 10mg, will further optimize to 25mg daily  Pt will continue Januvia 100mg po bid  Pt continues to play golf, and does participate in silver sneakers  Pt will continue to strive to exercise 30 minutes 5-7 x weekly.  Pt is using the plate method, and doing well.  He reports a 30 lb weight loss in the past 18 months.        Return in about 4 weeks (around 4/10/2019).    Thank you for allowing me to participate in the care of this patient.    Evelia Melara  03/13/19    CC:   Kit Torres P.A.-C.    This note was created using voice recognition software (Dragon). The accuracy of the dictation is limited by the abilities of the software. I have reviewed the note prior to signing, however some errors in grammar and context are still possible. If you have any questions related to this note please do not hesitate to contact our office.

## 2019-03-19 DIAGNOSIS — G62.9 NEUROPATHY: ICD-10-CM

## 2019-03-19 RX ORDER — TRAMADOL HYDROCHLORIDE 50 MG/1
TABLET ORAL
Qty: 180 TAB | Refills: 0 | Status: SHIPPED
Start: 2019-03-19 | End: 2019-05-29 | Stop reason: SDUPTHER

## 2019-04-12 ENCOUNTER — OFFICE VISIT (OUTPATIENT)
Dept: MEDICAL GROUP | Facility: MEDICAL CENTER | Age: 69
End: 2019-04-12
Payer: COMMERCIAL

## 2019-04-12 VITALS
RESPIRATION RATE: 16 BRPM | OXYGEN SATURATION: 94 % | HEART RATE: 52 BPM | DIASTOLIC BLOOD PRESSURE: 62 MMHG | BODY MASS INDEX: 35.04 KG/M2 | TEMPERATURE: 97.6 F | HEIGHT: 75 IN | WEIGHT: 281.8 LBS | SYSTOLIC BLOOD PRESSURE: 110 MMHG

## 2019-04-12 DIAGNOSIS — E66.9 OBESITY (BMI 35.0-39.9 WITHOUT COMORBIDITY): ICD-10-CM

## 2019-04-12 DIAGNOSIS — G89.29 CHRONIC PAIN OF RIGHT THUMB: ICD-10-CM

## 2019-04-12 DIAGNOSIS — M25.562 ACUTE PAIN OF LEFT KNEE: ICD-10-CM

## 2019-04-12 DIAGNOSIS — M79.644 CHRONIC PAIN OF RIGHT THUMB: ICD-10-CM

## 2019-04-12 DIAGNOSIS — E11.40 TYPE 2 DIABETES MELLITUS WITH DIABETIC NEUROPATHY, WITHOUT LONG-TERM CURRENT USE OF INSULIN (HCC): ICD-10-CM

## 2019-04-12 DIAGNOSIS — I71.40 ABDOMINAL AORTIC ANEURYSM (AAA) 3.0 CM TO 5.5 CM IN DIAMETER IN MALE (HCC): ICD-10-CM

## 2019-04-12 PROCEDURE — 99214 OFFICE O/P EST MOD 30 MIN: CPT | Performed by: PHYSICIAN ASSISTANT

## 2019-04-12 NOTE — ASSESSMENT & PLAN NOTE
Complains of some pain in the left knee at times especially when he is going up his flight of stairs at his home.  States it feels like the knee hyperextends.  Denies any swelling.  No injury.

## 2019-04-12 NOTE — ASSESSMENT & PLAN NOTE
Complains of a chronic history of pain in bilateral hands especially the right thumb.  Pain is tolerable.  Denies any significant weakness with his hand strength.,  Wants to just monitor the condition.  He is not taking any medications other than tramadol.

## 2019-04-12 NOTE — PROGRESS NOTES
Subjective:   Kun Lisa is a 68 y.o. male here today for diabetes, chronic pain in his bilateral hands, acute pain in left knee, abdominal aortic aneurysm and obesity.    Type 2 diabetes mellitus with neurologic complication ([facility])  This is a 68-year-old male who is here today to discuss his history of diabetes.  Doing well.  Is currently on Januvia and Jardiance.  No longer on glipizide.  Believes that glipizide may have caused him to have memory loss.  Blood sugars are well controlled range.  He is due for an A1c and a microalbumin level.    Chronic pain of right thumb  Complains of a chronic history of pain in bilateral hands especially the right thumb.  Pain is tolerable.  Denies any significant weakness with his hand strength.,  Wants to just monitor the condition.  He is not taking any medications other than tramadol.    Acute pain of left knee  Complains of some pain in the left knee at times especially when he is going up his flight of stairs at his home.  States it feels like the knee hyperextends.  Denies any swelling.  No injury.    Abdominal aortic aneurysm (AAA) 3.0 cm to 5.5 cm in diameter in male ([facility])  Chronic condition.  Stable.  Follows with Dr. Gonzalez in July.    Obesity (BMI 35.0-39.9 without comorbidity) ([facility])  States she is lost a few pounds.  Believes it may be secondary to medication but also is eating healthier.  Plans to continue to lose more weight.         Current medicines (including changes today)  Current Outpatient Prescriptions   Medication Sig Dispense Refill   • tramadol (ULTRAM) 50 MG Tab TAKE 1 TABLET BY MOUTH EVERY 4 HOURS AS NEEDED. FOR A 90 DAY SUPPLY 180 Tab 0   • Empagliflozin (JARDIANCE) 25 MG Tab Take 1 tablet by mouth every day. 90 Tab 3   • metoprolol SR (TOPROL XL) 50 MG TABLET SR 24 HR Take 1 Tab by mouth every day. 90 Tab 3   • SITagliptin (JANUVIA) 100 MG Tab Take 1 Tab by mouth every day. 90 Tab 3   • ramipril (ALTACE) 10 MG capsule TAKE 1 CAPSULE DAILY  "90 Cap 1   • rosuvastatin (CRESTOR) 20 MG Tab TAKE 1 TABLET EVERY EVENING 90 Tab 1   • Omega-3 Fatty Acids (FISH OIL) 1000 MG Cap capsule Take 1,000 mg by mouth every day.     • Multiple Vitamins-Minerals (DAILY MULTIVITAMIN PO) Take 1 Tab by mouth every day.     • baclofen (LIORESAL) 20 MG tablet Take 20 mg by mouth as needed. Indications: Muscle Spasticity     • Glucosamine-Chondroitin (GLUCOSAMINE CHONDROITIN COMPLX) 500-250 MG Cap Take 2 Tabs by mouth every day.       No current facility-administered medications for this visit.      He  has a past medical history of Acid reflux; Arrhythmia; CAD (coronary artery disease); Diabetes (HCC); Diabetic neuropathy (HCC); Hyperlipidemia; Hypertension; Obstructive sleep apnea; Palpitations; Past heart attack; Psychiatric problem; Sleep apnea; and Snoring.    Social History and Family History were reviewed and updated.    ROS   No chest pain, no shortness of breath, no abdominal pain and all other systems were reviewed and are negative.       Objective:     /62 (BP Location: Right arm, Patient Position: Sitting, BP Cuff Size: Adult)   Pulse (!) 52   Temp 36.4 °C (97.6 °F) (Temporal)   Resp 16   Ht 1.905 m (6' 3\")   Wt (!) 127.8 kg (281 lb 12.8 oz)   SpO2 94%  Body mass index is 35.22 kg/m².   Physical Exam:  Constitutional: Alert, no distress.  Skin: Warm, dry, good turgor, no rashes in visible areas.  Eye: Equal, round and reactive, conjunctiva clear, lids normal.  ENMT: Lips without lesions, good dentition, oropharynx clear.  Neck: Trachea midline, no masses.   Lymph: No cervical or supraclavicular lymphadenopathy  Respiratory: Unlabored respiratory effort, lungs clear to auscultation, no wheezes, no ronchi.  Cardiovascular: Normal S1, S2, no murmur, no edema.  Abdomen: Soft, non-tender, no masses.  Psych: Alert and oriented x3, normal affect and mood.        Assessment and Plan:   The following treatment plan was discussed    1. Type 2 diabetes mellitus with " diabetic neuropathy, without long-term current use of insulin ([facility])  Chronic condition.  Status none known.  Ordered A1c and microalbumin level.  Continue medication as directed by pharmacology.  To new dietary changes.  Lose weight.  - HEMOGLOBIN A1C; Future  - MICROALBUMIN CREAT RATIO URINE (LAB COLLECT); Future    2. Chronic pain of right thumb  New condition noted but chronic.  Likely second arthritis.  We will continue to monitor.  Take tramadol as needed.  Offered referral to hand specialist but he declined.    3. Acute pain of left knee  Chronic condition.  Atraumatic pain.  Offered referral to PT to strengthen up the knee but he declined.  We will continue to monitor.    4. Abdominal aortic aneurysm (AAA) 3.0 cm to 5.5 cm in diameter in male ([facility])  On condition.  Stable.  Follow with specialist in July.    5. Obesity (BMI 35.0-39.9 without comorbidity) ([facility])  Chronic condition.  Slight improvement.  Continue dietary changes.  Will monitor.      Followup: Return if symptoms worsen or fail to improve.    Please note that this dictation was created using voice recognition software. I have made every reasonable attempt to correct obvious errors, but I expect that there are errors of grammar and possibly content that I did not discover before finalizing the note.

## 2019-04-12 NOTE — ASSESSMENT & PLAN NOTE
This is a 68-year-old male who is here today to discuss his history of diabetes.  Doing well.  Is currently on Januvia and Jardiance.  No longer on glipizide.  Believes that glipizide may have caused him to have memory loss.  Blood sugars are well controlled range.  He is due for an A1c and a microalbumin level.

## 2019-04-12 NOTE — ASSESSMENT & PLAN NOTE
States she is lost a few pounds.  Believes it may be secondary to medication but also is eating healthier.  Plans to continue to lose more weight.

## 2019-04-29 ENCOUNTER — HOSPITAL ENCOUNTER (OUTPATIENT)
Dept: LAB | Facility: MEDICAL CENTER | Age: 69
End: 2019-04-29
Attending: PHYSICIAN ASSISTANT
Payer: COMMERCIAL

## 2019-04-29 DIAGNOSIS — E11.40 TYPE 2 DIABETES MELLITUS WITH DIABETIC NEUROPATHY, WITHOUT LONG-TERM CURRENT USE OF INSULIN (HCC): ICD-10-CM

## 2019-04-29 LAB
CREAT UR-MCNC: 90.2 MG/DL
EST. AVERAGE GLUCOSE BLD GHB EST-MCNC: 180 MG/DL
HBA1C MFR BLD: 7.9 % (ref 0–5.6)
MICROALBUMIN UR-MCNC: 1.1 MG/DL
MICROALBUMIN/CREAT UR: 12 MG/G (ref 0–30)

## 2019-04-29 PROCEDURE — 82570 ASSAY OF URINE CREATININE: CPT

## 2019-04-29 PROCEDURE — 82043 UR ALBUMIN QUANTITATIVE: CPT

## 2019-04-29 PROCEDURE — 83036 HEMOGLOBIN GLYCOSYLATED A1C: CPT

## 2019-04-29 PROCEDURE — 36415 COLL VENOUS BLD VENIPUNCTURE: CPT

## 2019-05-08 ENCOUNTER — NON-PROVIDER VISIT (OUTPATIENT)
Dept: MEDICAL GROUP | Facility: PHYSICIAN GROUP | Age: 69
End: 2019-05-08
Payer: COMMERCIAL

## 2019-05-08 VITALS — BODY MASS INDEX: 34.62 KG/M2 | WEIGHT: 277 LBS

## 2019-05-08 DIAGNOSIS — E11.65 UNCONTROLLED TYPE 2 DIABETES MELLITUS WITH HYPERGLYCEMIA (HCC): ICD-10-CM

## 2019-05-08 NOTE — NON-PROVIDER
New Patient Consult Note  Primary care physician: Kit Torres P.A.-C.    Reason for consult: Management of Uncontrolled Type 2 Diabetes    HPI:  Kun Lisa is a 68 y.o. old patient who comes in today for evaluation of above stated problem.    Most Recent HbA1c:   Lab Results   Component Value Date/Time    HBA1C 7.9 (H) 04/29/2019 10:09 AM        Current Diabetes Regimen:    SGLT-2 Inhibitor:  Empagliflozin 25 mg once daily   Metformin not tolerated     Other: Januivia 100mg po bid    Before Breakfast: pt does not routinely test FSBG  Before Lunch:  Before Dinner:  Before Bedtime:  Other times:  Hypoglycemia:  None      ROS:  Constitutional: 4 lbs weight loss in the past week  Cardiac: No palpitations or racing heart  Resp: No shortness of breath  Neuro: No numbness or tinging in feet  Endo: No heat or cold intolerance, no polyuria or polydipsia  All other systems were reviewed and were negative.    Past Medical History:  Patient Active Problem List    Diagnosis Date Noted   • Chronic pain of right thumb 04/12/2019   • Acute pain of left knee 04/12/2019   • Idiopathic acute pancreatitis without infection or necrosis 01/14/2019   • Abdominal aortic aneurysm (AAA) 3.0 cm to 5.5 cm in diameter in male ([facility]) 01/14/2019   • Obesity (BMI 35.0-39.9 without comorbidity) ([facility]) 03/01/2018   • Coronary artery disease 04/27/2017   • Abnormal nuclear stress test 04/27/2017   • Coronary artery disease due to calcified coronary lesion: Stent to distal circumflex, patent LAD stent and  of RCA in April 2017 01/20/2017   • Palpitations 01/20/2017   • Obstructive sleep apnea syndrome 11/22/2016   • Type 2 diabetes mellitus with neurologic complication ([facility]) 10/25/2016   • Dyslipidemia 10/25/2016   • Essential hypertension 10/25/2016   • Arrhythmia 10/25/2016   • Neuropathy ([facility]) 10/25/2016       Past Surgical History:  Past Surgical History:   Procedure Laterality Date   • Z CARDIAC CATH  4/27/17    Synergy stent to  Circ   • STENT PLACEMENT  1997    LAD   • ZZZ CARDIAC CATH  1997    LAD stent   • ANGIOPLASTY         Allergies:  Metformin    Social History:  Social History     Social History   • Marital status: Single     Spouse name: N/A   • Number of children: N/A   • Years of education: N/A     Occupational History   • Not on file.     Social History Main Topics   • Smoking status: Former Smoker     Packs/day: 1.00     Years: 32.00     Types: Cigarettes     Quit date: 6/1/2003   • Smokeless tobacco: Never Used   • Alcohol use No      Comment: rarely   • Drug use: Yes     Frequency: 7.0 times per week     Types: Marijuana      Comment: medical marijuana   • Sexual activity: No     Other Topics Concern   • Not on file     Social History Narrative   • No narrative on file       Family History:  Family History   Problem Relation Age of Onset   • Stroke Father 57   • Arthritis Mother    • Stroke Mother        Medications:    Current Outpatient Prescriptions:   •  tramadol (ULTRAM) 50 MG Tab, TAKE 1 TABLET BY MOUTH EVERY 4 HOURS AS NEEDED. FOR A 90 DAY SUPPLY, Disp: 180 Tab, Rfl: 0  •  Empagliflozin (JARDIANCE) 25 MG Tab, Take 1 tablet by mouth every day., Disp: 90 Tab, Rfl: 3  •  metoprolol SR (TOPROL XL) 50 MG TABLET SR 24 HR, Take 1 Tab by mouth every day., Disp: 90 Tab, Rfl: 3  •  SITagliptin (JANUVIA) 100 MG Tab, Take 1 Tab by mouth every day., Disp: 90 Tab, Rfl: 3  •  ramipril (ALTACE) 10 MG capsule, TAKE 1 CAPSULE DAILY, Disp: 90 Cap, Rfl: 1  •  rosuvastatin (CRESTOR) 20 MG Tab, TAKE 1 TABLET EVERY EVENING, Disp: 90 Tab, Rfl: 1  •  Omega-3 Fatty Acids (FISH OIL) 1000 MG Cap capsule, Take 1,000 mg by mouth every day., Disp: , Rfl:   •  Multiple Vitamins-Minerals (DAILY MULTIVITAMIN PO), Take 1 Tab by mouth every day., Disp: , Rfl:   •  baclofen (LIORESAL) 20 MG tablet, Take 20 mg by mouth as needed. Indications: Muscle Spasticity, Disp: , Rfl:   •  Glucosamine-Chondroitin (GLUCOSAMINE CHONDROITIN COMPLX) 500-250 MG Cap, Take  2 Tabs by mouth every day., Disp: , Rfl:     Labs: Reviewed    Physical Examination:  Vital signs: Wt (!) 125.6 kg (277 lb)   BMI 34.62 kg/m²  Body mass index is 34.62 kg/m².  General: No apparent distress, cooperative  Eyes: No scleral icterus or discharge  ENMT: Normal on external inspection of nose, lips, normal thyroid exam  Neck: No abnormal masses on inspection  Resp: Normal effort, clear to auscultation bilaterally   CVS: Regular rate and rhythm, S1 S2 normal, no murmur   Extremities: No edema  Abdomen: abdominal obesity present  Neuro: Alert and oriented  Skin: No rash  Psych: Normal mood and affect, intact memory and able to make informed decisions    Assessment and Plan:    Pt is tolerating Jardiance 25mg po daily  Pt will continue Januvia 100 po bid.  Pt continues to play golf, completing 18 holes yesterday.  Pt is meeting his exercise goals of 30 minutes daily 5-7 days per week.  Pt continues to lose weight at an appropriate rate, having lost 4 lbs in the past week.  Pt has not been as vigilant with his dietary choices, but will continue to strive to do better.      No Follow-up on file.    Thank you for allowing me to participate in the care of this patient.    Evelia Melara  05/08/19    CC:   Kit Torres P.A.-C.    This note was created using voice recognition software (Dragon). The accuracy of the dictation is limited by the abilities of the software. I have reviewed the note prior to signing, however some errors in grammar and context are still possible. If you have any questions related to this note please do not hesitate to contact our office.

## 2019-07-08 DIAGNOSIS — E78.5 DYSLIPIDEMIA: ICD-10-CM

## 2019-07-08 DIAGNOSIS — I10 ESSENTIAL HYPERTENSION: ICD-10-CM

## 2019-07-08 DIAGNOSIS — I25.84 CORONARY ARTERY DISEASE DUE TO CALCIFIED CORONARY LESION: ICD-10-CM

## 2019-07-08 DIAGNOSIS — I25.10 CORONARY ARTERY DISEASE DUE TO CALCIFIED CORONARY LESION: ICD-10-CM

## 2019-07-15 RX ORDER — RAMIPRIL 10 MG/1
CAPSULE ORAL
Qty: 90 CAP | Refills: 1 | Status: SHIPPED | OUTPATIENT
Start: 2019-07-15 | End: 2020-01-10 | Stop reason: SDUPTHER

## 2019-07-15 RX ORDER — ROSUVASTATIN CALCIUM 20 MG/1
TABLET, COATED ORAL
Qty: 90 TAB | Refills: 1 | Status: SHIPPED | OUTPATIENT
Start: 2019-07-15 | End: 2020-01-10 | Stop reason: SDUPTHER

## 2019-07-16 ENCOUNTER — OFFICE VISIT (OUTPATIENT)
Dept: CARDIOLOGY | Facility: MEDICAL CENTER | Age: 69
End: 2019-07-16
Payer: COMMERCIAL

## 2019-07-16 VITALS
HEIGHT: 75 IN | HEART RATE: 52 BPM | DIASTOLIC BLOOD PRESSURE: 68 MMHG | WEIGHT: 272 LBS | SYSTOLIC BLOOD PRESSURE: 108 MMHG | OXYGEN SATURATION: 94 % | BODY MASS INDEX: 33.82 KG/M2

## 2019-07-16 DIAGNOSIS — I10 ESSENTIAL HYPERTENSION: ICD-10-CM

## 2019-07-16 DIAGNOSIS — I25.10 CORONARY ARTERY DISEASE DUE TO CALCIFIED CORONARY LESION: ICD-10-CM

## 2019-07-16 DIAGNOSIS — I71.40 ABDOMINAL AORTIC ANEURYSM (AAA) 3.0 CM TO 5.5 CM IN DIAMETER IN MALE (HCC): ICD-10-CM

## 2019-07-16 DIAGNOSIS — E78.5 DYSLIPIDEMIA: ICD-10-CM

## 2019-07-16 DIAGNOSIS — I25.84 CORONARY ARTERY DISEASE DUE TO CALCIFIED CORONARY LESION: ICD-10-CM

## 2019-07-16 PROCEDURE — 99214 OFFICE O/P EST MOD 30 MIN: CPT | Performed by: INTERNAL MEDICINE

## 2019-07-16 NOTE — PROGRESS NOTES
Chief Complaint   Patient presents with   • Coronary Artery Disease     F/V: 6 MO/ LABS IN EPIC       Subjective:   Kun Lisa is a 69 y.o. male who presents today for follow-up evaluation because of history of coronary artery disease. He also has hypertension and dyslipidemia in addition to type II diabetes. Stent to distal circumflex, patent LAD stent and  of RCA in April 2017.     He has been changed from glipizide to Jardiance.    He denies any chest discomfort, dyspnea on exertion, PND orthopnea.  He rarely notes some dependent edema if he travels.  He has a couple of episodes of palpitations every 6 months.  These seem to last only seconds.  He does have some orthostatic lightheadedness.      Past Medical History:   Diagnosis Date   • Acid reflux    • Arrhythmia    • CAD (coronary artery disease)     stent   • Diabetes (HCC)     oral meds   • Diabetic neuropathy (HCC)     bilat feet   • Hyperlipidemia    • Hypertension    • Obstructive sleep apnea     CPAP   • Palpitations    • Past heart attack     1997   • Psychiatric problem     depression   • Sleep apnea     CPAP   • Snoring      Past Surgical History:   Procedure Laterality Date   • ZZZ CARDIAC CATH  4/27/17    Synergy stent to Circ   • STENT PLACEMENT  1997    LAD   • ZZZ CARDIAC CATH  1997    LAD stent   • ANGIOPLASTY       Family History   Problem Relation Age of Onset   • Stroke Father 57   • Arthritis Mother    • Stroke Mother      Social History     Social History   • Marital status: Single     Spouse name: N/A   • Number of children: N/A   • Years of education: N/A     Occupational History   • Not on file.     Social History Main Topics   • Smoking status: Former Smoker     Packs/day: 1.00     Years: 32.00     Types: Cigarettes     Quit date: 6/1/2003   • Smokeless tobacco: Never Used   • Alcohol use No      Comment: rarely   • Drug use: Yes     Frequency: 7.0 times per week     Types: Marijuana      Comment: medical marijuana   • Sexual  "activity: No     Other Topics Concern   • Not on file     Social History Narrative   • No narrative on file     Allergies   Allergen Reactions   • Metformin Unspecified     Stomach upset and acid reflux     Outpatient Encounter Prescriptions as of 7/16/2019   Medication Sig Dispense Refill   • rosuvastatin (CRESTOR) 20 MG Tab TAKE 1 TABLET EVERY EVENING 90 Tab 1   • ramipril (ALTACE) 10 MG capsule TAKE 1 CAPSULE DAILY 90 Cap 1   • tramadol (ULTRAM) 50 MG Tab TAKE 1 TABLET BY MOUTH EVERY 4 HOURS AS NEEDED. FOR A 90 DAY SUPPLY (Patient taking differently: Take 50 mg by mouth 2 Times a Day.) 180 Tab 0   • Empagliflozin (JARDIANCE) 25 MG Tab Take 1 tablet by mouth every day. 90 Tab 3   • metoprolol SR (TOPROL XL) 50 MG TABLET SR 24 HR Take 1 Tab by mouth every day. 90 Tab 3   • SITagliptin (JANUVIA) 100 MG Tab Take 1 Tab by mouth every day. 90 Tab 3   • Multiple Vitamins-Minerals (DAILY MULTIVITAMIN PO) Take 1 Tab by mouth 2 Times a Day.     • [DISCONTINUED] Omega-3 Fatty Acids (FISH OIL) 1000 MG Cap capsule Take 1,000 mg by mouth every day.     • baclofen (LIORESAL) 20 MG tablet Take 20 mg by mouth as needed. Indications: Muscle Spasticity     • [DISCONTINUED] Glucosamine-Chondroitin (GLUCOSAMINE CHONDROITIN COMPLX) 500-250 MG Cap Take 2 Tabs by mouth every day.       No facility-administered encounter medications on file as of 7/16/2019.      ROS     Objective:   /68 (BP Location: Left arm, Patient Position: Sitting, BP Cuff Size: Adult)   Pulse (!) 52   Ht 1.905 m (6' 3\")   Wt 123.4 kg (272 lb)   SpO2 94%   BMI 34.00 kg/m²      Physical Exam   Constitutional: He appears well-developed and well-nourished.   Neck: No JVD present.   Cardiovascular: Normal rate and regular rhythm.    No murmur heard.  Pulmonary/Chest: Effort normal and breath sounds normal. He has no rales.   Abdominal: Soft. There is no tenderness.   Musculoskeletal: He exhibits no edema.     Lab Results   Component Value Date/Time    " CHOLSTRLTOT 131 01/14/2019 08:58 AM    LDL 57 01/14/2019 08:58 AM    HDL 37 (A) 01/14/2019 08:58 AM    TRIGLYCERIDE 187 (H) 01/14/2019 08:58 AM       Lab Results   Component Value Date/Time    SODIUM 138 01/14/2019 08:58 AM    POTASSIUM 4.3 01/14/2019 08:58 AM    CHLORIDE 104 01/14/2019 08:58 AM    CO2 25 01/14/2019 08:58 AM    GLUCOSE 163 (H) 01/14/2019 08:58 AM    BUN 12 01/14/2019 08:58 AM    CREATININE 1.03 01/14/2019 08:58 AM     Lab Results   Component Value Date/Time    ALKPHOSPHAT 47 01/14/2019 08:58 AM    ASTSGOT 16 01/14/2019 08:58 AM    ALTSGPT 22 01/14/2019 08:58 AM    TBILIRUBIN 0.5 01/14/2019 08:58 AM      No results found for: BNPBTYPENAT     Abdominal ultrasound:  Impression       1.  No sonographic evidence for biliary obstruction    2.  Distal aortic aneurysm with a maximum diameter of 5.5 cm.    3.  Increased hepatic echotexture is likely related to fatty infiltration. Hepatocellular disease can have a similar appearance.   Reading Provider Reading Date   Verna Rabago M.D. Dec 28, 2018           Assessment:     1. Coronary artery disease due to calcified coronary lesion: Stent to distal circumflex, patent LAD stent and  of RCA in April 2017     2. Dyslipidemia     3. Essential hypertension     4. Abdominal aortic aneurysm (AAA) 3.0 cm to 5.5 cm in diameter in male (HCC)         Medical Decision Making:  Today's Assessment / Status / Plan:     Mr. Lisa is clinically stable.  His lipid status and blood pressure appear to be under good control.  He is asymptomatic from a cardiovascular standpoint.  He has joined a gym and he is advised to exercise at least 3 times a week.  He is going to follow-up with vascular surgery with respect to his abdominal aortic aneurysm.  He will follow-up with cardiology in about a year.

## 2019-07-16 NOTE — LETTER
[facility]   [address]  [place] [address]  Phone: [phone]  Fax: [phone]              Kun Lisa  1950    Encounter Date: 7/16/2019    Marbin Haddad M.D.          PROGRESS NOTE:  Chief Complaint   Patient presents with   • Coronary Artery Disease     F/V: 6 MO/ LABS IN EPIC       Subjective:   Kun Lisa is a 69 y.o. male who presents today for follow-up evaluation because of history of coronary artery disease. He also has hypertension and dyslipidemia in addition to type II diabetes. Stent to distal circumflex, patent LAD stent and  of RCA in April 2017.     He has been changed from glipizide to Jardiance.    He denies any chest discomfort, dyspnea on exertion, PND orthopnea.  He rarely notes some dependent edema if he travels.  He has a couple of episodes of palpitations every 6 months.  These seem to last only seconds.  He does have some orthostatic lightheadedness.      Past Medical History:   Diagnosis Date   • Acid reflux    • Arrhythmia    • CAD (coronary artery disease)     stent   • Diabetes (HCC)     oral meds   • Diabetic neuropathy ([facility])     bilat feet   • Hyperlipidemia    • Hypertension    • Obstructive sleep apnea     CPAP   • Palpitations    • Past heart attack     1997   • Psychiatric problem     depression   • Sleep apnea     CPAP   • Snoring      Past Surgical History:   Procedure Laterality Date   • ZZZ CARDIAC CATH  4/27/17    Synergy stent to Circ   • STENT PLACEMENT  1997    LAD   • ZZZ CARDIAC CATH  1997    LAD stent   • ANGIOPLASTY       Family History   Problem Relation Age of Onset   • Stroke Father 57   • Arthritis Mother    • Stroke Mother      Social History     Social History   • Marital status: Single     Spouse name: N/A   • Number of children: N/A   • Years of education: N/A     Occupational History   • Not on file.     Social History Main Topics   • Smoking status: Former Smoker    "Packs/day: 1.00     Years: 32.00     Types: Cigarettes     Quit date: 6/1/2003   • Smokeless tobacco: Never Used   • Alcohol use No      Comment: rarely   • Drug use: Yes     Frequency: 7.0 times per week     Types: Marijuana      Comment: medical marijuana   • Sexual activity: No     Other Topics Concern   • Not on file     Social History Narrative   • No narrative on file     Allergies   Allergen Reactions   • Metformin Unspecified     Stomach upset and acid reflux     Outpatient Encounter Prescriptions as of 7/16/2019   Medication Sig Dispense Refill   • rosuvastatin (CRESTOR) 20 MG Tab TAKE 1 TABLET EVERY EVENING 90 Tab 1   • ramipril (ALTACE) 10 MG capsule TAKE 1 CAPSULE DAILY 90 Cap 1   • tramadol (ULTRAM) 50 MG Tab TAKE 1 TABLET BY MOUTH EVERY 4 HOURS AS NEEDED. FOR A 90 DAY SUPPLY (Patient taking differently: Take 50 mg by mouth 2 Times a Day.) 180 Tab 0   • Empagliflozin (JARDIANCE) 25 MG Tab Take 1 tablet by mouth every day. 90 Tab 3   • metoprolol SR (TOPROL XL) 50 MG TABLET SR 24 HR Take 1 Tab by mouth every day. 90 Tab 3   • SITagliptin (JANUVIA) 100 MG Tab Take 1 Tab by mouth every day. 90 Tab 3   • Multiple Vitamins-Minerals (DAILY MULTIVITAMIN PO) Take 1 Tab by mouth 2 Times a Day.     • [DISCONTINUED] Omega-3 Fatty Acids (FISH OIL) 1000 MG Cap capsule Take 1,000 mg by mouth every day.     • baclofen (LIORESAL) 20 MG tablet Take 20 mg by mouth as needed. Indications: Muscle Spasticity     • [DISCONTINUED] Glucosamine-Chondroitin (GLUCOSAMINE CHONDROITIN COMPLX) 500-250 MG Cap Take 2 Tabs by mouth every day.       No facility-administered encounter medications on file as of 7/16/2019.      ROS     Objective:   /68 (BP Location: Left arm, Patient Position: Sitting, BP Cuff Size: Adult)   Pulse (!) 52   Ht 1.905 m (6' 3\")   Wt 123.4 kg (272 lb)   SpO2 94%   BMI 34.00 kg/m²      Physical Exam   Constitutional: He appears well-developed and well-nourished.   Neck: No JVD present.   "   Cardiovascular: Normal rate and regular rhythm.    No murmur heard.  Pulmonary/Chest: Effort normal and breath sounds normal. He has no rales.   Abdominal: Soft. There is no tenderness.   Musculoskeletal: He exhibits no edema.     Lab Results   Component Value Date/Time    CHOLSTRLTOT 131 01/14/2019 08:58 AM    LDL 57 01/14/2019 08:58 AM    HDL 37 (A) 01/14/2019 08:58 AM    TRIGLYCERIDE 187 (H) 01/14/2019 08:58 AM       Lab Results   Component Value Date/Time    SODIUM 138 01/14/2019 08:58 AM    POTASSIUM 4.3 01/14/2019 08:58 AM    CHLORIDE 104 01/14/2019 08:58 AM    CO2 25 01/14/2019 08:58 AM    GLUCOSE 163 (H) 01/14/2019 08:58 AM    BUN 12 01/14/2019 08:58 AM    CREATININE 1.03 01/14/2019 08:58 AM     Lab Results   Component Value Date/Time    ALKPHOSPHAT 47 01/14/2019 08:58 AM    ASTSGOT 16 01/14/2019 08:58 AM    ALTSGPT 22 01/14/2019 08:58 AM    TBILIRUBIN 0.5 01/14/2019 08:58 AM      No results found for: BNPBTYPENAT     Abdominal ultrasound:  Impression       1.  No sonographic evidence for biliary obstruction    2.  Distal aortic aneurysm with a maximum diameter of 5.5 cm.    3.  Increased hepatic echotexture is likely related to fatty infiltration. Hepatocellular disease can have a similar appearance.   Reading Provider Reading Date   Verna Rabago M.D. Dec 28, 2018           Assessment:     1. Coronary artery disease due to calcified coronary lesion: Stent to distal circumflex, patent LAD stent and  of RCA in April 2017     2. Dyslipidemia     3. Essential hypertension     4. Abdominal aortic aneurysm (AAA) 3.0 cm to 5.5 cm in diameter in male (HCC)         Medical Decision Making:  Today's Assessment / Status / Plan:     Mr. Lisa is clinically stable.  His lipid status and blood pressure appear to be under good control.  He is asymptomatic from a cardiovascular standpoint.  He has joined a gym and he is advised to exercise at least 3 times a week.  He is going to follow-up with vascular surgery  with respect to his abdominal aortic aneurysm.  He will follow-up with cardiology in about a year.      SE[facility]  [address]  [address]  [facility] [address]  VIA In Basket

## 2019-08-07 ENCOUNTER — NON-PROVIDER VISIT (OUTPATIENT)
Dept: MEDICAL GROUP | Facility: PHYSICIAN GROUP | Age: 69
End: 2019-08-07
Payer: COMMERCIAL

## 2019-08-07 ENCOUNTER — ANTICOAGULATION MONITORING (OUTPATIENT)
Dept: CARDIOLOGY | Facility: PHYSICIAN GROUP | Age: 69
End: 2019-08-07

## 2019-08-07 VITALS — SYSTOLIC BLOOD PRESSURE: 128 MMHG | WEIGHT: 270.5 LBS | BODY MASS INDEX: 33.81 KG/M2 | DIASTOLIC BLOOD PRESSURE: 70 MMHG

## 2019-08-07 DIAGNOSIS — E11.9 TYPE 2 DIABETES MELLITUS WITHOUT COMPLICATION, WITHOUT LONG-TERM CURRENT USE OF INSULIN (HCC): ICD-10-CM

## 2019-08-07 LAB
GLUCOSE BLD-MCNC: 163 MG/DL (ref 70–100)
HBA1C MFR BLD: 7.2 % (ref 0–5.6)
INT CON NEG: ABNORMAL
INT CON POS: ABNORMAL

## 2019-08-07 PROCEDURE — 83036 HEMOGLOBIN GLYCOSYLATED A1C: CPT | Performed by: FAMILY MEDICINE

## 2019-08-07 PROCEDURE — 99402 PREV MED CNSL INDIV APPRX 30: CPT | Performed by: FAMILY MEDICINE

## 2019-08-07 PROCEDURE — 82962 GLUCOSE BLOOD TEST: CPT | Performed by: FAMILY MEDICINE

## 2019-08-07 NOTE — NON-PROVIDER
New Patient Consult Note  Primary care physician: Kit Torres P.A.-C.  Start time: 0951  End time:10:38  Reason for consult: Management of Uncontrolled Type 2 Diabetes    HPI:  Kun Lisa is a 69 y.o. old patient who comes in today for evaluation of above stated problem.    Most Recent HbA1c:   Lab Results   Component Value Date/Time    HBA1C 7.2 (A) 08/07/2019    FSBG 163    Current Diabetes Regimen:    SGLT-2 Inhibitor:  Empagliflozin 25 mg once daily       Other: Januvia 100mg po qd    Before Breakfast: pt has not been testing  Before Lunch:  Before Dinner:  Before Bedtime:  Other times:  Hypoglycemia:  None      ROS:  Constitutional: pt has lost ~7 lbs since last visit  Cardiac: No palpitations or racing heart  Resp: No shortness of breath  Neuro: No numbness or tinging in feet  Endo: No heat or cold intolerance, no polyuria or polydipsia  All other systems were reviewed and were negative.    Past Medical History:  Patient Active Problem List    Diagnosis Date Noted   • Chronic pain of right thumb 04/12/2019   • Acute pain of left knee 04/12/2019   • Idiopathic acute pancreatitis without infection or necrosis 01/14/2019   • Abdominal aortic aneurysm (AAA) 3.0 cm to 5.5 cm in diameter in male ([facility]) 01/14/2019   • Obesity (BMI 35.0-39.9 without comorbidity) ([facility]) 03/01/2018   • Coronary artery disease 04/27/2017   • Abnormal nuclear stress test 04/27/2017   • Coronary artery disease due to calcified coronary lesion: Stent to distal circumflex, patent LAD stent and  of RCA in April 2017 01/20/2017   • Palpitations 01/20/2017   • Obstructive sleep apnea syndrome 11/22/2016   • Type 2 diabetes mellitus with neurologic complication ([facility]) 10/25/2016   • Dyslipidemia 10/25/2016   • Essential hypertension 10/25/2016   • Arrhythmia 10/25/2016   • Neuropathy ([facility]) 10/25/2016       Past Surgical History:  Past Surgical History:   Procedure Laterality Date   • Z CARDIAC CATH  4/27/17    Synergy stent to  Circ   • STENT PLACEMENT      LAD   • ZZZ CARDIAC CATH      LAD stent   • ANGIOPLASTY         Allergies:  Metformin    Social History:  Social History     Socioeconomic History   • Marital status: Single     Spouse name: Not on file   • Number of children: Not on file   • Years of education: Not on file   • Highest education level: Not on file   Occupational History   • Not on file   Social Needs   • Financial resource strain: Not on file   • Food insecurity:     Worry: Not on file     Inability: Not on file   • Transportation needs:     Medical: Not on file     Non-medical: Not on file   Tobacco Use   • Smoking status: Former Smoker     Packs/day: 1.00     Years: 32.00     Pack years: 32.00     Types: Cigarettes     Last attempt to quit: 2003     Years since quittin.1   • Smokeless tobacco: Never Used   Substance and Sexual Activity   • Alcohol use: No     Alcohol/week: 0.0 oz     Comment: rarely   • Drug use: Yes     Frequency: 7.0 times per week     Types: Marijuana     Comment: medical marijuana   • Sexual activity: Never   Lifestyle   • Physical activity:     Days per week: Not on file     Minutes per session: Not on file   • Stress: Not on file   Relationships   • Social connections:     Talks on phone: Not on file     Gets together: Not on file     Attends L[employer] service: Not on file     Active member of club or organization: Not on file     Attends meetings of clubs or organizations: Not on file     Relationship status: Not on file   • Intimate partner violence:     Fear of current or ex partner: Not on file     Emotionally abused: Not on file     Physically abused: Not on file     Forced sexual activity: Not on file   Other Topics Concern   • Not on file   Social History Narrative   • Not on file       Family History:  Family History   Problem Relation Age of Onset   • Stroke Father 57   • Arthritis Mother    • Stroke Mother        Medications:    Current Outpatient Medications:   •   "rosuvastatin (CRESTOR) 20 MG Tab, TAKE 1 TABLET EVERY EVENING, Disp: 90 Tab, Rfl: 1  •  ramipril (ALTACE) 10 MG capsule, TAKE 1 CAPSULE DAILY, Disp: 90 Cap, Rfl: 1  •  tramadol (ULTRAM) 50 MG Tab, TAKE 1 TABLET BY MOUTH EVERY 4 HOURS AS NEEDED. FOR A 90 DAY SUPPLY (Patient taking differently: Take 50 mg by mouth 2 Times a Day.), Disp: 180 Tab, Rfl: 0  •  Empagliflozin (JARDIANCE) 25 MG Tab, Take 1 tablet by mouth every day., Disp: 90 Tab, Rfl: 3  •  metoprolol SR (TOPROL XL) 50 MG TABLET SR 24 HR, Take 1 Tab by mouth every day., Disp: 90 Tab, Rfl: 3  •  SITagliptin (JANUVIA) 100 MG Tab, Take 1 Tab by mouth every day., Disp: 90 Tab, Rfl: 3  •  Multiple Vitamins-Minerals (DAILY MULTIVITAMIN PO), Take 1 Tab by mouth 2 Times a Day., Disp: , Rfl:   •  baclofen (LIORESAL) 20 MG tablet, Take 20 mg by mouth as needed. Indications: Muscle Spasticity, Disp: , Rfl:     Labs: Reviewed    Physical Examination:  Vital signs: /70   Wt 122.7 kg (270 lb 8 oz)   BMI 33.81 kg/m²  Body mass index is 33.81 kg/m².  General: No apparent distress, cooperative  Eyes: No scleral icterus or discharge  ENMT: Normal on external inspection of nose, lips, normal thyroid exam  Neck: No abnormal masses on inspection  Resp: Normal effort, clear to auscultation bilaterally   CVS: Regular rate and rhythm, S1 S2 normal, no murmur   Extremities: No edema  Abdomen: abdominal obesity present  Neuro: Alert and oriented  Skin: No rash  Psych: Normal mood and affect, intact memory and able to make informed decisions    Assessment and Plan:    Pt is currently optimized on Jardiance 25mg po daily  Pt is currently optimized on Januvia 100mg po daily  Pt A1c is down to 7.2 from 7.9 at last visit  Pt continues to meet his exercise goal of 30 minutes daily 5 x per week, golfing 2-3 times per week.  Pt continues to follow a common adult diet, focusing on portion control, and avoiding \"white foods\"  Pt is happy with his progress and generally feels " better.  BP is at goal.    Return in about 6 months (around 2/7/2020).    Thank you for allowing me to participate in the care of this patient.    Evelia Melara  08/07/19    CC:   Kit Torres P.A.-C.    This note was created using voice recognition software (Dragon). The accuracy of the dictation is limited by the abilities of the software. I have reviewed the note prior to signing, however some errors in grammar and context are still possible. If you have any questions related to this note please do not hesitate to contact our office.

## 2019-08-08 ENCOUNTER — TELEPHONE (OUTPATIENT)
Dept: CARDIOLOGY | Facility: MEDICAL CENTER | Age: 69
End: 2019-08-08

## 2019-08-08 NOTE — TELEPHONE ENCOUNTER
Kun Lisa Francis P, M.D. 4 hours ago (10:11 AM)         I had an ultrasound at [facility] on 8/5/19 to check on my abdominal aortic aneurysm. I was told results would be available the next day, but I've still not received any info on My Chart.      S/w [facility] who have no record in [facility] or their radiology system that any tests were performed on 8/5/19. The last order they have for pt was a RUQ US ordered by Dr. Resendiz on 12/27/18 at  urgent care, but this was not performed. Pt notified of this through [employer].

## 2019-08-13 ENCOUNTER — OFFICE VISIT (OUTPATIENT)
Dept: MEDICAL GROUP | Facility: MEDICAL CENTER | Age: 69
End: 2019-08-13
Payer: COMMERCIAL

## 2019-08-13 VITALS
BODY MASS INDEX: 33.44 KG/M2 | RESPIRATION RATE: 16 BRPM | TEMPERATURE: 97.8 F | HEIGHT: 75 IN | OXYGEN SATURATION: 94 % | WEIGHT: 268.96 LBS | SYSTOLIC BLOOD PRESSURE: 108 MMHG | HEART RATE: 58 BPM | DIASTOLIC BLOOD PRESSURE: 62 MMHG

## 2019-08-13 DIAGNOSIS — Z11.59 ENCOUNTER FOR HEPATITIS C SCREENING TEST FOR LOW RISK PATIENT: ICD-10-CM

## 2019-08-13 DIAGNOSIS — I71.40 ABDOMINAL AORTIC ANEURYSM (AAA) 3.0 CM TO 5.5 CM IN DIAMETER IN MALE (HCC): ICD-10-CM

## 2019-08-13 DIAGNOSIS — M62.830 MUSCLE SPASM OF BACK: ICD-10-CM

## 2019-08-13 DIAGNOSIS — I25.84 CORONARY ARTERY DISEASE DUE TO CALCIFIED CORONARY LESION: ICD-10-CM

## 2019-08-13 DIAGNOSIS — Z00.00 ANNUAL PHYSICAL EXAM: ICD-10-CM

## 2019-08-13 DIAGNOSIS — I25.10 CORONARY ARTERY DISEASE DUE TO CALCIFIED CORONARY LESION: ICD-10-CM

## 2019-08-13 PROBLEM — Z87.19 HISTORY OF ACUTE PANCREATITIS: Status: ACTIVE | Noted: 2019-01-14

## 2019-08-13 PROBLEM — E66.9 OBESITY (BMI 35.0-39.9 WITHOUT COMORBIDITY): Status: RESOLVED | Noted: 2018-03-01 | Resolved: 2019-08-13

## 2019-08-13 PROCEDURE — 99214 OFFICE O/P EST MOD 30 MIN: CPT | Performed by: PHYSICIAN ASSISTANT

## 2019-08-13 RX ORDER — BACLOFEN 20 MG/1
20 TABLET ORAL PRN
Qty: 90 TAB | Refills: 1 | Status: SHIPPED | OUTPATIENT
Start: 2019-08-13 | End: 2019-12-30

## 2019-08-13 NOTE — ASSESSMENT & PLAN NOTE
This is a 69-year-old male who is here today for an annual physical.  States he is waiting on the results of his abdominal ultrasound ordered by surgery for his abdominal aortic aneurysm.  He did not bring in the paperwork.  Is yet to be contacted by Dr. Mcik Barrett.  Denies any abdominal pain.  Has the results but is unsure how to read them.  Requesting refill of baclofen.  Takes the medication intermittently maybe once or twice a month for low back spasming.  Is currently using medical marijuana for his chronic pain of his back and other joints.  States that he is trying to stop use of tramadol.  Is not requesting a renewal today.  Has an appointment with a new cardiologist in September Dr. Baca.  Is not on aspirin or Plavix for his history of stent.  Per the previous note by Dr. Rhodes who is retiring there is no mention of starting an anti-coagulant.

## 2019-08-13 NOTE — PROGRESS NOTES
Subjective:   Kun Lisa is a 69 y.o. male here today for annual physical.    Annual physical exam  This is a 69-year-old male who is here today for an annual physical.  States he is waiting on the results of his abdominal ultrasound ordered by surgery for his abdominal aortic aneurysm.  He did not bring in the paperwork.  Is yet to be contacted by Dr. Mick Barrett.  Denies any abdominal pain.  Has the results but is unsure how to read them.  Requesting refill of baclofen.  Takes the medication intermittently maybe once or twice a month for low back spasming.  Is currently using medical marijuana for his chronic pain of his back and other joints.  States that he is trying to stop use of tramadol.  Is not requesting a renewal today.  Has an appointment with a new cardiologist in September Dr. Baca.  Is not on aspirin or Plavix for his history of stent.  Per the previous note by Dr. Rhodse who is retiring there is no mention of starting an anti-coagulant.       Current medicines (including changes today)  Current Outpatient Medications   Medication Sig Dispense Refill   • baclofen (LIORESAL) 20 MG tablet Take 1 Tab by mouth as needed. Indications: Muscle Spasticity 90 Tab 1   • rosuvastatin (CRESTOR) 20 MG Tab TAKE 1 TABLET EVERY EVENING 90 Tab 1   • ramipril (ALTACE) 10 MG capsule TAKE 1 CAPSULE DAILY 90 Cap 1   • tramadol (ULTRAM) 50 MG Tab TAKE 1 TABLET BY MOUTH EVERY 4 HOURS AS NEEDED. FOR A 90 DAY SUPPLY (Patient taking differently: Take 50 mg by mouth 2 Times a Day.) 180 Tab 0   • Empagliflozin (JARDIANCE) 25 MG Tab Take 1 tablet by mouth every day. 90 Tab 3   • metoprolol SR (TOPROL XL) 50 MG TABLET SR 24 HR Take 1 Tab by mouth every day. 90 Tab 3   • SITagliptin (JANUVIA) 100 MG Tab Take 1 Tab by mouth every day. 90 Tab 3   • Multiple Vitamins-Minerals (DAILY MULTIVITAMIN PO) Take 1 Tab by mouth 2 Times a Day.       No current facility-administered medications for this visit.      He  has a past  "medical history of Acid reflux, Arrhythmia, CAD (coronary artery disease), Diabetes (HCC), Diabetic neuropathy (HCC), Hyperlipidemia, Hypertension, Obstructive sleep apnea, Palpitations, Past heart attack, Psychiatric problem, Sleep apnea, and Snoring.    Social History and Family History were reviewed and updated.    ROS   No chest pain, no shortness of breath, no abdominal pain and all other systems were reviewed and are negative.       Objective:     /62 (BP Location: Left arm, Patient Position: Sitting, BP Cuff Size: Adult)   Pulse (!) 58   Temp 36.6 °C (97.8 °F) (Temporal)   Resp 16   Ht 1.905 m (6' 3\")   Wt 122 kg (268 lb 15.4 oz)   SpO2 94%  Body mass index is 33.62 kg/m².   Physical Exam:  Constitutional: Alert, no distress.  Skin: Warm, dry, good turgor, no rashes in visible areas.  Eye: Equal, round and reactive, conjunctiva clear, lids normal.  ENMT: Lips without lesions, good dentition, oropharynx clear.  Neck: Trachea midline, no masses.   Lymph: No cervical or supraclavicular lymphadenopathy  Respiratory: Unlabored respiratory effort, lungs clear to auscultation, no wheezes, no ronchi.  Cardiovascular: Normal S1, S2, no murmur, no edema.  Psych: Alert and oriented x3, normal affect and mood.        Assessment and Plan:   The following treatment plan was discussed    1. Annual physical exam  Health maintenance items appear to be updated.  Discussed vaccinations such as Shingrix.  Advised to return in February 2 monitor diabetic levels.    2. Abdominal aortic aneurysm (AAA) 3.0 cm to 5.5 cm in diameter in male (HCC)  Chronic condition.  Status unknown.  Follow with surgery regarding ultrasound results.  Advised him if he is not satisfied with his current provider I can refer him to a new referral.    3. Muscle spasm of back  Chronic condition.  Stable.  Renew baclofen as directed.  Takes medication intermittently.  Continue exercise and weight loss.  - baclofen (LIORESAL) 20 MG tablet; Take 1 " Tab by mouth as needed. Indications: Muscle Spasticity  Dispense: 90 Tab; Refill: 1    4. Coronary artery disease due to calcified coronary lesion: Stent to distal circumflex, patent LAD stent and  of RCA in April 2017  Chronic condition.  Stable.  Follow with new cardiologist in September.  Discuss anticoagulant at that appointment.    5. Encounter for hepatitis C screening test for low risk patient  Order hep C viral antibody.  - HEP C VIRUS ANTIBODY; Future      Followup: Return if symptoms worsen or fail to improve.    Please note that this dictation was created using voice recognition software. I have made every reasonable attempt to correct obvious errors, but I expect that there are errors of grammar and possibly content that I did not discover before finalizing the note.

## 2019-09-20 ENCOUNTER — OFFICE VISIT (OUTPATIENT)
Dept: CARDIOLOGY | Facility: CLINIC | Age: 69
End: 2019-09-20
Payer: COMMERCIAL

## 2019-09-20 VITALS
OXYGEN SATURATION: 94 % | HEART RATE: 64 BPM | DIASTOLIC BLOOD PRESSURE: 70 MMHG | HEIGHT: 75 IN | BODY MASS INDEX: 32.58 KG/M2 | WEIGHT: 262 LBS | SYSTOLIC BLOOD PRESSURE: 112 MMHG

## 2019-09-20 DIAGNOSIS — I10 ESSENTIAL HYPERTENSION: ICD-10-CM

## 2019-09-20 DIAGNOSIS — E78.5 DYSLIPIDEMIA: ICD-10-CM

## 2019-09-20 DIAGNOSIS — E11.9 TYPE 2 DIABETES MELLITUS WITHOUT COMPLICATION, WITHOUT LONG-TERM CURRENT USE OF INSULIN (HCC): ICD-10-CM

## 2019-09-20 DIAGNOSIS — I71.40 ABDOMINAL AORTIC ANEURYSM (AAA) 3.0 CM TO 5.5 CM IN DIAMETER IN MALE (HCC): ICD-10-CM

## 2019-09-20 DIAGNOSIS — R94.39 ABNORMAL NUCLEAR STRESS TEST: ICD-10-CM

## 2019-09-20 DIAGNOSIS — R00.2 PALPITATIONS: ICD-10-CM

## 2019-09-20 DIAGNOSIS — I71.40 ABDOMINAL AORTIC ANEURYSM (AAA) WITHOUT RUPTURE (HCC): ICD-10-CM

## 2019-09-20 DIAGNOSIS — Z87.891 FORMER SMOKER: ICD-10-CM

## 2019-09-20 DIAGNOSIS — I25.84 CORONARY ARTERY DISEASE DUE TO CALCIFIED CORONARY LESION: ICD-10-CM

## 2019-09-20 DIAGNOSIS — I25.10 CORONARY ARTERY DISEASE DUE TO CALCIFIED CORONARY LESION: ICD-10-CM

## 2019-09-20 DIAGNOSIS — I45.10 RBBB: ICD-10-CM

## 2019-09-20 DIAGNOSIS — G47.33 OSA ON CPAP: ICD-10-CM

## 2019-09-20 DIAGNOSIS — I49.3 PVC (PREMATURE VENTRICULAR CONTRACTION): ICD-10-CM

## 2019-09-20 PROCEDURE — 99214 OFFICE O/P EST MOD 30 MIN: CPT | Performed by: INTERNAL MEDICINE

## 2019-09-20 RX ORDER — IBUPROFEN 200 MG
200 TABLET ORAL EVERY 6 HOURS PRN
COMMUNITY
End: 2020-07-21

## 2019-09-20 RX ORDER — TRAMADOL HYDROCHLORIDE 50 MG/1
50 TABLET ORAL EVERY MORNING
COMMUNITY
End: 2020-02-10

## 2019-09-20 NOTE — LETTER
[facility] [address],   2nd Floor  [place] [address]  Phone: [phone]  Fax: [phone]              Kun Lisa  1950    Encounter Date: 9/20/2019    Myra Baca M.D.          PROGRESS NOTE:  Subjective:   Chief Complaint:   Chief Complaint   Patient presents with   • Coronary Artery Disease       Kun Lisa is a 69 y.o. male who returns for coronary artery disease. Prior MI 1997 with stents to LAD and Circ, then   of RCA in April 2017 after abnormal nuc, bradycardia, RBBB.    He recalls going through divorce in 1997, had C with stent to LAD and Circ. Went to ED with CP, sounds like UA and mild MI.  Was placed on meds.  Then had second stent in 2017 after abnormal stress test, led to RCA. Not sure if opening RCA helped with symptoms.    He is not limited by chest pain, pressure or tightness.   No significant dyspnea on exertion.   No orthopnea or significant lower extremity swelling.   Rare palpitations, maybe once a year, not limiting, lies down, takes deep breath, lasts up to 1 min.  Also known to have PVCs that he does feel from time to time.  No dizziness, or presyncope/syncope.   No symptoms of leg claudication.   No stroke/TIA like symptoms.    Lost weight, stays active, golfs twice a week, walking and riding.    Has HTN, BP controlled on 2 meds.  Has DM on Jardiance  On BB, slow HR, no symptoms  Was taken off ASA, will restart.  Has HLP, LDL 57, HDL 37, , , crestor 20   Prior smoker for 32 years, stopped in 2003.   Has SID, using CPAP, feels much better on this.  Has AAA, infrarenal, 5.3 cm, will see Dr. Gonzalez.    Father had MI in his 50s, 62, passed away.  Mother lived to 93, smoker, dementia.    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    DATA REVIEWED by me:  ECG 4-27-17  Sinus bradycardia, 45, first degree AV delay, RBBB    Holter  6-19-17  Sinus, PVCs 7,589, 1 atrial run, some PACs, no rhythm changes.    Nuc 4-4-17  Small sized, partially reversible, decreased uptake of moderate severity in  the apical inferior (RCA), mid inferior (RCA) segments during post stress  images.    Medium sized, partially reversible, decreased uptake of moderate severity in  the apical lateral (LCX), inferolateral (LCX) and mid anterolateral (LCX)  segments during post stress images.   Normal left ventricular wall motion.  LV ejection fraction = 62%.    CT abd  1.  Infrarenal abdominal aortic aneurysm with a maximum diameter of 5.3 cm.  2.  Peripancreatic inflammatory changes are consistent with known pancreatitis.  3.  Enlarged peripancreatic lymph node, likely reactive.  4.  Diverticulosis.    Echo none    [facility] 4-27-17  POSTPROCEDURE DIAGNOSES:  1.  Coronary artery disease including patent stent in the mid left anterior descending artery with nonobstructive in-stent restenosis, high-grade distal circumflex artery of a codominant system supplying excellent collaterals to mid to distal right coronary artery with long chronic total occlusion of the   ostial to mid right coronary artery.  2.  Successful percutaneous transluminal coronary angioplasty/stent placement of the distal circumflex artery with 2.5x16 mm Synergy drug-eluting stent.    Most recent labs:     8-7-19 Ha1c 7.2    1-14-19 na 138, k 4.3, cr 1.03, lft normal LDL 57, HDL 37, ,     12-28-18 h 16, p 173      Past Medical History:   Diagnosis Date   • Acid reflux    • Arrhythmia    • CAD (coronary artery disease)     stent   • Diabetes (HCC)     oral meds   • Diabetic neuropathy (HCC)     bilat feet   • Hyperlipidemia    • Hypertension    • Obstructive sleep apnea     CPAP   • Palpitations    • Past heart attack     1997   • Psychiatric problem     depression   • Sleep apnea     CPAP   • Snoring      Past Surgical History:   Procedure Laterality Date   • TODD CARDIAC CATH  4/27/17    Gwyn  stent to Circ   • STENT PLACEMENT      LAD   • ZZZ CARDIAC CATH      LAD stent   • ANGIOPLASTY       Family History   Problem Relation Age of Onset   • Stroke Father 57         at 62 of second stroke   • Arthritis Mother    • Stroke Mother    • Alzheimer's Disease Mother      Social History     Socioeconomic History   • Marital status:      Spouse name: Not on file   • Number of children: Not on file   • Years of education: Not on file   • Highest education level: Not on file   Occupational History   • Not on file   Social Needs   • Financial resource strain: Not on file   • Food insecurity:     Worry: Not on file     Inability: Not on file   • Transportation needs:     Medical: Not on file     Non-medical: Not on file   Tobacco Use   • Smoking status: Former Smoker     Packs/day: 1.00     Years: 32.00     Pack years: 32.00     Types: Cigarettes     Last attempt to quit: 2003     Years since quittin.3   • Smokeless tobacco: Never Used   Substance and Sexual Activity   • Alcohol use: No     Alcohol/week: 0.0 oz     Comment: rarely   • Drug use: Yes     Frequency: 7.0 times per week     Types: Marijuana     Comment: medical marijuana   • Sexual activity: Not on file   Lifestyle   • Physical activity:     Days per week: Not on file     Minutes per session: Not on file   • Stress: Not on file   Relationships   • Social connections:     Talks on phone: Not on file     Gets together: Not on file     Attends [employer] service: Not on file     Active member of club or organization: Not on file     Attends meetings of clubs or organizations: Not on file     Relationship status: Not on file   • Intimate partner violence:     Fear of current or ex partner: Not on file     Emotionally abused: Not on file     Physically abused: Not on file     Forced sexual activity: Not on file   Other Topics Concern   • Not on file   Social History Narrative   • Not on file     No Known Allergies    Current  "Outpatient Medications   Medication Sig Dispense Refill   • tramadol (ULTRAM) 50 MG Tab Take 50 mg by mouth every morning.     • ibuprofen (MOTRIN) 200 MG Tab Take 200 mg by mouth every 6 hours as needed.     • aspirin EC (ECOTRIN) 81 MG Tablet Delayed Response Take 1 Tab by mouth every day. 30 Tab    • baclofen (LIORESAL) 20 MG tablet Take 1 Tab by mouth as needed. Indications: Muscle Spasticity (Patient taking differently: Take 20 mg by mouth every bedtime. Indications: Muscle Spasticity) 90 Tab 1   • rosuvastatin (CRESTOR) 20 MG Tab TAKE 1 TABLET EVERY EVENING 90 Tab 1   • ramipril (ALTACE) 10 MG capsule TAKE 1 CAPSULE DAILY 90 Cap 1   • Empagliflozin (JARDIANCE) 25 MG Tab Take 1 tablet by mouth every day. 90 Tab 3   • metoprolol SR (TOPROL XL) 50 MG TABLET SR 24 HR Take 1 Tab by mouth every day. 90 Tab 3   • SITagliptin (JANUVIA) 100 MG Tab Take 1 Tab by mouth every day. 90 Tab 3   • Multiple Vitamins-Minerals (DAILY MULTIVITAMIN PO) Take 1 Tab by mouth 2 Times a Day.       No current facility-administered medications for this visit.        ROS  All others systems reviewed and negative.     Objective:     /70 (BP Location: Right arm, Patient Position: Sitting)   Pulse 64   Ht 1.905 m (6' 3\")   Wt 118.8 kg (262 lb)   SpO2 94%  Body mass index is 32.75 kg/m².    Physical Exam   General: No acute distress. Well nourished.  HEENT: EOM grossly intact, no scleral icterus, no pharyngeal erythema.   Neck:  No JVD, no bruits, trachea midline  CVS: RRR, some ectopy. Normal S1, S2. No M/R/G. No LE edema.  2+ radial pulses, 2+ PT pulses  Resp: CTAB. No wheezing or crackles/rhonchi. Normal respiratory effort.  Abdomen: Soft, NT, no pascale hepatomegaly.  MSK/Ext: No clubbing or cyanosis.  Skin: Warm and dry, no rashes.  Neurological: CN III-XII grossly intact. No focal deficits.   Psych: A&O x 3, appropriate affect, good judgement      Assessment:     1. Coronary artery disease due to calcified coronary lesion: Stent " to distal circumflex, patent LAD stent and  of RCA in April 2017     2. Dyslipidemia     3. Essential hypertension     4. Type 2 diabetes mellitus without complication, without long-term current use of insulin (HCC)     5. Abdominal aortic aneurysm (AAA) 3.0 cm to 5.5 cm in diameter in male (HCC)     6. Abnormal nuclear stress test     7. RBBB     8. Former smoker     9. Palpitations     10. SID on CPAP     11. Abdominal aortic aneurysm (AAA) without rupture (HCC)     12. PVC (premature ventricular contraction)         Medical Decision Making:  Today's Assessment / Status / Plan:     -On secondary prevention therapy  -Palpitations are rare, if they occur more often, zio  -LDL is at goal.  -On Jardiance  -He needs to be on ASA 81 mg for secondary prevention  -Rare NSAID use  -Watch bradycardia, first degree AV block, RBBB, no symptoms, we could reduce BB dose if needed, he thinks he might be taking 100 mg.  -Going to FU with Dr. Gonzalez, planning for possible stent.  -We discussed he would be moderate risk only for any procedure that needs to be done.  *Prior to any surgery with sedation, I would want an echo first, can be ordered at any time*  -His goal it to NOT take any more medications, ok to adjust meds he is already on.  -He does not want anything heroic in terms of his care.  -RTC 6 months, then yearly      Return in about 6 months (around 3/20/2020), or 6 months in [place].    It is my pleasure to participate in the care of Mr. Lisa.  Please do not hesitate to contact me with questions or concerns.    Myra Baca MD, [facility]  Cardiologist [facility]    Please note that this dictation was created using voice recognition software. I have made every reasonable attempt to correct obvious errors, but it is possible there are errors of grammar and possibly content that I did not discover before finalizing the note.      Kit Torres, P.A.-C.  [address]  [place]  [address]  [facility] [address]  VIA In Basket     Javier Gonzalez M.D.  [address] [facility]  [address]  [facility] [address]  VIA Facsimile: [phone]

## 2019-09-20 NOTE — PROGRESS NOTES
Subjective:   Chief Complaint:   Chief Complaint   Patient presents with   • Coronary Artery Disease       Kun Lisa is a 69 y.o. male who returns for coronary artery disease. Prior MI 1997 with stents to LAD and Circ, then   of RCA in April 2017 after abnormal nuc, bradycardia, RBBB.    He recalls going through divorce in 1997, had [facility] with stent to LAD and Circ. Went to ED with CP, sounds like UA and mild MI.  Was placed on meds.  Then had second stent in 2017 after abnormal stress test, led to RCA. Not sure if opening RCA helped with symptoms.    He is not limited by chest pain, pressure or tightness.   No significant dyspnea on exertion.   No orthopnea or significant lower extremity swelling.   Rare palpitations, maybe once a year, not limiting, lies down, takes deep breath, lasts up to 1 min.  Also known to have PVCs that he does feel from time to time.  No dizziness, or presyncope/syncope.   No symptoms of leg claudication.   No stroke/TIA like symptoms.    Lost weight, stays active, golfs twice a week, walking and riding.    Has HTN, BP controlled on 2 meds.  Has DM on Jardiance  On BB, slow HR, no symptoms  Was taken off ASA, will restart.  Has HLP, LDL 57, HDL 37, , , crestor 20   Prior smoker for 32 years, stopped in 2003.   Has SID, using CPAP, feels much better on this.  Has AAA, infrarenal, 5.3 cm, will see Dr. Gonzalez.    Father had MI in his 50s, 62, passed away.  Mother lived to 93, smoker, dementia.    From 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    DATA REVIEWED by me:  ECG 4-27-17  Sinus bradycardia, 45, first degree AV delay, RBBB    Holter 6-19-17  Sinus, PVCs 7,589, 1 atrial run, some PACs, no rhythm changes.    Nuc 4-4-17  Small sized, partially reversible, decreased uptake of moderate severity in  the apical inferior (RCA), mid inferior (RCA) segments during post stress  images.    Medium sized, partially reversible, decreased uptake of  moderate severity in  the apical lateral (LCX), inferolateral (LCX) and mid anterolateral (LCX)  segments during post stress images.   Normal left ventricular wall motion.  LV ejection fraction = 62%.    CT abd  1.  Infrarenal abdominal aortic aneurysm with a maximum diameter of 5.3 cm.  2.  Peripancreatic inflammatory changes are consistent with known pancreatitis.  3.  Enlarged peripancreatic lymph node, likely reactive.  4.  Diverticulosis.    Echo none    [facility] 17  POSTPROCEDURE DIAGNOSES:  1.  Coronary artery disease including patent stent in the mid left anterior descending artery with nonobstructive in-stent restenosis, high-grade distal circumflex artery of a codominant system supplying excellent collaterals to mid to distal right coronary artery with long chronic total occlusion of the   ostial to mid right coronary artery.  2.  Successful percutaneous transluminal coronary angioplasty/stent placement of the distal circumflex artery with 2.5x16 mm Synergy drug-eluting stent.    Most recent labs:     19 Ha1c 7.2    19 na 138, k 4.3, cr 1.03, lft normal LDL 57, HDL 37, ,     18 h 16, p 173      Past Medical History:   Diagnosis Date   • Acid reflux    • Arrhythmia    • CAD (coronary artery disease)     stent   • Diabetes (HCC)     oral meds   • Diabetic neuropathy (HCC)     bilat feet   • Hyperlipidemia    • Hypertension    • Obstructive sleep apnea     CPAP   • Palpitations    • Past heart attack        • Psychiatric problem     depression   • Sleep apnea     CPAP   • Snoring      Past Surgical History:   Procedure Laterality Date   • [facility] CARDIAC CATH  17    Synergy stent to Circ   • STENT PLACEMENT      LAD   • Z CARDIAC CATH      LAD stent   • ANGIOPLASTY       Family History   Problem Relation Age of Onset   • Stroke Father 57         at 62 of second stroke   • Arthritis Mother    • Stroke Mother    • Alzheimer's Disease Mother      Social History      Socioeconomic History   • Marital status:      Spouse name: Not on file   • Number of children: Not on file   • Years of education: Not on file   • Highest education level: Not on file   Occupational History   • Not on file   Social Needs   • Financial resource strain: Not on file   • Food insecurity:     Worry: Not on file     Inability: Not on file   • Transportation needs:     Medical: Not on file     Non-medical: Not on file   Tobacco Use   • Smoking status: Former Smoker     Packs/day: 1.00     Years: 32.00     Pack years: 32.00     Types: Cigarettes     Last attempt to quit: 2003     Years since quittin.3   • Smokeless tobacco: Never Used   Substance and Sexual Activity   • Alcohol use: No     Alcohol/week: 0.0 oz     Comment: rarely   • Drug use: Yes     Frequency: 7.0 times per week     Types: Marijuana     Comment: medical marijuana   • Sexual activity: Not on file   Lifestyle   • Physical activity:     Days per week: Not on file     Minutes per session: Not on file   • Stress: Not on file   Relationships   • Social connections:     Talks on phone: Not on file     Gets together: Not on file     Attends Bahai service: Not on file     Active member of club or organization: Not on file     Attends meetings of clubs or organizations: Not on file     Relationship status: Not on file   • Intimate partner violence:     Fear of current or ex partner: Not on file     Emotionally abused: Not on file     Physically abused: Not on file     Forced sexual activity: Not on file   Other Topics Concern   • Not on file   Social History Narrative   • Not on file     No Known Allergies    Current Outpatient Medications   Medication Sig Dispense Refill   • tramadol (ULTRAM) 50 MG Tab Take 50 mg by mouth every morning.     • ibuprofen (MOTRIN) 200 MG Tab Take 200 mg by mouth every 6 hours as needed.     • aspirin EC (ECOTRIN) 81 MG Tablet Delayed Response Take 1 Tab by mouth every day. 30 Tab    •  "baclofen (LIORESAL) 20 MG tablet Take 1 Tab by mouth as needed. Indications: Muscle Spasticity (Patient taking differently: Take 20 mg by mouth every bedtime. Indications: Muscle Spasticity) 90 Tab 1   • rosuvastatin (CRESTOR) 20 MG Tab TAKE 1 TABLET EVERY EVENING 90 Tab 1   • ramipril (ALTACE) 10 MG capsule TAKE 1 CAPSULE DAILY 90 Cap 1   • Empagliflozin (JARDIANCE) 25 MG Tab Take 1 tablet by mouth every day. 90 Tab 3   • metoprolol SR (TOPROL XL) 50 MG TABLET SR 24 HR Take 1 Tab by mouth every day. 90 Tab 3   • SITagliptin (JANUVIA) 100 MG Tab Take 1 Tab by mouth every day. 90 Tab 3   • Multiple Vitamins-Minerals (DAILY MULTIVITAMIN PO) Take 1 Tab by mouth 2 Times a Day.       No current facility-administered medications for this visit.        ROS  All others systems reviewed and negative.     Objective:     /70 (BP Location: Right arm, Patient Position: Sitting)   Pulse 64   Ht 1.905 m (6' 3\")   Wt 118.8 kg (262 lb)   SpO2 94%  Body mass index is 32.75 kg/m².    Physical Exam   General: No acute distress. Well nourished.  HEENT: EOM grossly intact, no scleral icterus, no pharyngeal erythema.   Neck:  No JVD, no bruits, trachea midline  CVS: RRR, some ectopy. Normal S1, S2. No M/R/G. No LE edema.  2+ radial pulses, 2+ PT pulses  Resp: CTAB. No wheezing or crackles/rhonchi. Normal respiratory effort.  Abdomen: Soft, NT, no pascale hepatomegaly.  MSK/Ext: No clubbing or cyanosis.  Skin: Warm and dry, no rashes.  Neurological: CN III-XII grossly intact. No focal deficits.   Psych: A&O x 3, appropriate affect, good judgement      Assessment:     1. Coronary artery disease due to calcified coronary lesion: Stent to distal circumflex, patent LAD stent and  of RCA in April 2017     2. Dyslipidemia     3. Essential hypertension     4. Type 2 diabetes mellitus without complication, without long-term current use of insulin (HCC)     5. Abdominal aortic aneurysm (AAA) 3.0 cm to 5.5 cm in diameter in male (HCC)   "   6. Abnormal nuclear stress test     7. RBBB     8. Former smoker     9. Palpitations     10. SID on CPAP     11. Abdominal aortic aneurysm (AAA) without rupture (HCC)     12. PVC (premature ventricular contraction)         Medical Decision Making:  Today's Assessment / Status / Plan:     -On secondary prevention therapy  -Palpitations are rare, if they occur more often, zio  -LDL is at goal.  -On Jardiance  -He needs to be on ASA 81 mg for secondary prevention  -Rare NSAID use  -Watch bradycardia, first degree AV block, RBBB, no symptoms, we could reduce BB dose if needed, he thinks he might be taking 100 mg.  -Going to FU with Dr. Gonzalez, planning for possible stent.  -We discussed he would be moderate risk only for any procedure that needs to be done.  *Prior to any surgery with sedation, I would want an echo first, can be ordered at any time*  -His goal it to NOT take any more medications, ok to adjust meds he is already on.  -He does not want anything heroic in terms of his care.  -RTC 6 months, then yearly      Return in about 6 months (around 3/20/2020), or 6 months in [place].    It is my pleasure to participate in the care of Mr. Lisa.  Please do not hesitate to contact me with questions or concerns.    Myra Baca MD, [facility]  Cardiologist [facility]    Please note that this dictation was created using voice recognition software. I have made every reasonable attempt to correct obvious errors, but it is possible there are errors of grammar and possibly content that I did not discover before finalizing the note.

## 2019-10-17 DIAGNOSIS — E11.9 TYPE 2 DIABETES MELLITUS WITHOUT COMPLICATION, WITHOUT LONG-TERM CURRENT USE OF INSULIN (HCC): ICD-10-CM

## 2019-10-18 RX ORDER — SITAGLIPTIN 100 MG/1
TABLET, FILM COATED ORAL
Qty: 90 TAB | Refills: 3 | Status: SHIPPED | OUTPATIENT
Start: 2019-10-18 | End: 2020-09-27

## 2019-10-29 DIAGNOSIS — G62.9 NEUROPATHY: ICD-10-CM

## 2019-10-29 RX ORDER — TRAMADOL HYDROCHLORIDE 50 MG/1
TABLET ORAL
Qty: 180 TAB | Refills: 0 | Status: SHIPPED | OUTPATIENT
Start: 2019-10-29 | End: 2020-02-10 | Stop reason: SDUPTHER

## 2019-12-30 DIAGNOSIS — M62.830 MUSCLE SPASM OF BACK: ICD-10-CM

## 2019-12-30 RX ORDER — BACLOFEN 20 MG/1
20 TABLET ORAL PRN
Qty: 90 TAB | Refills: 1 | Status: SHIPPED | OUTPATIENT
Start: 2019-12-30 | End: 2020-07-21 | Stop reason: SDUPTHER

## 2020-01-10 DIAGNOSIS — E78.5 DYSLIPIDEMIA: ICD-10-CM

## 2020-01-10 DIAGNOSIS — I25.84 CORONARY ARTERY DISEASE DUE TO CALCIFIED CORONARY LESION: ICD-10-CM

## 2020-01-10 DIAGNOSIS — I10 ESSENTIAL HYPERTENSION: ICD-10-CM

## 2020-01-10 DIAGNOSIS — I25.10 CORONARY ARTERY DISEASE DUE TO CALCIFIED CORONARY LESION: ICD-10-CM

## 2020-01-10 RX ORDER — RAMIPRIL 10 MG/1
10 CAPSULE ORAL DAILY
Qty: 90 CAP | Refills: 3 | Status: SHIPPED | OUTPATIENT
Start: 2020-01-10 | End: 2020-12-22 | Stop reason: SDUPTHER

## 2020-01-10 RX ORDER — ROSUVASTATIN CALCIUM 20 MG/1
20 TABLET, COATED ORAL DAILY
Qty: 90 TAB | Refills: 3 | Status: SHIPPED | OUTPATIENT
Start: 2020-01-10 | End: 2020-08-14

## 2020-02-06 ENCOUNTER — TELEPHONE (OUTPATIENT)
Dept: VASCULAR LAB | Facility: MEDICAL CENTER | Age: 70
End: 2020-02-06

## 2020-02-06 NOTE — TELEPHONE ENCOUNTER
Pt no-showed his appointment for DM with the pharmacist.  Routed to MA to reschedule.  Evelia Melara, Clinical Pharmacist, CDE, CACP

## 2020-02-10 ENCOUNTER — OFFICE VISIT (OUTPATIENT)
Dept: MEDICAL GROUP | Facility: MEDICAL CENTER | Age: 70
End: 2020-02-10
Payer: COMMERCIAL

## 2020-02-10 VITALS
DIASTOLIC BLOOD PRESSURE: 68 MMHG | RESPIRATION RATE: 16 BRPM | OXYGEN SATURATION: 96 % | WEIGHT: 264.55 LBS | SYSTOLIC BLOOD PRESSURE: 110 MMHG | HEART RATE: 60 BPM | BODY MASS INDEX: 32.89 KG/M2 | TEMPERATURE: 98.4 F | HEIGHT: 75 IN

## 2020-02-10 DIAGNOSIS — Z11.59 ENCOUNTER FOR HEPATITIS C SCREENING TEST FOR LOW RISK PATIENT: ICD-10-CM

## 2020-02-10 DIAGNOSIS — E66.9 OBESITY (BMI 30-39.9): ICD-10-CM

## 2020-02-10 DIAGNOSIS — G62.9 NEUROPATHY: ICD-10-CM

## 2020-02-10 DIAGNOSIS — F32.A DEPRESSION, UNSPECIFIED DEPRESSION TYPE: ICD-10-CM

## 2020-02-10 DIAGNOSIS — Z12.5 SCREENING PSA (PROSTATE SPECIFIC ANTIGEN): ICD-10-CM

## 2020-02-10 DIAGNOSIS — M25.50 ARTHRALGIA, UNSPECIFIED JOINT: ICD-10-CM

## 2020-02-10 DIAGNOSIS — E11.40 TYPE 2 DIABETES MELLITUS WITH DIABETIC NEUROPATHY, WITHOUT LONG-TERM CURRENT USE OF INSULIN (HCC): ICD-10-CM

## 2020-02-10 PROBLEM — Z00.00 ANNUAL PHYSICAL EXAM: Status: RESOLVED | Noted: 2019-08-13 | Resolved: 2020-02-10

## 2020-02-10 PROBLEM — M25.562 ACUTE PAIN OF LEFT KNEE: Status: RESOLVED | Noted: 2019-04-12 | Resolved: 2020-02-10

## 2020-02-10 PROCEDURE — 99214 OFFICE O/P EST MOD 30 MIN: CPT | Performed by: PHYSICIAN ASSISTANT

## 2020-02-10 RX ORDER — TRAMADOL HYDROCHLORIDE 50 MG/1
TABLET ORAL
Qty: 180 TAB | Refills: 0 | Status: SHIPPED | OUTPATIENT
Start: 2020-02-10 | End: 2020-07-21 | Stop reason: SDUPTHER

## 2020-02-10 ASSESSMENT — PATIENT HEALTH QUESTIONNAIRE - PHQ9
5. POOR APPETITE OR OVEREATING: 3 - NEARLY EVERY DAY
CLINICAL INTERPRETATION OF PHQ2 SCORE: 2
SUM OF ALL RESPONSES TO PHQ QUESTIONS 1-9: 14

## 2020-02-10 ASSESSMENT — ANXIETY QUESTIONNAIRES
5. BEING SO RESTLESS THAT IT IS HARD TO SIT STILL: NOT AT ALL
2. NOT BEING ABLE TO STOP OR CONTROL WORRYING: SEVERAL DAYS
7. FEELING AFRAID AS IF SOMETHING AWFUL MIGHT HAPPEN: NOT AT ALL
1. FEELING NERVOUS, ANXIOUS, OR ON EDGE: SEVERAL DAYS
4. TROUBLE RELAXING: NOT AT ALL
6. BECOMING EASILY ANNOYED OR IRRITABLE: NEARLY EVERY DAY
3. WORRYING TOO MUCH ABOUT DIFFERENT THINGS: SEVERAL DAYS
GAD7 TOTAL SCORE: 6

## 2020-02-10 NOTE — ASSESSMENT & PLAN NOTE
Chronic condition.  No recent labs but his A1c last time was in good range.  Taking his medication daily.

## 2020-02-10 NOTE — ASSESSMENT & PLAN NOTE
Complains of generalized joint pain of his low back as well as his shoulder and knees.  Symptoms usually occur in the morning when he wakes up.  Sometimes activity does flare the pain.  Just wants it documented.

## 2020-02-10 NOTE — PROGRESS NOTES
Subjective:   Kun Lisa is a 69 y.o. male here today for depression, type 2 diabetes, joint pain and neuropathy.  Also healthcare maintenance items.    Depression  This is a 69-year-old male who is here today to discuss a few items.  3 to 6-month history of feeling depressed.  Moved down to [place].  Does not have a network of friends there.  Has had some friends that have passed away.  Has slight depression.  Denies any homicidal or suicidal ideations.  No anxiety.  Would like to see a psychologist.    Type 2 diabetes mellitus with neurologic complication (CMS-HCC)  Chronic condition.  No recent labs but his A1c last time was in good range.  Taking his medication daily.    Joint pain  Complains of generalized joint pain of his low back as well as his shoulder and knees.  Symptoms usually occur in the morning when he wakes up.  Sometimes activity does flare the pain.  Just wants it documented.    Neuropathy ([facility])  Chronic history of tramadol use because of his neuropathy.  Has neuropathy of his distal extremities.  Medication has been effective in the past.       Current medicines (including changes today)  Current Outpatient Medications   Medication Sig Dispense Refill   • tramadol (ULTRAM) 50 MG Tab TAKE 1 TABLET BY MOUTH EVERY 4 HOURS AS NEEDED. FOR A 90 DAY SUPPLY 180 Tab 0   • ramipril (ALTACE) 10 MG capsule Take 1 Cap by mouth every day. 90 Cap 3   • rosuvastatin (CRESTOR) 20 MG Tab Take 1 Tab by mouth every day. 90 Tab 3   • baclofen (LIORESAL) 20 MG tablet TAKE 1 TAB BY MOUTH AS NEEDED. INDICATIONS: MUSCLE SPASTICITY 90 Tab 1   • JANUVIA 100 MG Tab TAKE 1 TABLET DAILY 90 Tab 3   • ibuprofen (MOTRIN) 200 MG Tab Take 200 mg by mouth every 6 hours as needed.     • aspirin EC (ECOTRIN) 81 MG Tablet Delayed Response Take 1 Tab by mouth every day. 30 Tab    • Empagliflozin (JARDIANCE) 25 MG Tab Take 1 tablet by mouth every day. 90 Tab 3   • metoprolol SR (TOPROL XL) 50 MG TABLET SR 24 HR Take 1 Tab by  "mouth every day. 90 Tab 3   • Multiple Vitamins-Minerals (DAILY MULTIVITAMIN PO) Take 1 Tab by mouth 2 Times a Day.       No current facility-administered medications for this visit.      He  has a past medical history of Acid reflux, Arrhythmia, CAD (coronary artery disease), Diabetes (HCC), Diabetic neuropathy (HCC), Hyperlipidemia, Hypertension, Obstructive sleep apnea, Palpitations, Past heart attack, Psychiatric problem, Sleep apnea, and Snoring.    Social History and Family History were reviewed and updated.    ROS   No chest pain, no shortness of breath, no abdominal pain and all other systems were reviewed and are negative.       Objective:     /68 (BP Location: Left arm, Patient Position: Sitting, BP Cuff Size: Adult)   Pulse 60   Temp 36.9 °C (98.4 °F) (Temporal)   Resp 16   Ht 1.905 m (6' 3\")   Wt 120 kg (264 lb 8.8 oz)   SpO2 96%  Body mass index is 33.07 kg/m².   Physical Exam:  Constitutional: Alert, no distress.  Skin: Warm, dry, good turgor, no rashes in visible areas.  Eye: Equal, round and reactive, conjunctiva clear, lids normal.  ENMT: Lips without lesions, good dentition, oropharynx clear.  Neck: Trachea midline, no masses.   Lymph: No cervical or supraclavicular lymphadenopathy  Respiratory: Unlabored respiratory effort, lungs appear clear, no wheezes.  Cardiovascular: Regular rate and rhythm.  Psych: Alert and oriented x3, normal affect and mood.        Assessment and Plan:   The following treatment plan was discussed    1. Depression, unspecified depression type  New condition noted in chart but chronic.  Refer to psychology for evaluation and therapy.  - REFERRAL TO PSYCHOLOGY    2. Neuropathy  Chronic condition.  Stable.  Renew tramadol as directed.  Appointment made in 3 months.  - tramadol (ULTRAM) 50 MG Tab; TAKE 1 TABLET BY MOUTH EVERY 4 HOURS AS NEEDED. FOR A 90 DAY SUPPLY  Dispense: 180 Tab; Refill: 0    3. Arthralgia, unspecified joint  Chronic condition but new condition " noted in chart.  Likely secondary to arthritis.  We will continue to monitor.    4. Type 2 diabetes mellitus with diabetic neuropathy, without long-term current use of insulin (HCC)  Chronic condition.  Status unknown.  A1c ordered.  Continue medications as directed.  Also ordered other labs pertaining to his diabetes.  - Comp Metabolic Panel; Future  - HEMOGLOBIN A1C; Future  - Lipid Profile; Future    5. Obesity (BMI 30-39.9)  Chronic condition.  We will continue to monitor.  - Patient identified as having weight management issue.  Appropriate orders and counseling given.    6. Encounter for hepatitis C screening test for low risk patient  Hep C viral antibody ordered.  Low risk.  - HEP C VIRUS ANTIBODY; Future    7. Screening PSA (prostate specific antigen)  PSA ordered.  Asymptomatic.  - PROSTATE SPECIFIC AG SCREENING; Future      Followup: Return in about 3 months (around 5/10/2020).    Please note that this dictation was created using voice recognition software. I have made every reasonable attempt to correct obvious errors, but I expect that there are errors of grammar and possibly content that I did not discover before finalizing the note.

## 2020-02-10 NOTE — ASSESSMENT & PLAN NOTE
Chronic history of tramadol use because of his neuropathy.  Has neuropathy of his distal extremities.  Medication has been effective in the past.

## 2020-02-10 NOTE — ASSESSMENT & PLAN NOTE
This is a 69-year-old male who is here today to discuss a few items.  3 to 6-month history of feeling depressed.  Moved down to [place].  Does not have a network of friends there.  Has had some friends that have passed away.  Has slight depression.  Denies any homicidal or suicidal ideations.  No anxiety.  Would like to see a psychologist.

## 2020-02-11 DIAGNOSIS — E78.5 DYSLIPIDEMIA: ICD-10-CM

## 2020-02-11 DIAGNOSIS — I25.10 CORONARY ARTERY DISEASE DUE TO CALCIFIED CORONARY LESION: ICD-10-CM

## 2020-02-11 DIAGNOSIS — I25.84 CORONARY ARTERY DISEASE DUE TO CALCIFIED CORONARY LESION: ICD-10-CM

## 2020-02-11 DIAGNOSIS — I10 ESSENTIAL HYPERTENSION: ICD-10-CM

## 2020-02-12 RX ORDER — METOPROLOL SUCCINATE 50 MG/1
50 TABLET, EXTENDED RELEASE ORAL DAILY
Qty: 90 TAB | Refills: 2 | Status: SHIPPED | OUTPATIENT
Start: 2020-02-12 | End: 2020-11-05

## 2020-02-24 ENCOUNTER — TELEPHONE (OUTPATIENT)
Dept: MEDICAL GROUP | Facility: MEDICAL CENTER | Age: 70
End: 2020-02-24

## 2020-02-24 DIAGNOSIS — Z23 NEED FOR VACCINATION: ICD-10-CM

## 2020-02-24 NOTE — TELEPHONE ENCOUNTER
Patient is on the MA Schedule 02/26/2020 for Hep B vaccine/injection.    SPECIFIC Action To Be Taken: Orders pending, please sign.

## 2020-02-26 ENCOUNTER — NON-PROVIDER VISIT (OUTPATIENT)
Dept: MEDICAL GROUP | Facility: MEDICAL CENTER | Age: 70
End: 2020-02-26
Payer: COMMERCIAL

## 2020-02-26 ENCOUNTER — HOSPITAL ENCOUNTER (OUTPATIENT)
Dept: LAB | Facility: MEDICAL CENTER | Age: 70
End: 2020-02-26
Attending: PHYSICIAN ASSISTANT
Payer: COMMERCIAL

## 2020-02-26 DIAGNOSIS — E11.40 TYPE 2 DIABETES MELLITUS WITH DIABETIC NEUROPATHY, WITHOUT LONG-TERM CURRENT USE OF INSULIN (HCC): ICD-10-CM

## 2020-02-26 DIAGNOSIS — Z11.59 ENCOUNTER FOR HEPATITIS C SCREENING TEST FOR LOW RISK PATIENT: ICD-10-CM

## 2020-02-26 DIAGNOSIS — Z12.5 SCREENING PSA (PROSTATE SPECIFIC ANTIGEN): ICD-10-CM

## 2020-02-26 LAB
ALBUMIN SERPL BCP-MCNC: 4.4 G/DL (ref 3.2–4.9)
ALBUMIN/GLOB SERPL: 1.5 G/DL
ALP SERPL-CCNC: 49 U/L (ref 30–99)
ALT SERPL-CCNC: 19 U/L (ref 2–50)
ANION GAP SERPL CALC-SCNC: 7 MMOL/L (ref 0–11.9)
AST SERPL-CCNC: 16 U/L (ref 12–45)
BILIRUB SERPL-MCNC: 0.7 MG/DL (ref 0.1–1.5)
BUN SERPL-MCNC: 19 MG/DL (ref 8–22)
CALCIUM SERPL-MCNC: 9.5 MG/DL (ref 8.5–10.5)
CHLORIDE SERPL-SCNC: 101 MMOL/L (ref 96–112)
CHOLEST SERPL-MCNC: 158 MG/DL (ref 100–199)
CO2 SERPL-SCNC: 27 MMOL/L (ref 20–33)
CREAT SERPL-MCNC: 1.16 MG/DL (ref 0.5–1.4)
EST. AVERAGE GLUCOSE BLD GHB EST-MCNC: 166 MG/DL
FASTING STATUS PATIENT QL REPORTED: NORMAL
GLOBULIN SER CALC-MCNC: 2.9 G/DL (ref 1.9–3.5)
GLUCOSE SERPL-MCNC: 159 MG/DL (ref 65–99)
HBA1C MFR BLD: 7.4 % (ref 0–5.6)
HDLC SERPL-MCNC: 40 MG/DL
LDLC SERPL CALC-MCNC: 89 MG/DL
POTASSIUM SERPL-SCNC: 4.7 MMOL/L (ref 3.6–5.5)
PROT SERPL-MCNC: 7.3 G/DL (ref 6–8.2)
SODIUM SERPL-SCNC: 135 MMOL/L (ref 135–145)
TRIGL SERPL-MCNC: 143 MG/DL (ref 0–149)

## 2020-02-26 PROCEDURE — 80061 LIPID PANEL: CPT

## 2020-02-26 PROCEDURE — 90746 HEPB VACCINE 3 DOSE ADULT IM: CPT | Performed by: PHYSICIAN ASSISTANT

## 2020-02-26 PROCEDURE — 80053 COMPREHEN METABOLIC PANEL: CPT

## 2020-02-26 PROCEDURE — 36415 COLL VENOUS BLD VENIPUNCTURE: CPT

## 2020-02-26 PROCEDURE — 83036 HEMOGLOBIN GLYCOSYLATED A1C: CPT

## 2020-02-26 PROCEDURE — 84153 ASSAY OF PSA TOTAL: CPT

## 2020-02-26 PROCEDURE — 86803 HEPATITIS C AB TEST: CPT

## 2020-02-26 PROCEDURE — G0010 ADMIN HEPATITIS B VACCINE: HCPCS | Performed by: PHYSICIAN ASSISTANT

## 2020-02-26 NOTE — PROGRESS NOTES
"Kun Lisa is a 69 y.o. male here for a non-provider visit for:   HEPATITIS B 1 of 3    Reason for immunization: Overdue/Provider Recommended  Immunization records indicate need for vaccine: Yes, confirmed with Epic  Minimum interval has been met for this vaccine: No  ABN completed: Not Indicated    Order and dose verified by: CHRISTINE  VIS Dated  08/15/2019 was given to patient: Yes  All IAC Questionnaire questions were answered \"No.\"    Patient tolerated injection and no adverse effects were observed or reported: Yes    Pt scheduled for next dose in series: Not Indicated    "

## 2020-02-27 LAB
HCV AB SER QL: NEGATIVE
PSA SERPL-MCNC: 0.56 NG/ML (ref 0–4)

## 2020-03-12 RX ORDER — EMPAGLIFLOZIN 25 MG/1
1 TABLET, FILM COATED ORAL DAILY
Qty: 90 TAB | Refills: 3 | Status: SHIPPED | OUTPATIENT
Start: 2020-03-12 | End: 2021-03-26 | Stop reason: SDUPTHER

## 2020-03-12 NOTE — TELEPHONE ENCOUNTER
Received request via: Patient    Was the patient seen in the last year in this department? Yes    Does the patient have an active prescription (recently filled or refills available) for medication(s) requested? No     Empagliflozin (JARDIANCE) 25 MG Tab [Marcello Sesay M.D.]

## 2020-03-13 ENCOUNTER — OFFICE VISIT (OUTPATIENT)
Dept: URGENT CARE | Facility: CLINIC | Age: 70
End: 2020-03-13
Payer: COMMERCIAL

## 2020-03-13 VITALS
TEMPERATURE: 99.4 F | WEIGHT: 260 LBS | SYSTOLIC BLOOD PRESSURE: 104 MMHG | HEIGHT: 75 IN | HEART RATE: 76 BPM | DIASTOLIC BLOOD PRESSURE: 60 MMHG | BODY MASS INDEX: 32.33 KG/M2 | OXYGEN SATURATION: 96 % | RESPIRATION RATE: 16 BRPM

## 2020-03-13 DIAGNOSIS — R05.9 COUGH: ICD-10-CM

## 2020-03-13 DIAGNOSIS — J06.9 VIRAL URI: ICD-10-CM

## 2020-03-13 LAB
FLUAV+FLUBV AG SPEC QL IA: NEGATIVE
INT CON NEG: NEGATIVE
INT CON POS: POSITIVE

## 2020-03-13 PROCEDURE — 87804 INFLUENZA ASSAY W/OPTIC: CPT | Performed by: FAMILY MEDICINE

## 2020-03-13 PROCEDURE — 99213 OFFICE O/P EST LOW 20 MIN: CPT | Performed by: FAMILY MEDICINE

## 2020-03-13 ASSESSMENT — FIBROSIS 4 INDEX: FIB4 SCORE: 1.44

## 2020-03-13 ASSESSMENT — ENCOUNTER SYMPTOMS: COUGH: 1

## 2020-03-13 NOTE — PROGRESS NOTES
"Subjective:      Kun Lisa is a 69 y.o. male who presents with Cough (Pt reports low grade fevers, lower abd pain, diarrhea, dry cough, clear nasal discharge, post nasal drip. Onset 10 hours.)            Cough   This is a new problem. The current episode started today. The problem has been unchanged. Associated symptoms include chills, rhinorrhea and a sore throat. Pertinent negatives include no ear pain. Nothing aggravates the symptoms. He has tried nothing for the symptoms. The treatment provided no relief. There is no history of asthma or COPD.   No travel history or exposure to other ill contacts.    Review of Systems   Constitutional: Positive for chills.   HENT: Positive for congestion, rhinorrhea and sore throat. Negative for ear pain.    Respiratory: Positive for cough. Negative for stridor.    All other systems reviewed and are negative.         Objective:     /60 (BP Location: Right arm, Patient Position: Sitting, BP Cuff Size: Large adult)   Pulse 76   Temp 37.4 °C (99.4 °F) (Temporal)   Resp 16   Ht 1.905 m (6' 3\")   Wt 117.9 kg (260 lb)   SpO2 96%   BMI 32.50 kg/m²      Physical Exam  Constitutional:       General: He is not in acute distress.     Appearance: He is not ill-appearing, toxic-appearing or diaphoretic.   HENT:      Head: Normocephalic and atraumatic.      Right Ear: Tympanic membrane, ear canal and external ear normal.      Left Ear: Tympanic membrane, ear canal and external ear normal.      Nose: No rhinorrhea.      Mouth/Throat:      Mouth: Mucous membranes are moist. No oral lesions.      Pharynx: Oropharynx is clear. Uvula midline. No pharyngeal swelling, oropharyngeal exudate, posterior oropharyngeal erythema or uvula swelling.      Tonsils: No tonsillar exudate.   Eyes:      Conjunctiva/sclera: Conjunctivae normal.   Neck:      Musculoskeletal: Neck supple.   Cardiovascular:      Rate and Rhythm: Normal rate and regular rhythm.      Heart sounds: No murmur. No " friction rub. No gallop.    Pulmonary:      Effort: Pulmonary effort is normal. No respiratory distress.      Breath sounds: No stridor. No wheezing, rhonchi or rales.   Lymphadenopathy:      Cervical: No cervical adenopathy.   Skin:     General: Skin is warm.      Coloration: Skin is not jaundiced or pale.   Neurological:      Mental Status: He is alert and oriented to person, place, and time.   Psychiatric:         Mood and Affect: Mood normal.            Results for orders placed or performed in visit on 03/13/20   POCT Influenza A/B   Result Value Ref Range    Rapid Influenza A-B NEGATIVE     Internal Control Positive Positive     Internal Control Negative Negative           Assessment/Plan:   ASSESSMENT:PLAN:  1. Viral URI    2. Cough  - POCT Influenza A/B    Continue symptomatic care  Plan per orders and instructions  Warning signs reviewed

## 2020-03-19 ASSESSMENT — ENCOUNTER SYMPTOMS
STRIDOR: 0
CHILLS: 1
RHINORRHEA: 1
SORE THROAT: 1

## 2020-03-19 ASSESSMENT — COPD QUESTIONNAIRES: COPD: 0

## 2020-07-21 ENCOUNTER — OFFICE VISIT (OUTPATIENT)
Dept: MEDICAL GROUP | Facility: MEDICAL CENTER | Age: 70
End: 2020-07-21
Payer: COMMERCIAL

## 2020-07-21 VITALS
SYSTOLIC BLOOD PRESSURE: 108 MMHG | WEIGHT: 266.76 LBS | OXYGEN SATURATION: 96 % | HEIGHT: 75 IN | BODY MASS INDEX: 33.17 KG/M2 | RESPIRATION RATE: 16 BRPM | HEART RATE: 60 BPM | TEMPERATURE: 98.4 F | DIASTOLIC BLOOD PRESSURE: 54 MMHG

## 2020-07-21 DIAGNOSIS — M62.830 MUSCLE SPASM OF BACK: ICD-10-CM

## 2020-07-21 DIAGNOSIS — E11.40 TYPE 2 DIABETES MELLITUS WITH DIABETIC NEUROPATHY, WITHOUT LONG-TERM CURRENT USE OF INSULIN (HCC): ICD-10-CM

## 2020-07-21 DIAGNOSIS — G62.9 NEUROPATHY: ICD-10-CM

## 2020-07-21 PROCEDURE — 99214 OFFICE O/P EST MOD 30 MIN: CPT | Performed by: PHYSICIAN ASSISTANT

## 2020-07-21 RX ORDER — TRAMADOL HYDROCHLORIDE 50 MG/1
TABLET ORAL
Qty: 180 TAB | Refills: 0 | Status: SHIPPED | OUTPATIENT
Start: 2020-07-21 | End: 2021-02-10 | Stop reason: SDUPTHER

## 2020-07-21 RX ORDER — BACLOFEN 20 MG/1
20 TABLET ORAL PRN
Qty: 90 TAB | Refills: 1 | Status: SHIPPED | OUTPATIENT
Start: 2020-07-21 | End: 2021-01-11

## 2020-07-21 ASSESSMENT — FIBROSIS 4 INDEX: FIB4 SCORE: 1.46

## 2020-07-21 NOTE — ASSESSMENT & PLAN NOTE
Chronic condition of neuropathy.  Uses tramadol.  Takes tramadol when needed.  Possibly once daily.  Requesting a refill today.  Currently 50 mg.  Medication is effective.

## 2020-07-21 NOTE — ASSESSMENT & PLAN NOTE
This is a 70-year-old male here today to follow-up on his health.  Has a chronic history of low back cramping which starts in the middle lower back but also goes down to his hips legs and foot.  He does lie on a foam roll which may help.  When he does any physical activity this will cause exacerbation of pain and tightness in his right lower back and buttocks.  He takes baclofen as needed for symptoms.  He has had work-up in the past but has lived with these symptoms for many years.  Requesting a refill of the baclofen.

## 2020-07-21 NOTE — PROGRESS NOTES
Subjective:   Kun Lisa is a 70 y.o. male here today for chronic history of back spasming, type 2 diabetes and neuropathy.    Muscle spasm of back  This is a 70-year-old male here today to follow-up on his health.  Has a chronic history of low back cramping which starts in the middle lower back but also goes down to his hips legs and foot.  He does lie on a foam roll which may help.  When he does any physical activity this will cause exacerbation of pain and tightness in his right lower back and buttocks.  He takes baclofen as needed for symptoms.  He has had work-up in the past but has lived with these symptoms for many years.  Requesting a refill of the baclofen.    Type 2 diabetes mellitus with neurologic complication (CMS-HCC)  Last A1c at 7.4.  Taking his medications as directed.  Currently on Jardiance and Januvia.  Needs an updated microalbumin level.    Neuropathy ([facility])  Chronic condition of neuropathy.  Uses tramadol.  Takes tramadol when needed.  Possibly once daily.  Requesting a refill today.  Currently 50 mg.  Medication is effective.       Current medicines (including changes today)  Current Outpatient Medications   Medication Sig Dispense Refill   • tramadol (ULTRAM) 50 MG Tab TAKE 1 TABLET BY MOUTH EVERY 4 HOURS AS NEEDED. FOR A 90 DAY SUPPLY 180 Tab 0   • baclofen (LIORESAL) 20 MG tablet Take 1 Tab by mouth as needed. Indications: Muscle Spasticity 90 Tab 1   • Empagliflozin (JARDIANCE) 25 MG Tab Take 1 tablet by mouth every day. 90 Tab 3   • metoprolol SR (TOPROL XL) 50 MG TABLET SR 24 HR Take 1 Tab by mouth every day. 90 Tab 2   • ramipril (ALTACE) 10 MG capsule Take 1 Cap by mouth every day. 90 Cap 3   • rosuvastatin (CRESTOR) 20 MG Tab Take 1 Tab by mouth every day. 90 Tab 3   • JANUVIA 100 MG Tab TAKE 1 TABLET DAILY 90 Tab 3   • Multiple Vitamins-Minerals (DAILY MULTIVITAMIN PO) Take 1 Tab by mouth 2 Times a Day.       No current facility-administered medications for this visit.   "    He  has a past medical history of Acid reflux, Arrhythmia, CAD (coronary artery disease), Diabetes ([facility]), Diabetic neuropathy ([facility]), Hyperlipidemia, Hypertension, Obstructive sleep apnea, Palpitations, Past heart attack, Psychiatric problem, Sleep apnea, and Snoring.    Social History and Family History were reviewed and updated.    ROS   No chest pain, no shortness of breath, no abdominal pain and all other systems were reviewed and are negative.       Objective:     /54   Pulse 60   Temp 36.9 °C (98.4 °F) (Temporal)   Resp 16   Ht 1.905 m (6' 3\")   Wt 121 kg (266 lb 12.1 oz)   SpO2 96%  Body mass index is 33.34 kg/m².   Physical Exam:  Constitutional: Alert, no distress.  Skin: Warm, dry, good turgor, no rashes in visible areas.  Eye: Equal, round and reactive, conjunctiva clear, lids normal.  ENMT: Lips without lesions, good dentition, oropharynx clear.  Neck: Trachea midline, no masses.   Lymph: No cervical or supraclavicular lymphadenopathy  Respiratory: Unlabored respiratory effort, lungs appear clear, no wheezes.  Cardiovascular: Regular rate and rhythm.  Psych: Alert and oriented x3, normal affect and mood.        Assessment and Plan:   The following treatment plan was discussed    1. Neuropathy  Chronic condition.  Stable.   reviewed.  Renew tramadol 50 mg.  Provided a 90-day supply.  - tramadol (ULTRAM) 50 MG Tab; TAKE 1 TABLET BY MOUTH EVERY 4 HOURS AS NEEDED. FOR A 90 DAY SUPPLY  Dispense: 180 Tab; Refill: 0    2. Type 2 diabetes mellitus with diabetic neuropathy, without long-term current use of insulin ([facility])  Chronic condition.  Last A1c is 7.4.  Will check A1c at the end of August.  Also added a microalbumin.  - MICROALBUMIN CREAT RATIO URINE; Future  - HEMOGLOBIN A1C; Future    3. Muscle spasm of back  Chronic condition.  Stable.  Renewed baclofen as directed.  - baclofen (LIORESAL) 20 MG tablet; Take 1 Tab by mouth as needed. Indications: Muscle Spasticity  Dispense: 90 Tab; " Refill: 1      Followup: Return in about 3 months (around 10/21/2020), or if symptoms worsen or fail to improve.    Please note that this dictation was created using voice recognition software. I have made every reasonable attempt to correct obvious errors, but I expect that there are errors of grammar and possibly content that I did not discover before finalizing the note.

## 2020-07-21 NOTE — ASSESSMENT & PLAN NOTE
Last A1c at 7.4.  Taking his medications as directed.  Currently on Jardiance and Januvia.  Needs an updated microalbumin level.

## 2020-08-13 NOTE — PROGRESS NOTES
Subjective:   Chief Complaint:   Chief Complaint   Patient presents with   • Coronary Artery Disease       Kun Lisa is a 70 y.o. male who returns for coronary artery disease, HTN, HLP, bradycardia, RBBB.    Prior MI 1997 with stents to LAD and Circ, then   of RCA in April 2017 after abnormal nuc, bradycardia, RBBB.    He recalls going through divorce in 1997, had LHC with stent to LAD and Circ.   Went to ED with CP, sounds like UA and mild MI.  Was placed on meds.  Then had second stent in 2017 after abnormal stress test, led to RCA. Not sure if opening RCA helped with symptoms.    He is not limited by chest pain, pressure or tightness.   No significant dyspnea on exertion.   No orthopnea or significant lower extremity swelling.   Rare palpitations, maybe once a year, not limiting, lies down, takes deep breath, lasts up to 1 min. Cut out cold drinks, that helps.  Also known to have PVCs that he does feel from time to time.  No lightheaded, or presyncope/syncope.   No symptoms of leg claudication.   No stroke/TIA like symptoms.  Noticing memory problems.    Lost weight, stays active, golfs twice a week, walking and riding.    Has been having sx that sound like neuropathy in his feet.    Has HTN, BP controlled on 2 meds.  Has DM on Jardiance, not controlled.  On BB, slow HR, no symptoms  Was taken off ASA, will restart.  Has HLP, LDL 89. On Crestor 20 mg  Prior smoker for 32 years, stopped in 2003.   Has SID, using CPAP, feels much better on this.    Smoke marijuana daily.  No ETOH.  Mild caffeine.    Has AAA, infrarenal, 5.3 cm, will see Dr. Gonzalez.    Father had MI in his 50s, 62, passed away.  Mother lived to 93, smoker, dementia.    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  Lives in [place].  Closet family in [place].  Daughter [place], [place].  Golfs with friends twice a week. Also, friends in [place].     DATA REVIEWED by me:  ECG 4-27-17  Sinus bradycardia, 45, first  degree AV delay, RBBB    Holter 6-19-17  Sinus, PVCs 7,589, 1 atrial run, some PACs, no rhythm changes.    Nuc 4-4-17  Small sized, partially reversible, decreased uptake of moderate severity in  the apical inferior (RCA), mid inferior (RCA) segments during post stress  images.    Medium sized, partially reversible, decreased uptake of moderate severity in  the apical lateral (LCX), inferolateral (LCX) and mid anterolateral (LCX)  segments during post stress images.   Normal left ventricular wall motion.  LV ejection fraction = 62%.    CT abd 12-28-18  1.  Infrarenal abdominal aortic aneurysm with a maximum diameter of 5.3 cm.  2.  Peripancreatic inflammatory changes are consistent with known pancreatitis.  3.  Enlarged peripancreatic lymph node, likely reactive.  4.  Diverticulosis.    Echo none    [facility] 4-27-17  POSTPROCEDURE DIAGNOSES:  1.  Coronary artery disease including patent stent in the mid left anterior descending artery with nonobstructive in-stent restenosis, high-grade distal circumflex artery of a codominant system supplying excellent collaterals to mid to distal right coronary artery with long chronic total occlusion of the   ostial to mid right coronary artery.  2.  Successful percutaneous transluminal coronary angioplasty/stent placement of the distal circumflex artery with 2.5x16 mm Synergy drug-eluting stent.    Most recent labs:     Lab Results   Component Value Date/Time    HEMOGLOBIN 16.0 12/28/2018 10:53 AM    HEMATOCRIT 44.0 12/28/2018 10:53 AM    MCV 90.7 12/28/2018 10:53 AM    INR 0.95 04/25/2017 03:00 PM      Lab Results   Component Value Date/Time    SODIUM 135 02/26/2020 09:22 AM    POTASSIUM 4.7 02/26/2020 09:22 AM    CHLORIDE 101 02/26/2020 09:22 AM    CO2 27 02/26/2020 09:22 AM    GLUCOSE 159 (H) 02/26/2020 09:22 AM    BUN 19 02/26/2020 09:22 AM    CREATININE 1.16 02/26/2020 09:22 AM      Lab Results   Component Value Date/Time    ASTSGOT 16 02/26/2020 09:22 AM    ALTSGPT 19 02/26/2020  09:22 AM    ALBUMIN 4.4 2020 09:22 AM      Lab Results   Component Value Date/Time    CHOLSTRLTOT 158 2020 09:22 AM    LDL 89 2020 09:22 AM    HDL 40 2020 09:22 AM    TRIGLYCERIDE 143 2020 09:22 AM       8-719 Ha1c 7.2    -14-19 na 138, k 4.3, cr 1.03, lft normal LDL 57, HDL 37, ,     18 h 16, p 173      Past Medical History:   Diagnosis Date   • Acid reflux    • Arrhythmia    • CAD (coronary artery disease)     stent   • Diabetes (HCC)     oral meds   • Diabetic neuropathy (HCC)     bilat feet   • Hyperlipidemia    • Hypertension    • Obstructive sleep apnea     CPAP   • Palpitations    • Past heart attack        • Psychiatric problem     depression   • Sleep apnea     CPAP   • Snoring      Past Surgical History:   Procedure Laterality Date   • ZZZ CARDIAC CATH  17    Synergy stent to Circ   • STENT PLACEMENT      LAD   • ZZZ CARDIAC CATH      LAD stent   • ANGIOPLASTY       Family History   Problem Relation Age of Onset   • Stroke Father 57         at 62 of second stroke   • Arthritis Mother    • Stroke Mother    • Alzheimer's Disease Mother      Social History     Socioeconomic History   • Marital status:      Spouse name: Not on file   • Number of children: Not on file   • Years of education: Not on file   • Highest education level: Not on file   Occupational History   • Not on file   Social Needs   • Financial resource strain: Not on file   • Food insecurity     Worry: Not on file     Inability: Not on file   • Transportation needs     Medical: Not on file     Non-medical: Not on file   Tobacco Use   • Smoking status: Former Smoker     Packs/day: 1.00     Years: 32.00     Pack years: 32.00     Types: Cigarettes     Quit date: 2003     Years since quittin.2   • Smokeless tobacco: Never Used   Substance and Sexual Activity   • Alcohol use: Yes     Alcohol/week: 0.0 oz     Comment: rarely   • Drug use: Yes     Frequency: 7.0  "times per week     Types: Marijuana     Comment: medical marijuana   • Sexual activity: Not on file   Lifestyle   • Physical activity     Days per week: Not on file     Minutes per session: Not on file   • Stress: Not on file   Relationships   • Social connections     Talks on phone: Not on file     Gets together: Not on file     Attends Sabianist service: Not on file     Active member of club or organization: Not on file     Attends meetings of clubs or organizations: Not on file     Relationship status: Not on file   • Intimate partner violence     Fear of current or ex partner: Not on file     Emotionally abused: Not on file     Physically abused: Not on file     Forced sexual activity: Not on file   Other Topics Concern   • Not on file   Social History Narrative   • Not on file     No Known Allergies    Current Outpatient Medications   Medication Sig Dispense Refill   • rosuvastatin (CRESTOR) 40 MG tablet Take 1 Tab by mouth every day. 100 Tab 3   • tramadol (ULTRAM) 50 MG Tab TAKE 1 TABLET BY MOUTH EVERY 4 HOURS AS NEEDED. FOR A 90 DAY SUPPLY 180 Tab 0   • baclofen (LIORESAL) 20 MG tablet Take 1 Tab by mouth as needed. Indications: Muscle Spasticity 90 Tab 1   • Empagliflozin (JARDIANCE) 25 MG Tab Take 1 tablet by mouth every day. 90 Tab 3   • metoprolol SR (TOPROL XL) 50 MG TABLET SR 24 HR Take 1 Tab by mouth every day. 90 Tab 2   • ramipril (ALTACE) 10 MG capsule Take 1 Cap by mouth every day. 90 Cap 3   • JANUVIA 100 MG Tab TAKE 1 TABLET DAILY 90 Tab 3   • Multiple Vitamins-Minerals (DAILY MULTIVITAMIN PO) Take 1 Tab by mouth 2 Times a Day.       No current facility-administered medications for this visit.        ROS    All others systems reviewed and negative.     Objective:     /70 (BP Location: Right arm, Patient Position: Sitting)   Pulse (!) 58   Ht 1.905 m (6' 3\")   Wt 117.5 kg (259 lb)   SpO2 95%  Body mass index is 32.37 kg/m².    Physical Exam   General: No acute distress. Well " nourished.  HEENT: EOM grossly intact, no scleral icterus, no pharyngeal erythema.   Neck:  No JVD, no bruits, trachea midline  CVS: RRR, some ectopy. Normal S1, S2. No M/R/G. No LE edema.  2+ radial pulses, 2+ PT pulses  Resp: CTAB. No wheezing or crackles/rhonchi. Normal respiratory effort.  Abdomen: Soft, NT, no pascale hepatomegaly.  MSK/Ext: No clubbing or cyanosis.  Skin: Warm and dry, no rashes.  Neurological: CN III-XII grossly intact. No focal deficits.   Psych: A&O x 3, appropriate affect, good judgement    Physical exam performed today and unchanged, except what is noted, compared to 9-20-19    Assessment:     1. Coronary artery disease due to calcified coronary lesion: LAD stent in 1997     2. Dyslipidemia     3. Essential hypertension     4. RBBB     5. Former smoker     6. Palpitations     7. Type 2 diabetes mellitus without complication, without long-term current use of insulin (HCC)     8. SID on CPAP     9. Abdominal aortic aneurysm (AAA) without rupture ([facility])     10. Abnormal nuclear stress test     11. PVC (premature ventricular contraction)         Medical Decision Making:  Today's Assessment / Status / Plan:     -On secondary prevention therapy with ASA, BB, statin  -Palpitations are rare, if they occur more often, zio but responded to avoiding cold drinks.  -LDL was at goal, no longer. Increase crestor, can wait til next year to repeat with routine labs.  -On Jardiance, A1C not controlled.  -Rare NSAID use  -Watch bradycardia, first degree AV block, RBBB, no symptoms, we could reduce BB dose if needed, he thinks he might be taking 100 mg.  -Going to FU with Dr. Gonzalez, planning for possible stent. I spoke with Dr. Gonzalez today, his office will FU  -His goal it to NOT take any more medications, ok to adjust meds he is already on.  -He does not want anything heroic in terms of his care.  -RTC 6 months in [place]    Written instructions given today:    -Please talk to Tomás Torres about memory  loss    -Dr. Matt Gonzalez with vascular surgery- his office should be reaching out for further testing and intervention of your abdominal aneurysm.    -Your LDL cholesterol went up, increase your rosuvastatin from 20 mg to 40 mg (Take 2 of your 20 mg at the same time until gone), I will send a new prescription.       Return in about 6 months (around 2/14/2021).    It is my pleasure to participate in the care of Mr. Lisa.  Please do not hesitate to contact me with questions or concerns.    Myra Baca MD, [facility]  Cardiologist [facility]    Please note that this dictation was created using voice recognition software. I have made every reasonable attempt to correct obvious errors, but it is possible there are errors of grammar and possibly content that I did not discover before finalizing the note.

## 2020-08-14 ENCOUNTER — OFFICE VISIT (OUTPATIENT)
Dept: CARDIOLOGY | Facility: CLINIC | Age: 70
End: 2020-08-14
Payer: COMMERCIAL

## 2020-08-14 VITALS
SYSTOLIC BLOOD PRESSURE: 120 MMHG | OXYGEN SATURATION: 95 % | HEIGHT: 75 IN | WEIGHT: 259 LBS | HEART RATE: 58 BPM | DIASTOLIC BLOOD PRESSURE: 70 MMHG | BODY MASS INDEX: 32.2 KG/M2

## 2020-08-14 DIAGNOSIS — Z87.891 FORMER SMOKER: ICD-10-CM

## 2020-08-14 DIAGNOSIS — I45.10 RBBB: ICD-10-CM

## 2020-08-14 DIAGNOSIS — G47.33 OSA ON CPAP: ICD-10-CM

## 2020-08-14 DIAGNOSIS — I25.10 CORONARY ARTERY DISEASE DUE TO CALCIFIED CORONARY LESION: ICD-10-CM

## 2020-08-14 DIAGNOSIS — R00.2 PALPITATIONS: ICD-10-CM

## 2020-08-14 DIAGNOSIS — E11.9 TYPE 2 DIABETES MELLITUS WITHOUT COMPLICATION, WITHOUT LONG-TERM CURRENT USE OF INSULIN (HCC): ICD-10-CM

## 2020-08-14 DIAGNOSIS — I49.3 PVC (PREMATURE VENTRICULAR CONTRACTION): ICD-10-CM

## 2020-08-14 DIAGNOSIS — I71.40 ABDOMINAL AORTIC ANEURYSM (AAA) WITHOUT RUPTURE (HCC): ICD-10-CM

## 2020-08-14 DIAGNOSIS — I10 ESSENTIAL HYPERTENSION: ICD-10-CM

## 2020-08-14 DIAGNOSIS — R94.39 ABNORMAL NUCLEAR STRESS TEST: ICD-10-CM

## 2020-08-14 DIAGNOSIS — E78.5 DYSLIPIDEMIA: ICD-10-CM

## 2020-08-14 DIAGNOSIS — I25.84 CORONARY ARTERY DISEASE DUE TO CALCIFIED CORONARY LESION: ICD-10-CM

## 2020-08-14 PROCEDURE — 99214 OFFICE O/P EST MOD 30 MIN: CPT | Performed by: INTERNAL MEDICINE

## 2020-08-14 RX ORDER — ROSUVASTATIN CALCIUM 40 MG/1
40 TABLET, COATED ORAL DAILY
Qty: 100 TAB | Refills: 3 | Status: SHIPPED | OUTPATIENT
Start: 2020-08-14 | End: 2020-10-26 | Stop reason: SDUPTHER

## 2020-08-14 ASSESSMENT — FIBROSIS 4 INDEX: FIB4 SCORE: 1.46

## 2020-08-14 NOTE — LETTER
[facility] [address],   [address]nd [address]  [place] [address]  Phone: [phone]  Fax: [phone]              Kun Lisa  1950    Encounter Date: 8/14/2020    Myra Baca M.D.          PROGRESS NOTE:  Subjective:   Chief Complaint:   Chief Complaint   Patient presents with   • Coronary Artery Disease       Kun Lisa is a 70 y.o. male who returns for coronary artery disease, HTN, HLP, bradycardia, RBBB.    Prior MI 1997 with stents to LAD and Circ, then   of RCA in April 2017 after abnormal nuc, bradycardia, RBBB.    He recalls going through divorce in 1997, had [facility] with stent to LAD and Circ.   Went to ED with CP, sounds like UA and mild MI.  Was placed on meds.  Then had second stent in 2017 after abnormal stress test, led to RCA. Not sure if opening RCA helped with symptoms.    He is not limited by chest pain, pressure or tightness.   No significant dyspnea on exertion.   No orthopnea or significant lower extremity swelling.   Rare palpitations, maybe once a year, not limiting, lies down, takes deep breath, lasts up to 1 min. Cut out cold drinks, that helps.  Also known to have PVCs that he does feel from time to time.  No lightheaded, or presyncope/syncope.   No symptoms of leg claudication.   No stroke/TIA like symptoms.  Noticing memory problems.    Lost weight, stays active, golfs twice a week, walking and riding.    Has been having sx that sound like neuropathy in his feet.    Has HTN, BP controlled on 2 meds.  Has DM on Jardiance, not controlled.  On BB, slow HR, no symptoms  Was taken off ASA, will restart.  Has HLP, LDL 89. On Crestor 20 mg  Prior smoker for 32 years, stopped in 2003.   Has SID, using CPAP, feels much better on this.    Smoke marijuana daily.  No ETOH.  Mild caffeine.    Has AAA, infrarenal, 5.3 cm, will see Dr. Gonzalez.    Father had MI in his 50s, 62, passed away.  Mother lived to ,  smoker, dementia.    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  Lives in [place].  Closet family in [place].  Daughter [place], [place].  Golfs with friends twice a week. Also, friends in [place].     DATA REVIEWED by me:  ECG 4-27-17  Sinus bradycardia, 45, first degree AV delay, RBBB    Holter 6-19-17  Sinus, PVCs 7,589, 1 atrial run, some PACs, no rhythm changes.    Nuc 4-4-17  Small sized, partially reversible, decreased uptake of moderate severity in  the apical inferior (RCA), mid inferior (RCA) segments during post stress  images.    Medium sized, partially reversible, decreased uptake of moderate severity in  the apical lateral (LCX), inferolateral (LCX) and mid anterolateral (LCX)  segments during post stress images.   Normal left ventricular wall motion.  LV ejection fraction = 62%.    CT abd 12-28-18  1.  Infrarenal abdominal aortic aneurysm with a maximum diameter of 5.3 cm.  2.  Peripancreatic inflammatory changes are consistent with known pancreatitis.  3.  Enlarged peripancreatic lymph node, likely reactive.  4.  Diverticulosis.    Echo none    [facility] 4-27-17  POSTPROCEDURE DIAGNOSES:  1.  Coronary artery disease including patent stent in the mid left anterior descending artery with nonobstructive in-stent restenosis, high-grade distal circumflex artery of a codominant system supplying excellent collaterals to mid to distal right coronary artery with long chronic total occlusion of the   ostial to mid right coronary artery.  2.  Successful percutaneous transluminal coronary angioplasty/stent placement of the distal circumflex artery with 2.5x16 mm Synergy drug-eluting stent.    Most recent labs:     Lab Results   Component Value Date/Time    HEMOGLOBIN 16.0 12/28/2018 10:53 AM    HEMATOCRIT 44.0 12/28/2018 10:53 AM    MCV 90.7 12/28/2018 10:53 AM    INR 0.95 04/25/2017 03:00 PM      Lab Results   Component Value Date/Time    SODIUM 135 02/26/2020 09:22 AM    POTASSIUM 4.7  2020 09:22 AM    CHLORIDE 101 2020 09:22 AM    CO2 27 2020 09:22 AM    GLUCOSE 159 (H) 2020 09:22 AM    BUN 19 2020 09:22 AM    CREATININE 1.16 2020 09:22 AM      Lab Results   Component Value Date/Time    ASTSGOT 16 2020 09:22 AM    ALTSGPT 19 2020 09:22 AM    ALBUMIN 4.4 2020 09:22 AM      Lab Results   Component Value Date/Time    CHOLSTRLTOT 158 2020 09:22 AM    LDL 89 2020 09:22 AM    HDL 40 2020 09:22 AM    TRIGLYCERIDE 143 2020 09:22 AM       8-719 Ha1c 7.2    14-19 na 138, k 4.3, cr 1.03, lft normal LDL 57, HDL 37, ,     12-18 h 16, p 173      Past Medical History:   Diagnosis Date   • Acid reflux    • Arrhythmia    • CAD (coronary artery disease)     stent   • Diabetes (HCC)     oral meds   • Diabetic neuropathy (HCC)     bilat feet   • Hyperlipidemia    • Hypertension    • Obstructive sleep apnea     CPAP   • Palpitations    • Past heart attack        • Psychiatric problem     depression   • Sleep apnea     CPAP   • Snoring      Past Surgical History:   Procedure Laterality Date   • ZZZ CARDIAC CATH  17    Synergy stent to Circ   • STENT PLACEMENT      LAD   • ZZZ CARDIAC CATH      LAD stent   • ANGIOPLASTY       Family History   Problem Relation Age of Onset   • Stroke Father 57         at 62 of second stroke   • Arthritis Mother    • Stroke Mother    • Alzheimer's Disease Mother      Social History     Socioeconomic History   • Marital status:      Spouse name: Not on file   • Number of children: Not on file   • Years of education: Not on file   • Highest education level: Not on file   Occupational History   • Not on file   Social Needs   • Financial resource strain: Not on file   • Food insecurity     Worry: Not on file     Inability: Not on file   • Transportation needs     Medical: Not on file     Non-medical: Not on file   Tobacco Use   • Smoking status: Former Smoker      Packs/day: 1.00     Years: 32.00     Pack years: 32.00     Types: Cigarettes     Quit date: 2003     Years since quittin.2   • Smokeless tobacco: Never Used   Substance and Sexual Activity   • Alcohol use: Yes     Alcohol/week: 0.0 oz     Comment: rarely   • Drug use: Yes     Frequency: 7.0 times per week     Types: Marijuana     Comment: medical marijuana   • Sexual activity: Not on file   Lifestyle   • Physical activity     Days per week: Not on file     Minutes per session: Not on file   • Stress: Not on file   Relationships   • Social connections     Talks on phone: Not on file     Gets together: Not on file     Attends [employer] service: Not on file     Active member of club or organization: Not on file     Attends meetings of clubs or organizations: Not on file     Relationship status: Not on file   • Intimate partner violence     Fear of current or ex partner: Not on file     Emotionally abused: Not on file     Physically abused: Not on file     Forced sexual activity: Not on file   Other Topics Concern   • Not on file   Social History Narrative   • Not on file     No Known Allergies    Current Outpatient Medications   Medication Sig Dispense Refill   • rosuvastatin (CRESTOR) 40 MG tablet Take 1 Tab by mouth every day. 100 Tab 3   • tramadol (ULTRAM) 50 MG Tab TAKE 1 TABLET BY MOUTH EVERY 4 HOURS AS NEEDED. FOR A 90 DAY SUPPLY 180 Tab 0   • baclofen (LIORESAL) 20 MG tablet Take 1 Tab by mouth as needed. Indications: Muscle Spasticity 90 Tab 1   • Empagliflozin (JARDIANCE) 25 MG Tab Take 1 tablet by mouth every day. 90 Tab 3   • metoprolol SR (TOPROL XL) 50 MG TABLET SR 24 HR Take 1 Tab by mouth every day. 90 Tab 2   • ramipril (ALTACE) 10 MG capsule Take 1 Cap by mouth every day. 90 Cap 3   • JANUVIA 100 MG Tab TAKE 1 TABLET DAILY 90 Tab 3   • Multiple Vitamins-Minerals (DAILY MULTIVITAMIN PO) Take 1 Tab by mouth 2 Times a Day.       No current facility-administered medications for this visit.         "      ROS    All others systems reviewed and negative.     Objective:     /70 (BP Location: Right arm, Patient Position: Sitting)   Pulse (!) 58   Ht 1.905 m (6' 3\")   Wt 117.5 kg (259 lb)   SpO2 95%  Body mass index is 32.37 kg/m².    Physical Exam   General: No acute distress. Well nourished.  HEENT: EOM grossly intact, no scleral icterus, no pharyngeal erythema.   Neck:  No JVD, no bruits, trachea midline  CVS: RRR, some ectopy. Normal S1, S2. No M/R/G. No LE edema.  2+ radial pulses, 2+ PT pulses  Resp: CTAB. No wheezing or crackles/rhonchi. Normal respiratory effort.  Abdomen: Soft, NT, no pascale hepatomegaly.  MSK/Ext: No clubbing or cyanosis.  Skin: Warm and dry, no rashes.  Neurological: CN III-XII grossly intact. No focal deficits.   Psych: A&O x 3, appropriate affect, good judgement    Physical exam performed today and unchanged, except what is noted, compared to 9-20-19    Assessment:     1. Coronary artery disease due to calcified coronary lesion: LAD stent in 1997     2. Dyslipidemia     3. Essential hypertension     4. RBBB     5. Former smoker     6. Palpitations     7. Type 2 diabetes mellitus without complication, without long-term current use of insulin (HCC)     8. SID on CPAP     9. Abdominal aortic aneurysm (AAA) without rupture (HCC)     10. Abnormal nuclear stress test     11. PVC (premature ventricular contraction)         Medical Decision Making:  Today's Assessment / Status / Plan:     -On secondary prevention therapy with ASA, BB, statin  -Palpitations are rare, if they occur more often, zio but responded to avoiding cold drinks.  -LDL was at goal, no longer. Increase crestor, can wait til next year to repeat with routine labs.  -On Jardiance, A1C not controlled.  -Rare NSAID use  -Watch bradycardia, first degree AV block, RBBB, no symptoms, we could reduce BB dose if needed, he thinks he might be taking 100 mg.  -Going to FU with Dr. Gonzalez, planning for possible stent. I spoke " with Dr. Gonzalez today, his office will FU  -His goal it to NOT take any more medications, ok to adjust meds he is already on.  -He does not want anything heroic in terms of his care.  -RTC 6 months in [place]    Written instructions given today:    -Please talk to Tomás Torres about memory loss    -Dr. Matt Gonzalez with vascular surgery- his office should be reaching out for further testing and intervention of your abdominal aneurysm.    -Your LDL cholesterol went up, increase your rosuvastatin from 20 mg to 40 mg (Take 2 of your 20 mg at the same time until gone), I will send a new prescription.       Return in about 6 months (around 2/14/2021).    It is my pleasure to participate in the care of Mr. Lisa.  Please do not hesitate to contact me with questions or concerns.    Myra Baca MD, [facility]  Cardiologist [facility]    Please note that this dictation was created using voice recognition software. I have made every reasonable attempt to correct obvious errors, but it is possible there are errors of grammar and possibly content that I did not discover before finalizing the note.      Javier Gonzalez M.D.  [address]  [address]  [place] [address]  Via Fax: [phone]     Kit Torres, P.A.-C.  [address]  [address]  [place] [address]  Via In Basket

## 2020-08-14 NOTE — PATIENT INSTRUCTIONS
-Please talk to Tomás Torres about memory loss    -Dr. Matt Gonzalez with vascular surgery- his office should be reaching out for further testing and intervention of your abdominal aneurysm.    -Your LDL cholesterol went up, increase your rosuvastatin from 20 mg to 40 mg (Take 2 of your 20 mg at the same time until gone), I will send a new prescription.

## 2020-09-03 LAB
ALBUMIN/CREAT UR: 5 MG/G CREAT (ref 0–29)
CREAT UR-MCNC: 72.8 MG/DL
HBA1C MFR BLD: 6.9 % (ref 4.8–5.6)
MICROALBUMIN UR-MCNC: 3.4 UG/ML

## 2020-09-18 ENCOUNTER — OFFICE VISIT (OUTPATIENT)
Dept: MEDICAL GROUP | Facility: MEDICAL CENTER | Age: 70
End: 2020-09-18
Payer: COMMERCIAL

## 2020-09-18 VITALS
BODY MASS INDEX: 32.33 KG/M2 | HEIGHT: 75 IN | RESPIRATION RATE: 16 BRPM | DIASTOLIC BLOOD PRESSURE: 74 MMHG | TEMPERATURE: 98.2 F | WEIGHT: 260 LBS | SYSTOLIC BLOOD PRESSURE: 116 MMHG | OXYGEN SATURATION: 94 % | HEART RATE: 60 BPM

## 2020-09-18 DIAGNOSIS — Z23 NEED FOR SHINGLES VACCINE: ICD-10-CM

## 2020-09-18 DIAGNOSIS — F32.9 REACTIVE DEPRESSION: ICD-10-CM

## 2020-09-18 PROCEDURE — 90750 HZV VACC RECOMBINANT IM: CPT | Performed by: PHYSICIAN ASSISTANT

## 2020-09-18 PROCEDURE — 99213 OFFICE O/P EST LOW 20 MIN: CPT | Mod: 25 | Performed by: PHYSICIAN ASSISTANT

## 2020-09-18 PROCEDURE — 90471 IMMUNIZATION ADMIN: CPT | Performed by: PHYSICIAN ASSISTANT

## 2020-09-18 ASSESSMENT — FIBROSIS 4 INDEX: FIB4 SCORE: 1.46

## 2020-09-18 NOTE — PROGRESS NOTES
"Subjective:   Kun Lisa is a 70 y.o. male here today for need for shingles vaccination and depression.    Need for shingles vaccine  This is a 70-year-old male here today for his shingles vaccination.  He is prepared to get both doses.  No new complaints today.    Depression  Still is dealing with mild depression.  Moved to [place] and still alone.  Is able to control his symptoms.  Denies any homicidal or suicidal ideations.       Current medicines (including changes today)  Current Outpatient Medications   Medication Sig Dispense Refill   • rosuvastatin (CRESTOR) 40 MG tablet Take 1 Tab by mouth every day. 100 Tab 3   • tramadol (ULTRAM) 50 MG Tab TAKE 1 TABLET BY MOUTH EVERY 4 HOURS AS NEEDED. FOR A 90 DAY SUPPLY 180 Tab 0   • baclofen (LIORESAL) 20 MG tablet Take 1 Tab by mouth as needed. Indications: Muscle Spasticity 90 Tab 1   • Empagliflozin (JARDIANCE) 25 MG Tab Take 1 tablet by mouth every day. 90 Tab 3   • metoprolol SR (TOPROL XL) 50 MG TABLET SR 24 HR Take 1 Tab by mouth every day. 90 Tab 2   • ramipril (ALTACE) 10 MG capsule Take 1 Cap by mouth every day. 90 Cap 3   • JANUVIA 100 MG Tab TAKE 1 TABLET DAILY 90 Tab 3   • Multiple Vitamins-Minerals (DAILY MULTIVITAMIN PO) Take 1 Tab by mouth 2 Times a Day.       No current facility-administered medications for this visit.      He  has a past medical history of Acid reflux, Arrhythmia, CAD (coronary artery disease), Diabetes (HCC), Diabetic neuropathy (HCC), Hyperlipidemia, Hypertension, Obstructive sleep apnea, Palpitations, Past heart attack, Psychiatric problem, Sleep apnea, and Snoring.    Social History and Family History were reviewed and updated.    ROS   No chest pain, no shortness of breath, no abdominal pain and all other systems were reviewed and are negative.       Objective:     /74   Pulse 60   Temp 36.8 °C (98.2 °F) (Temporal)   Resp 16   Ht 1.905 m (6' 3\")   Wt 117.9 kg (260 lb)   SpO2 94%  Body mass index is 32.5 kg/m². "   Physical Exam:  Constitutional: Alert, no distress.  Skin: Warm, dry, good turgor, no rashes in visible areas.  Eye: Equal, round and reactive, conjunctiva clear, lids normal.  ENMT: Lips without lesions, good dentition, oropharynx clear.  Neck: Trachea midline, no masses.   Lymph: No cervical or supraclavicular lymphadenopathy  Respiratory: Unlabored respiratory effort, lungs appear clear, no wheezes.  Cardiovascular: Regular rate and rhythm.  Psych: Alert and oriented x3, normal affect and mood.        Assessment and Plan:   The following treatment plan was discussed    1. Need for shingles vaccine  Administered without complaints.  Discussed possibility of side effects.  Take ibuprofen and Tylenol as needed.  Discussed side effects.  Appointment made in 2 months for Shingrix No. 2.  - Shingrix Vaccine    2. Reactive depression  Chronic condition.  Stable.  We will continue to monitor.      Followup: Return if symptoms worsen or fail to improve, for 2 months for shingles #2 and in January.    Please note that this dictation was created using voice recognition software. I have made every reasonable attempt to correct obvious errors, but I expect that there are errors of grammar and possibly content that I did not discover before finalizing the note.

## 2020-09-18 NOTE — ASSESSMENT & PLAN NOTE
This is a 70-year-old male here today for his shingles vaccination.  He is prepared to get both doses.  No new complaints today.

## 2020-09-18 NOTE — ASSESSMENT & PLAN NOTE
Still is dealing with mild depression.  Moved to [place] and still alone.  Is able to control his symptoms.  Denies any homicidal or suicidal ideations.

## 2020-09-27 DIAGNOSIS — E11.9 TYPE 2 DIABETES MELLITUS WITHOUT COMPLICATION, WITHOUT LONG-TERM CURRENT USE OF INSULIN (HCC): ICD-10-CM

## 2020-09-27 RX ORDER — SITAGLIPTIN 100 MG/1
TABLET, FILM COATED ORAL
Qty: 90 TAB | Refills: 3 | Status: SHIPPED | OUTPATIENT
Start: 2020-09-27 | End: 2021-09-13

## 2020-10-26 DIAGNOSIS — E78.5 DYSLIPIDEMIA: ICD-10-CM

## 2020-10-27 RX ORDER — ROSUVASTATIN CALCIUM 40 MG/1
40 TABLET, COATED ORAL DAILY
Qty: 90 TAB | Refills: 3 | Status: SHIPPED | OUTPATIENT
Start: 2020-10-27 | End: 2021-10-13

## 2020-11-04 DIAGNOSIS — I25.84 CORONARY ARTERY DISEASE DUE TO CALCIFIED CORONARY LESION: ICD-10-CM

## 2020-11-04 DIAGNOSIS — I25.10 CORONARY ARTERY DISEASE DUE TO CALCIFIED CORONARY LESION: ICD-10-CM

## 2020-11-04 DIAGNOSIS — I10 ESSENTIAL HYPERTENSION: ICD-10-CM

## 2020-11-04 DIAGNOSIS — E78.5 DYSLIPIDEMIA: ICD-10-CM

## 2020-11-05 RX ORDER — METOPROLOL SUCCINATE 50 MG/1
TABLET, EXTENDED RELEASE ORAL
Qty: 90 TAB | Refills: 2 | Status: SHIPPED | OUTPATIENT
Start: 2020-11-05 | End: 2021-07-26

## 2020-11-20 ENCOUNTER — NON-PROVIDER VISIT (OUTPATIENT)
Dept: MEDICAL GROUP | Facility: MEDICAL CENTER | Age: 70
End: 2020-11-20
Payer: COMMERCIAL

## 2020-11-20 DIAGNOSIS — Z23 NEED FOR VACCINATION: ICD-10-CM

## 2020-11-20 PROCEDURE — 90471 IMMUNIZATION ADMIN: CPT | Performed by: NURSE PRACTITIONER

## 2020-11-20 PROCEDURE — 90750 HZV VACC RECOMBINANT IM: CPT | Performed by: NURSE PRACTITIONER

## 2020-11-20 NOTE — NON-PROVIDER
"Kun Johnrose Lisa  here for a non-provider visit for:   FLU    Reason for immunization: continue or complete series started at the office  Immunization records indicate need for vaccine: Yes, confirmed with Epic  Minimum interval has been met for this vaccine: Yes  ABN completed: Yes    Order and dose verified by: HARPER BARGER Dated  10/30/2019  was given to patient: Yes  All IAC Questionnaire questions were answered \"No.\"    Patient tolerated injection and no adverse effects were observed or reported: Yes    Pt scheduled for next dose in series: Not Indicated    Melvin Batista, Med Ass't    "

## 2020-12-22 ENCOUNTER — TELEPHONE (OUTPATIENT)
Dept: CARDIOLOGY | Facility: MEDICAL CENTER | Age: 70
End: 2020-12-22

## 2020-12-22 DIAGNOSIS — E78.5 DYSLIPIDEMIA: ICD-10-CM

## 2020-12-22 DIAGNOSIS — I25.84 CORONARY ARTERY DISEASE DUE TO CALCIFIED CORONARY LESION: ICD-10-CM

## 2020-12-22 DIAGNOSIS — I25.10 CORONARY ARTERY DISEASE DUE TO CALCIFIED CORONARY LESION: ICD-10-CM

## 2020-12-22 DIAGNOSIS — I10 ESSENTIAL HYPERTENSION: ICD-10-CM

## 2020-12-22 RX ORDER — RAMIPRIL 10 MG/1
10 CAPSULE ORAL DAILY
Qty: 90 CAP | Refills: 2 | Status: SHIPPED | OUTPATIENT
Start: 2020-12-22 | End: 2021-10-04

## 2020-12-22 NOTE — TELEPHONE ENCOUNTER
JASBIR Castillo from [facility] pharmacy calling about refill of ramipril (ALTACE) 10 MG capsule. Please call back at [phone], Ref #[id number]

## 2021-01-11 ENCOUNTER — OFFICE VISIT (OUTPATIENT)
Dept: MEDICAL GROUP | Facility: MEDICAL CENTER | Age: 71
End: 2021-01-11
Payer: COMMERCIAL

## 2021-01-11 VITALS
BODY MASS INDEX: 32.7 KG/M2 | WEIGHT: 263 LBS | TEMPERATURE: 98.2 F | DIASTOLIC BLOOD PRESSURE: 62 MMHG | RESPIRATION RATE: 16 BRPM | HEART RATE: 50 BPM | SYSTOLIC BLOOD PRESSURE: 118 MMHG | HEIGHT: 75 IN | OXYGEN SATURATION: 96 %

## 2021-01-11 DIAGNOSIS — M62.830 MUSCLE SPASM OF BACK: ICD-10-CM

## 2021-01-11 DIAGNOSIS — Z12.5 SCREENING PSA (PROSTATE SPECIFIC ANTIGEN): ICD-10-CM

## 2021-01-11 DIAGNOSIS — E11.40 TYPE 2 DIABETES MELLITUS WITH DIABETIC NEUROPATHY, WITHOUT LONG-TERM CURRENT USE OF INSULIN (HCC): ICD-10-CM

## 2021-01-11 DIAGNOSIS — I25.10 CORONARY ARTERY DISEASE DUE TO CALCIFIED CORONARY LESION: ICD-10-CM

## 2021-01-11 DIAGNOSIS — F32.9 REACTIVE DEPRESSION: ICD-10-CM

## 2021-01-11 DIAGNOSIS — I25.84 CORONARY ARTERY DISEASE DUE TO CALCIFIED CORONARY LESION: ICD-10-CM

## 2021-01-11 PROBLEM — Z23 NEED FOR SHINGLES VACCINE: Status: RESOLVED | Noted: 2020-09-18 | Resolved: 2021-01-11

## 2021-01-11 PROCEDURE — 99214 OFFICE O/P EST MOD 30 MIN: CPT | Performed by: PHYSICIAN ASSISTANT

## 2021-01-11 RX ORDER — BACLOFEN 20 MG/1
20 TABLET ORAL PRN
Qty: 90 TAB | Refills: 1 | Status: SHIPPED | OUTPATIENT
Start: 2021-01-11 | End: 2021-02-08

## 2021-01-11 NOTE — PROGRESS NOTES
Subjective:   Kun Lisa is a 70 y.o. male here today for diabetes, CAD, muscle spasms of back and depression.    Type 2 diabetes mellitus with neurologic complication (CMS-HCC)  This is a pleasant 70-year-old male here today to follow-up on his health.  Overall he is feeling healthy.  No new complaints today.  History of diabetes.  It is pretty well controlled but he is due for an A1c at the end of February.  On Januvia and Jardiance.  States that his diet has not been all that good over the past few weeks.  Has been checking his sugars.  He is due to see his ophthalmologist shortly.    Coronary artery disease due to calcified coronary lesion: Stent to distal circumflex, patent LAD stent and  of RCA in April 2017  Chronic condition.  Currently on Crestor 40 mg daily.  Has an appointment in August with cardiology.  Is not on a baby aspirin.  Was told not to take it possibly because of his abdominal aortic aneurysm.    Muscle spasm of back  Chronic condition.  Requesting a refill of baclofen.  Has tramadol as well to take if needed.  Does not need a renewal of that.  Muscle spasms are stable.    Depression  Is doing better with his depression.  It comes and goes.  Not debilitating.  He sees no reason why he needs to speak with a counselor or see a therapist.       Current medicines (including changes today)  Current Outpatient Medications   Medication Sig Dispense Refill   • baclofen (LIORESAL) 20 MG tablet TAKE 1 TAB BY MOUTH AS NEEDED. INDICATIONS: MUSCLE SPASTICITY 90 Tab 1   • ramipril (ALTACE) 10 MG capsule Take 1 Cap by mouth every day. 90 Cap 2   • metoprolol SR (TOPROL XL) 50 MG TABLET SR 24 HR TAKE 1 TABLET DAILY 90 Tab 2   • rosuvastatin (CRESTOR) 40 MG tablet Take 1 Tab by mouth every day. 90 Tab 3   • JANUVIA 100 MG Tab TAKE 1 TABLET DAILY 90 Tab 3   • Empagliflozin (JARDIANCE) 25 MG Tab Take 1 tablet by mouth every day. 90 Tab 3   • Multiple Vitamins-Minerals (DAILY MULTIVITAMIN PO) Take 1 Tab  "by mouth 2 Times a Day.       No current facility-administered medications for this visit.      He  has a past medical history of Acid reflux, Arrhythmia, CAD (coronary artery disease), Diabetes (HCC), Diabetic neuropathy (HCC), Hyperlipidemia, Hypertension, Obstructive sleep apnea, Palpitations, Past heart attack, Psychiatric problem, Sleep apnea, and Snoring.    Social History and Family History were reviewed and updated.    ROS   No chest pain, no shortness of breath, no abdominal pain and all other systems were reviewed and are negative.       Objective:     /62   Pulse (!) 50   Temp 36.8 °C (98.2 °F) (Temporal)   Resp 16   Ht 1.905 m (6' 3\")   Wt 119.3 kg (263 lb)   SpO2 96%  Body mass index is 32.87 kg/m².   Physical Exam:  Constitutional: Alert, no distress.  Skin: Warm, dry, good turgor, no rashes in visible areas.  Eye: Equal, round and reactive, conjunctiva clear, lids normal.  ENMT: Lips without lesions, good dentition, oropharynx clear.  Neck: Trachea midline, no masses.   Lymph: No cervical or supraclavicular lymphadenopathy  Respiratory: Unlabored respiratory effort, lungs appear clear, no wheezes, no ronchi.  Cardiovascular: Regular rate rhythm.  Psych: Alert and oriented x3, normal affect and mood.        Assessment and Plan:   The following treatment plan was discussed    1. Type 2 diabetes mellitus with diabetic neuropathy, without long-term current use of insulin (HCC)  Chronic condition.  Stable.  Continue medications as directed.  Order labs for early April.  - Lipid Profile; Future  - HEMOGLOBIN A1C; Future  - COMP METABOLIC PANEL    2. Muscle spasm of back  Chronic condition.  Stable.  Renew baclofen as directed.  Contact me for tramadol renewal.    3. Screening PSA (prostate specific antigen)  PSA ordered.  Screening.  - PROSTATE SPECIFIC AG SCREENING; Future    4. Reactive depression  Chronic condition.  Stable.  We will continue to monitor.    5. Coronary artery disease due to " calcified coronary lesion: Stent to distal circumflex, patent LAD stent and  of RCA in April 2017  Chronic condition.  Stable.  Follow with cardiology.  Continue Crestor as directed.      Followup: Return in about 6 months (around 7/11/2021), or if symptoms worsen or fail to improve.    Please note that this dictation was created using voice recognition software. I have made every reasonable attempt to correct obvious errors, but I expect that there are errors of grammar and possibly content that I did not discover before finalizing the note.

## 2021-01-11 NOTE — ASSESSMENT & PLAN NOTE
Is doing better with his depression.  It comes and goes.  Not debilitating.  He sees no reason why he needs to speak with a counselor or see a therapist.

## 2021-01-11 NOTE — ASSESSMENT & PLAN NOTE
This is a pleasant 70-year-old male here today to follow-up on his health.  Overall he is feeling healthy.  No new complaints today.  History of diabetes.  It is pretty well controlled but he is due for an A1c at the end of February.  On Januvia and Jardiance.  States that his diet has not been all that good over the past few weeks.  Has been checking his sugars.  He is due to see his ophthalmologist shortly.

## 2021-01-11 NOTE — ASSESSMENT & PLAN NOTE
Chronic condition.  Requesting a refill of baclofen.  Has tramadol as well to take if needed.  Does not need a renewal of that.  Muscle spasms are stable.

## 2021-01-11 NOTE — ASSESSMENT & PLAN NOTE
Chronic condition.  Currently on Crestor 40 mg daily.  Has an appointment in August with cardiology.  Is not on a baby aspirin.  Was told not to take it possibly because of his abdominal aortic aneurysm.

## 2021-01-11 NOTE — TELEPHONE ENCOUNTER
Received request via: Pharmacy    Was the patient seen in the last year in this department? Yes    Patients next Appointment:  1/11/2021     Requested Prescriptions     Pending Prescriptions Disp Refills   • baclofen (LIORESAL) 20 MG tablet [Pharmacy Med Name: BACLOFEN 20 MG TABLET] 90 Tab 1     Sig: TAKE 1 TAB BY MOUTH AS NEEDED. INDICATIONS: MUSCLE SPASTICITY

## 2021-02-08 DIAGNOSIS — M62.830 MUSCLE SPASM OF BACK: ICD-10-CM

## 2021-02-08 RX ORDER — BACLOFEN 20 MG/1
20 TABLET ORAL PRN
Qty: 90 TAB | Refills: 1 | Status: SHIPPED | OUTPATIENT
Start: 2021-02-08 | End: 2021-05-10

## 2021-02-08 NOTE — TELEPHONE ENCOUNTER
Received request via: Pharmacy    Was the patient seen in the last year in this department? Yes    Patients next Appointment:  8/4/2021     Requested Prescriptions     Pending Prescriptions Disp Refills   • baclofen (LIORESAL) 20 MG tablet [Pharmacy Med Name: BACLOFEN 20 MG TABLET] 90 Tab 1     Sig: TAKE 1 TAB BY MOUTH AS NEEDED. INDICATIONS: MUSCLE SPASTICITY

## 2021-02-11 LAB
ALBUMIN SERPL-MCNC: 4.6 G/DL (ref 3.8–4.8)
ALBUMIN/GLOB SERPL: 1.8 {RATIO} (ref 1.2–2.2)
ALP SERPL-CCNC: 57 IU/L (ref 39–117)
ALT SERPL-CCNC: 20 IU/L (ref 0–44)
AST SERPL-CCNC: 17 IU/L (ref 0–40)
BILIRUB SERPL-MCNC: 0.5 MG/DL (ref 0–1.2)
BUN SERPL-MCNC: 15 MG/DL (ref 8–27)
BUN/CREAT SERPL: 14 (ref 10–24)
CALCIUM SERPL-MCNC: 9.4 MG/DL (ref 8.6–10.2)
CHLORIDE SERPL-SCNC: 102 MMOL/L (ref 96–106)
CO2 SERPL-SCNC: 23 MMOL/L (ref 20–29)
CREAT SERPL-MCNC: 1.08 MG/DL (ref 0.76–1.27)
GLOBULIN SER CALC-MCNC: 2.5 G/DL (ref 1.5–4.5)
GLUCOSE SERPL-MCNC: 168 MG/DL (ref 65–99)
POTASSIUM SERPL-SCNC: 4.8 MMOL/L (ref 3.5–5.2)
PROT SERPL-MCNC: 7.1 G/DL (ref 6–8.5)
SODIUM SERPL-SCNC: 138 MMOL/L (ref 134–144)

## 2021-03-26 ENCOUNTER — PATIENT MESSAGE (OUTPATIENT)
Dept: MEDICAL GROUP | Facility: MEDICAL CENTER | Age: 71
End: 2021-03-26

## 2021-03-26 RX ORDER — EMPAGLIFLOZIN 25 MG/1
1 TABLET, FILM COATED ORAL DAILY
Qty: 90 TABLET | Refills: 3 | Status: SHIPPED | OUTPATIENT
Start: 2021-03-26 | End: 2022-03-08

## 2021-04-30 LAB
CHOLEST SERPL-MCNC: 150 MG/DL (ref 100–199)
HBA1C MFR BLD: 6.7 % (ref 4.8–5.6)
HDLC SERPL-MCNC: 44 MG/DL
LABORATORY COMMENT REPORT: ABNORMAL
LDLC SERPL CALC-MCNC: 76 MG/DL (ref 0–99)
PSA SERPL-MCNC: 0.9 NG/ML (ref 0–4)
TRIGL SERPL-MCNC: 180 MG/DL (ref 0–149)
VLDLC SERPL CALC-MCNC: 30 MG/DL (ref 5–40)

## 2021-05-09 DIAGNOSIS — M62.830 MUSCLE SPASM OF BACK: ICD-10-CM

## 2021-05-10 RX ORDER — BACLOFEN 20 MG/1
20 TABLET ORAL PRN
Qty: 90 TABLET | Refills: 1 | Status: SHIPPED | OUTPATIENT
Start: 2021-05-10 | End: 2021-06-06

## 2021-05-11 ENCOUNTER — HOSPITAL ENCOUNTER (OUTPATIENT)
Dept: RADIOLOGY | Facility: MEDICAL CENTER | Age: 71
End: 2021-05-11
Attending: SURGERY
Payer: COMMERCIAL

## 2021-05-11 DIAGNOSIS — I71.40 ABDOMINAL ANEURYSM (HCC): ICD-10-CM

## 2021-05-11 PROCEDURE — 93978 VASCULAR STUDY: CPT | Mod: 26 | Performed by: INTERNAL MEDICINE

## 2021-05-11 PROCEDURE — 93978 VASCULAR STUDY: CPT

## 2021-06-05 DIAGNOSIS — M62.830 MUSCLE SPASM OF BACK: ICD-10-CM

## 2021-06-06 RX ORDER — BACLOFEN 20 MG/1
20 TABLET ORAL PRN
Qty: 90 TABLET | Refills: 1 | Status: SHIPPED | OUTPATIENT
Start: 2021-06-06 | End: 2021-07-07

## 2021-07-07 DIAGNOSIS — M62.830 MUSCLE SPASM OF BACK: ICD-10-CM

## 2021-07-07 RX ORDER — BACLOFEN 20 MG/1
20 TABLET ORAL PRN
Qty: 90 TABLET | Refills: 1 | Status: SHIPPED | OUTPATIENT
Start: 2021-07-07 | End: 2021-07-13

## 2021-07-13 DIAGNOSIS — M62.830 MUSCLE SPASM OF BACK: ICD-10-CM

## 2021-07-13 RX ORDER — BACLOFEN 20 MG/1
20 TABLET ORAL PRN
Qty: 270 TABLET | Refills: 1 | Status: SHIPPED | OUTPATIENT
Start: 2021-07-13 | End: 2022-09-15

## 2021-07-23 DIAGNOSIS — I10 ESSENTIAL HYPERTENSION: ICD-10-CM

## 2021-07-23 DIAGNOSIS — E78.5 DYSLIPIDEMIA: ICD-10-CM

## 2021-07-23 DIAGNOSIS — I25.10 CORONARY ARTERY DISEASE DUE TO CALCIFIED CORONARY LESION: ICD-10-CM

## 2021-07-23 DIAGNOSIS — I25.84 CORONARY ARTERY DISEASE DUE TO CALCIFIED CORONARY LESION: ICD-10-CM

## 2021-07-26 RX ORDER — METOPROLOL SUCCINATE 50 MG/1
TABLET, EXTENDED RELEASE ORAL
Qty: 90 TABLET | Refills: 3 | Status: SHIPPED | OUTPATIENT
Start: 2021-07-26 | End: 2022-01-05

## 2021-08-04 ENCOUNTER — OFFICE VISIT (OUTPATIENT)
Dept: MEDICAL GROUP | Facility: MEDICAL CENTER | Age: 71
End: 2021-08-04
Payer: COMMERCIAL

## 2021-08-04 VITALS
WEIGHT: 251.4 LBS | BODY MASS INDEX: 31.26 KG/M2 | TEMPERATURE: 97.8 F | DIASTOLIC BLOOD PRESSURE: 66 MMHG | OXYGEN SATURATION: 96 % | HEIGHT: 75 IN | SYSTOLIC BLOOD PRESSURE: 122 MMHG | HEART RATE: 52 BPM

## 2021-08-04 DIAGNOSIS — E66.9 OBESITY (BMI 30-39.9): ICD-10-CM

## 2021-08-04 DIAGNOSIS — F32.9 REACTIVE DEPRESSION: ICD-10-CM

## 2021-08-04 DIAGNOSIS — I71.40 ABDOMINAL AORTIC ANEURYSM (AAA) 3.0 CM TO 5.5 CM IN DIAMETER IN MALE (HCC): ICD-10-CM

## 2021-08-04 DIAGNOSIS — E11.40 TYPE 2 DIABETES MELLITUS WITH DIABETIC NEUROPATHY, WITHOUT LONG-TERM CURRENT USE OF INSULIN (HCC): ICD-10-CM

## 2021-08-04 PROCEDURE — 99214 OFFICE O/P EST MOD 30 MIN: CPT | Performed by: PHYSICIAN ASSISTANT

## 2021-08-04 NOTE — ASSESSMENT & PLAN NOTE
This is a pleasant 71-year-old male here today to follow-up on his health.  Recently seen at [facility] for a abdominal discomfort.  CT scan was done.  Nothing found.  Labs were good.  Was noted that his aneurysm was at 5.4 cm.  He does follow with a surgeon here in [place] but was referred to one down in [place].  He denies any further issues such as GI pain.  The discomfort was not at all related to the abdominal aortic aneurysm.

## 2021-08-04 NOTE — ASSESSMENT & PLAN NOTE
Complains of continued depression.  States that most of his depression is related to politics.  He states that instead of discussing politics he argues politics.  His friends prefer to talk about it but he argues.  This is caused a risk in several of his friendships.  He is willing to see a psychologist.  Does not want to start medication.

## 2021-08-04 NOTE — ASSESSMENT & PLAN NOTE
Diabetes is stable.  No need for lab renewals.  Does need updated labs to be done in November of this coming year.

## 2021-08-04 NOTE — PROGRESS NOTES
Subjective:   Kun Lisa is a 71 y.o. male here today for abdominal aortic aneurysm, diabetes and depression.    Abdominal aortic aneurysm (AAA) 3.0 cm to 5.5 cm in diameter in male (HCC)  This is a pleasant 71-year-old male here today to follow-up on his health.  Recently seen at [facility] for a abdominal discomfort.  CT scan was done.  Nothing found.  Labs were good.  Was noted that his aneurysm was at 5.4 cm.  He does follow with a surgeon here in [place] but was referred to one down in [place].  He denies any further issues such as GI pain.  The discomfort was not at all related to the abdominal aortic aneurysm.    Type 2 diabetes mellitus with neurologic complication ([facility])  Diabetes is stable.  No need for lab renewals.  Does need updated labs to be done in November of this coming year.    Depression  Complains of continued depression.  States that most of his depression is related to politics.  He states that instead of discussing politics he argues politics.  His friends prefer to talk about it but he argues.  This is caused a risk in several of his friendships.  He is willing to see a psychologist.  Does not want to start medication.       Current medicines (including changes today)  Current Outpatient Medications   Medication Sig Dispense Refill   • metoprolol SR (TOPROL XL) 50 MG TABLET SR 24 HR TAKE 1 TABLET DAILY 90 tablet 3   • baclofen (LIORESAL) 20 MG tablet TAKE 1 TAB BY MOUTH AS NEEDED. INDICATIONS: MUSCLE SPASTICITY 270 tablet 1   • Empagliflozin (JARDIANCE) 25 MG Tab Take 1 tablet by mouth every day. 90 tablet 3   • ramipril (ALTACE) 10 MG capsule Take 1 Cap by mouth every day. 90 Cap 2   • rosuvastatin (CRESTOR) 40 MG tablet Take 1 Tab by mouth every day. 90 Tab 3   • JANUVIA 100 MG Tab TAKE 1 TABLET DAILY 90 Tab 3   • Multiple Vitamins-Minerals (DAILY MULTIVITAMIN PO) Take 1 Tab by mouth 2 Times a Day.       No current facility-administered medications for this visit.  "    He  has a past medical history of Acid reflux, Arrhythmia, CAD (coronary artery disease), Diabetes (HCC), Diabetic neuropathy (HCC), Hyperlipidemia, Hypertension, Obstructive sleep apnea, Palpitations, Past heart attack, Psychiatric problem, Sleep apnea, and Snoring.    Social History and Family History were reviewed and updated.    ROS   No chest pain, no shortness of breath, no abdominal pain and all other systems were reviewed and are negative.       Objective:     /66   Pulse (!) 52   Temp 36.6 °C (97.8 °F) (Temporal)   Ht 1.905 m (6' 3\")   Wt 114 kg (251 lb 6.4 oz)   SpO2 96%  Body mass index is 31.42 kg/m².   Physical Exam:  Constitutional: Alert, no distress.  Skin: Warm, dry, good turgor, no rashes in visible areas.  Eye: Equal, round and reactive, conjunctiva clear, lids normal.  ENMT: Lips without lesions, good dentition, oropharynx clear.  Neck: Trachea midline, no masses.   Lymph: No cervical or supraclavicular lymphadenopathy  Respiratory: Unlabored respiratory effort, lungs appear clear, no wheezes.  Cardiovascular: Regular rate rhythm.  Bradycardic.  Psych: Alert and oriented x3, normal affect and mood.        Assessment and Plan:   The following treatment plan was discussed    1. Abdominal aortic aneurysm (AAA) 3.0 cm to 5.5 cm in diameter in male (HCC)  Chronic condition.  Stable.  Advised to follow-up with surgeon in [place].  Currently asymptomatic.    2. Reactive depression  Chronic condition.  Uncontrolled.  Refer to psychology for evaluation.  - REFERRAL TO PSYCHOLOGY    3. Type 2 diabetes mellitus with diabetic neuropathy, without long-term current use of insulin (HCC)  Chronic condition.  Controlled.  Order labs for this coming November.  Continue medications as directed.  - HEMOGLOBIN A1C; Future  - Comp Metabolic Panel; Future  - MICROALBUMIN CREAT RATIO URINE; Future    4. Obesity (BMI 30-39.9)  Chronic condition.  We will continue to monitor.  - Patient identified as " having weight management issue.  Appropriate orders and counseling given.         Followup: Return in about 6 months (around 2/4/2022), or if symptoms worsen or fail to improve.    Please note that this dictation was created using voice recognition software. I have made every reasonable attempt to correct obvious errors, but I expect that there are errors of grammar and possibly content that I did not discover before finalizing the note.

## 2021-08-09 ENCOUNTER — OFFICE VISIT (OUTPATIENT)
Dept: MEDICAL GROUP | Facility: MEDICAL CENTER | Age: 71
End: 2021-08-09
Payer: COMMERCIAL

## 2021-08-09 VITALS
WEIGHT: 254.41 LBS | OXYGEN SATURATION: 97 % | SYSTOLIC BLOOD PRESSURE: 112 MMHG | DIASTOLIC BLOOD PRESSURE: 64 MMHG | BODY MASS INDEX: 31.63 KG/M2 | HEIGHT: 75 IN | HEART RATE: 65 BPM | TEMPERATURE: 97.7 F

## 2021-08-09 DIAGNOSIS — E78.5 DYSLIPIDEMIA: ICD-10-CM

## 2021-08-09 DIAGNOSIS — Z00.00 MEDICARE ANNUAL WELLNESS VISIT, INITIAL: ICD-10-CM

## 2021-08-09 DIAGNOSIS — M25.50 ARTHRALGIA, UNSPECIFIED JOINT: ICD-10-CM

## 2021-08-09 DIAGNOSIS — I25.84 CORONARY ARTERY DISEASE DUE TO CALCIFIED CORONARY LESION: ICD-10-CM

## 2021-08-09 DIAGNOSIS — I71.40 ABDOMINAL AORTIC ANEURYSM (AAA) 3.0 CM TO 5.5 CM IN DIAMETER IN MALE (HCC): ICD-10-CM

## 2021-08-09 DIAGNOSIS — G89.29 CHRONIC THUMB PAIN, BILATERAL: ICD-10-CM

## 2021-08-09 DIAGNOSIS — M79.644 CHRONIC THUMB PAIN, BILATERAL: ICD-10-CM

## 2021-08-09 DIAGNOSIS — M79.645 CHRONIC THUMB PAIN, BILATERAL: ICD-10-CM

## 2021-08-09 DIAGNOSIS — M62.830 MUSCLE SPASM OF BACK: ICD-10-CM

## 2021-08-09 DIAGNOSIS — I10 ESSENTIAL HYPERTENSION: ICD-10-CM

## 2021-08-09 DIAGNOSIS — G47.33 OBSTRUCTIVE SLEEP APNEA SYNDROME: ICD-10-CM

## 2021-08-09 DIAGNOSIS — I25.10 CORONARY ARTERY DISEASE DUE TO CALCIFIED CORONARY LESION: ICD-10-CM

## 2021-08-09 DIAGNOSIS — G62.9 NEUROPATHY: ICD-10-CM

## 2021-08-09 DIAGNOSIS — E11.40 TYPE 2 DIABETES MELLITUS WITH DIABETIC NEUROPATHY, WITHOUT LONG-TERM CURRENT USE OF INSULIN (HCC): ICD-10-CM

## 2021-08-09 DIAGNOSIS — E66.9 OBESITY (BMI 30-39.9): ICD-10-CM

## 2021-08-09 DIAGNOSIS — F32.9 REACTIVE DEPRESSION: ICD-10-CM

## 2021-08-09 PROBLEM — R00.2 PALPITATIONS: Status: RESOLVED | Noted: 2017-01-20 | Resolved: 2021-08-09

## 2021-08-09 PROBLEM — R94.39 ABNORMAL NUCLEAR STRESS TEST: Status: RESOLVED | Noted: 2017-04-27 | Resolved: 2021-08-09

## 2021-08-09 PROCEDURE — G0438 PPPS, INITIAL VISIT: HCPCS | Performed by: PHYSICIAN ASSISTANT

## 2021-08-09 ASSESSMENT — ACTIVITIES OF DAILY LIVING (ADL): BATHING_REQUIRES_ASSISTANCE: 0

## 2021-08-09 ASSESSMENT — PATIENT HEALTH QUESTIONNAIRE - PHQ9
CLINICAL INTERPRETATION OF PHQ2 SCORE: 2
SUM OF ALL RESPONSES TO PHQ QUESTIONS 1-9: 9
5. POOR APPETITE OR OVEREATING: 1 - SEVERAL DAYS

## 2021-08-09 ASSESSMENT — ENCOUNTER SYMPTOMS: GENERAL WELL-BEING: GOOD

## 2021-08-09 NOTE — ASSESSMENT & PLAN NOTE
This is a pleasant 71-year-old male here today for Medicare annual wellness examination.  This will be his first.  I saw him last week.  History of AAA measured at 5.4 cm.  He will be seeing a surgeon down in [place] shortly.  Chronic bilateral thumb pain which is stable.  Has other joint pain as well which he takes tramadol for.  Has been dealing with brain fog recently and will be cutting out tramadol to see how he handles it mentally and physically.  Has an appointment today with a psychologist for his ongoing depression.  History of CAD with a stent placed in 2017.  Notes from cardiology indicate to take a baby aspirin but he has not been taking it.  History of hyperlipidemia and is on Crestor.  History of hypertension and on metoprolol and ramipril.  Has chronic spasming of his back which she will take baclofen for.  History of neuropathy which she has taken tramadol for.  Also has sleep apnea hopefully controlled by CPAP.  Diabetes which is stable.  Takes Januvia daily.

## 2021-08-09 NOTE — PROGRESS NOTES
Chief Complaint   Patient presents with   • Annual Exam       HPI:  This is a pleasant 71-year-old male here today for Medicare annual wellness examination.  This will be his first.  I saw him last week.  History of AAA measured at 5.4 cm.  He will be seeing a surgeon down in [place] shortly.  Chronic bilateral thumb pain which is stable.  Has other joint pain as well which he takes tramadol for.  Has been dealing with brain fog recently and will be cutting out tramadol to see how he handles it mentally and physically.  Has an appointment today with a psychologist for his ongoing depression.  History of CAD with a stent placed in 2017.  Notes from cardiology indicate to take a baby aspirin but he has not been taking it.  History of hyperlipidemia and is on Crestor.  History of hypertension and on metoprolol and ramipril.  Has chronic spasming of his back which she will take baclofen for.  History of neuropathy which she has taken tramadol for.  Also has sleep apnea hopefully controlled by CPAP.  Diabetes which is stable.  Takes Januvia daily.    Patient Active Problem List    Diagnosis Date Noted   • Medicare annual wellness visit, initial 08/09/2021   • Depression 02/10/2020   • Joint pain 02/10/2020   • Obesity (BMI 30-39.9) 02/10/2020   • Muscle spasm of back 08/13/2019   • Chronic thumb pain, bilateral 04/12/2019   • History of acute pancreatitis 01/14/2019   • Abdominal aortic aneurysm (AAA) 3.0 cm to 5.5 cm in diameter in male ([facility]) 01/14/2019   • Coronary artery disease due to calcified coronary lesion: Stent to distal circumflex, patent LAD stent and  of RCA in April 2017 01/20/2017   • Obstructive sleep apnea syndrome 11/22/2016   • Type 2 diabetes mellitus with neurologic complication ([facility]) 10/25/2016   • Dyslipidemia 10/25/2016   • Essential hypertension 10/25/2016   • Neuropathy ([facility]) 10/25/2016       Current Outpatient Medications   Medication Sig Dispense Refill   • ASPIRIN 81 PO Take  by mouth.      • metoprolol SR (TOPROL XL) 50 MG TABLET SR 24 HR TAKE 1 TABLET DAILY 90 tablet 3   • baclofen (LIORESAL) 20 MG tablet TAKE 1 TAB BY MOUTH AS NEEDED. INDICATIONS: MUSCLE SPASTICITY 270 tablet 1   • Empagliflozin (JARDIANCE) 25 MG Tab Take 1 tablet by mouth every day. 90 tablet 3   • ramipril (ALTACE) 10 MG capsule Take 1 Cap by mouth every day. 90 Cap 2   • rosuvastatin (CRESTOR) 40 MG tablet Take 1 Tab by mouth every day. 90 Tab 3   • JANUVIA 100 MG Tab TAKE 1 TABLET DAILY 90 Tab 3   • Multiple Vitamins-Minerals (DAILY MULTIVITAMIN PO) Take 1 Tab by mouth 2 Times a Day.       No current facility-administered medications for this visit.          Current supplements as per medication list.     Allergies: Patient has no known allergies.    Current social contact/activities: Exercising, reading, time with friends     He  reports that he quit smoking about 18 years ago. His smoking use included cigarettes. He has a 32.00 pack-year smoking history. He has never used smokeless tobacco. He reports current alcohol use. He reports current drug use. Frequency: 7.00 times per week. Drug: Marijuana.  Counseling given: Not Answered      DPA/Advanced Directive:  Patient does not have an Advanced Directive.  A packet and workshop information was given on Advanced Directives.    ROS:    Gait: Uses no assistive device  Ostomy: No  Other tubes: No  Amputations: No  Chronic oxygen use: No  Last eye exam: 2020  Wears hearing aids: No   : Denies any urinary leakage during the last 6 months    Screening:    Depression Screening    Little interest or pleasure in doing things? Y 0 - not at all  Feeling down, depressed , or hopeless?Y 2 - more than half the days  Trouble falling or staying asleep, or sleeping too much? N 0 - not at all  Feeling tired or having little energy? N 0 - not at all  Poor appetite or overeating?N  1 - several days  Feeling bad about yourself - or that you are a failure or have let yourself or your family  down? 3 - nearly every dayN  Trouble concentrating on things, such as reading the newspaper or watching television?N 3 - nearly every day  Moving or speaking so slowly that other people could have noticed.  Or the opposite - being so fidgety or restless that you have been moving around a lot more than usual? N 0 - not at all  Thoughts that you would be better off dead, or of hurting yourself?N  0 - not at all  Patient Health Questionnaire Score: 99    If depressive symptoms identified deferred to follow up visit unless specifically addressed in assessment and plan.    Interpretation of PHQ-9 Total Score   Score Severity   1-4 No Depression   5-9 Mild Depression   10-14 Moderate Depression   15-19 Moderately Severe Depression   20-27 Severe Depression      Screening for Cognitive Impairment    Three Minute Recall (captain, garden, picture) 0/3   0/3  Rios clock face with all 12 numbers and set the hands to show 5 past 8.  Yes    Cognitive concerns identified deferred for follow up unless specifically addressed in assessment and plan.    Fall Risk Assessment    Has the patient had two or more falls in the last year or any fall with injury in the last year? N  No    Safety Assessment    Throw rugs on floor.  NoN   Handrails on all stairs. N Yes  Good lighting in all hallways. N Yes  Difficulty hearing.N  Yes  Patient counseled about all safety risks that were identified.    Functional Assessment ADLs    Are there any barriers preventing you from cooking for yourself or meeting nutritional needs?  No.  N  Are there any barriers preventing you from driving safely or obtaining transportation?  No.  N  Are there any barriers preventing you from using a telephone or calling for help?  No.  N  Are there any barriers preventing you from shopping?  No.  N  Are there any barriers preventing you from taking care of your own finances?  No.  N  Are there any barriers preventing you from managing your medications?  No.  N  Are  there any barriers preventing you from showering, bathing or dressing yourself? No.  N  Are you currently engaging in any exercise or physical activity?  Yes.     What is your perception of your health?  Good.Good    Health Maintenance Summary                DIABETES MONOFILAMENT / LE EXAM Overdue 10/8/2019      Done 10/8/2018 WNL     Patient has more history with this topic...    URINE ACR / MICROALBUMIN Next Due 2021      Done 2020 MICROALB/CREAT RATIO RAND. UR     Patient has more history with this topic...    IMM INFLUENZA Next Due 2021      Done 2020 Imm Admin: Influenza Vaccine Adult HD     Patient has more history with this topic...    A1C SCREENING Next Due 10/29/2021      Done 2021 HEMOGLOBIN A1C     Patient has more history with this topic...    COLOGUARD STOOL DNA Next Due 2022      Done 2019      Patient has more history with this topic...    RETINAL SCREENING Next Due 3/1/2022      Done 3/1/2021 REFERRAL FOR RETINAL SCREENING EXAM     Patient has more history with this topic...    FASTING LIPID PROFILE Next Due 2022      Done 2021 LIPID PANEL     Patient has more history with this topic...    SERUM CREATININE Next Due 2022      Done 2021 Ext Proc: COMP METABOLIC PANEL     Patient has more history with this topic...    Annual Wellness Visit Next Due 8/10/2022      Done 2021 Prob Dx: Medicare annual wellness visit, initial     Patient has more history with this topic...    IMM DTaP/Tdap/Td Vaccine Next Due 2026      Done 2016 Imm Admin: Tdap Vaccine          Patient Care Team:  Kit Torres P.A.-C. as PCP - General (Family Medicine)  Marbin Haddad M.D. as Consulting Physician (Cardiology)      Social History     Tobacco Use   • Smoking status: Former Smoker     Packs/day: 1.00     Years: 32.00     Pack years: 32.00     Types: Cigarettes     Quit date: 2003     Years since quittin.2   • Smokeless tobacco: Never Used   Vaping Use  "  • Vaping Use: Some days   Substance Use Topics   • Alcohol use: Yes     Alcohol/week: 0.0 oz     Comment: rarely   • Drug use: Yes     Frequency: 7.0 times per week     Types: Marijuana     Comment: medical marijuana     Family History   Problem Relation Age of Onset   • Stroke Father 57         at 62 of second stroke   • Arthritis Mother    • Stroke Mother    • Alzheimer's Disease Mother      He  has a past medical history of Acid reflux, Arrhythmia, CAD (coronary artery disease), Diabetes ([facility]), Diabetic neuropathy ([facility]), Hyperlipidemia, Hypertension, Obstructive sleep apnea, Palpitations, Past heart attack, Psychiatric problem, Sleep apnea, and Snoring.   Past Surgical History:   Procedure Laterality Date   • CATARACT EXTRACTION WITH IOL Bilateral    • ZProactive Comfort CARDIAC CATH  18    Synergy stent to Circ   • STENT PLACEMENT      LAD   • ZZZ CARDIAC CATH      LAD stent   • ANGIOPLASTY         Exam:   /64   Pulse 65   Temp 36.5 °C (97.7 °F) (Temporal)   Ht 1.905 m (6' 3\")   Wt 115 kg (254 lb 6.6 oz)   SpO2 97%  Body mass index is 31.8 kg/m².    Hearing good.    Dentition fair  Alert, oriented in no acute distress.  Eye contact is good, speech goal directed, affect calm    Assessment and Plan. The following treatment and monitoring plan is recommended:    1. Medicare annual wellness visit, initial    2. Abdominal aortic aneurysm (AAA) 3.0 cm to 5.5 cm in diameter in male ([facility])    3. Coronary artery disease due to calcified coronary lesion: Stent to distal circumflex, patent LAD stent and  of RCA in 2017    4. Reactive depression    5. Dyslipidemia    6. Essential hypertension    7. Arthralgia, unspecified joint    8. Obesity (BMI 30-39.9)    9. Obstructive sleep apnea syndrome    10. Type 2 diabetes mellitus with diabetic neuropathy, without long-term current use of insulin ([facility])    11. Neuropathy ([facility])    12. Muscle spasm of back    13. Chronic thumb pain, bilateral    Other " orders  - ASPIRIN 81 PO; Take  by mouth.      Services suggested: No services needed at this time  Health Care Screening: Age-appropriate preventive services recommended by USPTF and ACIP covered by Medicare were discussed today. Services ordered if indicated and agreed upon by the patient.  Referrals offered: Community-based lifestyle interventions to reduce health risks and promote self-management and wellness, fall prevention, nutrition, physical activity, tobacco-use cessation, weight loss, and mental health services as per orders if indicated.    Discussion today about general wellness and lifestyle habits:    · Prevent falls and reduce trip hazards; Cautioned about securing or removing rugs.  · Have a working fire alarm and carbon monoxide detector;   · Engage in regular physical activity and social activities     Follow-up: Return if symptoms worsen or fail to improve.

## 2021-08-25 ENCOUNTER — OFFICE VISIT (OUTPATIENT)
Dept: CARDIOLOGY | Facility: PHYSICIAN GROUP | Age: 71
End: 2021-08-25
Payer: COMMERCIAL

## 2021-08-25 VITALS
OXYGEN SATURATION: 96 % | SYSTOLIC BLOOD PRESSURE: 100 MMHG | BODY MASS INDEX: 31.82 KG/M2 | HEIGHT: 75 IN | WEIGHT: 255.9 LBS | DIASTOLIC BLOOD PRESSURE: 60 MMHG | HEART RATE: 76 BPM

## 2021-08-25 DIAGNOSIS — R94.39 ABNORMAL NUCLEAR STRESS TEST: ICD-10-CM

## 2021-08-25 DIAGNOSIS — I45.10 RBBB: ICD-10-CM

## 2021-08-25 DIAGNOSIS — I10 ESSENTIAL HYPERTENSION: ICD-10-CM

## 2021-08-25 DIAGNOSIS — I48.19 PERSISTENT ATRIAL FIBRILLATION (HCC): ICD-10-CM

## 2021-08-25 DIAGNOSIS — E11.9 TYPE 2 DIABETES MELLITUS WITHOUT COMPLICATION, WITHOUT LONG-TERM CURRENT USE OF INSULIN (HCC): ICD-10-CM

## 2021-08-25 DIAGNOSIS — Z87.891 FORMER SMOKER: ICD-10-CM

## 2021-08-25 DIAGNOSIS — Z82.3 FAMILY HISTORY OF STROKE: ICD-10-CM

## 2021-08-25 DIAGNOSIS — I49.3 PVC (PREMATURE VENTRICULAR CONTRACTION): ICD-10-CM

## 2021-08-25 DIAGNOSIS — I49.1 PREMATURE ATRIAL CONTRACTION: ICD-10-CM

## 2021-08-25 DIAGNOSIS — Z01.810 PREOP CARDIOVASCULAR EXAM: ICD-10-CM

## 2021-08-25 DIAGNOSIS — G47.33 OSA ON CPAP: ICD-10-CM

## 2021-08-25 DIAGNOSIS — I71.40 ABDOMINAL AORTIC ANEURYSM (AAA) WITHOUT RUPTURE (HCC): ICD-10-CM

## 2021-08-25 DIAGNOSIS — I71.40 ABDOMINAL AORTIC ANEURYSM (AAA) 3.0 CM TO 5.5 CM IN DIAMETER IN MALE (HCC): ICD-10-CM

## 2021-08-25 DIAGNOSIS — I25.84 CORONARY ARTERY DISEASE DUE TO CALCIFIED CORONARY LESION: ICD-10-CM

## 2021-08-25 DIAGNOSIS — I25.10 CORONARY ARTERY DISEASE DUE TO CALCIFIED CORONARY LESION: ICD-10-CM

## 2021-08-25 PROCEDURE — 99214 OFFICE O/P EST MOD 30 MIN: CPT | Performed by: INTERNAL MEDICINE

## 2021-08-25 RX ORDER — SODIUM PHOSPHATE,MONO-DIBASIC 19G-7G/118
ENEMA (ML) RECTAL
COMMUNITY
End: 2021-08-25

## 2021-08-25 RX ORDER — OMEGA-3/DHA/EPA/FISH OIL 300-1000MG
CAPSULE ORAL DAILY
COMMUNITY
End: 2021-08-25

## 2021-08-25 RX ORDER — AMOXICILLIN 500 MG/1
500 CAPSULE ORAL 3 TIMES DAILY
COMMUNITY
Start: 2021-08-19 | End: 2021-09-29

## 2021-08-25 RX ORDER — MULTIVITAMIN
1 TABLET ORAL
COMMUNITY
End: 2021-08-25

## 2021-08-25 RX ORDER — ASPIRIN 81 MG/1
81 TABLET ORAL DAILY
Qty: 100 TABLET | COMMUNITY
Start: 2021-08-25 | End: 2021-08-25

## 2021-08-25 NOTE — LETTER
[facility]   [address] [place] [address]  Phone: [phone]  Fax: [phone]              Kun Lisa  1950    Encounter Date: 8/25/2021    Myra Baca M.D.          PROGRESS NOTE:  Subjective:   Chief Complaint:   Chief Complaint   Patient presents with   • Coronary Artery Disease     F/V DX:Coronary artery disease due to calcified coronary lesion: LAD stent in 1997       Kun Lisa is a 71 y.o. male who returns for CAD/stent, HTN, HLP, bradycardia, RBBB, PVCs/ectopy, preop risk assessment and today persistent afib.    Prior MI 1997 with stents to LAD and Circ, then   of RCA in April 2017 after abnormal nuc, bradycardia, RBBB.    He recalls going through divorce in 1997, had LHC with stent to LAD and Circ.   Went to ED with CP, sounds like UA and mild MI.  Was placed on meds.  Then had second stent in 2017 after abnormal stress test, led to RCA. Not sure if opening RCA helped with symptoms.  Someone suggested he stop taking aspirin.  Remains on statin and beta-blocker.    New diagnosis of persistent atrial fibrillation today.  Has frequent ectopy on exam, known to have PVCs.  He does not feel them.    No palpitations.  Avoids cold drinks, does recall that cold drinks would provoke some heart racing but nothing recently.  Holter monitor in 2017 with PACs, PVCs and brief atrial runs.  Today, ECG confirms persistent atrial fibrillation.    Does get some low back pain when walking.  He is not limited by chest pain, pressure or tightness.   No significant dyspnea on exertion.   No orthopnea or significant lower extremity swelling.   No lightheaded, or presyncope/syncope.   No symptoms of leg claudication.   No stroke/TIA like symptoms.    Some mild brain fog, stopped tramadol to see if this improves.    Lost weight, stays active, golfs twice a week, walking and riding.    Has been having sx that sound like  neuropathy in his feet.    Has HTN, BP controlled on 2 meds.    Has DM on Jardiance, not controlled.    On BB, slow HR at times , no symptoms.    Has HLP, LDL 76. On Crestor 20 mg.    Prior smoker for 32 years, stopped in 2003.     Has SID, using CPAP, feels much better on this.    Smoke marijuana daily.  No ETOH.  Mild caffeine.    Has AAA, infrarenal, 5.4 cm, was seeing Dr. Gonzalez.  Now seeing Dr. Pepper, going for surgery.    Father had MI in his 50s, 62, passed away.  Had a stroke with paralysis and word finding in his 50s.  Probably A. fib.  Mother lived to , smoker, dementia.    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  Lives alone in [place].  , has financial independence.   Closet family in [place], sister and brother in law planning to move to [place].  Daughter [place], [place].  Golfs with friends twice a week.   Friends in [place].     DATA REVIEWED by me:  ECG 8-25-21  Atrial fibrillation, rate 69, rightward axis, atypical right bundle branch block    ECG 4-27-17  Sinus bradycardia, 45, first degree AV delay, RBBB    Holter 6-19-17  Sinus, PVCs 7,589, 1 atrial run, some PACs, no rhythm changes.    Nuc 4-4-17  Small sized, partially reversible, decreased uptake of moderate severity in  the apical inferior (RCA), mid inferior (RCA) segments during post stress  images.    Medium sized, partially reversible, decreased uptake of moderate severity in  the apical lateral (LCX), inferolateral (LCX) and mid anterolateral (LCX)  segments during post stress images.   Normal left ventricular wall motion.  LV ejection fraction = 62%.    CT abd 12-28-18  1.  Infrarenal abdominal aortic aneurysm with a maximum diameter of 5.3 cm.  2.  Peripancreatic inflammatory changes are consistent with known pancreatitis.  3.  Enlarged peripancreatic lymph node, likely reactive.  4.  Diverticulosis.    Echo none    [facility] 4-27-17  POSTPROCEDURE DIAGNOSES:  1.  Coronary artery disease including  patent stent in the mid left anterior descending artery with nonobstructive in-stent restenosis, high-grade distal circumflex artery of a codominant system supplying excellent collaterals to mid to distal right coronary artery with long chronic total occlusion of the   ostial to mid right coronary artery.  2.  Successful percutaneous transluminal coronary angioplasty/stent placement of the distal circumflex artery with 2.5x16 mm Synergy drug-eluting stent.    Most recent labs:     Lab Results   Component Value Date/Time    HEMOGLOBIN 16.0 12/28/2018 10:53 AM    HEMATOCRIT 44.0 12/28/2018 10:53 AM    MCV 90.7 12/28/2018 10:53 AM    INR 0.95 04/25/2017 03:00 PM      Lab Results   Component Value Date/Time    SODIUM 138 02/10/2021 04:05 AM    SODIUM 135 02/26/2020 09:22 AM    POTASSIUM 4.8 02/10/2021 04:05 AM    POTASSIUM 4.7 02/26/2020 09:22 AM    CHLORIDE 102 02/10/2021 04:05 AM    CHLORIDE 101 02/26/2020 09:22 AM    CO2 23 02/10/2021 04:05 AM    CO2 27 02/26/2020 09:22 AM    GLUCOSE 168 (H) 02/10/2021 04:05 AM    GLUCOSE 159 (H) 02/26/2020 09:22 AM    BUN 15 02/10/2021 04:05 AM    BUN 19 02/26/2020 09:22 AM    CREATININE 1.08 02/10/2021 04:05 AM    CREATININE 1.16 02/26/2020 09:22 AM      Lab Results   Component Value Date/Time    ASTSGOT 17 02/10/2021 04:05 AM    ASTSGOT 16 02/26/2020 09:22 AM    ALTSGPT 20 02/10/2021 04:05 AM    ALTSGPT 19 02/26/2020 09:22 AM    ALBUMIN 4.6 02/10/2021 04:05 AM    ALBUMIN 4.4 02/26/2020 09:22 AM      Lab Results   Component Value Date/Time    CHOLSTRLTOT 150 04/29/2021 04:06 AM    CHOLSTRLTOT 158 02/26/2020 09:22 AM    LDL 89 02/26/2020 09:22 AM    HDL 44 04/29/2021 04:06 AM    HDL 40 02/26/2020 09:22 AM    TRIGLYCERIDE 180 (H) 04/29/2021 04:06 AM    TRIGLYCERIDE 143 02/26/2020 09:22 AM       8-7-19 Ha1c 7.2    1-14-19 na 138, k 4.3, cr 1.03, lft normal LDL 57, HDL 37, ,     12-28-18 h 16, p 173      Past Medical History:   Diagnosis Date   • Acid reflux    • Arrhythmia     • CAD (coronary artery disease)     stent   • Diabetes (HCC)     oral meds   • Diabetic neuropathy (HCC)     bilat feet   • Hyperlipidemia    • Hypertension    • Obstructive sleep apnea     CPAP   • Palpitations    • Past heart attack        • Psychiatric problem     depression   • Sleep apnea     CPAP   • Snoring      Past Surgical History:   Procedure Laterality Date   • CATARACT EXTRACTION WITH IOL Bilateral    • ZZZ CARDIAC CATH  17    Synergy stent to Circ   • STENT PLACEMENT      LAD   • ZZZ CARDIAC CATH      LAD stent   • ANGIOPLASTY       Family History   Problem Relation Age of Onset   • Stroke Father 57         at 62 of second stroke   • Arthritis Mother    • Stroke Mother    • Alzheimer's Disease Mother      Social History     Socioeconomic History   • Marital status:      Spouse name: Not on file   • Number of children: Not on file   • Years of education: Not on file   • Highest education level: Not on file   Occupational History   • Not on file   Tobacco Use   • Smoking status: Former Smoker     Packs/day: 1.00     Years: 32.00     Pack years: 32.00     Types: Cigarettes     Quit date: 2003     Years since quittin.2   • Smokeless tobacco: Never Used   Vaping Use   • Vaping Use: Some days   Substance and Sexual Activity   • Alcohol use: Yes     Alcohol/week: 0.0 oz     Comment: rarely   • Drug use: Yes     Frequency: 7.0 times per week     Types: Marijuana     Comment: medical marijuana   • Sexual activity: Not on file   Other Topics Concern   • Not on file   Social History Narrative   • Not on file     Social Determinants of Health     Financial Resource Strain:    • Difficulty of Paying Living Expenses:    Food Insecurity:    • Worried About Running Out of Food in the Last Year:    • Ran Out of Food in the Last Year:    Transportation Needs:    • Lack of Transportation (Medical):    • Lack of Transportation (Non-Medical):    Physical Activity:    • Days of  "Exercise per Week:    • Minutes of Exercise per Session:    Stress:    • Feeling of Stress :    Social Connections:    • Frequency of Communication with Friends and Family:    • Frequency of Social Gatherings with Friends and Family:    • Attends S[employer] Services:    • Active Member of Clubs or Organizations:    • Attends Club or Organization Meetings:    • Marital Status:    Intimate Partner Violence:    • Fear of Current or Ex-Partner:    • Emotionally Abused:    • Physically Abused:    • Sexually Abused:      No Known Allergies    Current Outpatient Medications   Medication Sig Dispense Refill   • amoxicillin (AMOXIL) 500 MG Cap Take 500 mg by mouth 3 times a day.     • apixaban (ELIQUIS) 5mg Tab Take 1 Tablet by mouth 2 times a day. 60 Tablet 11   • metoprolol SR (TOPROL XL) 50 MG TABLET SR 24 HR TAKE 1 TABLET DAILY 90 tablet 3   • baclofen (LIORESAL) 20 MG tablet TAKE 1 TAB BY MOUTH AS NEEDED. INDICATIONS: MUSCLE SPASTICITY 270 tablet 1   • Empagliflozin (JARDIANCE) 25 MG Tab Take 1 tablet by mouth every day. 90 tablet 3   • ramipril (ALTACE) 10 MG capsule Take 1 Cap by mouth every day. 90 Cap 2   • rosuvastatin (CRESTOR) 40 MG tablet Take 1 Tab by mouth every day. 90 Tab 3   • JANUVIA 100 MG Tab TAKE 1 TABLET DAILY 90 Tab 3   • Cholecalciferol 2000 UNIT Cap Take 2,000 Units by mouth every day. (Patient not taking: Reported on 8/25/2021)     • docosahexanoic acid (OMEGA 3 FA) 1000 MG Cap Take  by mouth every day. (Patient not taking: Reported on 8/25/2021)     • glucosamine/chondroitin (GLUCOSAM/CHRONDROIT 500-400) 500-400 MG Cap Take  by mouth. (Patient not taking: Reported on 8/25/2021)       No current facility-administered medications for this visit.       ROS    All others systems reviewed and negative.     Objective:     /60 (BP Location: Left arm, Patient Position: Sitting, BP Cuff Size: Adult)   Pulse 76   Ht 1.905 m (6' 3\")   Wt 116 kg (255 lb 14.4 oz)   SpO2 96%  Body mass index is 31.99 " kg/m².    Physical Exam   General: No acute distress. Well nourished.  HEENT: EOM grossly intact, no scleral icterus, no pharyngeal erythema.   Neck:  No JVD, no bruits, trachea midline  CVS: RRR, some ectopy. Normal S1, S2. No M/R/G. No LE edema.  2+ radial pulses, 2+ PT pulses  Resp: CTAB. No wheezing or crackles/rhonchi. Normal respiratory effort.  Abdomen: Soft, NT, no pascale hepatomegaly.  MSK/Ext: No clubbing or cyanosis.  Skin: Warm and dry, no rashes.  Neurological: CN III-XII grossly intact. No focal deficits.   Psych: A&O x 3, appropriate affect, good judgement    Physical exam performed today and unchanged, except what is noted, compared to 8-    Assessment:     1. Coronary artery disease due to calcified coronary lesion: LAD stent in 1997     2. Essential hypertension     3. RBBB  EKG   4. Former smoker     5. SID on CPAP     6. Type 2 diabetes mellitus without complication, without long-term current use of insulin (HCC)     7. Abdominal aortic aneurysm (AAA) without rupture (HCC)     8. Abnormal nuclear stress test     9. PVC (premature ventricular contraction)     10. Abdominal aortic aneurysm (AAA) 3.0 cm to 5.5 cm in diameter in male (HCC)     11. Preop cardiovascular exam     12. Premature atrial contraction  EKG   13. Family history of stroke     14. Persistent atrial fibrillation (HCC)  EC-ECHOCARDIOGRAM COMPLETE W/O CONT       Medical Decision Making:  Today's Assessment / Status / Plan:     -Persistent atrial fibrillation, new diagnosis today, has probably been in this for a while.  Appears rate controlled.  -Recommend anticoagulation for chads 2 vascular score of 4, no prior stroke, see below.  -Letter today, not high risk for vascular surgery, no testing prior  -On secondary prevention therapy with BB, statin.  No aspirin since he will be on blood thinner  -LDL better, near goal at 76, on max crestor, could consider zetia but probably does not want extra medication  -Statin holiday due  to reduced memory, see below  -On Jardiance, A1C not controlled, we discussed today, goal A1c is 6.9.  He has been at 7.1.  -BP controlled  -Rare NSAID use  -Watch bradycardia, first degree AV block, RBBB, no symptoms, we could reduce BB dose if needed, he thinks he might be taking 100 mg.  -Probably surgery soon with Dr. Pepper for infrarenal abdominal aortic aneurysm, not high risk, letter today  -His goal it to NOT take any more medications, ok to adjust meds he is already on.  -He does not want anything heroic in terms of his care.  -RTC 1 month with APN, 4 to 5 months with physician    Written instructions given today:    -On rare occasion the statin medications are associated with some memory loss.  If we think this may be an issue, it would be reasonable to take a rosuvastatin holiday    -Rosuvastatin holiday: Stop your rosuvastatin for 2 months.  Restart after 2 months.  Note if your memory seems to improve off of the medication and gets worse again on the medication.    -Atrial fibrillation is a rhythm change of your heart associated with stroke.    -To assess your risk of stroke with you something called the CHADS2 vascular score.    -Your chads 2 vascular score is 4.  By guidelines you should be on blood thinner.    Price check these medications which do not require routine blood testing:  *Eliquis=Apixaban 5 mg AM and PM  *Xarelto=Rivaroxaban 20 mg once daily with food  *Pradaxa=Dabigatran 150 mg AM and PM    If the above medications are not affordable, I will refer you to a clinical pharmacist in the anticoagulation clinic to discuss the following:  *Warfarin/Coumadin= starts 5 mg daily, needs blood testing    -I have sent a prescription for Eliquis but it is not affordable, please call your insurance company about Xarelto and Pradaxa prices.  If these are not affordable, I will refer you to our clinical pharmacist to get started on warfarin.    -Do not take aspirin with your blood thinner.    -From time  to time you will need to stop your blood thinner, it is okay to stop your blood thinner for 48 hours prior to any surgery that you have.  The risk of stroke during that time is very small.    -Your heart rate is doing fine in atrial fibrillation, I do not think we need to try to put you back into a normal rhythm unless you feel poorly.    -We do need an echocardiogram of your heart which shows is the shape and structure of your heart.      Return in about 4 weeks (around 9/22/2021).    It is my pleasure to participate in the care of Mr. Lisa.  Please do not hesitate to contact me with questions or concerns.    Myra Baca MD, [facility]  Cardiologist [facility]    Please note that this dictation was created using voice recognition software. I have made every reasonable attempt to correct obvious errors, but it is possible there are errors of grammar and possibly content that I did not discover before finalizing the note.        Kit Torres, P.A.-C.  [address]  [address]  [facility] [address]  Via In Basket

## 2021-08-25 NOTE — LETTER
PROCEDURE/SURGERY CLEARANCE FORM      Encounter Date: 8/25/2021    Patient: Kun Lisa  YOB: 1950    CARDIOLOGIST:  Myra Baca M.D.    REFERRING DOCTOR:  Dr. Richard Pepper    PATIENT does not have  PPM.    PATIENT does not have  AICD.    The above patient is NOT HIGH RISK to have the following procedure/surgery: AAA surgery, open repair with general anesthesia.                                            Additional comments: No testing prior.                 MD Signature   Myra Baca M.D.

## 2021-08-25 NOTE — PROGRESS NOTES
Subjective:   Chief Complaint:   Chief Complaint   Patient presents with   • Coronary Artery Disease     F/V DX:Coronary artery disease due to calcified coronary lesion: LAD stent in 1997       Kun Lisa is a 71 y.o. male who returns for CAD/stent, HTN, HLP, bradycardia, RBBB, PVCs/ectopy, preop risk assessment and today persistent afib.    Prior MI 1997 with stents to LAD and Circ, then   of RCA in April 2017 after abnormal nuc, bradycardia, RBBB.    He recalls going through divorce in 1997, had LHC with stent to LAD and Circ.   Went to ED with CP, sounds like UA and mild MI.  Was placed on meds.  Then had second stent in 2017 after abnormal stress test, led to RCA. Not sure if opening RCA helped with symptoms.  Someone suggested he stop taking aspirin.  Remains on statin and beta-blocker.    New diagnosis of persistent atrial fibrillation today.  Has frequent ectopy on exam, known to have PVCs.  He does not feel them.    No palpitations.  Avoids cold drinks, does recall that cold drinks would provoke some heart racing but nothing recently.  Holter monitor in 2017 with PACs, PVCs and brief atrial runs.  Today, ECG confirms persistent atrial fibrillation.    Does get some low back pain when walking.  He is not limited by chest pain, pressure or tightness.   No significant dyspnea on exertion.   No orthopnea or significant lower extremity swelling.   No lightheaded, or presyncope/syncope.   No symptoms of leg claudication.   No stroke/TIA like symptoms.    Some mild brain fog, stopped tramadol to see if this improves.    Lost weight, stays active, golfs twice a week, walking and riding.    Has been having sx that sound like neuropathy in his feet.    Has HTN, BP controlled on 2 meds.    Has DM on Jardiance, not controlled.    On BB, slow HR at times , no symptoms.    Has HLP, LDL 76. On Crestor 20 mg.    Prior smoker for 32 years, stopped in 2003.     Has SID, using CPAP, feels much better on  this.    Smoke marijuana daily.  No ETOH.  Mild caffeine.    Has AAA, infrarenal, 5.4 cm, was seeing Dr. Gonzalez.  Now seeing Dr. Pepper, going for surgery.    Father had MI in his 50s, 62, passed away.  Had a stroke with paralysis and word finding in his 50s.  Probably A. fib.  Mother lived to , smoker, dementia.    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  Lives alone in [place].  , has financial independence.   Closet family in [place], sister and brother in law planning to move to [place].  Daughter [place], [place].  Golfs with friends twice a week.   Friends in [place].     DATA REVIEWED by me:  ECG 8-25-21  Atrial fibrillation, rate 69, rightward axis, atypical right bundle branch block    ECG 4-27-17  Sinus bradycardia, 45, first degree AV delay, RBBB    Holter 6-19-17  Sinus, PVCs 7,589, 1 atrial run, some PACs, no rhythm changes.    Nuc 4-4-17  Small sized, partially reversible, decreased uptake of moderate severity in  the apical inferior (RCA), mid inferior (RCA) segments during post stress  images.    Medium sized, partially reversible, decreased uptake of moderate severity in  the apical lateral (LCX), inferolateral (LCX) and mid anterolateral (LCX)  segments during post stress images.   Normal left ventricular wall motion.  LV ejection fraction = 62%.    CT abd 12-28-18  1.  Infrarenal abdominal aortic aneurysm with a maximum diameter of 5.3 cm.  2.  Peripancreatic inflammatory changes are consistent with known pancreatitis.  3.  Enlarged peripancreatic lymph node, likely reactive.  4.  Diverticulosis.    Echo none    [facility] 4-27-17  POSTPROCEDURE DIAGNOSES:  1.  Coronary artery disease including patent stent in the mid left anterior descending artery with nonobstructive in-stent restenosis, high-grade distal circumflex artery of a codominant system supplying excellent collaterals to mid to distal right coronary artery with long chronic total occlusion of the   ostial  to mid right coronary artery.  2.  Successful percutaneous transluminal coronary angioplasty/stent placement of the distal circumflex artery with 2.5x16 mm Synergy drug-eluting stent.    Most recent labs:     Lab Results   Component Value Date/Time    HEMOGLOBIN 16.0 12/28/2018 10:53 AM    HEMATOCRIT 44.0 12/28/2018 10:53 AM    MCV 90.7 12/28/2018 10:53 AM    INR 0.95 04/25/2017 03:00 PM      Lab Results   Component Value Date/Time    SODIUM 138 02/10/2021 04:05 AM    SODIUM 135 02/26/2020 09:22 AM    POTASSIUM 4.8 02/10/2021 04:05 AM    POTASSIUM 4.7 02/26/2020 09:22 AM    CHLORIDE 102 02/10/2021 04:05 AM    CHLORIDE 101 02/26/2020 09:22 AM    CO2 23 02/10/2021 04:05 AM    CO2 27 02/26/2020 09:22 AM    GLUCOSE 168 (H) 02/10/2021 04:05 AM    GLUCOSE 159 (H) 02/26/2020 09:22 AM    BUN 15 02/10/2021 04:05 AM    BUN 19 02/26/2020 09:22 AM    CREATININE 1.08 02/10/2021 04:05 AM    CREATININE 1.16 02/26/2020 09:22 AM      Lab Results   Component Value Date/Time    ASTSGOT 17 02/10/2021 04:05 AM    ASTSGOT 16 02/26/2020 09:22 AM    ALTSGPT 20 02/10/2021 04:05 AM    ALTSGPT 19 02/26/2020 09:22 AM    ALBUMIN 4.6 02/10/2021 04:05 AM    ALBUMIN 4.4 02/26/2020 09:22 AM      Lab Results   Component Value Date/Time    CHOLSTRLTOT 150 04/29/2021 04:06 AM    CHOLSTRLTOT 158 02/26/2020 09:22 AM    LDL 89 02/26/2020 09:22 AM    HDL 44 04/29/2021 04:06 AM    HDL 40 02/26/2020 09:22 AM    TRIGLYCERIDE 180 (H) 04/29/2021 04:06 AM    TRIGLYCERIDE 143 02/26/2020 09:22 AM       8-7-19 Ha1c 7.2    1-14-19 na 138, k 4.3, cr 1.03, lft normal LDL 57, HDL 37, ,     12-28-18 h 16, p 173      Past Medical History:   Diagnosis Date   • Acid reflux    • Arrhythmia    • CAD (coronary artery disease)     stent   • Diabetes (HCC)     oral meds   • Diabetic neuropathy (HCC)     bilat feet   • Hyperlipidemia    • Hypertension    • Obstructive sleep apnea     CPAP   • Palpitations    • Past heart attack     1997   • Psychiatric problem      depression   • Sleep apnea     CPAP   • Snoring      Past Surgical History:   Procedure Laterality Date   • CATARACT EXTRACTION WITH IOL Bilateral    • ZIVA CARDIAC CATH  17    Synergy stent to Circ   • STENT PLACEMENT      LAD   • ZZZ CARDIAC CATH      LAD stent   • ANGIOPLASTY       Family History   Problem Relation Age of Onset   • Stroke Father 57         at 62 of second stroke   • Arthritis Mother    • Stroke Mother    • Alzheimer's Disease Mother      Social History     Socioeconomic History   • Marital status:      Spouse name: Not on file   • Number of children: Not on file   • Years of education: Not on file   • Highest education level: Not on file   Occupational History   • Not on file   Tobacco Use   • Smoking status: Former Smoker     Packs/day: 1.00     Years: 32.00     Pack years: 32.00     Types: Cigarettes     Quit date: 2003     Years since quittin.2   • Smokeless tobacco: Never Used   Vaping Use   • Vaping Use: Some days   Substance and Sexual Activity   • Alcohol use: Yes     Alcohol/week: 0.0 oz     Comment: rarely   • Drug use: Yes     Frequency: 7.0 times per week     Types: Marijuana     Comment: medical marijuana   • Sexual activity: Not on file   Other Topics Concern   • Not on file   Social History Narrative   • Not on file     Social Determinants of Health     Financial Resource Strain:    • Difficulty of Paying Living Expenses:    Food Insecurity:    • Worried About Running Out of Food in the Last Year:    • Ran Out of Food in the Last Year:    Transportation Needs:    • Lack of Transportation (Medical):    • Lack of Transportation (Non-Medical):    Physical Activity:    • Days of Exercise per Week:    • Minutes of Exercise per Session:    Stress:    • Feeling of Stress :    Social Connections:    • Frequency of Communication with Friends and Family:    • Frequency of Social Gatherings with Friends and Family:    • Attends Orthodox Services:    •  "Active Member of Clubs or Organizations:    • Attends Club or Organization Meetings:    • Marital Status:    Intimate Partner Violence:    • Fear of Current or Ex-Partner:    • Emotionally Abused:    • Physically Abused:    • Sexually Abused:      No Known Allergies    Current Outpatient Medications   Medication Sig Dispense Refill   • amoxicillin (AMOXIL) 500 MG Cap Take 500 mg by mouth 3 times a day.     • apixaban (ELIQUIS) 5mg Tab Take 1 Tablet by mouth 2 times a day. 60 Tablet 11   • metoprolol SR (TOPROL XL) 50 MG TABLET SR 24 HR TAKE 1 TABLET DAILY 90 tablet 3   • baclofen (LIORESAL) 20 MG tablet TAKE 1 TAB BY MOUTH AS NEEDED. INDICATIONS: MUSCLE SPASTICITY 270 tablet 1   • Empagliflozin (JARDIANCE) 25 MG Tab Take 1 tablet by mouth every day. 90 tablet 3   • ramipril (ALTACE) 10 MG capsule Take 1 Cap by mouth every day. 90 Cap 2   • rosuvastatin (CRESTOR) 40 MG tablet Take 1 Tab by mouth every day. 90 Tab 3   • JANUVIA 100 MG Tab TAKE 1 TABLET DAILY 90 Tab 3   • Cholecalciferol 2000 UNIT Cap Take 2,000 Units by mouth every day. (Patient not taking: Reported on 8/25/2021)     • docosahexanoic acid (OMEGA 3 FA) 1000 MG Cap Take  by mouth every day. (Patient not taking: Reported on 8/25/2021)     • glucosamine/chondroitin (GLUCOSAM/CHRONDROIT 500-400) 500-400 MG Cap Take  by mouth. (Patient not taking: Reported on 8/25/2021)       No current facility-administered medications for this visit.       ROS    All others systems reviewed and negative.     Objective:     /60 (BP Location: Left arm, Patient Position: Sitting, BP Cuff Size: Adult)   Pulse 76   Ht 1.905 m (6' 3\")   Wt 116 kg (255 lb 14.4 oz)   SpO2 96%  Body mass index is 31.99 kg/m².    Physical Exam   General: No acute distress. Well nourished.  HEENT: EOM grossly intact, no scleral icterus, no pharyngeal erythema.   Neck:  No JVD, no bruits, trachea midline  CVS: RRR, some ectopy. Normal S1, S2. No M/R/G. No LE edema.  2+ radial pulses, 2+ PT " pulses  Resp: CTAB. No wheezing or crackles/rhonchi. Normal respiratory effort.  Abdomen: Soft, NT, no pascale hepatomegaly.  MSK/Ext: No clubbing or cyanosis.  Skin: Warm and dry, no rashes.  Neurological: CN III-XII grossly intact. No focal deficits.   Psych: A&O x 3, appropriate affect, good judgement    Physical exam performed today and unchanged, except what is noted, compared to 8-    Assessment:     1. Coronary artery disease due to calcified coronary lesion: LAD stent in 1997     2. Essential hypertension     3. RBBB  EKG   4. Former smoker     5. SID on CPAP     6. Type 2 diabetes mellitus without complication, without long-term current use of insulin (HCC)     7. Abdominal aortic aneurysm (AAA) without rupture (HCC)     8. Abnormal nuclear stress test     9. PVC (premature ventricular contraction)     10. Abdominal aortic aneurysm (AAA) 3.0 cm to 5.5 cm in diameter in male (HCC)     11. Preop cardiovascular exam     12. Premature atrial contraction  EKG   13. Family history of stroke     14. Persistent atrial fibrillation (HCC)  EC-ECHOCARDIOGRAM COMPLETE W/O CONT       Medical Decision Making:  Today's Assessment / Status / Plan:     -Persistent atrial fibrillation, new diagnosis today, has probably been in this for a while.  Appears rate controlled.  -Recommend anticoagulation for chads 2 vascular score of 4, no prior stroke, see below.  -Letter today, not high risk for vascular surgery, no testing prior  -On secondary prevention therapy with BB, statin.  No aspirin since he will be on blood thinner  -LDL better, near goal at 76, on max crestor, could consider zetia but probably does not want extra medication  -Statin holiday due to reduced memory, see below  -On Jardiance, A1C not controlled, we discussed today, goal A1c is 6.9.  He has been at 7.1.  -BP controlled  -Rare NSAID use  -Watch bradycardia, first degree AV block, RBBB, no symptoms, we could reduce BB dose if needed, he thinks he might  be taking 100 mg.  -Probably surgery soon with Dr. Pepper for infrarenal abdominal aortic aneurysm, not high risk, letter today  -His goal it to NOT take any more medications, ok to adjust meds he is already on.  -He does not want anything heroic in terms of his care.  -RTC 1 month with APN, 4 to 5 months with physician    Written instructions given today:    -On rare occasion the statin medications are associated with some memory loss.  If we think this may be an issue, it would be reasonable to take a rosuvastatin holiday    -Rosuvastatin holiday: Stop your rosuvastatin for 2 months.  Restart after 2 months.  Note if your memory seems to improve off of the medication and gets worse again on the medication.    -Atrial fibrillation is a rhythm change of your heart associated with stroke.    -To assess your risk of stroke with you something called the CHADS2 vascular score.    -Your chads 2 vascular score is 4.  By guidelines you should be on blood thinner.    Price check these medications which do not require routine blood testing:  *Eliquis=Apixaban 5 mg AM and PM  *Xarelto=Rivaroxaban 20 mg once daily with food  *Pradaxa=Dabigatran 150 mg AM and PM    If the above medications are not affordable, I will refer you to a clinical pharmacist in the anticoagulation clinic to discuss the following:  *Warfarin/Coumadin= starts 5 mg daily, needs blood testing    -I have sent a prescription for Eliquis but it is not affordable, please call your insurance company about Xarelto and Pradaxa prices.  If these are not affordable, I will refer you to our clinical pharmacist to get started on warfarin.    -Do not take aspirin with your blood thinner.    -From time to time you will need to stop your blood thinner, it is okay to stop your blood thinner for 48 hours prior to any surgery that you have.  The risk of stroke during that time is very small.    -Your heart rate is doing fine in atrial fibrillation, I do not think we need to  try to put you back into a normal rhythm unless you feel poorly.    -We do need an echocardiogram of your heart which shows is the shape and structure of your heart.      Return in about 4 weeks (around 9/22/2021).    It is my pleasure to participate in the care of Mr. Lisa.  Please do not hesitate to contact me with questions or concerns.    Myra Baca MD, [facility]  Cardiologist [facility]    Please note that this dictation was created using voice recognition software. I have made every reasonable attempt to correct obvious errors, but it is possible there are errors of grammar and possibly content that I did not discover before finalizing the note.   COLIN: I was signed out this pt pending labs, imaging results and admission per signout for this pt with LLE swelling/edema/infection. XR shows that there is no acute fx but has loose screw in soft tissue. Started on vanco. COLIN: I was called to bedside as pt woke up, removed his own IV and started urinating in the sink in his room ED 14. He started yelling at the nurse as she tried to help him and we both tried to encourage him to sit in case he fell due to his leg infection but continued to yell. Security called as pt was danger to self. Appears groggy and slurring speech. Likely secondary to his drug use as well as the BZP given to him earlier. He then began throwing things in the room at people including the trash can. pt require chemical sedation for his safety as he is still a high risk for falling but also to protect staff as pt was throwing things at staff. Will need admission for his leg infection however. Trent Gamble, PGY 3: Received sign out on patient. will contact [facility] [phone] to obtain ortho attending who did the surgery. spoke with our ortho team and no emergent intervention and recommend transfer to original surgeon at [facility]. Tarik Segovia D.O., PGY2 (Resident)  Attempted to call [facility]. Unable to reach provider. Will reattmpt. Trent Gamble, PGY 3: hospitalist recommend ct LE and will admit for abx with ortho following .

## 2021-09-06 ENCOUNTER — SUPERVISING PHYSICIAN REVIEW (OUTPATIENT)
Dept: MEDICAL GROUP | Facility: MEDICAL CENTER | Age: 71
End: 2021-09-06

## 2021-09-07 NOTE — PROGRESS NOTES
Supervising Physician Review    Reviewed note dated: 8/9/2021    Recommendations: Recommend not to take muscle relaxer baclofen and tramadol together.

## 2021-09-08 DIAGNOSIS — E11.9 TYPE 2 DIABETES MELLITUS WITHOUT COMPLICATION, WITHOUT LONG-TERM CURRENT USE OF INSULIN (HCC): ICD-10-CM

## 2021-09-13 RX ORDER — SITAGLIPTIN 100 MG/1
TABLET, FILM COATED ORAL
Qty: 90 TABLET | Refills: 3 | Status: SHIPPED | OUTPATIENT
Start: 2021-09-13 | End: 2022-08-24 | Stop reason: SDUPTHER

## 2021-09-14 ENCOUNTER — TELEPHONE (OUTPATIENT)
Dept: CARDIOLOGY | Facility: MEDICAL CENTER | Age: 71
End: 2021-09-14

## 2021-09-14 NOTE — TELEPHONE ENCOUNTER
JASBIR Roy from [facility] called to check on Pts medical clearance.   # [phone]  Fax# [phone]    Thank you

## 2021-09-14 NOTE — TELEPHONE ENCOUNTER
JASBIR addressed this clearance in her last office note on 8/25/21 and wrote a letter. Letter faxed to the number provided, receipt confirmed.

## 2021-09-22 ENCOUNTER — ANCILLARY PROCEDURE (OUTPATIENT)
Dept: CARDIOLOGY | Facility: IMAGING CENTER | Age: 71
End: 2021-09-22
Attending: INTERNAL MEDICINE
Payer: COMMERCIAL

## 2021-09-22 DIAGNOSIS — I48.19 PERSISTENT ATRIAL FIBRILLATION (HCC): ICD-10-CM

## 2021-09-22 LAB
LV EJECT FRACT  99904: 65
LV EJECT FRACT MOD 2C 99903: 65.08
LV EJECT FRACT MOD 4C 99902: 56.34
LV EJECT FRACT MOD BP 99901: 60.66

## 2021-09-22 PROCEDURE — 93306 TTE W/DOPPLER COMPLETE: CPT | Performed by: INTERNAL MEDICINE

## 2021-09-29 ENCOUNTER — OFFICE VISIT (OUTPATIENT)
Dept: CARDIOLOGY | Facility: PHYSICIAN GROUP | Age: 71
End: 2021-09-29
Payer: COMMERCIAL

## 2021-09-29 VITALS
DIASTOLIC BLOOD PRESSURE: 64 MMHG | WEIGHT: 255 LBS | SYSTOLIC BLOOD PRESSURE: 104 MMHG | HEIGHT: 75 IN | OXYGEN SATURATION: 98 % | HEART RATE: 65 BPM | BODY MASS INDEX: 31.71 KG/M2 | RESPIRATION RATE: 14 BRPM

## 2021-09-29 DIAGNOSIS — G47.33 OBSTRUCTIVE SLEEP APNEA SYNDROME: ICD-10-CM

## 2021-09-29 DIAGNOSIS — I71.40 ABDOMINAL AORTIC ANEURYSM (AAA) 3.0 CM TO 5.5 CM IN DIAMETER IN MALE (HCC): ICD-10-CM

## 2021-09-29 DIAGNOSIS — E78.5 DYSLIPIDEMIA: ICD-10-CM

## 2021-09-29 DIAGNOSIS — Z79.01 ANTICOAGULATED: ICD-10-CM

## 2021-09-29 DIAGNOSIS — I25.10 CORONARY ARTERY DISEASE DUE TO CALCIFIED CORONARY LESION: ICD-10-CM

## 2021-09-29 DIAGNOSIS — E11.40 TYPE 2 DIABETES MELLITUS WITH DIABETIC NEUROPATHY, WITHOUT LONG-TERM CURRENT USE OF INSULIN (HCC): ICD-10-CM

## 2021-09-29 DIAGNOSIS — I48.19 PERSISTENT ATRIAL FIBRILLATION (HCC): ICD-10-CM

## 2021-09-29 DIAGNOSIS — I10 ESSENTIAL HYPERTENSION: ICD-10-CM

## 2021-09-29 DIAGNOSIS — I25.84 CORONARY ARTERY DISEASE DUE TO CALCIFIED CORONARY LESION: ICD-10-CM

## 2021-09-29 PROBLEM — I25.2 OLD MYOCARDIAL INFARCTION: Status: ACTIVE | Noted: 2021-09-29

## 2021-09-29 PROCEDURE — 99214 OFFICE O/P EST MOD 30 MIN: CPT | Performed by: NURSE PRACTITIONER

## 2021-09-29 ASSESSMENT — ENCOUNTER SYMPTOMS
ABDOMINAL PAIN: 0
INSOMNIA: 0
CHILLS: 0
HEADACHES: 0
BRUISES/BLEEDS EASILY: 0
FEVER: 0
ORTHOPNEA: 0
DIZZINESS: 0
LOSS OF CONSCIOUSNESS: 0
MYALGIAS: 0
PALPITATIONS: 0
SHORTNESS OF BREATH: 0
PND: 0
NAUSEA: 0
COUGH: 0

## 2021-09-29 NOTE — PROGRESS NOTES
Chief Complaint   Patient presents with   • Follow-Up   • Atrial Fibrillation   • Anticoagulation   • Coronary Artery Disease   • HTN (Controlled)   • Hyperlipidemia   • Diabetes   • Abdominal Aortic Aneurysm       Subjective     Kun Lisa is a 71 y.o. male who presents today for one month follow-up of persistent atrial fibrillation, and medication evaluation.    Kun is a 71 year old male with history of CAD, status post remote MI in 1997 with stents in the LAD and LCx, and more recent PCI/JOE to the LCx in 2017, hypertension, hyperlipidemia, DM, and AAA, normally followed by Dr. Baca, and last seen in late August 2021. At that visit, EKG confirmed persistent atrial fibrillation, asymptomatic. No new rate or rhythm control agents were added, but anticoagulation with Eliquis was started.    He is here today for follow-up. He is tolerating Eliquis without any problems: no epistaxis or hematuria. He does not feel any palpitations or fluttering of his heart. No chest pain, pressure or discomfort; very mild shortness of breath, but unchanged from previous; no orthopnea or PND; no dizziness or syncope; very mild, stable LE edema. BP is stable (runs lower, but tolerable). He uses a CPAP for SID.     Past Medical History:   Diagnosis Date   • AAA (abdominal aortic aneurysm) ([facility])     Followed by Dr. Pepper   • Acid reflux    • Arrhythmia    • CAD (coronary artery disease) 1997/2017 1997: MI, stents to LAD and LCx. April 2017: PCI/JOE (Synergy 2.5 x 16mm) the distal LCx.   • Chronic anticoagulation    • Diabetes (HCC)     Oral meds   • Diabetic neuropathy ([facility])     Bilateral feet   • Hyperlipidemia    • Hypertension    • Obstructive sleep apnea     CPAP   • Palpitations    • Past heart attack 1997   • Persistent atrial fibrillation (HCC) 09/2021    Echocardiogram with normal LV size, LVEF 65%. Normal RV. Enlarged RA and mildly dilated LA. Trace MR. Mild TR. RVSP 25mmHg.   • Psychiatric problem     depression    • Sleep apnea     CPAP   • Snoring      Past Surgical History:   Procedure Laterality Date   • CATARACT EXTRACTION WITH IOL Bilateral    • ZIVA CARDIAC CATH  17    Synergy stent to Circ   • STENT PLACEMENT      LAD   • ZZZ CARDIAC CATH      LAD stent   • ANGIOPLASTY       Family History   Problem Relation Age of Onset   • Stroke Father 57         at 62 of second stroke   • Arthritis Mother    • Stroke Mother    • Alzheimer's Disease Mother      Social History     Socioeconomic History   • Marital status:      Spouse name: Not on file   • Number of children: Not on file   • Years of education: Not on file   • Highest education level: Not on file   Occupational History   • Not on file   Tobacco Use   • Smoking status: Former Smoker     Packs/day: 1.00     Years: 32.00     Pack years: 32.00     Types: Cigarettes     Quit date: 2003     Years since quittin.3   • Smokeless tobacco: Never Used   Vaping Use   • Vaping Use: Some days   Substance and Sexual Activity   • Alcohol use: Not Currently     Alcohol/week: 0.0 oz     Comment: rarely   • Drug use: Yes     Frequency: 7.0 times per week     Types: Marijuana, Inhaled     Comment: medical marijuana   • Sexual activity: Not on file   Other Topics Concern   • Not on file   Social History Narrative   • Not on file     Social Determinants of Health     Financial Resource Strain:    • Difficulty of Paying Living Expenses:    Food Insecurity:    • Worried About Running Out of Food in the Last Year:    • Ran Out of Food in the Last Year:    Transportation Needs:    • Lack of Transportation (Medical):    • Lack of Transportation (Non-Medical):    Physical Activity:    • Days of Exercise per Week:    • Minutes of Exercise per Session:    Stress:    • Feeling of Stress :    Social Connections:    • Frequency of Communication with Friends and Family:    • Frequency of Social Gatherings with Friends and Family:    • Attends Nondenominational Services:   "  • Active Member of Clubs or Organizations:    • Attends Club or Organization Meetings:    • Marital Status:    Intimate Partner Violence:    • Fear of Current or Ex-Partner:    • Emotionally Abused:    • Physically Abused:    • Sexually Abused:      No Known Allergies  Outpatient Encounter Medications as of 9/29/2021   Medication Sig Dispense Refill   • apixaban (ELIQUIS) 5mg Tab Take 1 Tablet by mouth 2 times a day. 100 Tablet 3   • JANUVIA 100 MG Tab TAKE 1 TABLET DAILY 90 Tablet 3   • metoprolol SR (TOPROL XL) 50 MG TABLET SR 24 HR TAKE 1 TABLET DAILY 90 tablet 3   • baclofen (LIORESAL) 20 MG tablet TAKE 1 TAB BY MOUTH AS NEEDED. INDICATIONS: MUSCLE SPASTICITY 270 tablet 1   • Empagliflozin (JARDIANCE) 25 MG Tab Take 1 tablet by mouth every day. 90 tablet 3   • ramipril (ALTACE) 10 MG capsule Take 1 Cap by mouth every day. 90 Cap 2   • rosuvastatin (CRESTOR) 40 MG tablet Take 1 Tab by mouth every day. 90 Tab 3   • [DISCONTINUED] amoxicillin (AMOXIL) 500 MG Cap Take 500 mg by mouth 3 times a day.     • [DISCONTINUED] apixaban (ELIQUIS) 5mg Tab Take 1 Tablet by mouth 2 times a day. 60 Tablet 11     No facility-administered encounter medications on file as of 9/29/2021.     Review of Systems   Constitutional: Negative for chills and fever.   HENT: Negative for congestion.    Respiratory: Negative for cough and shortness of breath.    Cardiovascular: Negative for chest pain, palpitations, orthopnea, leg swelling and PND.   Gastrointestinal: Negative for abdominal pain and nausea.   Musculoskeletal: Positive for joint pain. Negative for myalgias.   Skin: Negative for rash.   Neurological: Negative for dizziness, loss of consciousness and headaches.   Endo/Heme/Allergies: Does not bruise/bleed easily.   Psychiatric/Behavioral: The patient does not have insomnia.               Objective     /64 (BP Location: Left arm, Patient Position: Sitting, BP Cuff Size: Adult)   Pulse 65   Resp 14   Ht 1.905 m (6' 3\")   " Wt 116 kg (255 lb)   SpO2 98%   BMI 31.87 kg/m²     Physical Exam  Constitutional:       Appearance: He is well-developed.   HENT:      Head: Normocephalic.   Neck:      Vascular: No JVD.   Cardiovascular:      Rate and Rhythm: Normal rate. Rhythm irregular.      Heart sounds: Normal heart sounds.      Comments: HR controlled  Pulmonary:      Effort: Pulmonary effort is normal. No respiratory distress.      Breath sounds: Normal breath sounds. No wheezing or rales.   Abdominal:      General: Bowel sounds are normal. There is no distension.      Palpations: Abdomen is soft.      Tenderness: There is no abdominal tenderness.   Musculoskeletal:         General: Normal range of motion.      Cervical back: Normal range of motion and neck supple.      Right lower leg: Edema present.      Left lower leg: Edema present.      Comments: Trace LE edema to the ankles bilaterally, R>L   Skin:     General: Skin is warm and dry.      Findings: No rash.   Neurological:      Mental Status: He is alert and oriented to person, place, and time.     CONCLUSIONS OF ECHOCARDIOGRAM OF 9/22/201:  The left ventricular ejection fraction is visually estimated to be 65%.  Mildly dilated left atrium.  Estimated right ventricular systolic pressure is 25 mmHg.  Compared to prior echo 03/03/17, no significant change, LVH not   appreciated.        FINDINGS OF AORTA/ILIAC US OF 5/11/2021:  Widening of the infrarenal aorta to a maximum diameter of 5.3 cm.   Intraluminal thrombus noted along the proximal wall of the aneurysm.   No elevated velocities in the celiac and superior mesenteric arteries, though  the origins are not visualized due to limitations.   Waveforms and velocities of the bilateral iliac arteries are normal. Outflow    Doppler waveforms are triphasic bilaterally.   Ectasia noted of the proximal right common iliac artery.   Common iliac diameters (cm):     Right: Proximal- 1.8, Mid- 1.6, Distal- 1.4.   Left: Proximal-1.6,  Distal-1.1.   External iliac diameters (cm):   Right: Proximal- 1.3, Mid- 1.3, Distal- 0.96.       Left: Proximal- 0.98, Mid- 1.3, Distal- 1.0.    POSTPROCEDURE DIAGNOSES OF [facility] OF 4/27/20217:  1.  Coronary artery disease including patent stent in the mid left anterior   descending artery with nonobstructive in-stent restenosis, high-grade distal   circumflex artery of a codominant system supplying excellent collaterals to   mid to distal right coronary artery with long chronic total occlusion of the   ostial to mid right coronary artery.  2.  Successful percutaneous transluminal coronary angioplasty/stent placement   of the distal circumflex artery with 2.5x16 mm Synergy drug-eluting stent.       Lab Results   Component Value Date/Time    CHOLSTRLTOT 150 04/29/2021 04:06 AM    CHOLSTRLTOT 158 02/26/2020 09:22 AM    LDL 89 02/26/2020 09:22 AM    HDL 44 04/29/2021 04:06 AM    HDL 40 02/26/2020 09:22 AM    TRIGLYCERIDE 180 (H) 04/29/2021 04:06 AM    TRIGLYCERIDE 143 02/26/2020 09:22 AM       Lab Results   Component Value Date/Time    SODIUM 138 02/10/2021 04:05 AM    SODIUM 135 02/26/2020 09:22 AM    POTASSIUM 4.8 02/10/2021 04:05 AM    POTASSIUM 4.7 02/26/2020 09:22 AM    CHLORIDE 102 02/10/2021 04:05 AM    CHLORIDE 101 02/26/2020 09:22 AM    CO2 23 02/10/2021 04:05 AM    CO2 27 02/26/2020 09:22 AM    GLUCOSE 168 (H) 02/10/2021 04:05 AM    GLUCOSE 159 (H) 02/26/2020 09:22 AM    BUN 15 02/10/2021 04:05 AM    BUN 19 02/26/2020 09:22 AM    CREATININE 1.08 02/10/2021 04:05 AM    CREATININE 1.16 02/26/2020 09:22 AM    BUNCREATRAT 14 02/10/2021 04:05 AM       Assessment & Plan     1. Persistent atrial fibrillation (HCC)  apixaban (ELIQUIS) 5mg Tab   2. Anticoagulated  apixaban (ELIQUIS) 5mg Tab   3. Coronary artery disease due to calcified coronary lesion: Stent to distal circumflex, patent LAD stent and  of RCA in April 2017     4. Abdominal aortic aneurysm (AAA) 3.0 cm to 5.5 cm in diameter in male (HCC)     5.  Essential hypertension     6. Dyslipidemia     7. Type 2 diabetes mellitus with diabetic neuropathy, without long-term current use of insulin (HCC)     8. Obstructive sleep apnea syndrome         Medical Decision Making: Today's Assessment/Status/Plan:        1. Persistent atrial fibrillation, rate controlled with Toprol XL 50mg once daily. He is asymptomatic. Reviewed echocardiogram with patient, stable.    2. Anticoagulation with Eliquis. No bleeding problems. He is given Rx for 90 days with refills.    3. CAD, status post remote MI in 1997 and stents x 2, and more recent PCI/JOE to the LCx in 2017. He is on Eliquis, BB, ACEI and statin.    4. AAA at 5.3cm on US, followed by Dr. Pepper. He does have FU with him next month.    5. Hypertension, treated with Altace 10mg once daily. BP is well controlled.    6. Hyperlipidemia, treated with Crestor 40mg. More recent LDL was 76.    7. Diabetes mellitus, treated with Jardiance and Januvia, followed by PCP.    8. SID, uses CPAP.    He seems to be doing well on Eliquis. Same medications for now. Keep January 2022 follow-up with Dr. Baca. Follow-up sooner if clinical condition changes.

## 2021-10-04 DIAGNOSIS — I25.84 CORONARY ARTERY DISEASE DUE TO CALCIFIED CORONARY LESION: ICD-10-CM

## 2021-10-04 DIAGNOSIS — I10 ESSENTIAL HYPERTENSION: ICD-10-CM

## 2021-10-04 DIAGNOSIS — I25.10 CORONARY ARTERY DISEASE DUE TO CALCIFIED CORONARY LESION: ICD-10-CM

## 2021-10-04 DIAGNOSIS — E78.5 DYSLIPIDEMIA: ICD-10-CM

## 2021-10-05 RX ORDER — RAMIPRIL 10 MG/1
CAPSULE ORAL
Qty: 90 CAPSULE | Refills: 3 | Status: SHIPPED | OUTPATIENT
Start: 2021-10-05 | End: 2022-08-24 | Stop reason: SDUPTHER

## 2021-10-10 DIAGNOSIS — E78.5 DYSLIPIDEMIA: ICD-10-CM

## 2021-10-13 RX ORDER — ROSUVASTATIN CALCIUM 40 MG/1
40 TABLET, COATED ORAL DAILY
Qty: 90 TABLET | Refills: 3 | Status: SHIPPED | OUTPATIENT
Start: 2021-10-13 | End: 2022-08-24 | Stop reason: SDUPTHER

## 2021-11-17 ENCOUNTER — OFFICE VISIT (OUTPATIENT)
Dept: MEDICAL GROUP | Facility: MEDICAL CENTER | Age: 71
End: 2021-11-17
Payer: COMMERCIAL

## 2021-11-17 VITALS
OXYGEN SATURATION: 96 % | HEIGHT: 75 IN | HEART RATE: 67 BPM | RESPIRATION RATE: 14 BRPM | WEIGHT: 240 LBS | BODY MASS INDEX: 29.84 KG/M2 | SYSTOLIC BLOOD PRESSURE: 120 MMHG | DIASTOLIC BLOOD PRESSURE: 70 MMHG | TEMPERATURE: 97.3 F

## 2021-11-17 DIAGNOSIS — I71.40 ABDOMINAL AORTIC ANEURYSM (AAA) 3.0 CM TO 5.5 CM IN DIAMETER IN MALE (HCC): ICD-10-CM

## 2021-11-17 DIAGNOSIS — F32.9 REACTIVE DEPRESSION: ICD-10-CM

## 2021-11-17 DIAGNOSIS — E11.40 TYPE 2 DIABETES MELLITUS WITH DIABETIC NEUROPATHY, WITHOUT LONG-TERM CURRENT USE OF INSULIN (HCC): ICD-10-CM

## 2021-11-17 PROCEDURE — 99214 OFFICE O/P EST MOD 30 MIN: CPT | Performed by: PHYSICIAN ASSISTANT

## 2021-11-17 NOTE — ASSESSMENT & PLAN NOTE
Was seen at [facility] in [place].  Went to 3 sessions and is feeling better about his mood.  He has not gone back yet.  Was told to compile a journal which he has not completed.

## 2021-11-17 NOTE — PROGRESS NOTES
Subjective:   Kun Lisa is a 71 y.o. male here today for recent AAA repair, depression and diabetes.    Abdominal aortic aneurysm (AAA) 3.0 cm to 5.5 cm in diameter in male ([facility])  Last month had EVAR performed by Dr. Pepper of [place] [facility].  Is doing well after the procedure.  Has an appointment soon for an ultrasound.  States that the wounds are healing up well.  No itching.  No discharge.    Depression  Was seen at [facility] in [place].  Went to 3 sessions and is feeling better about his mood.  He has not gone back yet.  Was told to compile a journal which he has not completed.    Type 2 diabetes mellitus with neurologic complication ([facility])  Chronic condition.  No concerns with his diabetes status.  He was due for labs but because of his pending AAA procedure and depressed state he did not remember to get those performed.  No change in medications.       Current medicines (including changes today)  Current Outpatient Medications   Medication Sig Dispense Refill   • rosuvastatin (CRESTOR) 40 MG tablet Take 1 Tablet by mouth every day. 90 Tablet 3   • ramipril (ALTACE) 10 MG capsule TAKE 1 CAPSULE BY MOUTH EVERY DAY 90 Capsule 3   • apixaban (ELIQUIS) 5mg Tab Take 1 Tablet by mouth 2 times a day. 100 Tablet 3   • JANUVIA 100 MG Tab TAKE 1 TABLET DAILY 90 Tablet 3   • metoprolol SR (TOPROL XL) 50 MG TABLET SR 24 HR TAKE 1 TABLET DAILY 90 tablet 3   • baclofen (LIORESAL) 20 MG tablet TAKE 1 TAB BY MOUTH AS NEEDED. INDICATIONS: MUSCLE SPASTICITY 270 tablet 1   • Empagliflozin (JARDIANCE) 25 MG Tab Take 1 tablet by mouth every day. 90 tablet 3     No current facility-administered medications for this visit.     He  has a past medical history of AAA (abdominal aortic aneurysm) ([facility]), Acid reflux, Arrhythmia, CAD (coronary artery disease) (1997/2017), Chronic anticoagulation, Diabetes ([facility]), Diabetic neuropathy ([facility]), Hyperlipidemia, Hypertension, Obstructive sleep apnea, Palpitations, Past heart attack  "(1997), Persistent atrial fibrillation (HCC) (09/2021), Psychiatric problem, Sleep apnea, and Snoring.    Social History and Family History were reviewed and updated.    ROS   No chest pain, no shortness of breath, no abdominal pain and all other systems were reviewed and are negative.       Objective:     /70 (BP Location: Right arm, Patient Position: Sitting, BP Cuff Size: Adult)   Pulse 67   Temp 36.3 °C (97.3 °F) (Temporal)   Resp 14   Ht 1.905 m (6' 3\")   Wt 109 kg (240 lb)   SpO2 96%  Body mass index is 30 kg/m².   Physical Exam:  Constitutional: Alert, no distress.  Skin: Warm, dry, good turgor, no rashes in visible areas.  Eye: Equal, round and reactive, conjunctiva clear, lids normal.  ENMT: Lips without lesions, good dentition, oropharynx clear.  Neck: Trachea midline, no masses.   Lymph: No cervical or supraclavicular lymphadenopathy  Respiratory: Unlabored respiratory effort, lungs clear to auscultation, no wheezes, no ronchi.  Cardiovascular: Normal S1, S2, no murmur, no edema.  Abdomen: Pelvic regions with 2 linear incisions bilaterally.  Appear to be scabbing over and dry.  There does also appear that there is a clump of glue but is intermixed with dried blood.  Psych: Alert and oriented x3, normal affect and mood.        Assessment and Plan:   The following treatment plan was discussed    1. Abdominal aortic aneurysm (AAA) 3.0 cm to 5.5 cm in diameter in male (HCC)  Chronic condition.  Status post EVAR.  Stable.  Continue to follow with [facility].  Follow-up with ultrasound.  Follow-up with cardiology for his appointment in the new year.    2. Reactive depression  Chronic condition.  Stable.  Psychology was successful.  He will continue to follow with psychology as needed.    3. Type 2 diabetes mellitus with diabetic neuropathy, without long-term current use of insulin (HCC)  Chronic condition.  Likely stable but did provide lab orders.  Nonfasting.  Continue medications as " directed.  - HEMOGLOBIN A1C; Future  - Comp Metabolic Panel; Future  - MICROALBUMIN CREAT RATIO URINE; Future         Followup: Return in about 3 months (around 2/17/2022), or if symptoms worsen or fail to improve.    Please note that this dictation was created using voice recognition software. I have made every reasonable attempt to correct obvious errors, but I expect that there are errors of grammar and possibly content that I did not discover before finalizing the note.

## 2021-11-17 NOTE — ASSESSMENT & PLAN NOTE
Chronic condition.  No concerns with his diabetes status.  He was due for labs but because of his pending AAA procedure and depressed state he did not remember to get those performed.  No change in medications.

## 2021-11-17 NOTE — ASSESSMENT & PLAN NOTE
Last month had EVAR performed by Dr. Pepper of [facility].  Is doing well after the procedure.  Has an appointment soon for an ultrasound.  States that the wounds are healing up well.  No itching.  No discharge.

## 2021-11-19 DIAGNOSIS — R94.4 DECREASED GFR: ICD-10-CM

## 2021-11-19 LAB
ALBUMIN SERPL-MCNC: 4.1 G/DL (ref 3.8–4.8)
ALBUMIN/CREAT UR: 13 MG/G CREAT (ref 0–29)
ALBUMIN/GLOB SERPL: 1.6 {RATIO} (ref 1.2–2.2)
ALP SERPL-CCNC: 67 IU/L (ref 44–121)
ALT SERPL-CCNC: 10 IU/L (ref 0–44)
AST SERPL-CCNC: 14 IU/L (ref 0–40)
BILIRUB SERPL-MCNC: 0.5 MG/DL (ref 0–1.2)
BUN SERPL-MCNC: 18 MG/DL (ref 8–27)
BUN/CREAT SERPL: 13 (ref 10–24)
CALCIUM SERPL-MCNC: 8.8 MG/DL (ref 8.6–10.2)
CHLORIDE SERPL-SCNC: 104 MMOL/L (ref 96–106)
CO2 SERPL-SCNC: 24 MMOL/L (ref 20–29)
CREAT SERPL-MCNC: 1.39 MG/DL (ref 0.76–1.27)
CREAT UR-MCNC: 104.4 MG/DL
GLOBULIN SER CALC-MCNC: 2.5 G/DL (ref 1.5–4.5)
GLUCOSE SERPL-MCNC: 126 MG/DL (ref 65–99)
HBA1C MFR BLD: 6.4 % (ref 4.8–5.6)
MICROALBUMIN UR-MCNC: 13.2 UG/ML
POTASSIUM SERPL-SCNC: 4.3 MMOL/L (ref 3.5–5.2)
PROT SERPL-MCNC: 6.6 G/DL (ref 6–8.5)
SODIUM SERPL-SCNC: 140 MMOL/L (ref 134–144)

## 2021-12-01 ENCOUNTER — OFFICE VISIT (OUTPATIENT)
Dept: MEDICAL GROUP | Facility: MEDICAL CENTER | Age: 71
End: 2021-12-01
Payer: COMMERCIAL

## 2021-12-01 VITALS
HEIGHT: 75 IN | TEMPERATURE: 96.7 F | OXYGEN SATURATION: 98 % | RESPIRATION RATE: 16 BRPM | SYSTOLIC BLOOD PRESSURE: 120 MMHG | DIASTOLIC BLOOD PRESSURE: 56 MMHG | WEIGHT: 250.2 LBS | HEART RATE: 68 BPM | BODY MASS INDEX: 31.11 KG/M2

## 2021-12-01 DIAGNOSIS — E11.40 TYPE 2 DIABETES MELLITUS WITH DIABETIC NEUROPATHY, WITHOUT LONG-TERM CURRENT USE OF INSULIN (HCC): ICD-10-CM

## 2021-12-01 PROCEDURE — 99213 OFFICE O/P EST LOW 20 MIN: CPT | Performed by: PHYSICIAN ASSISTANT

## 2021-12-01 NOTE — ASSESSMENT & PLAN NOTE
This is a pleasant 71-year-old male here today for a monofilament test.  At all.  I saw him recently.  He is doing well.  Recent A1c at 6.4.  States he does deal with neuropathy occasionally.  Does not take any medications to control neuropathy.

## 2021-12-01 NOTE — PROGRESS NOTES
"Subjective:   Kun Lisa is a 71 y.o. male here today for monofilament test secondary to diabetes.    Type 2 diabetes mellitus with neurologic complication (HCC)  This is a pleasant 71-year-old male here today for a monofilament test.  At all.  I saw him recently.  He is doing well.  Recent A1c at 6.4.       Current medicines (including changes today)  Current Outpatient Medications   Medication Sig Dispense Refill   • rosuvastatin (CRESTOR) 40 MG tablet Take 1 Tablet by mouth every day. 90 Tablet 3   • ramipril (ALTACE) 10 MG capsule TAKE 1 CAPSULE BY MOUTH EVERY DAY 90 Capsule 3   • apixaban (ELIQUIS) 5mg Tab Take 1 Tablet by mouth 2 times a day. 100 Tablet 3   • JANUVIA 100 MG Tab TAKE 1 TABLET DAILY 90 Tablet 3   • metoprolol SR (TOPROL XL) 50 MG TABLET SR 24 HR TAKE 1 TABLET DAILY 90 tablet 3   • baclofen (LIORESAL) 20 MG tablet TAKE 1 TAB BY MOUTH AS NEEDED. INDICATIONS: MUSCLE SPASTICITY 270 tablet 1   • Empagliflozin (JARDIANCE) 25 MG Tab Take 1 tablet by mouth every day. 90 tablet 3     No current facility-administered medications for this visit.     He  has a past medical history of AAA (abdominal aortic aneurysm) ([facility]), Acid reflux, Arrhythmia, CAD (coronary artery disease) (1997/2017), Chronic anticoagulation, Diabetes ([facility]), Diabetic neuropathy ([facility]), Hyperlipidemia, Hypertension, Obstructive sleep apnea, Palpitations, Past heart attack (1997), Persistent atrial fibrillation ([facility]) (09/2021), Psychiatric problem, Sleep apnea, and Snoring.    Social History and Family History were reviewed and updated.    ROS   No chest pain, no shortness of breath, no abdominal pain and all other systems were reviewed and are negative.       Objective:     /56 (BP Location: Right arm, Patient Position: Sitting, BP Cuff Size: Adult)   Pulse 68   Temp 35.9 °C (96.7 °F) (Temporal)   Resp 16   Ht 1.905 m (6' 3\")   Wt 113 kg (250 lb 3.2 oz)   SpO2 98%  Body mass index is 31.27 kg/m².   Physical " Exam:  Constitutional: Alert, no distress.  Skin: Warm, dry, good turgor, no rashes in visible areas.  Eye: Equal, round and reactive, conjunctiva clear, lids normal.  ENMT: Lips without lesions, good dentition, oropharynx clear.  Neck: Trachea midline, no masses.   Lymph: No cervical or supraclavicular lymphadenopathy  Respiratory: Unlabored respiratory effort, lungs appear clear, no wheezes.  Cardiovascular: Regular rate and rhythm.  Psych: Alert and oriented x3, normal affect and mood.    Monofilament testing with a 10 gram force: sensation intact: intact bilaterally  Visual Inspection: Feet without maceration, ulcers, fissures.  Pedal pulses: intact bilaterally      Assessment and Plan:   The following treatment plan was discussed    1. Type 2 diabetes mellitus with diabetic neuropathy, without long-term current use of insulin (HCC)  Chronic condition.  Stable.  Monofilament testing went well.  No neuropathy noted on examination today.  Continue medications as directed.  Follow-up appointment was already addressed from last office visit.         Followup: Return if symptoms worsen or fail to improve.    Please note that this dictation was created using voice recognition software. I have made every reasonable attempt to correct obvious errors, but I expect that there are errors of grammar and possibly content that I did not discover before finalizing the note.

## 2022-01-03 NOTE — PROGRESS NOTES
Subjective:   Chief Complaint:   Chief Complaint   Patient presents with   • Coronary Artery Disease     F/V Dx: Coronary artery disease due to calcified coronary lesion: LAD stent in 1997   • Atrial Fibrillation     F/V Dx: Persistent atrial fibrillation (HCC)   • Hypertension     F/V Dx: Essential hypertension       Kun Lisa is a 71 y.o. male who returns for CAD/stent, HTN, HLP, bradycardia, RBBB, PVCs/ectopy, persistent afib, chronic anticoagulation.    Prior MI 1997 with stents to LAD and Circ, then   of RCA in April 2017 after abnormal nuc, bradycardia, RBBB.    He recalls going through divorce in 1997, had C with stent to LAD and Circ.   Went to ED with CP, sounds like UA and mild MI.  Was placed on meds.  Then had second stent in 2017 after abnormal stress test, led to RCA. Not sure if opening RCA helped with symptoms.  Someone suggested he stop taking aspirin.  Remains on statin and beta-blocker.    Persistent atrial fibrillation found on exam 2021.  Known to have PVCs.  He does not feel them.    No palpitations.  Avoids cold drinks, does recall that cold drinks would provoke some heart racing but nothing recently.  Holter monitor in 2017 with PACs, PVCs and brief atrial runs.    Started on blood thinner for chads 2 vascular score of 4.  On apixaban    Does get some low back pain when walking.  He is not limited by chest pain, pressure or tightness.   No significant dyspnea on exertion.   No orthopnea or significant lower extremity swelling.   No lightheaded, or presyncope/syncope.   No symptoms of leg claudication.   No stroke/TIA like symptoms.    Some mild brain fog, stopped tramadol to see if this improves.    Lost weight, stays active, golfs twice a week, walking and riding.    Has been having sx that sound like neuropathy in his feet.    Has HTN, BP controlled on 2 meds.    Has DM on Jardiance, controlled.    On BB, slow HR at times , no symptoms.    Has HLP, LDL 76. On Crestor 20  mg.    Prior smoker for 32 years, stopped in 2003.     Has SID, using CPAP, feels much better on this.    Smoke marijuana daily.  No ETOH.  Mild caffeine.    Had AAA, infrarenal, 5.4 cm, had stenting with Dr. Pepper.    Father had MI in his 50s, 62, passed away.  Had a stroke with paralysis and word finding in his 50s.  Probably A. fib.  Mother lived to 93, smoker, dementia.    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  Lives alone in [place].  , has financial independence.   Closet family in [place], sister and brother in law planning to move to [place].  Daughter [place], [place].  Golfs with friends twice a week.   Friends in [place].     DATA REVIEWED by me:  ECG 8-25-21  Atrial fibrillation, rate 69, rightward axis, atypical right bundle branch block    ECG 4-27-17  Sinus bradycardia, 45, first degree AV delay, RBBB    Holter 6-19-17  Sinus, PVCs 7,589, 1 atrial run, some PACs, no rhythm changes.    Echo 9-22-21  The left ventricular ejection fraction is visually estimated to be 65%.  Mildly dilated left atrium.  Estimated right ventricular systolic pressure is 25 mmHg.     Compared to prior echo 03/03/17, no significant change, LVH not   appreciated.    Nuc 4-4-17  Small sized, partially reversible, decreased uptake of moderate severity in  the apical inferior (RCA), mid inferior (RCA) segments during post stress  images.    Medium sized, partially reversible, decreased uptake of moderate severity in  the apical lateral (LCX), inferolateral (LCX) and mid anterolateral (LCX)  segments during post stress images.   Normal left ventricular wall motion.  LV ejection fraction = 62%.    CT abd 12-28-18  1.  Infrarenal abdominal aortic aneurysm with a maximum diameter of 5.3 cm.  2.  Peripancreatic inflammatory changes are consistent with known pancreatitis.  3.  Enlarged peripancreatic lymph node, likely reactive.  4.  Diverticulosis.    Echo none    [facility] 4-27-17  POSTPROCEDURE  DIAGNOSES:  1.  Coronary artery disease including patent stent in the mid left anterior descending artery with nonobstructive in-stent restenosis, high-grade distal circumflex artery of a codominant system supplying excellent collaterals to mid to distal right coronary artery with long chronic total occlusion of the   ostial to mid right coronary artery.  2.  Successful percutaneous transluminal coronary angioplasty/stent placement of the distal circumflex artery with 2.5x16 mm Synergy drug-eluting stent.    Most recent labs:     Lab Results   Component Value Date/Time    HEMOGLOBIN 16.0 12/28/2018 10:53 AM    HEMATOCRIT 44.0 12/28/2018 10:53 AM    MCV 90.7 12/28/2018 10:53 AM    INR 0.95 04/25/2017 03:00 PM      Lab Results   Component Value Date/Time    SODIUM 140 11/18/2021 11:30 AM    SODIUM 135 02/26/2020 09:22 AM    POTASSIUM 4.3 11/18/2021 11:30 AM    POTASSIUM 4.7 02/26/2020 09:22 AM    CHLORIDE 104 11/18/2021 11:30 AM    CHLORIDE 101 02/26/2020 09:22 AM    CO2 24 11/18/2021 11:30 AM    CO2 27 02/26/2020 09:22 AM    GLUCOSE 126 (H) 11/18/2021 11:30 AM    GLUCOSE 159 (H) 02/26/2020 09:22 AM    BUN 18 11/18/2021 11:30 AM    BUN 19 02/26/2020 09:22 AM    CREATININE 1.39 (H) 11/18/2021 11:30 AM    CREATININE 1.16 02/26/2020 09:22 AM      Lab Results   Component Value Date/Time    ASTSGOT 14 11/18/2021 11:30 AM    ASTSGOT 16 02/26/2020 09:22 AM    ALTSGPT 10 11/18/2021 11:30 AM    ALTSGPT 19 02/26/2020 09:22 AM    ALBUMIN 4.1 11/18/2021 11:30 AM    ALBUMIN 4.4 02/26/2020 09:22 AM      Lab Results   Component Value Date/Time    CHOLSTRLTOT 150 04/29/2021 04:06 AM    CHOLSTRLTOT 158 02/26/2020 09:22 AM    LDL 89 02/26/2020 09:22 AM    HDL 44 04/29/2021 04:06 AM    HDL 40 02/26/2020 09:22 AM    TRIGLYCERIDE 180 (H) 04/29/2021 04:06 AM    TRIGLYCERIDE 143 02/26/2020 09:22 AM       8-7-19 Ha1c 7.2    1-14-19 na 138, k 4.3, cr 1.03, lft normal LDL 57, HDL 37, ,     12-28-18 h 16, p 173      Past Medical  History:   Diagnosis Date   • AAA (abdominal aortic aneurysm) ([facility])     Followed by Dr. Pepper   • Acid reflux    • Arrhythmia    • CAD (coronary artery disease) 1997: MI, stents to LAD and LCx. 2017: PCI/JOE (Synergy 2.5 x 16mm) the distal LCx.   • Chronic anticoagulation    • Diabetes ([facility])     Oral meds   • Diabetic neuropathy ([facility])     Bilateral feet   • Hyperlipidemia    • Hypertension    • Obstructive sleep apnea     CPAP   • Palpitations    • Past heart attack    • Persistent atrial fibrillation ([facility]) 2021    Echocardiogram with normal LV size, LVEF 65%. Normal RV. Enlarged RA and mildly dilated LA. Trace MR. Mild TR. RVSP 25mmHg.   • Psychiatric problem     depression   • Sleep apnea     CPAP   • Snoring      Past Surgical History:   Procedure Laterality Date   • CATARACT EXTRACTION WITH IOL Bilateral    • ZZZ CARDIAC CATH  17    Synergy stent to Circ   • STENT PLACEMENT      LAD   • ZZZ CARDIAC CATH      LAD stent   • ANGIOPLASTY       Family History   Problem Relation Age of Onset   • Stroke Father 57         at 62 of second stroke   • Arthritis Mother    • Stroke Mother    • Alzheimer's Disease Mother      Social History     Socioeconomic History   • Marital status:      Spouse name: Not on file   • Number of children: Not on file   • Years of education: Not on file   • Highest education level: Not on file   Occupational History   • Not on file   Tobacco Use   • Smoking status: Former Smoker     Packs/day: 1.00     Years: 32.00     Pack years: 32.00     Types: Cigarettes     Quit date: 2003     Years since quittin.6   • Smokeless tobacco: Never Used   Vaping Use   • Vaping Use: Some days   Substance and Sexual Activity   • Alcohol use: Not Currently     Alcohol/week: 0.0 oz   • Drug use: Yes     Frequency: 7.0 times per week     Types: Marijuana, Inhaled     Comment: medical marijuana   • Sexual activity: Not on file   Other Topics Concern   •  Not on file   Social History Narrative   • Not on file     Social Determinants of Health     Financial Resource Strain:    • Difficulty of Paying Living Expenses: Not on file   Food Insecurity:    • Worried About Running Out of Food in the Last Year: Not on file   • Ran Out of Food in the Last Year: Not on file   Transportation Needs:    • Lack of Transportation (Medical): Not on file   • Lack of Transportation (Non-Medical): Not on file   Physical Activity:    • Days of Exercise per Week: Not on file   • Minutes of Exercise per Session: Not on file   Stress:    • Feeling of Stress : Not on file   Social Connections:    • Frequency of Communication with Friends and Family: Not on file   • Frequency of Social Gatherings with Friends and Family: Not on file   • Attends Oriental orthodox Services: Not on file   • Active Member of Clubs or Organizations: Not on file   • Attends Club or Organization Meetings: Not on file   • Marital Status: Not on file   Intimate Partner Violence:    • Fear of Current or Ex-Partner: Not on file   • Emotionally Abused: Not on file   • Physically Abused: Not on file   • Sexually Abused: Not on file   Housing Stability:    • Unable to Pay for Housing in the Last Year: Not on file   • Number of Places Lived in the Last Year: Not on file   • Unstable Housing in the Last Year: Not on file     No Known Allergies    Current Outpatient Medications   Medication Sig Dispense Refill   • apixaban (ELIQUIS) 5mg Tab Take 1 Tablet by mouth 2 times a day. 180 Tablet 7   • metoprolol SR (TOPROL XL) 25 MG TABLET SR 24 HR Take 1 Tablet by mouth every day. 90 Tablet 7   • rosuvastatin (CRESTOR) 40 MG tablet Take 1 Tablet by mouth every day. 90 Tablet 3   • ramipril (ALTACE) 10 MG capsule TAKE 1 CAPSULE BY MOUTH EVERY DAY 90 Capsule 3   • JANUVIA 100 MG Tab TAKE 1 TABLET DAILY 90 Tablet 3   • baclofen (LIORESAL) 20 MG tablet TAKE 1 TAB BY MOUTH AS NEEDED. INDICATIONS: MUSCLE SPASTICITY 270 tablet 1   • Empagliflozin  "(JARDIANCE) 25 MG Tab Take 1 tablet by mouth every day. 90 tablet 3     No current facility-administered medications for this visit.       ROS    All others systems reviewed and negative.     Objective:     BP (!) 96/64 (BP Location: Left arm, Patient Position: Sitting, BP Cuff Size: Adult)   Pulse 68   Resp 16   Ht 1.905 m (6' 3\")   Wt 111 kg (244 lb 7.8 oz)   SpO2 99%  Body mass index is 30.56 kg/m².    Physical Exam   General: No acute distress. Well nourished.  HEENT: EOM grossly intact, no scleral icterus, no pharyngeal erythema.   Neck:  No JVD, no bruits, trachea midline  CVS: irreg irreg, Normal S1, S2. No M/R/G. No LE edema.  2+ radial pulses, 2+ PT pulses  Resp: CTAB. No wheezing or crackles/rhonchi. Normal respiratory effort.  Abdomen: Soft, NT, no pascale hepatomegaly.  MSK/Ext: No clubbing or cyanosis.  Skin: Warm and dry, no rashes.  Neurological: CN III-XII grossly intact. No focal deficits.   Psych: A&O x 3, appropriate affect, good judgement    Physical exam performed today and unchanged, except what is noted, compared to 8-25-21    Assessment:     1. Coronary artery disease due to calcified coronary lesion: LAD stent in 1997     2. Ischemic heart disease due to coronary artery obstruction (HCC)     3. Essential hypertension     4. Persistent atrial fibrillation (HCC)  apixaban (ELIQUIS) 5mg Tab   5. Anticoagulated  apixaban (ELIQUIS) 5mg Tab   6. SID on CPAP     7. Former smoker     8. RBBB     9. Type 2 diabetes mellitus with diabetic neuropathy, without long-term current use of insulin (HCC)     10. Abdominal aortic aneurysm (AAA) without rupture (HCC)     11. Abnormal nuclear stress test     12. PVC (premature ventricular contraction)     13. Premature atrial contraction     14. Family history of stroke     15. Palpitations     16. Pure hypercholesterolemia         Medical Decision Making:  Today's Assessment / Status / Plan:     -Persistent atrial fibrillation.  Appears rate " controlled.  -Cont anticoagulation for chads 2 vascular score of 4, no prior stroke.  -On secondary prevention therapy with BB, statin.  No aspirin since he will be on blood thinner  -LDL better, near goal at 76, on max crestor, could consider zetia but probably does not want extra medication  -Statin holiday due to reduced memory, see below  -On Jardiance, A1C controlled  -BP controlled, actually low, see below  -Rare NSAID use  -Watch bradycardia, reduce BB  -His goal it to NOT take any more medications, ok to adjust meds he is already on.  -He does not want anything heroic in terms of his care.  -RTC 9 months    Written instructions given today:    -Your blood pressure is on the lower side.    -Reduce your metoprolol in half from 50 mg to 25 mg.  Use up your current pills by cutting them in half.  I will send a new prescription.    -If your blood pressure remains low, we can consider cutting your ramipril in half but your PCP may have you on the higher dose to protect the kidneys from diabetes.    -If your side effects from Eliquis ever become too much to bear, there is a heart plug called the watchman left atrial appendage closure device.  It is relatively simple to place.  My partner can put this in up in [place].  Let me know and I can send you to him in anytime.  His name is Dr. Isai Perry.        Return in about 9 months (around 10/5/2022).    It is my pleasure to participate in the care of Mr. Lisa.  Please do not hesitate to contact me with questions or concerns.    Myra Baca MD, [facility]  Cardiologist [facility]    Please note that this dictation was created using voice recognition software. I have made every reasonable attempt to correct obvious errors, but it is possible there are errors of grammar and possibly content that I did not discover before finalizing the note.

## 2022-01-05 ENCOUNTER — OFFICE VISIT (OUTPATIENT)
Dept: CARDIOLOGY | Facility: PHYSICIAN GROUP | Age: 72
End: 2022-01-05
Payer: COMMERCIAL

## 2022-01-05 VITALS
OXYGEN SATURATION: 99 % | HEART RATE: 68 BPM | WEIGHT: 244.49 LBS | RESPIRATION RATE: 16 BRPM | SYSTOLIC BLOOD PRESSURE: 96 MMHG | BODY MASS INDEX: 30.4 KG/M2 | DIASTOLIC BLOOD PRESSURE: 64 MMHG | HEIGHT: 75 IN

## 2022-01-05 DIAGNOSIS — I10 ESSENTIAL HYPERTENSION: ICD-10-CM

## 2022-01-05 DIAGNOSIS — I45.10 RBBB: ICD-10-CM

## 2022-01-05 DIAGNOSIS — I25.10 CORONARY ARTERY DISEASE DUE TO CALCIFIED CORONARY LESION: ICD-10-CM

## 2022-01-05 DIAGNOSIS — I25.9 ISCHEMIC HEART DISEASE DUE TO CORONARY ARTERY OBSTRUCTION (HCC): ICD-10-CM

## 2022-01-05 DIAGNOSIS — I49.1 PREMATURE ATRIAL CONTRACTION: ICD-10-CM

## 2022-01-05 DIAGNOSIS — Z79.01 ANTICOAGULATED: ICD-10-CM

## 2022-01-05 DIAGNOSIS — Z82.3 FAMILY HISTORY OF STROKE: ICD-10-CM

## 2022-01-05 DIAGNOSIS — I48.19 PERSISTENT ATRIAL FIBRILLATION (HCC): ICD-10-CM

## 2022-01-05 DIAGNOSIS — I49.3 PVC (PREMATURE VENTRICULAR CONTRACTION): ICD-10-CM

## 2022-01-05 DIAGNOSIS — I24.0 ISCHEMIC HEART DISEASE DUE TO CORONARY ARTERY OBSTRUCTION (HCC): ICD-10-CM

## 2022-01-05 DIAGNOSIS — I25.84 CORONARY ARTERY DISEASE DUE TO CALCIFIED CORONARY LESION: ICD-10-CM

## 2022-01-05 DIAGNOSIS — E11.40 TYPE 2 DIABETES MELLITUS WITH DIABETIC NEUROPATHY, WITHOUT LONG-TERM CURRENT USE OF INSULIN (HCC): ICD-10-CM

## 2022-01-05 DIAGNOSIS — I71.40 ABDOMINAL AORTIC ANEURYSM (AAA) WITHOUT RUPTURE (HCC): ICD-10-CM

## 2022-01-05 DIAGNOSIS — R00.2 PALPITATIONS: ICD-10-CM

## 2022-01-05 DIAGNOSIS — G47.33 OSA ON CPAP: ICD-10-CM

## 2022-01-05 DIAGNOSIS — R94.39 ABNORMAL NUCLEAR STRESS TEST: ICD-10-CM

## 2022-01-05 DIAGNOSIS — Z87.891 FORMER SMOKER: ICD-10-CM

## 2022-01-05 DIAGNOSIS — E78.00 PURE HYPERCHOLESTEROLEMIA: ICD-10-CM

## 2022-01-05 PROCEDURE — 99214 OFFICE O/P EST MOD 30 MIN: CPT | Performed by: INTERNAL MEDICINE

## 2022-01-05 RX ORDER — RAMIPRIL 10 MG/1
CAPSULE ORAL
COMMUNITY
End: 2022-01-05

## 2022-01-05 RX ORDER — ROSUVASTATIN CALCIUM 40 MG/1
TABLET, COATED ORAL
COMMUNITY
End: 2022-01-05

## 2022-01-05 RX ORDER — METOPROLOL SUCCINATE 50 MG/1
TABLET, EXTENDED RELEASE ORAL
COMMUNITY
End: 2022-01-05

## 2022-01-05 RX ORDER — METOPROLOL SUCCINATE 25 MG/1
25 TABLET, EXTENDED RELEASE ORAL DAILY
Qty: 90 TABLET | Refills: 7 | Status: SHIPPED | OUTPATIENT
Start: 2022-01-05 | End: 2022-08-24 | Stop reason: SDUPTHER

## 2022-01-05 NOTE — LETTER
[facility]   [address]  [place] [address]  Phone: [phone]  Fax: [phone]              Kun Lisa  1950    Encounter Date: 1/5/2022    Myra Baca M.D.          PROGRESS NOTE:  Subjective:   Chief Complaint:   Chief Complaint   Patient presents with   • Coronary Artery Disease     F/V Dx: Coronary artery disease due to calcified coronary lesion: LAD stent in 1997   • Atrial Fibrillation     F/V Dx: Persistent atrial fibrillation (HCC)   • Hypertension     F/V Dx: Essential hypertension       Kun Lisa is a 71 y.o. male who returns for CAD/stent, HTN, HLP, bradycardia, RBBB, PVCs/ectopy, persistent afib, chronic anticoagulation.    Prior MI 1997 with stents to LAD and Circ, then   of RCA in April 2017 after abnormal nuc, bradycardia, RBBB.    He recalls going through divorce in 1997, had [facility] with stent to LAD and Circ.   Went to ED with CP, sounds like UA and mild MI.  Was placed on meds.  Then had second stent in 2017 after abnormal stress test, led to RCA. Not sure if opening RCA helped with symptoms.  Someone suggested he stop taking aspirin.  Remains on statin and beta-blocker.    Persistent atrial fibrillation found on exam 2021.  Known to have PVCs.  He does not feel them.    No palpitations.  Avoids cold drinks, does recall that cold drinks would provoke some heart racing but nothing recently.  Holter monitor in 2017 with PACs, PVCs and brief atrial runs.    Started on blood thinner for chads 2 vascular score of 4.  On apixaban    Does get some low back pain when walking.  He is not limited by chest pain, pressure or tightness.   No significant dyspnea on exertion.   No orthopnea or significant lower extremity swelling.   No lightheaded, or presyncope/syncope.   No symptoms of leg claudication.   No stroke/TIA like symptoms.    Some mild brain fog, stopped tramadol to see if this  improves.    Lost weight, stays active, golfs twice a week, walking and riding.    Has been having sx that sound like neuropathy in his feet.    Has HTN, BP controlled on 2 meds.    Has DM on Jardiance, controlled.    On BB, slow HR at times , no symptoms.    Has HLP, LDL 76. On Crestor 20 mg.    Prior smoker for 32 years, stopped in 2003.     Has SID, using CPAP, feels much better on this.    Smoke marijuana daily.  No ETOH.  Mild caffeine.    Had AAA, infrarenal, 5.4 cm, had stenting with Dr. Pepper.    Father had MI in his 50s, 62, passed away.  Had a stroke with paralysis and word finding in his 50s.  Probably A. fib.  Mother lived to , smoker, dementia.    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  Lives alone in [place].  , has financial independence.   Closet family in [place], sister and brother in law planning to move to [place].  Daughter [place], [place].  Golfs with friends twice a week.   Friends in [place].     DATA REVIEWED by me:  ECG 8-25-21  Atrial fibrillation, rate 69, rightward axis, atypical right bundle branch block    ECG 4-27-17  Sinus bradycardia, 45, first degree AV delay, RBBB    Holter 6-19-17  Sinus, PVCs 7,589, 1 atrial run, some PACs, no rhythm changes.    Echo 9-22-21  The left ventricular ejection fraction is visually estimated to be 65%.  Mildly dilated left atrium.  Estimated right ventricular systolic pressure is 25 mmHg.     Compared to prior echo 03/03/17, no significant change, LVH not   appreciated.    Nuc 4-4-17  Small sized, partially reversible, decreased uptake of moderate severity in  the apical inferior (RCA), mid inferior (RCA) segments during post stress  images.    Medium sized, partially reversible, decreased uptake of moderate severity in  the apical lateral (LCX), inferolateral (LCX) and mid anterolateral (LCX)  segments during post stress images.   Normal left ventricular wall motion.  LV ejection fraction = 62%.    CT abd  12-28-18  1.  Infrarenal abdominal aortic aneurysm with a maximum diameter of 5.3 cm.  2.  Peripancreatic inflammatory changes are consistent with known pancreatitis.  3.  Enlarged peripancreatic lymph node, likely reactive.  4.  Diverticulosis.    Echo none    [facility] 4-27-17  POSTPROCEDURE DIAGNOSES:  1.  Coronary artery disease including patent stent in the mid left anterior descending artery with nonobstructive in-stent restenosis, high-grade distal circumflex artery of a codominant system supplying excellent collaterals to mid to distal right coronary artery with long chronic total occlusion of the   ostial to mid right coronary artery.  2.  Successful percutaneous transluminal coronary angioplasty/stent placement of the distal circumflex artery with 2.5x16 mm Synergy drug-eluting stent.    Most recent labs:     Lab Results   Component Value Date/Time    HEMOGLOBIN 16.0 12/28/2018 10:53 AM    HEMATOCRIT 44.0 12/28/2018 10:53 AM    MCV 90.7 12/28/2018 10:53 AM    INR 0.95 04/25/2017 03:00 PM      Lab Results   Component Value Date/Time    SODIUM 140 11/18/2021 11:30 AM    SODIUM 135 02/26/2020 09:22 AM    POTASSIUM 4.3 11/18/2021 11:30 AM    POTASSIUM 4.7 02/26/2020 09:22 AM    CHLORIDE 104 11/18/2021 11:30 AM    CHLORIDE 101 02/26/2020 09:22 AM    CO2 24 11/18/2021 11:30 AM    CO2 27 02/26/2020 09:22 AM    GLUCOSE 126 (H) 11/18/2021 11:30 AM    GLUCOSE 159 (H) 02/26/2020 09:22 AM    BUN 18 11/18/2021 11:30 AM    BUN 19 02/26/2020 09:22 AM    CREATININE 1.39 (H) 11/18/2021 11:30 AM    CREATININE 1.16 02/26/2020 09:22 AM      Lab Results   Component Value Date/Time    ASTSGOT 14 11/18/2021 11:30 AM    ASTSGOT 16 02/26/2020 09:22 AM    ALTSGPT 10 11/18/2021 11:30 AM    ALTSGPT 19 02/26/2020 09:22 AM    ALBUMIN 4.1 11/18/2021 11:30 AM    ALBUMIN 4.4 02/26/2020 09:22 AM      Lab Results   Component Value Date/Time    CHOLSTRLTOT 150 04/29/2021 04:06 AM    CHOLSTRLTOT 158 02/26/2020 09:22 AM    LDL 89 02/26/2020 09:22 AM     HDL 44 2021 04:06 AM    HDL 40 2020 09:22 AM    TRIGLYCERIDE 180 (H) 2021 04:06 AM    TRIGLYCERIDE 143 2020 09:22 AM       8-7-19 Ha1c 7.2    14-19 na 138, k 4.3, cr 1.03, lft normal LDL 57, HDL 37, ,     12-18 h 16, p 173      Past Medical History:   Diagnosis Date   • AAA (abdominal aortic aneurysm) ([facility])     Followed by Dr. Pepper   • Acid reflux    • Arrhythmia    • CAD (coronary artery disease) 1997: MI, stents to LAD and LCx. 2017: PCI/JOE (Synergy 2.5 x 16mm) the distal LCx.   • Chronic anticoagulation    • Diabetes ([facility])     Oral meds   • Diabetic neuropathy ([facility])     Bilateral feet   • Hyperlipidemia    • Hypertension    • Obstructive sleep apnea     CPAP   • Palpitations    • Past heart attack    • Persistent atrial fibrillation ([facility]) 2021    Echocardiogram with normal LV size, LVEF 65%. Normal RV. Enlarged RA and mildly dilated LA. Trace MR. Mild TR. RVSP 25mmHg.   • Psychiatric problem     depression   • Sleep apnea     CPAP   • Snoring      Past Surgical History:   Procedure Laterality Date   • CATARACT EXTRACTION WITH IOL Bilateral    • ZZZ CARDIAC CATH  17    Synergy stent to Circ   • STENT PLACEMENT      LAD   • ZZZ CARDIAC CATH      LAD stent   • ANGIOPLASTY       Family History   Problem Relation Age of Onset   • Stroke Father 57         at 62 of second stroke   • Arthritis Mother    • Stroke Mother    • Alzheimer's Disease Mother      Social History     Socioeconomic History   • Marital status:      Spouse name: Not on file   • Number of children: Not on file   • Years of education: Not on file   • Highest education level: Not on file   Occupational History   • Not on file   Tobacco Use   • Smoking status: Former Smoker     Packs/day: 1.00     Years: 32.00     Pack years: 32.00     Types: Cigarettes     Quit date: 2003     Years since quittin.6   • Smokeless tobacco: Never Used   Vaping Use    • Vaping Use: Some days   Substance and Sexual Activity   • Alcohol use: Not Currently     Alcohol/week: 0.0 oz   • Drug use: Yes     Frequency: 7.0 times per week     Types: Marijuana, Inhaled     Comment: medical marijuana   • Sexual activity: Not on file   Other Topics Concern   • Not on file   Social History Narrative   • Not on file     Social Determinants of Health     Financial Resource Strain:    • Difficulty of Paying Living Expenses: Not on file   Food Insecurity:    • Worried About Running Out of Food in the Last Year: Not on file   • Ran Out of Food in the Last Year: Not on file   Transportation Needs:    • Lack of Transportation (Medical): Not on file   • Lack of Transportation (Non-Medical): Not on file   Physical Activity:    • Days of Exercise per Week: Not on file   • Minutes of Exercise per Session: Not on file   Stress:    • Feeling of Stress : Not on file   Social Connections:    • Frequency of Communication with Friends and Family: Not on file   • Frequency of Social Gatherings with Friends and Family: Not on file   • Attends Anabaptism Services: Not on file   • Active Member of Clubs or Organizations: Not on file   • Attends Club or Organization Meetings: Not on file   • Marital Status: Not on file   Intimate Partner Violence:    • Fear of Current or Ex-Partner: Not on file   • Emotionally Abused: Not on file   • Physically Abused: Not on file   • Sexually Abused: Not on file   Housing Stability:    • Unable to Pay for Housing in the Last Year: Not on file   • Number of Places Lived in the Last Year: Not on file   • Unstable Housing in the Last Year: Not on file     No Known Allergies    Current Outpatient Medications   Medication Sig Dispense Refill   • apixaban (ELIQUIS) 5mg Tab Take 1 Tablet by mouth 2 times a day. 180 Tablet 7   • metoprolol SR (TOPROL XL) 25 MG TABLET SR 24 HR Take 1 Tablet by mouth every day. 90 Tablet 7   • rosuvastatin (CRESTOR) 40 MG tablet Take 1 Tablet by mouth  "every day. 90 Tablet 3   • ramipril (ALTACE) 10 MG capsule TAKE 1 CAPSULE BY MOUTH EVERY DAY 90 Capsule 3   • JANUVIA 100 MG Tab TAKE 1 TABLET DAILY 90 Tablet 3   • baclofen (LIORESAL) 20 MG tablet TAKE 1 TAB BY MOUTH AS NEEDED. INDICATIONS: MUSCLE SPASTICITY 270 tablet 1   • Empagliflozin (JARDIANCE) 25 MG Tab Take 1 tablet by mouth every day. 90 tablet 3     No current facility-administered medications for this visit.       ROS    All others systems reviewed and negative.     Objective:     BP (!) 96/64 (BP Location: Left arm, Patient Position: Sitting, BP Cuff Size: Adult)   Pulse 68   Resp 16   Ht 1.905 m (6' 3\")   Wt 111 kg (244 lb 7.8 oz)   SpO2 99%  Body mass index is 30.56 kg/m².    Physical Exam   General: No acute distress. Well nourished.  HEENT: EOM grossly intact, no scleral icterus, no pharyngeal erythema.   Neck:  No JVD, no bruits, trachea midline  CVS: irreg irreg, Normal S1, S2. No M/R/G. No LE edema.  2+ radial pulses, 2+ PT pulses  Resp: CTAB. No wheezing or crackles/rhonchi. Normal respiratory effort.  Abdomen: Soft, NT, no pascale hepatomegaly.  MSK/Ext: No clubbing or cyanosis.  Skin: Warm and dry, no rashes.  Neurological: CN III-XII grossly intact. No focal deficits.   Psych: A&O x 3, appropriate affect, good judgement    Physical exam performed today and unchanged, except what is noted, compared to 8-25-21    Assessment:     1. Coronary artery disease due to calcified coronary lesion: LAD stent in 1997     2. Ischemic heart disease due to coronary artery obstruction (HCC)     3. Essential hypertension     4. Persistent atrial fibrillation (HCC)  apixaban (ELIQUIS) 5mg Tab   5. Anticoagulated  apixaban (ELIQUIS) 5mg Tab   6. SID on CPAP     7. Former smoker     8. RBBB     9. Type 2 diabetes mellitus with diabetic neuropathy, without long-term current use of insulin (HCC)     10. Abdominal aortic aneurysm (AAA) without rupture (HCC)     11. Abnormal nuclear stress test     12. PVC " (premature ventricular contraction)     13. Premature atrial contraction     14. Family history of stroke     15. Palpitations     16. Pure hypercholesterolemia         Medical Decision Making:  Today's Assessment / Status / Plan:     -Persistent atrial fibrillation.  Appears rate controlled.  -Cont anticoagulation for chads 2 vascular score of 4, no prior stroke.  -On secondary prevention therapy with BB, statin.  No aspirin since he will be on blood thinner  -LDL better, near goal at 76, on max crestor, could consider zetia but probably does not want extra medication  -Statin holiday due to reduced memory, see below  -On Jardiance, A1C controlled  -BP controlled, actually low, see below  -Rare NSAID use  -Watch bradycardia, reduce BB  -His goal it to NOT take any more medications, ok to adjust meds he is already on.  -He does not want anything heroic in terms of his care.  -RTC 9 months    Written instructions given today:    -Your blood pressure is on the lower side.    -Reduce your metoprolol in half from 50 mg to 25 mg.  Use up your current pills by cutting them in half.  I will send a new prescription.    -If your blood pressure remains low, we can consider cutting your ramipril in half but your PCP may have you on the higher dose to protect the kidneys from diabetes.    -If your side effects from Eliquis ever become too much to bear, there is a heart plug called the watchman left atrial appendage closure device.  It is relatively simple to place.  My partner can put this in up in [place].  Let me know and I can send you to him in anytime.  His name is Dr. Isai Perry.        Return in about 9 months (around 10/5/2022).    It is my pleasure to participate in the care of Mr. Lisa.  Please do not hesitate to contact me with questions or concerns.    Myra Baca MD, [facility]  Cardiologist [facility]    Please note that this dictation was created using voice recognition software.  I have made every reasonable attempt to correct obvious errors, but it is possible there are errors of grammar and possibly content that I did not discover before finalizing the note.          No Recipients

## 2022-01-05 NOTE — PATIENT INSTRUCTIONS
-Your blood pressure is on the lower side.    -Reduce your metoprolol in half from 50 mg to 25 mg.  Use up your current pills by cutting them in half.  I will send a new prescription.    -If your blood pressure remains low, we can consider cutting your ramipril in half but your PCP may have you on the higher dose to protect the kidneys from diabetes.    -If your side effects from Eliquis ever become too much to bear, there is a heart plug called the watchman left atrial appendage closure device.  It is relatively simple to place.  My partner can put this in up in [place].  Let me know and I can send you to him in anytime.  His name is Dr. Isai Perry.

## 2022-01-10 ENCOUNTER — OFFICE VISIT (OUTPATIENT)
Dept: URGENT CARE | Facility: CLINIC | Age: 72
End: 2022-01-10

## 2022-01-10 ENCOUNTER — HOSPITAL ENCOUNTER (OUTPATIENT)
Facility: MEDICAL CENTER | Age: 72
End: 2022-01-10
Attending: FAMILY MEDICINE
Payer: COMMERCIAL

## 2022-01-10 ENCOUNTER — APPOINTMENT (OUTPATIENT)
Dept: RADIOLOGY | Facility: IMAGING CENTER | Age: 72
End: 2022-01-10
Attending: FAMILY MEDICINE
Payer: COMMERCIAL

## 2022-01-10 VITALS
HEIGHT: 75 IN | RESPIRATION RATE: 12 BRPM | OXYGEN SATURATION: 97 % | TEMPERATURE: 97.3 F | DIASTOLIC BLOOD PRESSURE: 78 MMHG | BODY MASS INDEX: 30.46 KG/M2 | WEIGHT: 245 LBS | SYSTOLIC BLOOD PRESSURE: 130 MMHG | HEART RATE: 77 BPM

## 2022-01-10 DIAGNOSIS — R53.83 OTHER FATIGUE: ICD-10-CM

## 2022-01-10 DIAGNOSIS — K59.00 CONSTIPATION, UNSPECIFIED CONSTIPATION TYPE: ICD-10-CM

## 2022-01-10 PROCEDURE — 74019 RADEX ABDOMEN 2 VIEWS: CPT | Mod: TC | Performed by: FAMILY MEDICINE

## 2022-01-10 PROCEDURE — 99214 OFFICE O/P EST MOD 30 MIN: CPT | Performed by: FAMILY MEDICINE

## 2022-01-10 PROCEDURE — 0240U HCHG SARS-COV-2 COVID-19 NFCT DS RESP RNA 3 TRGT MIC: CPT

## 2022-01-11 DIAGNOSIS — R53.83 OTHER FATIGUE: ICD-10-CM

## 2022-01-11 NOTE — PATIENT INSTRUCTIONS
Recommend to start MiraLAX 1 scoop in large glass of water twice daily for the next 3 days then once daily after that for a week.    If there is any new symptoms or progressive worsening please come back and be seen otherwise as needed      Constipation, Adult  Constipation is when a person:  · Poops (has a bowel movement) fewer times in a week than normal.  · Has a hard time pooping.  · Has poop that is dry, hard, or bigger than normal.  Follow these instructions at home:  Eating and drinking    · Eat foods that have a lot of fiber, such as:  ? Fresh fruits and vegetables.  ? Whole grains.  ? Beans.  · Eat less of foods that are high in fat, low in fiber, or overly processed, such as:  ? French fries.  ? Hamburgers.  ? Cookies.  ? Candy.  ? Soda.  · Drink enough fluid to keep your pee (urine) clear or pale yellow.  General instructions  · Exercise regularly or as told by your doctor.  · Go to the restroom when you feel like you need to poop. Do not hold it in.  · Take over-the-counter and prescription medicines only as told by your doctor. These include any fiber supplements.  · Do pelvic floor retraining exercises, such as:  ? Doing deep breathing while relaxing your lower belly (abdomen).  ? Relaxing your pelvic floor while pooping.  · Watch your condition for any changes.  · Keep all follow-up visits as told by your doctor. This is important.  Contact a doctor if:  · You have pain that gets worse.  · You have a fever.  · You have not pooped for 4 days.  · You throw up (vomit).  · You are not hungry.  · You lose weight.  · You are bleeding from the anus.  · You have thin, pencil-like poop (stool).  Get help right away if:  · You have a fever, and your symptoms suddenly get worse.  · You leak poop or have blood in your poop.  · Your belly feels hard or bigger than normal (is bloated).  · You have very bad belly pain.  · You feel dizzy or you faint.  This information is not intended to replace advice given to you  by your health care provider. Make sure you discuss any questions you have with your health care provider.  Document Released: 06/05/2009 Document Revised: 11/30/2018 Document Reviewed: 06/07/2017  Elsevier Patient Education © 2020 [employer] Inc[employer]

## 2022-01-11 NOTE — PROGRESS NOTES
"Subjective     Kun Lisa is a 71 y.o. male who presents with Coronavirus Screening (covid test, constipated, fatigue and tired started yesterday)  Had 1 small bowel movement today.  Denies any nausea, vomiting, fever chills, cough or congestion.  Denies any chest pain or shortness of breath.  Little more fatigued than normal today.  He was to be tested for Covid.  Review of system otherwise negative.          HPI    ROS           Objective     /78 (BP Location: Right arm, Patient Position: Sitting, BP Cuff Size: Adult)   Pulse 77   Temp 36.3 °C (97.3 °F) (Temporal)   Resp 12   Ht 1.905 m (6' 3\")   Wt 111 kg (245 lb)   SpO2 97%   BMI 30.62 kg/m²      Physical Exam  Constitutional:       General: He is not in acute distress.     Appearance: He is not ill-appearing, toxic-appearing or diaphoretic.   HENT:      Head: Atraumatic.      Right Ear: External ear normal.      Left Ear: External ear normal.      Nose: Nose normal.   Eyes:      Conjunctiva/sclera: Conjunctivae normal.   Cardiovascular:      Rate and Rhythm: Normal rate and regular rhythm.      Heart sounds: No murmur heard.  No friction rub. No gallop.    Pulmonary:      Effort: Pulmonary effort is normal. No respiratory distress.      Breath sounds: No wheezing, rhonchi or rales.   Abdominal:      General: Bowel sounds are normal. There is no distension.      Palpations: Abdomen is soft. There is no mass.      Tenderness: There is abdominal tenderness (Mild generalized). There is no guarding or rebound.      Hernia: No hernia is present.   Skin:     Coloration: Skin is not jaundiced or pale.   Neurological:      Mental Status: He is alert and oriented to person, place, and time.   Psychiatric:         Behavior: Behavior normal.               Abdominal x-ray showed a lot of stool burden but no signs of obstruction.             Assessment & Plan     ASSESSMENT:PLAN:  1. Other fatigue  - CoV-2 and Flu A/B by PCR (24 hour In-House): Collect " NP swab in VTM; Future    Unlikely Covid but test results are pending.  Warning signs reviewed      2. Constipation, unspecified constipation type  - [id number] VIEWS; Future    No signs of obstruction.  Discussed the use of enemas and also starting MiraLAX  Warning signs reviewed  Follow up if not significantly improved as expected, sooner if any worsening or new symptoms

## 2022-01-12 LAB
FLUAV RNA SPEC QL NAA+PROBE: NEGATIVE
FLUBV RNA SPEC QL NAA+PROBE: NEGATIVE
SARS-COV-2 RNA RESP QL NAA+PROBE: NOTDETECTED
SPECIMEN SOURCE: NORMAL

## 2022-02-02 ENCOUNTER — OFFICE VISIT (OUTPATIENT)
Dept: MEDICAL GROUP | Facility: MEDICAL CENTER | Age: 72
End: 2022-02-02
Payer: COMMERCIAL

## 2022-02-02 VITALS
OXYGEN SATURATION: 96 % | TEMPERATURE: 96.9 F | HEART RATE: 65 BPM | SYSTOLIC BLOOD PRESSURE: 86 MMHG | DIASTOLIC BLOOD PRESSURE: 60 MMHG | BODY MASS INDEX: 31.16 KG/M2 | HEIGHT: 75 IN | WEIGHT: 250.6 LBS

## 2022-02-02 DIAGNOSIS — I71.40 ABDOMINAL AORTIC ANEURYSM (AAA) 3.0 CM TO 5.5 CM IN DIAMETER IN MALE (HCC): ICD-10-CM

## 2022-02-02 DIAGNOSIS — Z12.5 SCREENING PSA (PROSTATE SPECIFIC ANTIGEN): ICD-10-CM

## 2022-02-02 DIAGNOSIS — Z12.11 COLON CANCER SCREENING: ICD-10-CM

## 2022-02-02 DIAGNOSIS — E11.40 TYPE 2 DIABETES MELLITUS WITH DIABETIC NEUROPATHY, WITHOUT LONG-TERM CURRENT USE OF INSULIN (HCC): ICD-10-CM

## 2022-02-02 PROCEDURE — 99214 OFFICE O/P EST MOD 30 MIN: CPT | Performed by: PHYSICIAN ASSISTANT

## 2022-02-02 ASSESSMENT — PATIENT HEALTH QUESTIONNAIRE - PHQ9: CLINICAL INTERPRETATION OF PHQ2 SCORE: 0

## 2022-02-02 NOTE — PROGRESS NOTES
Subjective:   Kun Lisa is a 71 y.o. male here today for diabetes and AAA.    Type 2 diabetes mellitus with neurologic complication (HCC)  This is a pleasant 71-year-old male here today to follow-up on his health.  Overall doing quite well.  No new complaints today.  Has been unable to golf because his been quite cold and he golfs with a buddy who is older than he.  No renewals of medication needed.  He is due for labs for his diabetes measures in the next few months.    Abdominal aortic aneurysm (AAA) 3.0 cm to 5.5 cm in diameter in male (HCC)  Chronic condition.  Status post EVAR.  He had an ultrasound performed last May ordered by cardiology.  It showed no change in the aneurysm.       Current medicines (including changes today)  Current Outpatient Medications   Medication Sig Dispense Refill   • apixaban (ELIQUIS) 5mg Tab Take 1 Tablet by mouth 2 times a day. 180 Tablet 7   • metoprolol SR (TOPROL XL) 25 MG TABLET SR 24 HR Take 1 Tablet by mouth every day. 90 Tablet 7   • rosuvastatin (CRESTOR) 40 MG tablet Take 1 Tablet by mouth every day. 90 Tablet 3   • ramipril (ALTACE) 10 MG capsule TAKE 1 CAPSULE BY MOUTH EVERY DAY 90 Capsule 3   • JANUVIA 100 MG Tab TAKE 1 TABLET DAILY 90 Tablet 3   • baclofen (LIORESAL) 20 MG tablet TAKE 1 TAB BY MOUTH AS NEEDED. INDICATIONS: MUSCLE SPASTICITY 270 tablet 1   • Empagliflozin (JARDIANCE) 25 MG Tab Take 1 tablet by mouth every day. 90 tablet 3     No current facility-administered medications for this visit.     He  has a past medical history of AAA (abdominal aortic aneurysm) ([facility]), Acid reflux, Arrhythmia, CAD (coronary artery disease) (1997/2017), Chronic anticoagulation, Diabetes ([facility]), Diabetic neuropathy ([facility]), Hyperlipidemia, Hypertension, Obstructive sleep apnea, Palpitations, Past heart attack (1997), Persistent atrial fibrillation ([facility]) (09/2021), Psychiatric problem, Sleep apnea, and Snoring.    Social History and Family History were reviewed and  "updated.    ROS   No chest pain, no shortness of breath, no abdominal pain and all other systems were reviewed and are negative.       Objective:     BP (!) 86/60 (BP Location: Right arm, Patient Position: Sitting, BP Cuff Size: Adult)   Pulse 65   Temp 36.1 °C (96.9 °F) (Temporal)   Ht 1.905 m (6' 3\")   Wt 114 kg (250 lb 9.6 oz)   SpO2 96%  Body mass index is 31.32 kg/m².   Physical Exam:  Constitutional: Alert, no distress.  Skin: Warm, dry, good turgor, no rashes in visible areas.  Eye: Equal, round and reactive, conjunctiva clear, lids normal.  ENMT: Lips without lesions, good dentition, oropharynx clear.  Neck: Trachea midline, no masses.   Lymph: No cervical or supraclavicular lymphadenopathy  Respiratory: Unlabored respiratory effort, lungs appear clear, no wheezes.  Cardiovascular: Regular rate and rhythm.  Psych: Alert and oriented x3, normal affect and mood.        Assessment and Plan:   The following treatment plan was discussed    1. Type 2 diabetes mellitus with diabetic neuropathy, without long-term current use of insulin (HCC)  Chronic condition.  Likely stable.  Ordered labs to be performed in early May.  Fast 8 hours.  He may use [facility].  - Lipid Profile; Future  - HEMOGLOBIN A1C; Future  - CBC WITH DIFFERENTIAL; Future  - TSH WITH REFLEX TO FT4; Future    2. Abdominal aortic aneurysm (AAA) 3.0 cm to 5.5 cm in diameter in male (HCC)  Chronic condition.  Status post EVAR.  Stable.  Continue to follow with cardiology.    3. Screening PSA (prostate specific antigen)  PSA ordered.  Screening.  - PROSTATE SPECIFIC AG SCREENING; Future    4. Colon cancer screening  Cologuard ordered.  Last Cologuard was almost 3 years ago.  Negative.  Discussed testing.  - COLOGUARD (FIT DNA)         Followup: Return in about 6 months (around 8/2/2022).    Please note that this dictation was created using voice recognition software. I have made every reasonable attempt to correct obvious errors, but I expect that " there are errors of grammar and possibly content that I did not discover before finalizing the note.

## 2022-02-02 NOTE — ASSESSMENT & PLAN NOTE
Chronic condition.  Status post EVAR.  He had an ultrasound performed last May ordered by cardiology.  It showed no change in the aneurysm.

## 2022-02-02 NOTE — ASSESSMENT & PLAN NOTE
This is a pleasant 71-year-old male here today to follow-up on his health.  Overall doing quite well.  No new complaints today.  Has been unable to golf because his been quite cold and he golfs with a lorenza who is older than he.  No renewals of medication needed.  He is due for labs for his diabetes measures in the next few months.

## 2022-08-02 ENCOUNTER — OFFICE VISIT (OUTPATIENT)
Dept: MEDICAL GROUP | Facility: MEDICAL CENTER | Age: 72
End: 2022-08-02
Payer: COMMERCIAL

## 2022-08-02 VITALS
DIASTOLIC BLOOD PRESSURE: 60 MMHG | HEART RATE: 61 BPM | WEIGHT: 253.97 LBS | OXYGEN SATURATION: 97 % | SYSTOLIC BLOOD PRESSURE: 122 MMHG | HEIGHT: 75 IN | TEMPERATURE: 97.3 F | BODY MASS INDEX: 31.58 KG/M2

## 2022-08-02 DIAGNOSIS — M54.50 ACUTE RIGHT-SIDED LOW BACK PAIN WITHOUT SCIATICA: ICD-10-CM

## 2022-08-02 DIAGNOSIS — F32.9 REACTIVE DEPRESSION: ICD-10-CM

## 2022-08-02 DIAGNOSIS — Z12.5 SCREENING PSA (PROSTATE SPECIFIC ANTIGEN): ICD-10-CM

## 2022-08-02 DIAGNOSIS — E11.40 TYPE 2 DIABETES MELLITUS WITH DIABETIC NEUROPATHY, WITHOUT LONG-TERM CURRENT USE OF INSULIN (HCC): ICD-10-CM

## 2022-08-02 DIAGNOSIS — Z00.00 PREVENTATIVE HEALTH CARE: ICD-10-CM

## 2022-08-02 PROCEDURE — 99214 OFFICE O/P EST MOD 30 MIN: CPT | Performed by: PHYSICIAN ASSISTANT

## 2022-08-02 NOTE — ASSESSMENT & PLAN NOTE
Chronic condition.  He does not need any renewals of medications today.  He did not perform labs that I ordered in February.  He has an appointment in a couple weeks with cardiology in [place].

## 2022-08-02 NOTE — ASSESSMENT & PLAN NOTE
Still dealing with mild depression.  Recently saw his daughter and that helped out of spirits.  He is doing well.

## 2022-08-02 NOTE — PROGRESS NOTES
Subjective:   Kun Lisa is a 72 y.o. male here today for acute right-sided low back pain, diabetes and depression.    Acute right-sided low back pain without sciatica  This is a pleasant 72-year-old male who is here today to follow-up on his health.  About 3 weeks ago he was walking out to his mailbox.  Was not paying attention and stepped off the curb and landed on his right knee.  He developed pain in his low back.  Currently the back pain has improved and he feels like there is a fatigue in the low back area.  Denies any sciatica.  Has a chronic history of low back pain but that had resolved with lots of therapy.  He expect the symptoms to gradually improve.    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  He does not need any renewals of medications today.  He did not perform labs that I ordered in February.  He has an appointment in a couple weeks with cardiology in [place].    Depression  Still dealing with mild depression.  Recently saw his daughter and that helped out of spirits.  He is doing well.       Current medicines (including changes today)  Current Outpatient Medications   Medication Sig Dispense Refill   • JARDIANCE 25 MG Tab TAKE 1 TABLET DAILY 90 Tablet 2   • apixaban (ELIQUIS) 5mg Tab Take 1 Tablet by mouth 2 times a day. 180 Tablet 7   • metoprolol SR (TOPROL XL) 25 MG TABLET SR 24 HR Take 1 Tablet by mouth every day. 90 Tablet 7   • rosuvastatin (CRESTOR) 40 MG tablet Take 1 Tablet by mouth every day. 90 Tablet 3   • ramipril (ALTACE) 10 MG capsule TAKE 1 CAPSULE BY MOUTH EVERY DAY 90 Capsule 3   • JANUVIA 100 MG Tab TAKE 1 TABLET DAILY 90 Tablet 3   • baclofen (LIORESAL) 20 MG tablet TAKE 1 TAB BY MOUTH AS NEEDED. INDICATIONS: MUSCLE SPASTICITY 270 tablet 1     No current facility-administered medications for this visit.     He  has a past medical history of AAA (abdominal aortic aneurysm) ([facility]), Acid reflux, Arrhythmia, CAD (coronary artery  "disease) (1997/2017), Chronic anticoagulation, Diabetes (HCC), Diabetic neuropathy (HCC), Hyperlipidemia, Hypertension, Obstructive sleep apnea, Palpitations, Past heart attack (1997), Persistent atrial fibrillation (HCC) (09/2021), Psychiatric problem, Sleep apnea, and Snoring.    Social History and Family History were reviewed and updated.    ROS   No chest pain, no shortness of breath, no abdominal pain and all other systems were reviewed and are negative.       Objective:     /60 (BP Location: Left arm, Patient Position: Sitting, BP Cuff Size: Adult)   Pulse 61   Temp 36.3 °C (97.3 °F) (Temporal)   Ht 1.905 m (6' 3\")   Wt 115 kg (253 lb 15.5 oz)   SpO2 97%  Body mass index is 31.74 kg/m².   Physical Exam:  Constitutional: Alert, no distress.  Skin: Warm, dry, good turgor, no rashes in visible areas.  Eye: Equal, round and reactive, conjunctiva clear, lids normal.  ENMT: Lips without lesions, good dentition, oropharynx clear.  Neck: Trachea midline, no masses.   Lymph: No cervical or supraclavicular lymphadenopathy  Respiratory: Unlabored respiratory effort, lungs appear clear, no wheezes.  Psych: Alert and oriented x3, normal affect and mood.        Assessment and Plan:   The following treatment plan was discussed    1. Acute right-sided low back pain without sciatica  Acute, new onset condition.  Stable.  Symptoms are improving.  Offered referral to PT but he declined.  He will continue to monitor his symptoms.  Advised to contact me through [email or web address] if his symptoms are not improving.    2. Type 2 diabetes mellitus with diabetic neuropathy, without long-term current use of insulin ([facility])  Chronic condition.  Status unknown.  We ordered labs.  Continue medications as directed.  Fast 8 hours for labs.  - Comp Metabolic Panel; Future  - Hemoglobin A1c; Future  - Lipid Profile; Future    3. Reactive depression  Chronic condition.  Stable.  We will continue to monitor.  Did offer referral to behavioral " health but he declined.  May contact me through [email or web address] to request any services.    4. Screening PSA (prostate specific antigen)  PSA ordered.  Screening.  - PROSTATE SPECIFIC AG SCREENING; Future    5. Preventative health care  Ordered labs.  Fast 8 hours.  He will be contacted with the results.  - Comp Metabolic Panel; Future  - Hemoglobin A1c; Future  - Lipid Profile; Future  - TSH WITH REFLEX TO FT4; Future  - CBC WITH DIFFERENTIAL; Future         Followup: Return in about 6 months (around 2/2/2023), or if symptoms worsen or fail to improve.    Please note that this dictation was created using voice recognition software. I have made every reasonable attempt to correct obvious errors, but I expect that there are errors of grammar and possibly content that I did not discover before finalizing the note.

## 2022-08-02 NOTE — ASSESSMENT & PLAN NOTE
This is a pleasant 72-year-old male who is here today to follow-up on his health.  About 3 weeks ago he was walking out to his mailbox.  Was not paying attention and stepped off the curb and landed on his right knee.  He developed pain in his low back.  Currently the back pain has improved and he feels like there is a fatigue in the low back area.  Denies any sciatica.  Has a chronic history of low back pain but that had resolved with lots of therapy.  He expect the symptoms to gradually improve.

## 2022-08-24 ENCOUNTER — OFFICE VISIT (OUTPATIENT)
Dept: CARDIOLOGY | Facility: PHYSICIAN GROUP | Age: 72
End: 2022-08-24
Payer: COMMERCIAL

## 2022-08-24 VITALS
DIASTOLIC BLOOD PRESSURE: 64 MMHG | SYSTOLIC BLOOD PRESSURE: 130 MMHG | RESPIRATION RATE: 16 BRPM | BODY MASS INDEX: 32.35 KG/M2 | OXYGEN SATURATION: 93 % | WEIGHT: 260.14 LBS | HEIGHT: 75 IN | HEART RATE: 60 BPM

## 2022-08-24 DIAGNOSIS — I10 ESSENTIAL HYPERTENSION: ICD-10-CM

## 2022-08-24 DIAGNOSIS — Z79.01 ANTICOAGULATED: ICD-10-CM

## 2022-08-24 DIAGNOSIS — E11.618 TYPE 2 DIABETES MELLITUS WITH OTHER DIABETIC ARTHROPATHY, WITHOUT LONG-TERM CURRENT USE OF INSULIN (HCC): ICD-10-CM

## 2022-08-24 DIAGNOSIS — I48.19 PERSISTENT ATRIAL FIBRILLATION (HCC): ICD-10-CM

## 2022-08-24 DIAGNOSIS — E11.40 TYPE 2 DIABETES MELLITUS WITH DIABETIC NEUROPATHY, WITHOUT LONG-TERM CURRENT USE OF INSULIN (HCC): ICD-10-CM

## 2022-08-24 DIAGNOSIS — I25.84 CORONARY ARTERY DISEASE DUE TO CALCIFIED CORONARY LESION: ICD-10-CM

## 2022-08-24 DIAGNOSIS — E78.5 DYSLIPIDEMIA: ICD-10-CM

## 2022-08-24 DIAGNOSIS — I25.10 CORONARY ARTERY DISEASE DUE TO CALCIFIED CORONARY LESION: ICD-10-CM

## 2022-08-24 PROCEDURE — 99214 OFFICE O/P EST MOD 30 MIN: CPT | Performed by: NURSE PRACTITIONER

## 2022-08-24 RX ORDER — ROSUVASTATIN CALCIUM 40 MG/1
40 TABLET, COATED ORAL DAILY
Qty: 90 TABLET | Refills: 3 | Status: SHIPPED | OUTPATIENT
Start: 2022-08-24 | End: 2023-05-10 | Stop reason: SDUPTHER

## 2022-08-24 RX ORDER — EMPAGLIFLOZIN 25 MG/1
1 TABLET, FILM COATED ORAL DAILY
Qty: 90 TABLET | Refills: 3 | Status: SHIPPED | OUTPATIENT
Start: 2022-08-24 | End: 2023-02-24

## 2022-08-24 RX ORDER — METOPROLOL SUCCINATE 25 MG/1
25 TABLET, EXTENDED RELEASE ORAL DAILY
Qty: 90 TABLET | Refills: 3 | Status: SHIPPED | OUTPATIENT
Start: 2022-08-24 | End: 2023-03-01

## 2022-08-24 RX ORDER — RAMIPRIL 10 MG/1
10 CAPSULE ORAL
Qty: 90 CAPSULE | Refills: 3 | Status: SHIPPED | OUTPATIENT
Start: 2022-08-24 | End: 2022-09-30

## 2022-08-25 ASSESSMENT — ENCOUNTER SYMPTOMS
BRUISES/BLEEDS EASILY: 0
PALPITATIONS: 0
ORTHOPNEA: 0
INSOMNIA: 0
FEVER: 0
MYALGIAS: 0
NAUSEA: 0
HEADACHES: 0
DIZZINESS: 0
LOSS OF CONSCIOUSNESS: 0
PND: 0
ABDOMINAL PAIN: 0
COUGH: 0
CHILLS: 0
SHORTNESS OF BREATH: 0

## 2022-08-25 NOTE — PROGRESS NOTES
Chief Complaint   Patient presents with    Follow-Up    Coronary Artery Disease    Atrial Fibrillation    Anticoagulation    HTN (Controlled)    Hyperlipidemia       Subjective     Kun Lisa is a 72 y.o. male who presents today for six month follow-up of CAD, persistent atrial fibrillation, anticoagulation, HTN, and hyperlipidemia.    Kun is a 72 year old male with history of CAD, status post remote MI in 1997 with stents in the LAD and LCx, and PCI/JOE to the LCx in April, 2017, persistent atrial fibrillation, anticoagulation, hypertension, hyperlipidemia, DM, and AAA, normally followed by Dr. Baca, and last seen in January 2022. At that time, Metoprolol was lowered from 50mg to 25mg, given low BP.     He is here today for six month follow-up. Generally, he is doing fine. No chest pain, pressure or discomfort; very mild shortness of breath, but unchanged from previous; no orthopnea or PND; no dizziness or syncope; very mild, stable LE edema. BP is stable. He does use a CPAP.    Past Medical History:   Diagnosis Date    AAA (abdominal aortic aneurysm) ([facility])     Followed by Dr. Pepper    Acid reflux     Arrhythmia     CAD (coronary artery disease) 1997/2017 1997: MI, stents to LAD and LCx. April 2017: PCI/JOE (Synergy 2.5 x 16mm) the distal LCx.    Chronic anticoagulation     Diabetes ([facility])     Oral meds    Diabetic neuropathy ([facility])     Bilateral feet    Hyperlipidemia     Hypertension     Obstructive sleep apnea     CPAP    Palpitations     Past heart attack 1997    Persistent atrial fibrillation ([facility]) 09/2021    Echocardiogram with normal LV size, LVEF 65%. Normal RV. Enlarged RA and mildly dilated LA. Trace MR. Mild TR. RVSP 25mmHg.    Psychiatric problem     depression    Sleep apnea     CPAP    Snoring      Past Surgical History:   Procedure Laterality Date    CATARACT EXTRACTION WITH IOL Bilateral 2021    ZZZ CARDIAC CATH  4/27/17    Synergy stent to Circ    STENT PLACEMENT  1997    LAD    ZZZ  CARDIAC CATH      LAD stent    ANGIOPLASTY       Family History   Problem Relation Age of Onset    Stroke Father 57         at 62 of second stroke    Arthritis Mother     Stroke Mother     Alzheimer's Disease Mother      Social History     Socioeconomic History    Marital status:      Spouse name: Not on file    Number of children: Not on file    Years of education: Not on file    Highest education level: Not on file   Occupational History    Not on file   Tobacco Use    Smoking status: Former     Packs/day: 1.00     Years: 32.00     Pack years: 32.00     Types: Cigarettes     Quit date: 2003     Years since quittin.2    Smokeless tobacco: Never   Vaping Use    Vaping Use: Some days   Substance and Sexual Activity    Alcohol use: Not Currently     Alcohol/week: 0.0 oz    Drug use: Yes     Frequency: 7.0 times per week     Types: Marijuana, Inhaled     Comment: medical marijuana. nightly    Sexual activity: Not on file   Other Topics Concern    Not on file   Social History Narrative    Not on file     Social Determinants of Health     Financial Resource Strain: Not on file   Food Insecurity: Not on file   Transportation Needs: Not on file   Physical Activity: Not on file   Stress: Not on file   Social Connections: Not on file   Intimate Partner Violence: Not on file   Housing Stability: Not on file     No Known Allergies  Outpatient Encounter Medications as of 2022   Medication Sig Dispense Refill    apixaban (ELIQUIS) 5mg Tab Take 1 Tablet by mouth 2 times a day. 180 Tablet 3    rosuvastatin (CRESTOR) 40 MG tablet Take 1 Tablet by mouth every day. 90 Tablet 3    metoprolol SR (TOPROL XL) 25 MG TABLET SR 24 HR Take 1 Tablet by mouth every day. 90 Tablet 3    SITagliptin (JANUVIA) 100 MG Tab Take 1 Tablet by mouth every day. 90 Tablet 3    Empagliflozin (JARDIANCE) 25 MG Tab Take 1 Tablet by mouth every day. 90 Tablet 3    ramipril (ALTACE) 10 MG capsule Take 1 Capsule by mouth every day.  "90 Capsule 3    baclofen (LIORESAL) 20 MG tablet TAKE 1 TAB BY MOUTH AS NEEDED. INDICATIONS: MUSCLE SPASTICITY 270 tablet 1    [DISCONTINUED] JARDIANCE 25 MG Tab TAKE 1 TABLET DAILY 90 Tablet 2    [DISCONTINUED] apixaban (ELIQUIS) 5mg Tab Take 1 Tablet by mouth 2 times a day. 180 Tablet 7    [DISCONTINUED] metoprolol SR (TOPROL XL) 25 MG TABLET SR 24 HR Take 1 Tablet by mouth every day. 90 Tablet 7    [DISCONTINUED] rosuvastatin (CRESTOR) 40 MG tablet Take 1 Tablet by mouth every day. 90 Tablet 3    [DISCONTINUED] ramipril (ALTACE) 10 MG capsule TAKE 1 CAPSULE BY MOUTH EVERY DAY 90 Capsule 3    [DISCONTINUED] JANUVIA 100 MG Tab TAKE 1 TABLET DAILY 90 Tablet 3     No facility-administered encounter medications on file as of 8/24/2022.     Review of Systems   Constitutional:  Negative for chills and fever.   HENT:  Negative for congestion.    Respiratory:  Negative for cough and shortness of breath.    Cardiovascular:  Negative for chest pain, palpitations, orthopnea, leg swelling and PND.   Gastrointestinal:  Negative for abdominal pain and nausea.   Musculoskeletal:  Positive for joint pain. Negative for myalgias.   Skin:  Negative for rash.   Neurological:  Negative for dizziness, loss of consciousness and headaches.   Endo/Heme/Allergies:  Does not bruise/bleed easily.   Psychiatric/Behavioral:  The patient does not have insomnia.             Objective     /64 (BP Location: Left arm, Patient Position: Sitting, BP Cuff Size: Adult)   Pulse 60   Resp 16   Ht 1.905 m (6' 3\")   Wt 118 kg (260 lb 2.3 oz)   SpO2 93%   BMI 32.52 kg/m²     Physical Exam  Constitutional:       Appearance: He is well-developed.   HENT:      Head: Normocephalic.   Neck:      Vascular: No JVD.   Cardiovascular:      Rate and Rhythm: Normal rate. Rhythm irregular.      Heart sounds: Normal heart sounds.      Comments: HR controlled  Pulmonary:      Effort: Pulmonary effort is normal. No respiratory distress.      Breath sounds: " Normal breath sounds. No wheezing or rales.   Abdominal:      General: Bowel sounds are normal. There is no distension.      Palpations: Abdomen is soft.      Tenderness: There is no abdominal tenderness.   Musculoskeletal:         General: Normal range of motion.      Cervical back: Normal range of motion and neck supple.      Right lower leg: Edema present.      Left lower leg: Edema present.      Comments: 1+ LE edema to the ankles bilaterally, R>L   Skin:     General: Skin is warm and dry.      Findings: No rash.   Neurological:      Mental Status: He is alert and oriented to person, place, and time.     CONCLUSIONS OF ECHOCARDIOGRAM OF 9/22/2021:  The left ventricular ejection fraction is visually estimated to be 65%.  Mildly dilated left atrium.  Estimated right ventricular systolic pressure is 25 mmHg.  Compared to prior echo 03/03/17, no significant change, LVH not appreciated.    FINDINGS OF AORTA/ILIAC US OF 5/11/2021:  Widening of the infrarenal aorta to a maximum diameter of 5.3 cm.   Intraluminal thrombus noted along the proximal wall of the aneurysm.   No elevated velocities in the celiac and superior mesenteric arteries, though  the origins are not visualized due to limitations.   Waveforms and velocities of the bilateral iliac arteries are normal. Outflow    Doppler waveforms are triphasic bilaterally.   Ectasia noted of the proximal right common iliac artery.   Common iliac diameters (cm):     Right: Proximal- 1.8, Mid- 1.6, Distal- 1.4.   Left: Proximal-1.6, Distal-1.1.   External iliac diameters (cm):   Right: Proximal- 1.3, Mid- 1.3, Distal- 0.96.       Left: Proximal- 0.98, Mid- 1.3, Distal- 1.0.     POSTPROCEDURE DIAGNOSES OF [facility] OF 4/27/20217:  1.  Coronary artery disease including patent stent in the mid left anterior   descending artery with nonobstructive in-stent restenosis, high-grade distal   circumflex artery of a codominant system supplying excellent collaterals to   mid to distal  right coronary artery with long chronic total occlusion of the   ostial to mid right coronary artery.  2.  Successful percutaneous transluminal coronary angioplasty/stent placement   of the distal circumflex artery with 2.5x16 mm Synergy drug-eluting stent.      No recent labs. He has been given labs to do by PCP.      Assessment & Plan     1. Coronary artery disease due to calcified coronary lesion: LAD stent in 1997  ramipril (ALTACE) 10 MG capsule      2. Persistent atrial fibrillation (HCC)  apixaban (ELIQUIS) 5mg Tab      3. Anticoagulated  apixaban (ELIQUIS) 5mg Tab      4. Dyslipidemia  rosuvastatin (CRESTOR) 40 MG tablet    ramipril (ALTACE) 10 MG capsule      5. Type 2 diabetes mellitus without complication, without long-term current use of insulin ([facility])  SITagliptin (JANUVIA) 100 MG Tab      6. Essential hypertension  ramipril (ALTACE) 10 MG capsule      7. Type 2 diabetes mellitus with diabetic neuropathy, without long-term current use of insulin ([facility])            Medical Decision Making: Today's Assessment/Status/Plan:      1. CAD, status post remote MI in 1997 with stents x 2, and PCI/JOE to the LCx in 2017. No angina or shortness of breath, on Eliquis, BB, ACEI and statin.    2. Persistent atrial fibrillation, rate controlled with Toprol XL, asymptomatic. He does have some mild LE edema; if it worsens, consider adding Lasix/KCl. For now, elevated legs and suggested compression stockings.    3. Chronic anticoagulation with Eliquis, no bleeding problems.    4. Hypertension, treated with BB and ACEI, stable.    5. Hyperlipidemia, treated with statin. He has been given labs to by by PCP.    6. Diabetes mellitus, treated, followed by PCP.     Same medications for now. Labs per PCP. Follow-up in 6 months, sooner if clinical condition changes.

## 2022-09-07 DIAGNOSIS — N18.31 STAGE 3A CHRONIC KIDNEY DISEASE: ICD-10-CM

## 2022-09-07 LAB
ALBUMIN SERPL-MCNC: 4.3 G/DL (ref 3.7–4.7)
ALBUMIN/GLOB SERPL: 2 {RATIO} (ref 1.2–2.2)
ALP SERPL-CCNC: 55 IU/L (ref 44–121)
ALT SERPL-CCNC: 14 IU/L (ref 0–44)
AST SERPL-CCNC: 14 IU/L (ref 0–40)
BASOPHILS # BLD AUTO: 0.1 X10E3/UL (ref 0–0.2)
BASOPHILS NFR BLD AUTO: 1 %
BILIRUB SERPL-MCNC: 0.6 MG/DL (ref 0–1.2)
BUN SERPL-MCNC: 22 MG/DL (ref 8–27)
BUN/CREAT SERPL: 16 (ref 10–24)
CALCIUM SERPL-MCNC: 9 MG/DL (ref 8.6–10.2)
CHLORIDE SERPL-SCNC: 105 MMOL/L (ref 96–106)
CHOLEST SERPL-MCNC: 145 MG/DL (ref 100–199)
CO2 SERPL-SCNC: 21 MMOL/L (ref 20–29)
CREAT SERPL-MCNC: 1.4 MG/DL (ref 0.76–1.27)
EGFRCR-CYS SERPLBLD CKD-EPI 2021: 54 ML/MIN/1.73
EOSINOPHIL # BLD AUTO: 0.1 X10E3/UL (ref 0–0.4)
EOSINOPHIL NFR BLD AUTO: 2 %
ERYTHROCYTE [DISTWIDTH] IN BLOOD BY AUTOMATED COUNT: 14 % (ref 11.6–15.4)
GLOBULIN SER CALC-MCNC: 2.2 G/DL (ref 1.5–4.5)
GLUCOSE SERPL-MCNC: 126 MG/DL (ref 65–99)
HBA1C MFR BLD: 6.4 % (ref 4.8–5.6)
HCT VFR BLD AUTO: 51.2 % (ref 37.5–51)
HDLC SERPL-MCNC: 49 MG/DL
HGB BLD-MCNC: 17.5 G/DL (ref 13–17.7)
IMM GRANULOCYTES # BLD AUTO: 0 X10E3/UL (ref 0–0.1)
IMM GRANULOCYTES NFR BLD AUTO: 0 %
IMMATURE CELLS  115398: ABNORMAL
LABORATORY COMMENT REPORT: NORMAL
LDLC SERPL CALC-MCNC: 76 MG/DL (ref 0–99)
LYMPHOCYTES # BLD AUTO: 2.1 X10E3/UL (ref 0.7–3.1)
LYMPHOCYTES NFR BLD AUTO: 31 %
MCH RBC QN AUTO: 32.9 PG (ref 26.6–33)
MCHC RBC AUTO-ENTMCNC: 34.2 G/DL (ref 31.5–35.7)
MCV RBC AUTO: 96 FL (ref 79–97)
MONOCYTES # BLD AUTO: 0.6 X10E3/UL (ref 0.1–0.9)
MONOCYTES NFR BLD AUTO: 9 %
MORPHOLOGY BLD-IMP: ABNORMAL
NEUTROPHILS # BLD AUTO: 3.9 X10E3/UL (ref 1.4–7)
NEUTROPHILS NFR BLD AUTO: 57 %
NRBC BLD AUTO-RTO: ABNORMAL %
PLATELET # BLD AUTO: 155 X10E3/UL (ref 150–450)
POTASSIUM SERPL-SCNC: 4.8 MMOL/L (ref 3.5–5.2)
PROT SERPL-MCNC: 6.5 G/DL (ref 6–8.5)
PSA SERPL-MCNC: 0.8 NG/ML (ref 0–4)
RBC # BLD AUTO: 5.32 X10E6/UL (ref 4.14–5.8)
SODIUM SERPL-SCNC: 138 MMOL/L (ref 134–144)
TRIGL SERPL-MCNC: 110 MG/DL (ref 0–149)
TSH SERPL DL<=0.005 MIU/L-ACNC: 2.34 UIU/ML (ref 0.45–4.5)
VLDLC SERPL CALC-MCNC: 20 MG/DL (ref 5–40)
WBC # BLD AUTO: 6.8 X10E3/UL (ref 3.4–10.8)

## 2022-09-30 DIAGNOSIS — I25.84 CORONARY ARTERY DISEASE DUE TO CALCIFIED CORONARY LESION: ICD-10-CM

## 2022-09-30 DIAGNOSIS — I25.10 CORONARY ARTERY DISEASE DUE TO CALCIFIED CORONARY LESION: ICD-10-CM

## 2022-09-30 DIAGNOSIS — E78.5 DYSLIPIDEMIA: ICD-10-CM

## 2022-09-30 DIAGNOSIS — I10 ESSENTIAL HYPERTENSION: ICD-10-CM

## 2022-09-30 RX ORDER — RAMIPRIL 10 MG/1
10 CAPSULE ORAL
Qty: 90 CAPSULE | Refills: 3 | Status: SHIPPED | OUTPATIENT
Start: 2022-09-30 | End: 2023-05-10 | Stop reason: SDUPTHER

## 2022-11-08 ENCOUNTER — PATIENT MESSAGE (OUTPATIENT)
Dept: HEALTH INFORMATION MANAGEMENT | Facility: OTHER | Age: 72
End: 2022-11-08

## 2022-11-15 LAB
BASOPHILS # BLD AUTO: 0.1 X10E3/UL (ref 0–0.2)
BASOPHILS NFR BLD AUTO: 1 %
CHOLEST SERPL-MCNC: 150 MG/DL (ref 100–199)
EOSINOPHIL # BLD AUTO: 0 X10E3/UL (ref 0–0.4)
EOSINOPHIL NFR BLD AUTO: 0 %
ERYTHROCYTE [DISTWIDTH] IN BLOOD BY AUTOMATED COUNT: 14.6 % (ref 11.6–15.4)
HBA1C MFR BLD: 6.3 % (ref 4.8–5.6)
HCT VFR BLD AUTO: 54.7 % (ref 37.5–51)
HDLC SERPL-MCNC: 45 MG/DL
HGB BLD-MCNC: 18.7 G/DL (ref 13–17.7)
IMM GRANULOCYTES # BLD AUTO: 0 X10E3/UL (ref 0–0.1)
IMM GRANULOCYTES NFR BLD AUTO: 0 %
IMMATURE CELLS  115398: ABNORMAL
LABORATORY COMMENT REPORT: NORMAL
LDLC SERPL CALC-MCNC: 82 MG/DL (ref 0–99)
LYMPHOCYTES # BLD AUTO: 2.1 X10E3/UL (ref 0.7–3.1)
LYMPHOCYTES NFR BLD AUTO: 31 %
MCH RBC QN AUTO: 32.7 PG (ref 26.6–33)
MCHC RBC AUTO-ENTMCNC: 34.2 G/DL (ref 31.5–35.7)
MCV RBC AUTO: 96 FL (ref 79–97)
MONOCYTES # BLD AUTO: 0.7 X10E3/UL (ref 0.1–0.9)
MONOCYTES NFR BLD AUTO: 9 %
MORPHOLOGY BLD-IMP: ABNORMAL
NEUTROPHILS # BLD AUTO: 4 X10E3/UL (ref 1.4–7)
NEUTROPHILS NFR BLD AUTO: 59 %
NRBC BLD AUTO-RTO: ABNORMAL %
PLATELET # BLD AUTO: 149 X10E3/UL (ref 150–450)
PSA SERPL-MCNC: 0.8 NG/ML (ref 0–4)
RBC # BLD AUTO: 5.71 X10E6/UL (ref 4.14–5.8)
TRIGL SERPL-MCNC: 130 MG/DL (ref 0–149)
TSH SERPL DL<=0.005 MIU/L-ACNC: 2.02 UIU/ML (ref 0.45–4.5)
VLDLC SERPL CALC-MCNC: 23 MG/DL (ref 5–40)
WBC # BLD AUTO: 6.9 X10E3/UL (ref 3.4–10.8)

## 2023-02-03 ENCOUNTER — OFFICE VISIT (OUTPATIENT)
Dept: MEDICAL GROUP | Facility: MEDICAL CENTER | Age: 73
End: 2023-02-03
Payer: COMMERCIAL

## 2023-02-03 VITALS
DIASTOLIC BLOOD PRESSURE: 70 MMHG | HEIGHT: 75 IN | TEMPERATURE: 98.1 F | BODY MASS INDEX: 32.02 KG/M2 | WEIGHT: 257.5 LBS | OXYGEN SATURATION: 96 % | SYSTOLIC BLOOD PRESSURE: 122 MMHG | HEART RATE: 79 BPM

## 2023-02-03 DIAGNOSIS — E11.40 TYPE 2 DIABETES MELLITUS WITH DIABETIC NEUROPATHY, WITHOUT LONG-TERM CURRENT USE OF INSULIN (HCC): ICD-10-CM

## 2023-02-03 DIAGNOSIS — M62.830 MUSCLE SPASM OF BACK: ICD-10-CM

## 2023-02-03 PROCEDURE — 99214 OFFICE O/P EST MOD 30 MIN: CPT | Performed by: PHYSICIAN ASSISTANT

## 2023-02-03 RX ORDER — BACLOFEN 20 MG/1
20 TABLET ORAL DAILY
Qty: 90 TABLET | Refills: 1 | Status: SHIPPED | OUTPATIENT
Start: 2023-02-03 | End: 2023-05-04

## 2023-02-03 ASSESSMENT — PATIENT HEALTH QUESTIONNAIRE - PHQ9: CLINICAL INTERPRETATION OF PHQ2 SCORE: 0

## 2023-02-03 ASSESSMENT — FIBROSIS 4 INDEX: FIB4 SCORE: 1.78

## 2023-02-03 NOTE — PROGRESS NOTES
Subjective:   Kun Lisa is a 72 y.o. male here today for diabetes and chronic back spasms.    Type 2 diabetes mellitus with neurologic complication (HCC)  This is a pleasant 72-year-old male here today to follow-up on his health.  Doing much better mentally.  He will be due for labs shortly.  Diabetes does typically well controlled at 6.4 A1c.  Is on Januvia and Jardiance.  He is interested in Ozempic.  Does have chronic back pain which presents as spasming.  He does take baclofen on a regular basis.  It is very effective.  Now dealing with neuropathy in both legs.  Denies any significant pain.       Current medicines (including changes today)  Current Outpatient Medications   Medication Sig Dispense Refill    baclofen (LIORESAL) 20 MG tablet Take 1 Tablet by mouth every day for 90 days. 90 Tablet 1    Semaglutide,0.25 or 0.5MG/DOS, 2 MG/1.5ML Solution Pen-injector Inject 0.25 mg under the skin every 7 days for 30 days. 1.5 mL 0    [START ON 3/5/2023] Semaglutide,0.25 or 0.5MG/DOS, 2 MG/1.5ML Solution Pen-injector Inject 0.5 mg under the skin every 7 days for 30 days. 1.5 mL 2    ramipril (ALTACE) 10 MG capsule TAKE 1 CAPSULE BY MOUTH EVERY DAY 90 Capsule 3    apixaban (ELIQUIS) 5mg Tab Take 1 Tablet by mouth 2 times a day. 180 Tablet 3    rosuvastatin (CRESTOR) 40 MG tablet Take 1 Tablet by mouth every day. 90 Tablet 3    metoprolol SR (TOPROL XL) 25 MG TABLET SR 24 HR Take 1 Tablet by mouth every day. 90 Tablet 3    SITagliptin (JANUVIA) 100 MG Tab Take 1 Tablet by mouth every day. 90 Tablet 3    Empagliflozin (JARDIANCE) 25 MG Tab Take 1 Tablet by mouth every day. 90 Tablet 3     No current facility-administered medications for this visit.     He  has a past medical history of AAA (abdominal aortic aneurysm), Acid reflux, Arrhythmia, CAD (coronary artery disease) (1997/2017), Chronic anticoagulation, Diabetes ([facility]), Diabetic neuropathy ([facility]), Hyperlipidemia, Hypertension, Obstructive sleep apnea,  "Palpitations, Past heart attack (1997), Persistent atrial fibrillation (HCC) (09/2021), Psychiatric problem, Sleep apnea, and Snoring.    Social History and Family History were reviewed and updated.    ROS   No chest pain, no shortness of breath, no abdominal pain and all other systems were reviewed and are negative.       Objective:     /70 (BP Location: Left arm, Patient Position: Sitting, BP Cuff Size: Adult)   Pulse 79   Temp 36.7 °C (98.1 °F) (Temporal)   Ht 1.905 m (6' 3\")   Wt 117 kg (257 lb 8 oz)   SpO2 96%  Body mass index is 32.18 kg/m².   Physical Exam:  Constitutional: Alert, no distress.  Skin: Warm, dry, good turgor, no rashes in visible areas.  Eye: Equal, round and reactive, conjunctiva clear, lids normal.  ENMT: Lips without lesions, good dentition, oropharynx clear.  Neck: Trachea midline, no masses.   Lymph: No cervical or supraclavicular lymphadenopathy  Respiratory: Unlabored respiratory effort, lungs appear clear.  Psych: Alert and oriented x3, normal affect and mood.        Assessment and Plan:   The following treatment plan was discussed    1. Type 2 diabetes mellitus with diabetic neuropathy, without long-term current use of insulin ([facility])  Chronic condition.  Stable.  He does have bilateral neuropathy of his legs.  Would like him better control with his diabetes therefore prescribed Ozempic.  Discussed side effects.  With 0.25 mg weekly.  After 1 month go 2.5 mg weekly.  After 2 months perform labs and contact me through [employer] so we can increase the dose to 1 mg.  - MICROALBUMIN CREAT RATIO URINE; Future  - HEMOGLOBIN A1C; Future  - Comp Metabolic Panel; Future  - Semaglutide,0.25 or 0.5MG/DOS, 2 MG/1.5ML Solution Pen-injector; Inject 0.25 mg under the skin every 7 days for 30 days.  Dispense: 1.5 mL; Refill: 0  - Semaglutide,0.25 or 0.5MG/DOS, 2 MG/1.5ML Solution Pen-injector; Inject 0.5 mg under the skin every 7 days for 30 days.  Dispense: 1.5 mL; Refill: 2  - Referral to " Ophthalmology    2. Muscle spasm of back  Chronic condition.  Stable.  Renew baclofen as directed.  - baclofen (LIORESAL) 20 MG tablet; Take 1 Tablet by mouth every day for 90 days.  Dispense: 90 Tablet; Refill: 1         Followup: Return in about 6 months (around 8/3/2023), or if symptoms worsen or fail to improve.    Please note that this dictation was created using voice recognition software. I have made every reasonable attempt to correct obvious errors, but I expect that there are errors of grammar and possibly content that I did not discover before finalizing the note.

## 2023-02-03 NOTE — ASSESSMENT & PLAN NOTE
This is a pleasant 72-year-old male here today to follow-up on his health.  Doing much better mentally.  He will be due for labs shortly.  Diabetes does typically well controlled at 6.4 A1c.  Is on Januvia and Jardiance.  He is interested in Ozempic.  Does have chronic back pain which presents as spasming.  He does take baclofen on a regular basis.  It is very effective.  Now dealing with neuropathy in both legs.  Denies any significant pain.

## 2023-02-24 RX ORDER — EMPAGLIFLOZIN 25 MG/1
TABLET, FILM COATED ORAL
Qty: 90 TABLET | Refills: 2 | Status: SHIPPED | OUTPATIENT
Start: 2023-02-24 | End: 2024-02-12

## 2023-03-01 ENCOUNTER — TELEPHONE (OUTPATIENT)
Dept: CARDIOLOGY | Facility: MEDICAL CENTER | Age: 73
End: 2023-03-01

## 2023-03-01 ENCOUNTER — OFFICE VISIT (OUTPATIENT)
Dept: CARDIOLOGY | Facility: PHYSICIAN GROUP | Age: 73
End: 2023-03-01
Payer: COMMERCIAL

## 2023-03-01 VITALS
BODY MASS INDEX: 30.98 KG/M2 | SYSTOLIC BLOOD PRESSURE: 124 MMHG | DIASTOLIC BLOOD PRESSURE: 70 MMHG | OXYGEN SATURATION: 95 % | WEIGHT: 249.12 LBS | HEART RATE: 55 BPM | HEIGHT: 75 IN | RESPIRATION RATE: 12 BRPM

## 2023-03-01 DIAGNOSIS — I48.19 PERSISTENT ATRIAL FIBRILLATION (HCC): ICD-10-CM

## 2023-03-01 DIAGNOSIS — E11.40 TYPE 2 DIABETES MELLITUS WITH DIABETIC NEUROPATHY, WITHOUT LONG-TERM CURRENT USE OF INSULIN (HCC): ICD-10-CM

## 2023-03-01 DIAGNOSIS — I10 ESSENTIAL HYPERTENSION: ICD-10-CM

## 2023-03-01 DIAGNOSIS — Z79.01 ANTICOAGULATED: ICD-10-CM

## 2023-03-01 DIAGNOSIS — I25.84 CORONARY ARTERY DISEASE DUE TO CALCIFIED CORONARY LESION: ICD-10-CM

## 2023-03-01 DIAGNOSIS — I71.40 ABDOMINAL AORTIC ANEURYSM (AAA) 3.0 CM TO 5.5 CM IN DIAMETER IN MALE (HCC): ICD-10-CM

## 2023-03-01 DIAGNOSIS — E78.5 DYSLIPIDEMIA: ICD-10-CM

## 2023-03-01 DIAGNOSIS — I25.10 CORONARY ARTERY DISEASE DUE TO CALCIFIED CORONARY LESION: ICD-10-CM

## 2023-03-01 DIAGNOSIS — G47.33 OBSTRUCTIVE SLEEP APNEA SYNDROME: ICD-10-CM

## 2023-03-01 PROCEDURE — 99214 OFFICE O/P EST MOD 30 MIN: CPT | Performed by: NURSE PRACTITIONER

## 2023-03-01 RX ORDER — METOPROLOL SUCCINATE 50 MG/1
50 TABLET, EXTENDED RELEASE ORAL DAILY
Qty: 100 TABLET | Refills: 3
Start: 2023-03-01 | End: 2023-05-10 | Stop reason: SDUPTHER

## 2023-03-01 ASSESSMENT — ENCOUNTER SYMPTOMS
SHORTNESS OF BREATH: 0
INSOMNIA: 0
COUGH: 0
LOSS OF CONSCIOUSNESS: 0
MYALGIAS: 0
FEVER: 0
PALPITATIONS: 0
NAUSEA: 0
ORTHOPNEA: 0
HEADACHES: 0
DIZZINESS: 0
ABDOMINAL PAIN: 0
PND: 0
BRUISES/BLEEDS EASILY: 0
CHILLS: 0

## 2023-03-01 ASSESSMENT — FIBROSIS 4 INDEX: FIB4 SCORE: 1.78

## 2023-03-01 NOTE — PROGRESS NOTES
Chief Complaint   Patient presents with    Follow-Up    Coronary Artery Disease    Atrial Fibrillation    Anticoagulation    HTN (Controlled)    Hyperlipidemia    Diabetes (Controlled)       Subjective     Kun Lisa is a 72 y.o. male who presents today for six month follow-up of CAD, permanent atrial fibrillation, anticoagulation, HTN, hyperlipidemia, DM and history of AAA.    Kun is a 72 year old male with history of CAD, status post remote MI in 1997 with stents in the LAD and LCx, and PCI/JOE to the LCx in April, 2017, persistent atrial fibrillation, anticoagulation, hypertension, hyperlipidemia, DM, and AAA status post EVAR in October 2021 (Dr. Pepper), last seen by me in August 2022.     He is here today for six month follow-up. Generally, he is doing fine. He tries to exercise (walk) daily, but it's been hard with all the snow lately. He denies any chest pain, pressure or discomfort; very mild shortness of breath, but unchanged from previous; no orthopnea or PND; no dizziness or syncope; very mild, stable LE edema. BP is stable. He does use a CPAP. He is compliant with his medications.       Past Medical History:   Diagnosis Date    AAA (abdominal aortic aneurysm)     Followed by Dr. Pepper    Acid reflux     Arrhythmia     CAD (coronary artery disease) 1997/2017 1997: MI, stents to LAD and LCx. April 2017: PCI/JOE (Synergy 2.5 x 16mm) the distal LCx.    Chronic anticoagulation     Diabetes (HCC)     Oral meds    Diabetic neuropathy (HCC)     Bilateral feet    Hyperlipidemia     Hypertension     Obstructive sleep apnea     CPAP    Palpitations     Past heart attack 1997    Persistent atrial fibrillation ([facility]) 09/2021    Echocardiogram with normal LV size, LVEF 65%. Normal RV. Enlarged RA and mildly dilated LA. Trace MR. Mild TR. RVSP 25mmHg.    Psychiatric problem     depression    Sleep apnea     CPAP    Snoring      Past Surgical History:   Procedure Laterality Date    CATARACT EXTRACTION WITH IOL  Bilateral     ZZZ CARDIAC CATH  17    Synergy stent to Circ    STENT PLACEMENT      LAD    ZZZ CARDIAC CATH      LAD stent    ANGIOPLASTY       Family History   Problem Relation Age of Onset    Stroke Father 57         at 62 of second stroke    Arthritis Mother     Stroke Mother     Alzheimer's Disease Mother      Social History     Socioeconomic History    Marital status:      Spouse name: Not on file    Number of children: Not on file    Years of education: Not on file    Highest education level: Not on file   Occupational History    Not on file   Tobacco Use    Smoking status: Former     Packs/day: 1.00     Years: 32.00     Pack years: 32.00     Types: Cigarettes     Quit date: 2003     Years since quittin.7    Smokeless tobacco: Never   Vaping Use    Vaping Use: Some days   Substance and Sexual Activity    Alcohol use: Not Currently     Alcohol/week: 0.0 oz    Drug use: Yes     Frequency: 7.0 times per week     Types: Marijuana, Inhaled     Comment: medical marijuana. nightly    Sexual activity: Not on file   Other Topics Concern    Not on file   Social History Narrative    Not on file     Social Determinants of Health     Financial Resource Strain: Not on file   Food Insecurity: Not on file   Transportation Needs: Not on file   Physical Activity: Not on file   Stress: Not on file   Social Connections: Not on file   Intimate Partner Violence: Not on file   Housing Stability: Not on file     No Known Allergies  Outpatient Encounter Medications as of 3/1/2023   Medication Sig Dispense Refill    JARDIANCE 25 MG Tab TAKE 1 TABLET DAILY 90 Tablet 2    baclofen (LIORESAL) 20 MG tablet Take 1 Tablet by mouth every day for 90 days. 90 Tablet 1    Semaglutide,0.25 or 0.5MG/DOS, 2 MG/1.5ML Solution Pen-injector Inject 0.25 mg under the skin every 7 days for 30 days. 1.5 mL 0    [START ON 3/5/2023] Semaglutide,0.25 or 0.5MG/DOS, 2 MG/1.5ML Solution Pen-injector Inject 0.5 mg under the  "skin every 7 days for 30 days. 1.5 mL 2    ramipril (ALTACE) 10 MG capsule TAKE 1 CAPSULE BY MOUTH EVERY DAY 90 Capsule 3    apixaban (ELIQUIS) 5mg Tab Take 1 Tablet by mouth 2 times a day. 180 Tablet 3    rosuvastatin (CRESTOR) 40 MG tablet Take 1 Tablet by mouth every day. 90 Tablet 3    metoprolol SR (TOPROL XL) 25 MG TABLET SR 24 HR Take 1 Tablet by mouth every day. (Patient taking differently: Take 50 mg by mouth every day.) 90 Tablet 3    SITagliptin (JANUVIA) 100 MG Tab Take 1 Tablet by mouth every day. 90 Tablet 3     No facility-administered encounter medications on file as of 3/1/2023.     Review of Systems   Constitutional:  Negative for chills and fever.   HENT:  Negative for congestion.    Respiratory:  Negative for cough and shortness of breath.    Cardiovascular:  Positive for leg swelling. Negative for chest pain, palpitations, orthopnea and PND.        Mild, stable.   Gastrointestinal:  Negative for abdominal pain and nausea.   Musculoskeletal:  Positive for joint pain. Negative for myalgias.   Skin:  Negative for rash.   Neurological:  Negative for dizziness, loss of consciousness and headaches.   Endo/Heme/Allergies:  Does not bruise/bleed easily.   Psychiatric/Behavioral:  The patient does not have insomnia.             Objective     /70 (BP Location: Left arm, Patient Position: Sitting, BP Cuff Size: Adult)   Pulse (!) 55   Resp 12   Ht 1.905 m (6' 3\")   Wt 113 kg (249 lb 1.9 oz)   SpO2 95%   BMI 31.14 kg/m²     Physical Exam  Constitutional:       Appearance: He is well-developed.      Comments: BMI 31.14 (weight is down 10+ pounds)   HENT:      Head: Normocephalic.   Neck:      Vascular: No JVD.   Cardiovascular:      Rate and Rhythm: Normal rate. Rhythm irregular.      Heart sounds: Normal heart sounds.      Comments: HR controlled  Pulmonary:      Effort: Pulmonary effort is normal. No respiratory distress.      Breath sounds: Normal breath sounds. No wheezing or rales. "   Abdominal:      General: Bowel sounds are normal. There is no distension.      Palpations: Abdomen is soft.      Tenderness: There is no abdominal tenderness.   Musculoskeletal:         General: Normal range of motion.      Cervical back: Normal range of motion and neck supple.      Right lower leg: Edema present.      Left lower leg: Edema present.      Comments: 1+ LE edema to the ankles bilaterally, R>L   Skin:     General: Skin is warm and dry.      Findings: No rash.   Neurological:      Mental Status: He is alert and oriented to person, place, and time.     CONCLUSIONS OF ECHOCARDIOGRAM OF 9/22/2021:  The left ventricular ejection fraction is visually estimated to be 65%.  Mildly dilated left atrium.  Estimated right ventricular systolic pressure is 25 mmHg.  Compared to prior echo 03/03/17, no significant change, LVH not appreciated.    Procedure Details of Operation of 10/20/2021:   1. Open exposure bilateral common femoral arteries  2. Retrograde induction 6 [place] sheaths  3. Aortogram with runoff to iliacs and femorals  4. Deployment of Cook Zenith main body 30 x 96 to the infrarenal aorta   5. Deployment of left iliac extender limb 20 x 74  6. Deployment of right iliac extender limb 20 x 56  7. Post Deployment balloon angioplasty with a Coda balloon  8. Completion Arteriogram     FINDINGS OF AORTA/ILIAC US OF 5/11/2021:  Widening of the infrarenal aorta to a maximum diameter of 5.3 cm.   Intraluminal thrombus noted along the proximal wall of the aneurysm.   No elevated velocities in the celiac and superior mesenteric arteries, though  the origins are not visualized due to limitations.   Waveforms and velocities of the bilateral iliac arteries are normal. Outflow    Doppler waveforms are triphasic bilaterally.   Ectasia noted of the proximal right common iliac artery.   Common iliac diameters (cm):     Right: Proximal- 1.8, Mid- 1.6, Distal- 1.4.   Left: Proximal-1.6, Distal-1.1.   External iliac  diameters (cm):   Right: Proximal- 1.3, Mid- 1.3, Distal- 0.96.       Left: Proximal- 0.98, Mid- 1.3, Distal- 1.0.     POSTPROCEDURE DIAGNOSES OF [facility] OF 4/27/20217:  1.  Coronary artery disease including patent stent in the mid left anterior   descending artery with nonobstructive in-stent restenosis, high-grade distal circumflex artery of a codominant system supplying excellent collaterals to mid to distal right coronary artery with long chronic total occlusion of the ostial to mid right coronary artery.  2.  Successful percutaneous transluminal coronary angioplasty/stent placement of the distal circumflex artery with 2.5x16 mm Synergy drug-eluting stent.    Lab Results   Component Value Date/Time    CHOLSTRLTOT 150 11/14/2022 04:44 AM    CHOLSTRLTOT 158 02/26/2020 09:22 AM    LDL 89 02/26/2020 09:22 AM    HDL 45 11/14/2022 04:44 AM    HDL 40 02/26/2020 09:22 AM    TRIGLYCERIDE 130 11/14/2022 04:44 AM    TRIGLYCERIDE 143 02/26/2020 09:22 AM        Lab Results   Component Value Date/Time    SODIUM 138 09/06/2022 04:32 AM    SODIUM 135 02/26/2020 09:22 AM    POTASSIUM 4.8 09/06/2022 04:32 AM    POTASSIUM 4.7 02/26/2020 09:22 AM    CHLORIDE 105 09/06/2022 04:32 AM    CHLORIDE 101 02/26/2020 09:22 AM    CO2 21 09/06/2022 04:32 AM    CO2 27 02/26/2020 09:22 AM    GLUCOSE 126 (H) 09/06/2022 04:32 AM    GLUCOSE 159 (H) 02/26/2020 09:22 AM    BUN 22 09/06/2022 04:32 AM    BUN 19 02/26/2020 09:22 AM    CREATININE 1.40 (H) 09/06/2022 04:32 AM    CREATININE 1.16 02/26/2020 09:22 AM    BUNCREATRAT 16 09/06/2022 04:32 AM      Lab Results   Component Value Date/Time    ASTSGOT 14 09/06/2022 04:32 AM    ASTSGOT 16 02/26/2020 09:22 AM    ALTSGPT 14 09/06/2022 04:32 AM    ALTSGPT 19 02/26/2020 09:22 AM                 Assessment & Plan     1. Coronary artery disease due to calcified coronary lesion: Stent to distal circumflex, patent LAD stent and  of RCA in April 2017        2. Persistent atrial fibrillation (HCC)        3.  Anticoagulated        4. Essential hypertension        5. Dyslipidemia        6. Type 2 diabetes mellitus with diabetic neuropathy, without long-term current use of insulin (HCC)        7. Obstructive sleep apnea syndrome        8. Abdominal aortic aneurysm (AAA) 3.0 cm to 5.5 cm in diameter in male            Medical Decision Making: Today's Assessment/Status/Plan:      1. CAD, status post remote MI in 1997 with stents x 2, and PCI/JOE to the LCx in 2017. No angina or shortness of breath. Continue:  Eliquis 5mg twice daily  Crestor 40mg once daily  Toprol XL 50mg once daily  Altace 10mg once daily     2. Persistent atrial fibrillation, rate controlled with Toprol XL, asymptomatic. He does have some ongoing mild LE edema. He does not wish to add any additional meds at this time. He does periodically use compression socks.     3. Chronic anticoagulation with Eliquis, no bleeding problems.     4. Hypertension, treated with Toprol XL and Aldactone, stable. BP is well controlled.     5. Hyperlipidemia, treated with Crestor 40mg. Last lipids were good.     6. Diabetes mellitus, treated, followed by PCP, and well controlled.     Same medications for now. Consider repeat echo at next follow-up. Follow-up in 6 months, sooner if clinical condition changes.

## 2023-03-01 NOTE — TELEPHONE ENCOUNTER
AB    Caller: Kun Lisa    Topic/issue: Pt is requesting a call back to go over the information he received today with medication changes. He states one of the medications is on his summary 2x but with different information. Please reach out to discuss.     Callback Number: [phone] (home)      Thank You,    Miranda MIGUEL

## 2023-03-02 NOTE — TELEPHONE ENCOUNTER
Tried to call pt's home number as requested, no answer, unable to leave message. Tried to call cell listed, message stating trouble with I-phone, call home number. Will await call back to discuss med concerns.

## 2023-03-22 ENCOUNTER — OFFICE VISIT (OUTPATIENT)
Dept: URGENT CARE | Facility: CLINIC | Age: 73
End: 2023-03-22
Payer: COMMERCIAL

## 2023-03-22 VITALS
HEART RATE: 84 BPM | OXYGEN SATURATION: 95 % | SYSTOLIC BLOOD PRESSURE: 140 MMHG | RESPIRATION RATE: 16 BRPM | WEIGHT: 244.6 LBS | HEIGHT: 75 IN | TEMPERATURE: 97.1 F | DIASTOLIC BLOOD PRESSURE: 100 MMHG | BODY MASS INDEX: 30.41 KG/M2

## 2023-03-22 DIAGNOSIS — R52 BODY ACHES: ICD-10-CM

## 2023-03-22 LAB
FLUAV RNA SPEC QL NAA+PROBE: NEGATIVE
FLUBV RNA SPEC QL NAA+PROBE: NEGATIVE
RSV RNA SPEC QL NAA+PROBE: NEGATIVE
SARS-COV-2 RNA RESP QL NAA+PROBE: NEGATIVE

## 2023-03-22 PROCEDURE — 0241U POCT CEPHEID COV-2, FLU A/B, RSV - PCR: CPT | Performed by: FAMILY MEDICINE

## 2023-03-22 PROCEDURE — 99213 OFFICE O/P EST LOW 20 MIN: CPT | Mod: CS | Performed by: FAMILY MEDICINE

## 2023-03-22 ASSESSMENT — ENCOUNTER SYMPTOMS
CARDIOVASCULAR NEGATIVE: 1
RESPIRATORY NEGATIVE: 1
GASTROINTESTINAL NEGATIVE: 1

## 2023-03-22 ASSESSMENT — FIBROSIS 4 INDEX: FIB4 SCORE: 1.78

## 2023-03-22 NOTE — PROGRESS NOTES
"Subjective:   Kun Lisa is a 72 y.o. male who presents for Coronavirus Screening (Loss of appetite, fatigue, body aches, depression x 3 days )      HPI    Review of Systems   Constitutional:  Positive for malaise/fatigue.   HENT: Negative.     Respiratory: Negative.     Cardiovascular: Negative.    Gastrointestinal: Negative.    Genitourinary: Negative.      Medications, Allergies, and current problem list reviewed today in Epic.     Objective:     BP (!) 140/100 (BP Location: Right arm, Patient Position: Sitting, BP Cuff Size: Adult)   Pulse 84   Temp 36.2 °C (97.1 °F) (Temporal)   Resp 16   Ht 1.905 m (6' 3\")   Wt 111 kg (244 lb 9.6 oz)   SpO2 95%     Physical Exam  Vitals and nursing note reviewed.   HENT:      Head: Normocephalic and atraumatic.      Right Ear: Tympanic membrane normal.      Left Ear: Tympanic membrane normal.      Nose: Nose normal.      Mouth/Throat:      Pharynx: Oropharynx is clear.   Cardiovascular:      Rate and Rhythm: Normal rate and regular rhythm.      Pulses: Normal pulses.      Heart sounds: Normal heart sounds.   Pulmonary:      Effort: Pulmonary effort is normal.      Breath sounds: Normal breath sounds.   Abdominal:      General: Abdomen is flat. Bowel sounds are normal.      Palpations: Abdomen is soft.       Assessment/Plan:     Diagnosis and associated orders:     1. Body aches  POCT CoV-2, Flu A/B, RSV by PCR         Comments/MDM:              Differential diagnosis, natural history, supportive care, and indications for immediate follow-up discussed.    Advised the patient to follow-up with the primary care physician for recheck, reevaluation, and consideration of further management.    Please note that this dictation was created using voice recognition software. I have made a reasonable attempt to correct obvious errors, but I expect that there are errors of grammar and possibly content that I did not discover before finalizing the note.    This note was " electronically signed by Jero Spear M.D.

## 2023-03-28 ENCOUNTER — OFFICE VISIT (OUTPATIENT)
Dept: URGENT CARE | Facility: CLINIC | Age: 73
End: 2023-03-28
Payer: COMMERCIAL

## 2023-03-28 VITALS
SYSTOLIC BLOOD PRESSURE: 134 MMHG | HEIGHT: 75 IN | DIASTOLIC BLOOD PRESSURE: 90 MMHG | RESPIRATION RATE: 14 BRPM | WEIGHT: 238 LBS | TEMPERATURE: 97.7 F | HEART RATE: 85 BPM | OXYGEN SATURATION: 95 % | BODY MASS INDEX: 29.59 KG/M2

## 2023-03-28 DIAGNOSIS — G24.3 CERVICAL DYSTONIA: ICD-10-CM

## 2023-03-28 PROCEDURE — 99213 OFFICE O/P EST LOW 20 MIN: CPT | Performed by: PHYSICIAN ASSISTANT

## 2023-03-28 ASSESSMENT — ENCOUNTER SYMPTOMS
COUGH: 0
CHILLS: 0
DIARRHEA: 0
HEADACHES: 0
ABDOMINAL PAIN: 0
FEVER: 0
EYE PAIN: 0
MYALGIAS: 0
SHORTNESS OF BREATH: 0
VOMITING: 0
CONSTIPATION: 0
SORE THROAT: 0
NECK PAIN: 1
NAUSEA: 0

## 2023-03-28 ASSESSMENT — FIBROSIS 4 INDEX: FIB4 SCORE: 1.78

## 2023-03-28 NOTE — PROGRESS NOTES
"Subjective:   Kun Lisa is a 72 y.o. male who presents for Other (States having an allergic reaction to ozempic took his second dose this week and starting yesterday he couldn't move his head side to side, he also states feeling like tightness on the back of his neck . )      Is a pleasant 72-year-old male who took his second dose of Ozempic 3 days ago, the following morning he woke up with some right-sided neck spasm and stiffness that progressed into left-sided and right-sided neck spasm and stiffness.  Not noted any fevers or chills, numbness or tingling in his hands.  Has tried ice and heat that seem to help a little bit along with anti-inflammatories.  No known injury or trauma, no recent overuse    Review of Systems   Constitutional:  Negative for chills and fever.   HENT:  Negative for congestion, ear pain and sore throat.    Eyes:  Negative for pain.   Respiratory:  Negative for cough and shortness of breath.    Cardiovascular:  Negative for chest pain.   Gastrointestinal:  Negative for abdominal pain, constipation, diarrhea, nausea and vomiting.   Genitourinary:  Negative for dysuria.   Musculoskeletal:  Positive for neck pain. Negative for myalgias.   Skin:  Negative for rash.   Neurological:  Negative for headaches.     Medications, Allergies, and current problem list reviewed today in Epic.     Objective:     BP (!) 134/90   Pulse 85   Temp 36.5 °C (97.7 °F) (Temporal)   Resp 14   Ht 1.905 m (6' 3\")   Wt 108 kg (238 lb)   SpO2 95%     Physical Exam  Vitals reviewed.   Constitutional:       Appearance: Normal appearance.   HENT:      Head: Normocephalic and atraumatic.      Right Ear: External ear normal.      Left Ear: External ear normal.      Nose: Nose normal.      Mouth/Throat:      Mouth: Mucous membranes are moist.   Eyes:      Conjunctiva/sclera: Conjunctivae normal.   Neck:      Comments: Bilateral paraspinal and trapezius as well as deep muscle spasm without tenderness.  Around 45 " degrees of rotation, chin to chest intact but upward gaze painful  Cardiovascular:      Rate and Rhythm: Normal rate.   Pulmonary:      Effort: Pulmonary effort is normal.   Skin:     General: Skin is warm and dry.      Capillary Refill: Capillary refill takes less than 2 seconds.   Neurological:      Mental Status: He is alert and oriented to person, place, and time.       Assessment/Plan:     Diagnosis and associated orders:     1. Cervical dystonia           Comments/MDM:     Suspected unrelated musculoskeletal spasm from sleeping in an awkward angle or similar relatively mild etiology.  I reviewed adverse effects and can see no connection between his symptoms and the Ozempic.  Reviewed with him the likely 2 or 3-week timeframe for slow resolution of the neck discomfort and recommend red flags that would warrant repeat evaluation.         Differential diagnosis, natural history, supportive care, and indications for immediate follow-up discussed.    Advised the patient to follow-up with the primary care physician for recheck, reevaluation, and consideration of further management.    Please note that this dictation was created using voice recognition software. I have made a reasonable attempt to correct obvious errors, but I expect that there are errors of grammar and possibly content that I did not discover before finalizing the note.    This note was electronically signed by Kavon Greer PA-C

## 2023-04-05 LAB
ALBUMIN SERPL-MCNC: 4.4 G/DL (ref 3.7–4.7)
ALBUMIN/CREAT UR: 32 MG/G CREAT (ref 0–29)
ALBUMIN/GLOB SERPL: 1.8 {RATIO} (ref 1.2–2.2)
ALP SERPL-CCNC: 60 IU/L (ref 44–121)
ALT SERPL-CCNC: 12 IU/L (ref 0–44)
AST SERPL-CCNC: 18 IU/L (ref 0–40)
BILIRUB SERPL-MCNC: 0.9 MG/DL (ref 0–1.2)
BUN SERPL-MCNC: 15 MG/DL (ref 8–27)
BUN/CREAT SERPL: 14 (ref 10–24)
CALCIUM SERPL-MCNC: 9.2 MG/DL (ref 8.6–10.2)
CHLORIDE SERPL-SCNC: 103 MMOL/L (ref 96–106)
CO2 SERPL-SCNC: 24 MMOL/L (ref 20–29)
CREAT SERPL-MCNC: 1.09 MG/DL (ref 0.76–1.27)
CREAT UR-MCNC: 79.4 MG/DL
EGFRCR SERPLBLD CKD-EPI 2021: 73 ML/MIN/1.73
GLOBULIN SER CALC-MCNC: 2.4 G/DL (ref 1.5–4.5)
GLUCOSE SERPL-MCNC: 97 MG/DL (ref 70–99)
HBA1C MFR BLD: 5.7 % (ref 4.8–5.6)
MICROALBUMIN UR-MCNC: 25.7 UG/ML
POTASSIUM SERPL-SCNC: 4.5 MMOL/L (ref 3.5–5.2)
PROT SERPL-MCNC: 6.8 G/DL (ref 6–8.5)
SODIUM SERPL-SCNC: 141 MMOL/L (ref 134–144)

## 2023-05-10 ENCOUNTER — OFFICE VISIT (OUTPATIENT)
Dept: CARDIOLOGY | Facility: PHYSICIAN GROUP | Age: 73
End: 2023-05-10
Payer: COMMERCIAL

## 2023-05-10 VITALS
HEIGHT: 75 IN | OXYGEN SATURATION: 97 % | HEART RATE: 60 BPM | RESPIRATION RATE: 16 BRPM | SYSTOLIC BLOOD PRESSURE: 106 MMHG | WEIGHT: 238.98 LBS | DIASTOLIC BLOOD PRESSURE: 70 MMHG | BODY MASS INDEX: 29.71 KG/M2

## 2023-05-10 DIAGNOSIS — E78.5 DYSLIPIDEMIA: ICD-10-CM

## 2023-05-10 DIAGNOSIS — I71.40 ABDOMINAL AORTIC ANEURYSM (AAA) 3.0 CM TO 5.5 CM IN DIAMETER IN MALE (HCC): ICD-10-CM

## 2023-05-10 DIAGNOSIS — Z98.890 STATUS POST ABDOMINAL AORTIC ANEURYSM (AAA) REPAIR: ICD-10-CM

## 2023-05-10 DIAGNOSIS — I48.21 PERMANENT ATRIAL FIBRILLATION (HCC): ICD-10-CM

## 2023-05-10 DIAGNOSIS — I25.10 CORONARY ARTERY DISEASE DUE TO CALCIFIED CORONARY LESION: ICD-10-CM

## 2023-05-10 DIAGNOSIS — I48.21 HYPERCOAGULABLE STATE DUE TO PERMANENT ATRIAL FIBRILLATION (HCC): ICD-10-CM

## 2023-05-10 DIAGNOSIS — G47.33 OBSTRUCTIVE SLEEP APNEA SYNDROME: ICD-10-CM

## 2023-05-10 DIAGNOSIS — R60.0 LOWER EXTREMITY EDEMA: ICD-10-CM

## 2023-05-10 DIAGNOSIS — I10 ESSENTIAL HYPERTENSION: ICD-10-CM

## 2023-05-10 DIAGNOSIS — Z86.79 STATUS POST ABDOMINAL AORTIC ANEURYSM (AAA) REPAIR: ICD-10-CM

## 2023-05-10 DIAGNOSIS — D68.69 HYPERCOAGULABLE STATE DUE TO PERMANENT ATRIAL FIBRILLATION (HCC): ICD-10-CM

## 2023-05-10 DIAGNOSIS — E66.9 OBESITY (BMI 30-39.9): ICD-10-CM

## 2023-05-10 DIAGNOSIS — I25.84 CORONARY ARTERY DISEASE DUE TO CALCIFIED CORONARY LESION: ICD-10-CM

## 2023-05-10 PROBLEM — I48.19 PERSISTENT ATRIAL FIBRILLATION (HCC): Status: ACTIVE | Noted: 2023-05-10

## 2023-05-10 PROCEDURE — 99214 OFFICE O/P EST MOD 30 MIN: CPT | Performed by: INTERNAL MEDICINE

## 2023-05-10 RX ORDER — ROSUVASTATIN CALCIUM 40 MG/1
40 TABLET, COATED ORAL DAILY
Qty: 90 TABLET | Refills: 3 | Status: SHIPPED | OUTPATIENT
Start: 2023-05-10 | End: 2024-03-06 | Stop reason: SDUPTHER

## 2023-05-10 RX ORDER — BACLOFEN 20 MG/1
20 TABLET ORAL
COMMUNITY
Start: 2023-05-04 | End: 2023-05-15

## 2023-05-10 RX ORDER — EZETIMIBE 10 MG/1
10 TABLET ORAL DAILY
Qty: 90 TABLET | Refills: 3 | Status: SHIPPED | OUTPATIENT
Start: 2023-05-10 | End: 2024-03-06 | Stop reason: SDUPTHER

## 2023-05-10 RX ORDER — METOPROLOL SUCCINATE 50 MG/1
50 TABLET, EXTENDED RELEASE ORAL DAILY
Qty: 100 TABLET | Refills: 3 | Status: SHIPPED | OUTPATIENT
Start: 2023-05-10 | End: 2024-03-06 | Stop reason: SDUPTHER

## 2023-05-10 RX ORDER — RAMIPRIL 10 MG/1
10 CAPSULE ORAL
Qty: 90 CAPSULE | Refills: 3 | Status: SHIPPED | OUTPATIENT
Start: 2023-05-10 | End: 2024-03-06 | Stop reason: SDUPTHER

## 2023-05-10 ASSESSMENT — FIBROSIS 4 INDEX: FIB4 SCORE: 2.48

## 2023-05-10 NOTE — PROGRESS NOTES
Cardiology Initial Consultation Note    Date of note:    5/10/2023    Primary Care Provider: Kit Torres P.A.-C.  Referring Provider: No ref. provider found    Patient Name: Kun Lisa   YOB: 1950  MRN:              [id number]    Chief Complaint   Patient presents with    Coronary Artery Disease    Dyslipidemia    Hypertension       History of Present Illness: Mr. Kun Lisa is a 72-year-old man with past medical history significant for coronary artery disease status post PCI to LAD and Lcx ( of RCA), permanent atrial fibrillation on Eliquis, hypertension, hyperlipidemia, AAA status post EVAR in 2021, SID on cpap, diabetes mellitus, depression, who presents to the cardiovascular office for routine follow-up.    Patient was last seen in the office in March 2023 by Collette Rutledge. During that visit he had no major cardiac complaints. Did note having mild LE edema (longstanding issue). Did not want to start new medications for this and agreed to conservative management with compression stockings.    Since his last visit, he has noticed improvement in his lower extremity edema.  He says that he keeps his leg elevated at home.  Occasionally worn compression stockings.  States that he has noticed significant improvement since the last visit.  Exercises 2-3 times a week.  Walks for approximately 1 hour.  Is able to do these activities without chest pain or dyspnea.  Otherwise he has no major cardiac complaints today.      On review of systems, patient states that he has been dealing with depression for quite some time.  Denies having thoughts of hurting himself or suicidal ideation. Depression has been managed by PCP      Cardiovascular Risk Factors:  1. Smoking status: Former smoker  2. Type II Diabetes Mellitus: Yes   Lab Results   Component Value Date/Time    HBA1C 5.7 (H) 04/04/2023 06:53 AM    HBA1C 6.3 (H) 11/14/2022 04:44 AM    HBA1C 7.4 (H) 02/26/2020 09:22 AM    HBA1C 7.2 (A)  08/07/2019 12:00 AM     3. Hypertension: Yes  4. Dyslipidemia: Yes   Cholesterol,Tot   Date Value Ref Range Status   11/14/2022 150 100 - 199 mg/dL Final   02/26/2020 158 100 - 199 mg/dL Final     LDL   Date Value Ref Range Status   02/26/2020 89 <100 mg/dL Final     HDL   Date Value Ref Range Status   11/14/2022 45 >39 mg/dL Final   02/26/2020 40 >=40 mg/dL Final     Triglycerides   Date Value Ref Range Status   11/14/2022 130 0 - 149 mg/dL Final   02/26/2020 143 0 - 149 mg/dL Final     5. Family history of early Coronary Artery Disease in a first degree relative (Male less than 55 years of age; Female less than 65 years of age): Denies      Review of Systems:  As per HPI. All other systems reviewed and are negative.      Past Medical History:   Diagnosis Date    AAA (abdominal aortic aneurysm) ([facility])     Followed by Dr. Pepper    Acid reflux     Arrhythmia     CAD (coronary artery disease) 1997/2017 1997: MI, stents to LAD and LCx. April 2017: PCI/JOE (Synergy 2.5 x 16mm) the distal LCx.    Chronic anticoagulation     Diabetes ([facility])     Oral meds    Diabetic neuropathy ([facility])     Bilateral feet    Hyperlipidemia     Hypertension     Obstructive sleep apnea     CPAP    Palpitations     Past heart attack 1997    Persistent atrial fibrillation ([facility]) 09/2021    Echocardiogram with normal LV size, LVEF 65%. Normal RV. Enlarged RA and mildly dilated LA. Trace MR. Mild TR. RVSP 25mmHg.    Psychiatric problem     depression    Sleep apnea     CPAP    Snoring          Past Surgical History:   Procedure Laterality Date    CATARACT EXTRACTION WITH IOL Bilateral 2021    ZZZ CARDIAC CATH  4/27/17    Synergy stent to Circ    STENT PLACEMENT  1997    LAD    ZZZ CARDIAC CATH  1997    LAD stent    ANGIOPLASTY           Current Outpatient Medications   Medication Sig Dispense Refill    baclofen (LIORESAL) 20 MG tablet Take 20 mg by mouth every day.      apixaban (ELIQUIS) 5mg Tab Take 1 Tablet by mouth 2 times a day. 180 Tablet 3     metoprolol SR (TOPROL XL) 50 MG TABLET SR 24 HR Take 1 Tablet by mouth every day. 100 Tablet 3    ramipril (ALTACE) 10 MG capsule Take 1 Capsule by mouth every day. 90 Capsule 3    rosuvastatin (CRESTOR) 40 MG tablet Take 1 Tablet by mouth every day. 90 Tablet 3    ezetimibe (ZETIA) 10 MG Tab Take 1 Tablet by mouth every day. 90 Tablet 3    JARDIANCE 25 MG Tab TAKE 1 TABLET DAILY 90 Tablet 2    SITagliptin (JANUVIA) 100 MG Tab Take 1 Tablet by mouth every day. 90 Tablet 3     No current facility-administered medications for this visit.         No Known Allergies      Family History   Problem Relation Age of Onset    Stroke Father 57         at 62 of second stroke    Arthritis Mother     Stroke Mother     Alzheimer's Disease Mother          Social History     Socioeconomic History    Marital status:      Spouse name: Not on file    Number of children: Not on file    Years of education: Not on file    Highest education level: Not on file   Occupational History    Not on file   Tobacco Use    Smoking status: Former     Packs/day: 1.00     Years: 32.00     Pack years: 32.00     Types: Cigarettes     Quit date: 2003     Years since quittin.9    Smokeless tobacco: Never   Vaping Use    Vaping Use: Never used   Substance and Sexual Activity    Alcohol use: Not Currently     Alcohol/week: 0.0 oz    Drug use: Yes     Frequency: 7.0 times per week     Types: Marijuana, Inhaled     Comment: medical marijuana. nightly    Sexual activity: Not on file   Other Topics Concern    Not on file   Social History Narrative    Not on file     Social Determinants of Health     Financial Resource Strain: Not on file   Food Insecurity: Not on file   Transportation Needs: Not on file   Physical Activity: Not on file   Stress: Not on file   Social Connections: Not on file   Intimate Partner Violence: Not on file   Housing Stability: Not on file       Physical Exam:  Ambulatory Vitals  /70 (BP Location: Left arm,  "Patient Position: Sitting, BP Cuff Size: Adult)   Pulse 60   Resp 16   Ht 1.905 m (6' 3\")   Wt 108 kg (238 lb 15.7 oz)   SpO2 97%    Oxygen Therapy:  Pulse Oximetry: 97 %  BP Readings from Last 4 Encounters:   05/10/23 106/70   03/28/23 (!) 134/90   03/22/23 (!) 140/100   03/01/23 124/70       Weight/BMI: Body mass index is 29.87 kg/m².  Wt Readings from Last 4 Encounters:   05/10/23 108 kg (238 lb 15.7 oz)   03/28/23 108 kg (238 lb)   03/22/23 111 kg (244 lb 9.6 oz)   03/01/23 113 kg (249 lb 1.9 oz)       General: Not in acute distress  HEENT: OP clear   Neck:  no JVD  CVS:  irregularly irregular, Normal S1, S2. No murmurs, rubs or gallops  Resp: Normal respiratory effort, lungs CTA bilaterally. No rales or rhonchi  Abdomen: Soft, non-distended, non-tender to palpation  Neurological: Alert and oriented x3, moves all extremities, no focal neurologic deficits  Psychiatric: Appropriate affect  Extremities:   Extremities warm, no significant lower ext edema, pulses intact      Lab Data Review:  Lab Results   Component Value Date/Time    CHOLSTRLTOT 150 11/14/2022 04:44 AM    CHOLSTRLTOT 158 02/26/2020 09:22 AM    LDL 89 02/26/2020 09:22 AM    HDL 45 11/14/2022 04:44 AM    HDL 40 02/26/2020 09:22 AM    TRIGLYCERIDE 130 11/14/2022 04:44 AM    TRIGLYCERIDE 143 02/26/2020 09:22 AM       Lab Results   Component Value Date/Time    SODIUM 141 04/04/2023 06:53 AM    SODIUM 135 02/26/2020 09:22 AM    POTASSIUM 4.5 04/04/2023 06:53 AM    POTASSIUM 4.7 02/26/2020 09:22 AM    CHLORIDE 103 04/04/2023 06:53 AM    CHLORIDE 101 02/26/2020 09:22 AM    CO2 24 04/04/2023 06:53 AM    CO2 27 02/26/2020 09:22 AM    GLUCOSE 97 04/04/2023 06:53 AM    GLUCOSE 159 (H) 02/26/2020 09:22 AM    BUN 15 04/04/2023 06:53 AM    BUN 19 02/26/2020 09:22 AM    CREATININE 1.09 04/04/2023 06:53 AM    CREATININE 1.16 02/26/2020 09:22 AM    BUNCREATRAT 14 04/04/2023 06:53 AM     Lab Results   Component Value Date/Time    ALKPHOSPHAT 60 04/04/2023 06:53 " AM    ALKPHOSPHAT 49 02/26/2020 09:22 AM    ASTSGOT 18 04/04/2023 06:53 AM    ASTSGOT 16 02/26/2020 09:22 AM    ALTSGPT 12 04/04/2023 06:53 AM    ALTSGPT 19 02/26/2020 09:22 AM    TBILIRUBIN 0.9 04/04/2023 06:53 AM    TBILIRUBIN 0.7 02/26/2020 09:22 AM      Lab Results   Component Value Date/Time    WBC 6.9 11/14/2022 04:44 AM    WBC 10.0 12/28/2018 10:53 AM     Lab Results   Component Value Date/Time    HBA1C 5.7 (H) 04/04/2023 06:53 AM    HBA1C 6.3 (H) 11/14/2022 04:44 AM    HBA1C 7.4 (H) 02/26/2020 09:22 AM    HBA1C 7.2 (A) 08/07/2019 12:00 AM         Cardiac Imaging and Procedures Review:    EKG 8/25/2021:  I personally reviewed the EKG which shows atrial fibrilation with controlled ventricular rate, RBBB    Echo 9/22/2021:  I personally reviewed the echo images which shows normal LV systolic function, LVEF 65%, normal RV size and function, mildly dilated left atrium and right atrium, mild MAC, trace MR, mild TR      Assessment & Plan     1. Coronary artery disease due to calcified coronary lesion: Stent to distal circumflex, patent LAD stent and  of RCA in April 2017        2. Essential hypertension  metoprolol SR (TOPROL XL) 50 MG TABLET SR 24 HR    ramipril (ALTACE) 10 MG capsule      3. Dyslipidemia  ramipril (ALTACE) 10 MG capsule    rosuvastatin (CRESTOR) 40 MG tablet    Lipid Profile      4. History of abdominal aortic aneurysm (AAA) 3.0 cm to 5.5 cm in diameter in male        5. Status post abdominal aortic aneurysm (AAA) repair        6. Obesity (BMI 30-39.9)        7. Obstructive sleep apnea syndrome        8. Permanent atrial fibrillation (HCC)  apixaban (ELIQUIS) 5mg Tab      9. Anticoagulated  apixaban (ELIQUIS) 5mg Tab                            Medical Decision Making:  Mr. Kun Lisa is a 72-year-old man with past medical history significant for coronary artery disease status post PCI to LAD and Lcx ( of RCA), permanent atrial fibrillation on Eliquis, hypertension, hyperlipidemia, AAA  status post EVAR in 2021, SID on cpap, diabetes mellitus, depression, who presents to the cardiovascular office for routine follow-up.    1. Coronary artery disease due to calcified coronary lesion: Stent to distal circumflex, patent LAD stent and  of RCA in April 2017  - Known history of CAD with PCI to LCX and LAD.  involving RCA. Currently stable without chest pain or dyspnea.  - Needs aggressive cardiac risk factor modification. Prior LDL not at goal of < 70. Will start zetia 10mg in addition to home dose of crestor 40mg daily  - Continue Metoprolol 50mg daily    2. Essential hypertension  - Blood pressure is well controlled. Continue Ramipril and Metoprolol.    3. Dyslipidemia  - Prior LDL of 82. Start Zetia 10mg as above. Will need repeat lipid panel in 3 months to ensure LDL < 70.  - Encourage lifestyle modifications including exercise and dietary changes. Incorporate heart healthy, mediterranean/AHA diet.    4. History of abdominal aortic aneurysm (AAA) 3.0 cm to 5.5 cm in diameter in male  5. Status post abdominal aortic aneurysm (AAA) repair  - s/p EVAR in 2021 by Dr. Pepper and currently stable.    6. Obesity (BMI 30-39.9)  - Encourage at least 150 mins of moderate intensity exercise per week. Heart healthy diet as above.    7. Obstructive sleep apnea syndrome  - Continue CPAP QHS    8. Permanent atrial fibrillation (HCC)  9. Hypercoagulable state due to atrial fibrillation  - Longstanding history of atrial fibrillation.  Permanent.  Remains in A-fib and asymptomatic.  Continue with rate control with metoprolol 50 mg daily.  Review of prior echocardiogram from 2021 does show changes due to longstanding AF which includes biatrial enlargement and cardiac remodeling.    10. Depression  - Chronic issue. No SI or HI. Encourage to follow up with PCP for ongoing care.    11. Lower extremity edema  -Edema has improved since last office visit.  No significant findings on exam.  Encourage continue using  compression stockings.  Keep legs elevated    It was a pleasure seeing Mr. Kun Lisa in the office today. Return in about 6 months (around 11/10/2023). Patient is aware to call the cardiology clinic with any questions or concerns.      Ryan Cornell MD, [facility]  Cardiologist, [facility], [facility]  [address] [address]  Phone: [phone]  Fax: [phone]    Please note that this dictation was created using voice recognition software. I have made every reasonable attempt to correct obvious errors, but it is possible there are errors of grammar and possibly content that I did not discover before finalizing the note.

## 2023-05-15 RX ORDER — BACLOFEN 20 MG/1
TABLET ORAL
Qty: 90 TABLET | Refills: 1 | Status: SHIPPED | OUTPATIENT
Start: 2023-05-15 | End: 2024-03-08

## 2023-05-23 DIAGNOSIS — E11.40 TYPE 2 DIABETES MELLITUS WITH DIABETIC NEUROPATHY, WITHOUT LONG-TERM CURRENT USE OF INSULIN (HCC): ICD-10-CM

## 2023-05-23 NOTE — TELEPHONE ENCOUNTER
Received request via: Pharmacy    Was the patient seen in the last year in this department? Yes    Does the patient have an active prescription (recently filled or refills available) for medication(s) requested? No    Does the patient have correction Plus and need 100 day supply (blood pressure, diabetes and cholesterol meds only)? Patient does not have SCP    Pharmacy comment:  discontinued package size 1.5 ML we need a new rx for 3ML.

## 2023-08-09 ENCOUNTER — OFFICE VISIT (OUTPATIENT)
Dept: MEDICAL GROUP | Facility: MEDICAL CENTER | Age: 73
End: 2023-08-09
Payer: COMMERCIAL

## 2023-08-09 VITALS
WEIGHT: 229.2 LBS | HEART RATE: 85 BPM | SYSTOLIC BLOOD PRESSURE: 100 MMHG | BODY MASS INDEX: 28.5 KG/M2 | OXYGEN SATURATION: 95 % | TEMPERATURE: 97.7 F | HEIGHT: 75 IN | DIASTOLIC BLOOD PRESSURE: 60 MMHG

## 2023-08-09 DIAGNOSIS — E11.40 TYPE 2 DIABETES MELLITUS WITH DIABETIC NEUROPATHY, WITHOUT LONG-TERM CURRENT USE OF INSULIN (HCC): ICD-10-CM

## 2023-08-09 DIAGNOSIS — L03.317 CELLULITIS OF BUTTOCK: ICD-10-CM

## 2023-08-09 DIAGNOSIS — I10 ESSENTIAL HYPERTENSION: ICD-10-CM

## 2023-08-09 DIAGNOSIS — Z12.5 SCREENING PSA (PROSTATE SPECIFIC ANTIGEN): ICD-10-CM

## 2023-08-09 DIAGNOSIS — E78.5 DYSLIPIDEMIA: ICD-10-CM

## 2023-08-09 PROBLEM — G89.29 CHRONIC RIGHT-SIDED LOW BACK PAIN WITH RIGHT-SIDED SCIATICA: Status: ACTIVE | Noted: 2022-08-02

## 2023-08-09 PROBLEM — M54.41 CHRONIC RIGHT-SIDED LOW BACK PAIN WITH RIGHT-SIDED SCIATICA: Status: ACTIVE | Noted: 2022-08-02

## 2023-08-09 PROCEDURE — 99214 OFFICE O/P EST MOD 30 MIN: CPT | Performed by: PHYSICIAN ASSISTANT

## 2023-08-09 PROCEDURE — 3074F SYST BP LT 130 MM HG: CPT | Performed by: PHYSICIAN ASSISTANT

## 2023-08-09 PROCEDURE — 3078F DIAST BP <80 MM HG: CPT | Performed by: PHYSICIAN ASSISTANT

## 2023-08-09 RX ORDER — CEPHALEXIN 500 MG/1
1000 CAPSULE ORAL 2 TIMES DAILY
Qty: 40 CAPSULE | Refills: 0 | Status: SHIPPED | OUTPATIENT
Start: 2023-08-09 | End: 2023-08-19

## 2023-08-09 ASSESSMENT — FIBROSIS 4 INDEX: FIB4 SCORE: 2.51

## 2023-08-09 NOTE — ASSESSMENT & PLAN NOTE
This is a pleasant 73-year-old male here today to follow-up on his health.  Doing well.  No significant complaints.  Was recently placed on Zetia.  LDL was slightly elevated at 82.  He is on Crestor max dose of 40 mg.  His cardiologist placed him on Zetia.  He will be due for labs soon for diabetes.  Doing well on Ozempic and Januvia.  Also on Jardiance.  A1c well-controlled.

## 2023-08-09 NOTE — PROGRESS NOTES
NICU Progress Note    This is a  female infant born 2020 10:36 AM at Gestational Age: 32w4d now at age: 2 week old  Patient Active Problem List    Diagnosis Date Noted   • Prematurity 2021     Priority: Low     Recent Events: No acute events over night. Remains stable on RA. No recorded alarms. Tolerating full feeds of SCF HP 24 kcal/oz,  ml/kg/d. Working on PO, and took about 50% PO in the last 24 hrs. Gaining weight. Adequate urine and stool output.      Weight change: 40 g      Objective:  Vitals Last Value 24-Hour Range   Temperature 98 °F (36.7 °C) (21) Temp  Min: 98 °F (36.7 °C)  Max: 99 °F (37.2 °C)   Pulse 154 (21) Pulse  Min: 154  Max: 175   Respiratory 57 (21) Resp  Min: 48  Max: 80   Non-Invasive Blood Pressure 71/33 (21 0300) BP  Min: 64/31  Max: 81/41   Arterial Blood Pressure   No data recorded   Mean Arterial Pressure 46 (21 0300) MAP (mmHg)  Min: 41  Max: 56   Pulse Oximetry 99 % (21) SpO2  Min: 95 %  Max: 100 %     Vent Settings Last Value   Mode Room air     Last Apnea:    and intervention:      Physical Exam:  Birthweight:  3 lb 10.6 oz (1660 g) with weight change of 15% since birth  Current weight:   Patient Vitals for the past 24 hrs:   Weight   21 2345 (!) 1910 g    with a weight change of Weight change: 40 g overnight  Gen:  Sleepy/arousable  infant in open crib.   Skin: Warm, pink, well perfused.  No edema and, not jaundiced.   HEENT: AFOS. Eyes open and no discharge noted. NG in place.   CV: RRR, no murmur appreciated. Brisk cap refill.  Pulm: CTA bilaterally.  No retractions appreciated on exam.   Abd: Flat, soft, NTND Bowel sounds are active.   Ext: Equal, spontaneous movement of all extremities.   Neuro: Normal tone and activity. + suck.     Fluids:    Intake/Output  Report      700 -  - 09/08 0659    P.O. (mL/kg) 130 (68.06)     NG/GT (mL/kg) 129 (67.54)     Total  Subjective:   Kun Lisa is a 73 y.o. male here today for dyslipidemia and cellulitis of the buttocks.    Dyslipidemia  This is a pleasant 73-year-old male here today to follow-up on his health.  Doing well.  No significant complaints.  Was recently placed on Zetia.  LDL was slightly elevated at 82.  He is on Crestor max dose of 40 mg.  His cardiologist placed him on Zetia.  He will be due for labs soon for diabetes.  Doing well on Ozempic and Januvia.  Also on Jardiance.  A1c well-controlled.    Cellulitis of buttock  Does complain over the past year of having pus expressed from a lesion of the left side of his buttocks twice and then more recently unable to express pus.  Complains of discomfort.       Current medicines (including changes today)  Current Outpatient Medications   Medication Sig Dispense Refill    cephALEXin (KEFLEX) 500 MG Cap Take 2 Capsules by mouth 2 times a day for 10 days. 40 Capsule 0    Semaglutide,0.25 or 0.5MG/DOS, 2 MG/3ML Solution Pen-injector Inject 0.5 mg under the skin every 7 days. 3 mL 3    baclofen (LIORESAL) 20 MG tablet TAKE 1 TABLET BY MOUTH EVERY DAY 90 Tablet 1    apixaban (ELIQUIS) 5mg Tab Take 1 Tablet by mouth 2 times a day. 180 Tablet 3    metoprolol SR (TOPROL XL) 50 MG TABLET SR 24 HR Take 1 Tablet by mouth every day. 100 Tablet 3    ramipril (ALTACE) 10 MG capsule Take 1 Capsule by mouth every day. 90 Capsule 3    rosuvastatin (CRESTOR) 40 MG tablet Take 1 Tablet by mouth every day. 90 Tablet 3    ezetimibe (ZETIA) 10 MG Tab Take 1 Tablet by mouth every day. 90 Tablet 3    JARDIANCE 25 MG Tab TAKE 1 TABLET DAILY 90 Tablet 2    SITagliptin (JANUVIA) 100 MG Tab Take 1 Tablet by mouth every day. 90 Tablet 3     No current facility-administered medications for this visit.     He  has a past medical history of AAA (abdominal aortic aneurysm) ([facility]), Acid reflux, Arrhythmia, CAD (coronary artery disease) (1997/2017), Chronic anticoagulation, Diabetes ([facility]), Diabetic  "neuropathy (HCC), Hyperlipidemia, Hypertension, Obstructive sleep apnea, Palpitations, Past heart attack (1997), Persistent atrial fibrillation (HCC) (09/2021), Psychiatric problem, Sleep apnea, and Snoring.    Social History and Family History were reviewed and updated.    ROS   No chest pain, no shortness of breath, no abdominal pain and all other systems were reviewed and are negative.       Objective:     /60 (BP Location: Left arm, Patient Position: Sitting, BP Cuff Size: Adult)   Pulse 85   Temp 36.5 °C (97.7 °F) (Temporal)   Ht 1.905 m (6' 3\")   Wt 104 kg (229 lb 3.2 oz)   SpO2 95%  Body mass index is 28.65 kg/m².   Physical Exam:  Constitutional: Alert, no distress.  Skin: Warm, dry, good turgor, no rashes in visible areas.  Lower left buttocks medial aspect there is a elevated, erythematous lesion.  No induration.  No flocculence.  Eye: Equal, round and reactive, conjunctiva clear, lids normal.  ENMT: Lips without lesions, good dentition, oropharynx clear.  Neck: Trachea midline, no masses.   Respiratory: Unlabored respiratory effort.  Psych: Alert and oriented x3, normal affect and mood.        Assessment and Plan:   The following treatment plan was discussed    1. Dyslipidemia  Chronic condition.  Stable.  Continue Crestor.  Follow-up to repeat lipid panel.  Discussed use of Zetia.  Cardiology wants better control.    2. Type 2 diabetes mellitus with diabetic neuropathy, without long-term current use of insulin (HCC)  Chronic condition.  Stable.  Ordered labs to be done in November.  Continue medications as directed.  - Lipid Profile; Future  - HEMOGLOBIN A1C; Future  - CBC WITH DIFFERENTIAL; Future  - Comp Metabolic Panel; Future    3. Essential hypertension  Chronic condition.  Stable.  Controlled.  Ordered labs.  Fast 8 hours.  - CBC WITH DIFFERENTIAL; Future  - Comp Metabolic Panel; Future    4. Cellulitis of buttock  New condition noted in chart but likely chronic.  Does not appear to be a " Intake(mL/kg) 259 (135.6)     Net +259           Urine Occurrence 5 x     Stool Occurrence 5 x           Source Rate Total (on current weight)   Feeds - SCF HP 24 kcal/oz 37 mL q3hr 154 mL/kg/d      PO 50%    Adequate  voiding and stooling.  Last Void:  1 (21 0615) X5  Last Stool:  1 (21)  X5 + mixed    Labs:    No results found for this or any previous visit (from the past 24 hour(s)).    Medications:  Current Facility-Administered Medications   Medication Dose Route Frequency Provider Last Rate Last Admin   • clotrimazole (LOTRIMIN) 1 % cream 1 application  1 application Topical 2 times per day Sabrina Pat MD   1 application at 21 0902   • sodium chloride (PF) 0.9 % injection 0.5 mL  0.5 mL Intravenous PRN Benjy Méndez MD   0.5 mL at 21 1906   • hepatitis B IMMUNE GLOBULIN (NABI-HB,HYPERHEP B) injection 0.5 mL  0.5 mL Intramuscular Once PRN Sabrina Pat MD       • hepatitis B (ENGERIX-B) 10 MCG/0.5ML vaccine 10 mcg  0.5 mL Intramuscular Once Sabrina Pat MD           Impression:   female infant at 32 4/7 weeks, now corrected to 35w 1d   RDS- s/p SIMV, now on RA, off caffeine   No alarms  Immature suck - working on oral feedings PO~NG  Hyperbilirubinemia- off phototherapy      Plan:  Resp:  Monitor respiratory status in room air. Monitor for apnea/bradycardia. Off caffeine since .    CV:  Monitor BP/perfusion, continue to assess for murmur.    FEN:  Continue on SCF HP 24kcal/oz feedings, mother does not plan to breast feed. Work on oral feedings. Wt Adjust as needed to maintain  mL/kg/d. Monitor for tolerance, output and growth. Follow I&O, lytes, weight, glucose.      ID:  Follow blood culture. Follow clinically for signs/symptoms of infection. Lotrimin to diaper rash.    HEME: follow bilirubin clinically. Follow weekly HCt.    Neuro: Head ultrasound is not indicated for this infant.  In isolette.    Current antibiotic status:  None       Mother updated by phone. She had no questions and/or concerns.     I saw and examined Girl Kelsey Conn on 2021 at 11:12 AM and patient requires: NICU Intensive Care: Cardiac monitoring, Constant monitoring by health care team, Continuous monitoring of VS, Enteral/ Parental nutritional adjustments, Heat maintenance and Frequent lab monitoring          sebaceous cyst but did provide Keflex.  Take for 10 days.  If symptoms do not improve then contact me for referral to dermatology.  Currently does not need to be lanced.  - cephALEXin (KEFLEX) 500 MG Cap; Take 2 Capsules by mouth 2 times a day for 10 days.  Dispense: 40 Capsule; Refill: 0    5. Screening PSA (prostate specific antigen)  PSA ordered.  Screening.  Perform in November.  - PROSTATE SPECIFIC AG SCREENING; Future         Followup: Return in about 6 months (around 2/9/2024), or if symptoms worsen or fail to improve.    Please note that this dictation was created using voice recognition software. I have made every reasonable attempt to correct obvious errors, but I expect that there are errors of grammar and possibly content that I did not discover before finalizing the note.

## 2023-08-09 NOTE — ASSESSMENT & PLAN NOTE
Does complain over the past year of having pus expressed from a lesion of the left side of his buttocks twice and then more recently unable to express pus.  Complains of discomfort.

## 2023-08-11 ENCOUNTER — OFFICE VISIT (OUTPATIENT)
Dept: URGENT CARE | Facility: CLINIC | Age: 73
End: 2023-08-11
Payer: COMMERCIAL

## 2023-08-11 VITALS
TEMPERATURE: 97.2 F | WEIGHT: 229 LBS | BODY MASS INDEX: 28.47 KG/M2 | DIASTOLIC BLOOD PRESSURE: 82 MMHG | OXYGEN SATURATION: 97 % | SYSTOLIC BLOOD PRESSURE: 126 MMHG | HEART RATE: 72 BPM | HEIGHT: 75 IN | RESPIRATION RATE: 16 BRPM
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
## 2023-08-11 PROCEDURE — 3079F DIAST BP 80-89 MM HG: CPT | Performed by: NURSE PRACTITIONER

## 2023-08-11 PROCEDURE — 99213 OFFICE O/P EST LOW 20 MIN: CPT | Performed by: NURSE PRACTITIONER

## 2023-08-11 PROCEDURE — 3074F SYST BP LT 130 MM HG: CPT | Performed by: NURSE PRACTITIONER

## 2023-08-11 RX ORDER — TRIAMCINOLONE ACETONIDE 1 MG/G
1 CREAM TOPICAL 2 TIMES DAILY
Qty: 15 G | Refills: 0 | Status: SHIPPED | OUTPATIENT
Start: 2023-08-11 | End: 2023-08-25

## 2023-08-11 ASSESSMENT — ENCOUNTER SYMPTOMS
FEVER: 0
SHORTNESS OF BREATH: 0

## 2023-08-11 ASSESSMENT — FIBROSIS 4 INDEX: FIB4 SCORE: 2.51

## 2023-08-11 NOTE — PROGRESS NOTES
Subjective:     Kun Lisa is a 73 y.o. male who presents for Rash (On R knee, R arm, itchy x yesterday )      Presents for rash to right arm, starting this morning. Itchy. No pain. Possible bug bites.           Rash  This is a new problem. The current episode started today. The rash is characterized by redness and swelling. Pertinent negatives include no facial edema, fever or shortness of breath.       Past Medical History:   Diagnosis Date    AAA (abdominal aortic aneurysm) ([facility])     Followed by Dr. Pepper    Acid reflux     Arrhythmia     CAD (coronary artery disease) 1997: MI, stents to LAD and LCx. 2017: PCI/JOE (Synergy 2.5 x 16mm) the distal LCx.    Chronic anticoagulation     Diabetes ([facility])     Oral meds    Diabetic neuropathy ([facility])     Bilateral feet    Hyperlipidemia     Hypertension     Obstructive sleep apnea     CPAP    Palpitations     Past heart attack     Persistent atrial fibrillation ([facility]) 2021    Echocardiogram with normal LV size, LVEF 65%. Normal RV. Enlarged RA and mildly dilated LA. Trace MR. Mild TR. RVSP 25mmHg.    Psychiatric problem     depression    Sleep apnea     CPAP    Snoring        Past Surgical History:   Procedure Laterality Date    CATARACT EXTRACTION WITH IOL Bilateral     ZZZ CARDIAC CATH  18    Synergy stent to Circ    STENT PLACEMENT      LAD    ZZZ CARDIAC CATH      LAD stent    ANGIOPLASTY         Social History     Socioeconomic History    Marital status:      Spouse name: Not on file    Number of children: Not on file    Years of education: Not on file    Highest education level: Not on file   Occupational History    Not on file   Tobacco Use    Smoking status: Former     Packs/day: 1.00     Years: 32.00     Pack years: 32.00     Types: Cigarettes     Quit date: 2003     Years since quittin.2    Smokeless tobacco: Never   Vaping Use    Vaping Use: Never used   Substance and Sexual Activity    Alcohol  "use: Not Currently     Alcohol/week: 0.0 oz    Drug use: Yes     Frequency: 7.0 times per week     Types: Marijuana, Inhaled     Comment: medical marijuana. nightly    Sexual activity: Not on file   Other Topics Concern    Not on file   Social History Narrative    Not on file     Social Determinants of Health     Financial Resource Strain: Not on file   Food Insecurity: Not on file   Transportation Needs: Not on file   Physical Activity: Not on file   Stress: Not on file   Social Connections: Not on file   Intimate Partner Violence: Not on file   Housing Stability: Not on file        Family History   Problem Relation Age of Onset    Stroke Father 57         at 62 of second stroke    Arthritis Mother     Stroke Mother     Alzheimer's Disease Mother         No Known Allergies    Review of Systems   Constitutional:  Negative for fever.   Respiratory:  Negative for shortness of breath.    Skin:  Positive for itching and rash.   All other systems reviewed and are negative.       Objective:   /82 (BP Location: Right arm, Patient Position: Sitting, BP Cuff Size: Adult)   Pulse 72   Temp 36.2 °C (97.2 °F) (Temporal)   Resp 16   Ht 1.905 m (6' 3\")   Wt 104 kg (229 lb)   SpO2 97%   BMI 28.62 kg/m²     Physical Exam  Vitals reviewed.   Constitutional:       General: He is not in acute distress.     Appearance: He is not toxic-appearing.   Pulmonary:      Effort: Pulmonary effort is normal. No respiratory distress.   Musculoskeletal:         General: Normal range of motion.   Skin:     General: Skin is warm and dry.      Capillary Refill: Capillary refill takes less than 2 seconds.      Findings: Erythema present.      Comments: 2 individual erythremic indurated lesions to the upper right arm, 1.5 cm each.  Central small excoriation to one of the lesions. Patient noted to be itching area. No vesicular lesions.   Neurological:      Mental Status: He is alert and oriented to person, place, and time. "         Assessment/Plan:   1. Bug bite, initial encounter  - triamcinolone acetonide (KENALOG) 0.1 % Cream; Apply 1 Application topically 2 times a day for 14 days.  Dispense: 15 g; Refill: 0  -Cold compresses.  -Elevate extremity for swelling.   -Over the counter antihistamine for itching, cetirizine (zyrtec).  -Tylenol for pain  -Watch for any signs of infection (redness, pus, pain, increased swelling or fever).     Follow up for blisters, signs of infection, red streaking, shortness of breath or wheezing, oral or facial swelling, rash, joint pain, or any other concerns.    -Presents for acute pruritic lesions to arm x 2, woke up with lesions. Discussed likely an insect bite, as he had another one to his knee. Discussed S&S of shingles with f/u. Patient recently seen for cellulitis of buttock, he had not started the keflex.    Differential diagnosis, natural history, supportive care, and indications for immediate follow-up discussed.

## 2023-08-11 NOTE — PATIENT INSTRUCTIONS
-Cold compresses.  -Elevate extremity for swelling.   -Over the counter antihistamine for itching, cetirizine (zyrtec).  -Tylenol for pain  -Watch for any signs of infection (redness, pus, pain, increased swelling or fever).     Follow up for blisters, signs of infection, red streaking, shortness of breath or wheezing, oral or facial swelling, rash, joint pain, or any other concerns.

## 2023-08-23 DIAGNOSIS — E11.618 TYPE 2 DIABETES MELLITUS WITH OTHER DIABETIC ARTHROPATHY, WITHOUT LONG-TERM CURRENT USE OF INSULIN (HCC): ICD-10-CM

## 2023-08-25 RX ORDER — SITAGLIPTIN 100 MG/1
100 TABLET, FILM COATED ORAL DAILY
Qty: 90 TABLET | Refills: 3 | Status: SHIPPED | OUTPATIENT
Start: 2023-08-25

## 2023-08-25 NOTE — TELEPHONE ENCOUNTER
Reviewed chart, duplicate therapy in med list. PCP last sent in Semaglutide, please advise on Januvia request.

## 2023-08-29 ENCOUNTER — OFFICE VISIT (OUTPATIENT)
Dept: MEDICAL GROUP | Facility: MEDICAL CENTER | Age: 73
End: 2023-08-29
Payer: COMMERCIAL

## 2023-08-29 ENCOUNTER — HOSPITAL ENCOUNTER (OUTPATIENT)
Dept: LAB | Facility: MEDICAL CENTER | Age: 73
End: 2023-08-29
Attending: PHYSICIAN ASSISTANT
Payer: COMMERCIAL

## 2023-08-29 VITALS
WEIGHT: 223.6 LBS | OXYGEN SATURATION: 97 % | HEART RATE: 83 BPM | SYSTOLIC BLOOD PRESSURE: 110 MMHG | HEIGHT: 75 IN | BODY MASS INDEX: 27.8 KG/M2 | DIASTOLIC BLOOD PRESSURE: 70 MMHG | TEMPERATURE: 97 F

## 2023-08-29 DIAGNOSIS — R41.3 MEMORY LOSS: ICD-10-CM

## 2023-08-29 DIAGNOSIS — E11.40 TYPE 2 DIABETES MELLITUS WITH DIABETIC NEUROPATHY, WITHOUT LONG-TERM CURRENT USE OF INSULIN (HCC): ICD-10-CM

## 2023-08-29 PROBLEM — I48.21 PERMANENT ATRIAL FIBRILLATION (HCC): Status: RESOLVED | Noted: 2021-09-29 | Resolved: 2023-08-29

## 2023-08-29 LAB — TSH SERPL DL<=0.005 MIU/L-ACNC: 1.6 UIU/ML (ref 0.38–5.33)

## 2023-08-29 PROCEDURE — 36415 COLL VENOUS BLD VENIPUNCTURE: CPT

## 2023-08-29 PROCEDURE — 3078F DIAST BP <80 MM HG: CPT | Performed by: PHYSICIAN ASSISTANT

## 2023-08-29 PROCEDURE — 3074F SYST BP LT 130 MM HG: CPT | Performed by: PHYSICIAN ASSISTANT

## 2023-08-29 PROCEDURE — 99214 OFFICE O/P EST MOD 30 MIN: CPT | Performed by: PHYSICIAN ASSISTANT

## 2023-08-29 PROCEDURE — 82607 VITAMIN B-12: CPT

## 2023-08-29 PROCEDURE — 84443 ASSAY THYROID STIM HORMONE: CPT

## 2023-08-29 ASSESSMENT — FIBROSIS 4 INDEX: FIB4 SCORE: 2.51

## 2023-08-29 NOTE — PROGRESS NOTES
Subjective:   Kun Lisa is a 73 y.o. male here today for memory loss and history of diabetes.    Memory loss  This is a pleasant 73-year-old male who comes in today to discuss his memory.  He states that he has noticed a severe impact of his memory in the past 4 to 6 months.  His daughter contacted us about a week ago to inform us of his decline in memory.  He states that he was recently at a concert in [place] and she told him that he told the same story 3 times.  He states that he forgets things repeatedly.  His mother was afflicted with dementia.  The only change of medication has been initiation of Ozempic.  He has no other side effects with Ozempic such as abdominal issues.  His diabetes is well controlled.  He denies any headaches.  No dizziness.  Faxed to his memory other than he is unable to remember and finds it difficult to work on the computer.  He does have a history of depression but that was very present during initial stages of COVID.  He is unsure if depression currently is affecting his memory.       Current medicines (including changes today)  Current Outpatient Medications   Medication Sig Dispense Refill    JANUVIA 100 MG Tab TAKE 1 TABLET DAILY 90 Tablet 3    Semaglutide,0.25 or 0.5MG/DOS, 2 MG/3ML Solution Pen-injector Inject 0.5 mg under the skin every 7 days. 3 mL 3    baclofen (LIORESAL) 20 MG tablet TAKE 1 TABLET BY MOUTH EVERY DAY 90 Tablet 1    apixaban (ELIQUIS) 5mg Tab Take 1 Tablet by mouth 2 times a day. 180 Tablet 3    metoprolol SR (TOPROL XL) 50 MG TABLET SR 24 HR Take 1 Tablet by mouth every day. 100 Tablet 3    ramipril (ALTACE) 10 MG capsule Take 1 Capsule by mouth every day. 90 Capsule 3    rosuvastatin (CRESTOR) 40 MG tablet Take 1 Tablet by mouth every day. 90 Tablet 3    ezetimibe (ZETIA) 10 MG Tab Take 1 Tablet by mouth every day. 90 Tablet 3    JARDIANCE 25 MG Tab TAKE 1 TABLET DAILY 90 Tablet 2     No current facility-administered medications for this visit.  "    He  has a past medical history of AAA (abdominal aortic aneurysm) ([facility]), Acid reflux, Arrhythmia, CAD (coronary artery disease) (1997/2017), Chronic anticoagulation, Diabetes (HCC), Diabetic neuropathy ([facility]), Hyperlipidemia, Hypertension, Obstructive sleep apnea, Palpitations, Past heart attack (1997), Persistent atrial fibrillation ([facility]) (09/2021), Psychiatric problem, Sleep apnea, and Snoring.    Social History and Family History were reviewed and updated.    ROS   No chest pain, no shortness of breath, no abdominal pain and all other systems were reviewed and are negative.       Objective:     /70 (BP Location: Left arm, Patient Position: Sitting, BP Cuff Size: Adult)   Pulse 83   Temp 36.1 °C (97 °F) (Temporal)   Ht 1.905 m (6' 3\")   Wt 101 kg (223 lb 9.6 oz)   SpO2 97%  Body mass index is 27.95 kg/m².   Physical Exam:  Constitutional: Alert, no distress.  Skin: Warm, dry, good turgor, no rashes in visible areas.  Eye: Equal, round and reactive, conjunctiva clear, lids normal.  ENMT: Lips without lesions, good dentition, oropharynx clear.  Neck: Trachea midline, no masses.   Respiratory: Unlabored respiratory effort.  Psych: Alert and oriented x3, normal affect and mood.  Memory: Recall 1 out of 3 words.      Assessment and Plan:   The following treatment plan was discussed    1. Memory loss  New condition noted in chart.  Symptoms appear more chronic than acute but I am rather concerned in his memory and how he presents today.  Referral to neurology.  Ordered B12 and TSH.  Performed today.  Nonfasting.  MRI brain ordered.  Contact imaging or walk into the labs and imaging section in the hospital to get it scheduled.  Also discussed stopping Ozempic for 3 to 4 weeks to see if this improves on his memory.  - MR-BRAIN-W/O; Future  - Referral to Neurology  - VITAMIN B12; Future  - TSH WITH REFLEX TO FT4; Future    2. Type 2 diabetes mellitus with diabetic neuropathy, without long-term current use of " insulin (HCC)  Chronic condition.  Well-controlled.  Continue Jardiance and Januvia.  Discontinue Ozempic and till about 4 weeks.  I will see him in 6 weeks with an close follow-up.         Followup: Return in about 6 months (around 2/29/2024), or if symptoms worsen or fail to improve.    Please note that this dictation was created using voice recognition software. I have made every reasonable attempt to correct obvious errors, but I expect that there are errors of grammar and possibly content that I did not discover before finalizing the note.

## 2023-08-29 NOTE — ASSESSMENT & PLAN NOTE
This is a pleasant 73-year-old male who comes in today to discuss his memory.  He states that he has noticed a severe impact of his memory in the past 4 to 6 months.  His daughter contacted us about a week ago to inform us of his decline in memory.  He states that he was recently at a concert in [place] and she told him that he told the same story 3 times.  He states that he forgets things repeatedly.  His mother was afflicted with dementia.  The only change of medication has been initiation of Ozempic.  He has no other side effects with Ozempic such as abdominal issues.  His diabetes is well controlled.  He denies any headaches.  No dizziness.  Faxed to his memory other than he is unable to remember and finds it difficult to work on the computer.  He does have a history of depression but that was very present during initial stages of COVID.  He is unsure if depression currently is affecting his memory.

## 2023-08-30 LAB — VIT B12 SERPL-MCNC: 286 PG/ML (ref 211–911)

## 2023-09-12 ENCOUNTER — APPOINTMENT (OUTPATIENT)
Dept: RADIOLOGY | Facility: MEDICAL CENTER | Age: 73
End: 2023-09-12
Attending: PHYSICIAN ASSISTANT
Payer: COMMERCIAL

## 2023-09-18 ENCOUNTER — APPOINTMENT (OUTPATIENT)
Dept: RADIOLOGY | Facility: MEDICAL CENTER | Age: 73
End: 2023-09-18
Attending: PHYSICIAN ASSISTANT
Payer: MEDICARE

## 2023-09-18 DIAGNOSIS — R41.3 MEMORY LOSS: ICD-10-CM

## 2023-09-18 PROCEDURE — 70551 MRI BRAIN STEM W/O DYE: CPT

## 2023-12-20 ENCOUNTER — OFFICE VISIT (OUTPATIENT)
Dept: MEDICAL GROUP | Facility: MEDICAL CENTER | Age: 73
End: 2023-12-20
Payer: COMMERCIAL

## 2023-12-20 VITALS
BODY MASS INDEX: 29.19 KG/M2 | SYSTOLIC BLOOD PRESSURE: 102 MMHG | TEMPERATURE: 97.3 F | WEIGHT: 234.8 LBS | HEIGHT: 75 IN | OXYGEN SATURATION: 96 % | DIASTOLIC BLOOD PRESSURE: 50 MMHG | HEART RATE: 77 BPM

## 2023-12-20 DIAGNOSIS — E78.5 DYSLIPIDEMIA: ICD-10-CM

## 2023-12-20 DIAGNOSIS — E11.40 TYPE 2 DIABETES MELLITUS WITH DIABETIC NEUROPATHY, WITHOUT LONG-TERM CURRENT USE OF INSULIN (HCC): ICD-10-CM

## 2023-12-20 DIAGNOSIS — R41.3 MEMORY LOSS: ICD-10-CM

## 2023-12-20 DIAGNOSIS — Z12.5 SCREENING PSA (PROSTATE SPECIFIC ANTIGEN): ICD-10-CM

## 2023-12-20 DIAGNOSIS — I10 ESSENTIAL HYPERTENSION: ICD-10-CM

## 2023-12-20 PROBLEM — L03.317 CELLULITIS OF BUTTOCK: Status: RESOLVED | Noted: 2023-08-09 | Resolved: 2023-12-20

## 2023-12-20 PROCEDURE — 99214 OFFICE O/P EST MOD 30 MIN: CPT | Performed by: PHYSICIAN ASSISTANT

## 2023-12-20 PROCEDURE — 3074F SYST BP LT 130 MM HG: CPT | Performed by: PHYSICIAN ASSISTANT

## 2023-12-20 PROCEDURE — 3078F DIAST BP <80 MM HG: CPT | Performed by: PHYSICIAN ASSISTANT

## 2023-12-20 ASSESSMENT — FIBROSIS 4 INDEX: FIB4 SCORE: 2.51

## 2023-12-20 NOTE — ASSESSMENT & PLAN NOTE
Overall he states he is feeling well.  No complaints.  He will be due for labs soon.  Did not perform labs that were ordered recently.  He is taking his medications as directed.

## 2023-12-20 NOTE — PROGRESS NOTES
Subjective:   Kun Lisa is a 73 y.o. male here today for memory loss and hypertension.    Memory loss  This is a pleasant 74-year-old male who is here today to follow-up on his health.  It appears that he did not contact [facility] Ledezma to schedule an appointment with neurology given his memory loss.  MRI done recently showed the following:    Remote left inferior cerebellar microhemorrhage/cavernoma.     Hemosiderin staining along the medial right parietal cortex likely related to remote hemorrhage.     Nonspecific T2 hyperintensities are noted in the periventricular and deep white matter, most likely related to chronic microvascular ischemia.    Essential hypertension  Overall he states he is feeling well.  No complaints.  He will be due for labs soon.  Did not perform labs that were ordered recently.  He is taking his medications as directed.       Current medicines (including changes today)  Current Outpatient Medications   Medication Sig Dispense Refill    JANUVIA 100 MG Tab TAKE 1 TABLET DAILY 90 Tablet 3    baclofen (LIORESAL) 20 MG tablet TAKE 1 TABLET BY MOUTH EVERY DAY 90 Tablet 1    apixaban (ELIQUIS) 5mg Tab Take 1 Tablet by mouth 2 times a day. 180 Tablet 3    metoprolol SR (TOPROL XL) 50 MG TABLET SR 24 HR Take 1 Tablet by mouth every day. 100 Tablet 3    ramipril (ALTACE) 10 MG capsule Take 1 Capsule by mouth every day. 90 Capsule 3    rosuvastatin (CRESTOR) 40 MG tablet Take 1 Tablet by mouth every day. 90 Tablet 3    ezetimibe (ZETIA) 10 MG Tab Take 1 Tablet by mouth every day. 90 Tablet 3    JARDIANCE 25 MG Tab TAKE 1 TABLET DAILY 90 Tablet 2     No current facility-administered medications for this visit.     He  has a past medical history of AAA (abdominal aortic aneurysm) ([facility]), Acid reflux, Arrhythmia, CAD (coronary artery disease) (1997/2017), Chronic anticoagulation, Diabetes ([facility]), Diabetic neuropathy ([facility]), Hyperlipidemia, Hypertension, Obstructive sleep apnea, Palpitations, Past  "heart attack (1997), Persistent atrial fibrillation (HCC) (09/2021), Psychiatric problem, Sleep apnea, and Snoring.    Social History and Family History were reviewed and updated.    ROS   No chest pain, no shortness of breath, no abdominal pain and all other systems were reviewed and are negative.       Objective:     /50 (BP Location: Left arm, Patient Position: Sitting, BP Cuff Size: Adult)   Pulse 77   Temp 36.3 °C (97.3 °F) (Temporal)   Ht 1.905 m (6' 3\")   Wt 107 kg (234 lb 12.8 oz)   SpO2 96%  Body mass index is 29.35 kg/m².   Physical Exam:  Constitutional: Alert, no distress.  Skin: Warm, dry, good turgor, no rashes in visible areas.  Eye: Equal, round and reactive, conjunctiva clear, lids normal.  ENMT: Lips without lesions, good dentition, oropharynx clear.  Neck: Trachea midline, no masses.   Psych: Alert and oriented x3, normal affect and mood.        Assessment and Plan:   The following treatment plan was discussed    1. Memory loss  Chronic condition.  Stable.  Today it appears he is still impacted with memory issues.  Wrote the information down to contact Aaron regarding following up with neurology.  I will see him closely in February.    2. Essential hypertension  Chronic condition.  Controlled.  Continue medications.  Ordered labs.  Fast 8 hours.  [facility] [place].  - CBC WITH DIFFERENTIAL; Future  - Comp Metabolic Panel; Future    3. Type 2 diabetes mellitus with diabetic neuropathy, without long-term current use of insulin (HCC)  Chronic condition.  Likely stable.  Continue diabetic medications as directed.  Ordered labs.  Fast 8 hours.  - HEMOGLOBIN A1C; Future  - MICROALBUMIN CREAT RATIO URINE; Future    4. Dyslipidemia  Chronic condition.  Continue Crestor.  Order labs.  Fast 8 hours.  - Lipid Profile; Future    5. Screening PSA (prostate specific antigen)  PSA ordered.  Screening.  - PROSTATE SPECIFIC AG SCREENING; Future         Followup: Return if " symptoms worsen or fail to improve.    Please note that this dictation was created using voice recognition software. I have made every reasonable attempt to correct obvious errors, but I expect that there are errors of grammar and possibly content that I did not discover before finalizing the note.

## 2023-12-20 NOTE — ASSESSMENT & PLAN NOTE
This is a pleasant 74-year-old male who is here today to follow-up on his health.  It appears that he did not contact Aaron Ledezma to schedule an appointment with neurology given his memory loss.  MRI done recently showed the following:    Remote left inferior cerebellar microhemorrhage/cavernoma.     Hemosiderin staining along the medial right parietal cortex likely related to remote hemorrhage.     Nonspecific T2 hyperintensities are noted in the periventricular and deep white matter, most likely related to chronic microvascular ischemia.

## 2023-12-28 LAB
ALBUMIN SERPL-MCNC: 4.3 G/DL (ref 3.8–4.8)
ALBUMIN/CREAT UR: 9 MG/G CREAT (ref 0–29)
ALBUMIN/GLOB SERPL: 1.9 {RATIO} (ref 1.2–2.2)
ALP SERPL-CCNC: 54 IU/L (ref 44–121)
ALT SERPL-CCNC: 27 IU/L (ref 0–44)
AST SERPL-CCNC: 25 IU/L (ref 0–40)
BASOPHILS # BLD AUTO: 0.1 X10E3/UL (ref 0–0.2)
BASOPHILS NFR BLD AUTO: 1 %
BILIRUB SERPL-MCNC: 0.6 MG/DL (ref 0–1.2)
BUN SERPL-MCNC: 19 MG/DL (ref 8–27)
BUN/CREAT SERPL: 15 (ref 10–24)
CALCIUM SERPL-MCNC: 9 MG/DL (ref 8.6–10.2)
CHLORIDE SERPL-SCNC: 105 MMOL/L (ref 96–106)
CHOLEST SERPL-MCNC: 109 MG/DL (ref 100–199)
CO2 SERPL-SCNC: 23 MMOL/L (ref 20–29)
CREAT SERPL-MCNC: 1.23 MG/DL (ref 0.76–1.27)
CREAT UR-MCNC: 105.8 MG/DL
EGFRCR SERPLBLD CKD-EPI 2021: 62 ML/MIN/1.73
EOSINOPHIL # BLD AUTO: 0.2 X10E3/UL (ref 0–0.4)
EOSINOPHIL NFR BLD AUTO: 3 %
ERYTHROCYTE [DISTWIDTH] IN BLOOD BY AUTOMATED COUNT: 13.4 % (ref 11.6–15.4)
GLOBULIN SER CALC-MCNC: 2.3 G/DL (ref 1.5–4.5)
GLUCOSE SERPL-MCNC: 128 MG/DL (ref 70–99)
HBA1C MFR BLD: 6.1 % (ref 4.8–5.6)
HCT VFR BLD AUTO: 52.6 % (ref 37.5–51)
HDLC SERPL-MCNC: 50 MG/DL
HGB BLD-MCNC: 17.1 G/DL (ref 13–17.7)
IMM GRANULOCYTES # BLD AUTO: 0 X10E3/UL (ref 0–0.1)
IMM GRANULOCYTES NFR BLD AUTO: 0 %
IMMATURE CELLS  115398: ABNORMAL
LABORATORY COMMENT REPORT: NORMAL
LDLC SERPL CALC-MCNC: 43 MG/DL (ref 0–99)
LYMPHOCYTES # BLD AUTO: 1.9 X10E3/UL (ref 0.7–3.1)
LYMPHOCYTES NFR BLD AUTO: 30 %
MCH RBC QN AUTO: 32.6 PG (ref 26.6–33)
MCHC RBC AUTO-ENTMCNC: 32.5 G/DL (ref 31.5–35.7)
MCV RBC AUTO: 100 FL (ref 79–97)
MICROALBUMIN UR-MCNC: 9.3 UG/ML
MONOCYTES # BLD AUTO: 0.6 X10E3/UL (ref 0.1–0.9)
MONOCYTES NFR BLD AUTO: 9 %
MORPHOLOGY BLD-IMP: ABNORMAL
NEUTROPHILS # BLD AUTO: 3.6 X10E3/UL (ref 1.4–7)
NEUTROPHILS NFR BLD AUTO: 57 %
NRBC BLD AUTO-RTO: ABNORMAL %
PLATELET # BLD AUTO: 145 X10E3/UL (ref 150–450)
POTASSIUM SERPL-SCNC: 4.7 MMOL/L (ref 3.5–5.2)
PROT SERPL-MCNC: 6.6 G/DL (ref 6–8.5)
PSA SERPL-MCNC: 0.7 NG/ML (ref 0–4)
RBC # BLD AUTO: 5.24 X10E6/UL (ref 4.14–5.8)
SODIUM SERPL-SCNC: 139 MMOL/L (ref 134–144)
TRIGL SERPL-MCNC: 76 MG/DL (ref 0–149)
VLDLC SERPL CALC-MCNC: 16 MG/DL (ref 5–40)
WBC # BLD AUTO: 6.3 X10E3/UL (ref 3.4–10.8)

## 2024-01-22 RX ORDER — EZETIMIBE 10 MG/1
10 TABLET ORAL DAILY
Qty: 90 TABLET | Refills: 3 | OUTPATIENT
Start: 2024-01-22

## 2024-02-09 ENCOUNTER — OFFICE VISIT (OUTPATIENT)
Dept: MEDICAL GROUP | Facility: MEDICAL CENTER | Age: 74
End: 2024-02-09
Payer: COMMERCIAL

## 2024-02-09 VITALS
HEART RATE: 61 BPM | TEMPERATURE: 96.8 F | HEIGHT: 75 IN | OXYGEN SATURATION: 96 % | BODY MASS INDEX: 29.59 KG/M2 | WEIGHT: 238 LBS | SYSTOLIC BLOOD PRESSURE: 94 MMHG | DIASTOLIC BLOOD PRESSURE: 50 MMHG

## 2024-02-09 DIAGNOSIS — R41.3 MEMORY LOSS: ICD-10-CM

## 2024-02-09 DIAGNOSIS — D69.6 LOW PLATELET COUNT (HCC): ICD-10-CM

## 2024-02-09 DIAGNOSIS — E11.40 TYPE 2 DIABETES MELLITUS WITH DIABETIC NEUROPATHY, WITHOUT LONG-TERM CURRENT USE OF INSULIN (HCC): ICD-10-CM

## 2024-02-09 DIAGNOSIS — G47.33 OBSTRUCTIVE SLEEP APNEA SYNDROME: ICD-10-CM

## 2024-02-09 PROCEDURE — 99214 OFFICE O/P EST MOD 30 MIN: CPT | Performed by: PHYSICIAN ASSISTANT

## 2024-02-09 PROCEDURE — 3074F SYST BP LT 130 MM HG: CPT | Performed by: PHYSICIAN ASSISTANT

## 2024-02-09 PROCEDURE — 3078F DIAST BP <80 MM HG: CPT | Performed by: PHYSICIAN ASSISTANT

## 2024-02-09 ASSESSMENT — FIBROSIS 4 INDEX: FIB4 SCORE: 2.39

## 2024-02-09 ASSESSMENT — PATIENT HEALTH QUESTIONNAIRE - PHQ9: CLINICAL INTERPRETATION OF PHQ2 SCORE: 0

## 2024-02-09 NOTE — ASSESSMENT & PLAN NOTE
This is a pleasant 73-year-old male who recently stopped Ozempic and I placed him on Jardiance.  Since that time his memory has returned.  During last appointment he kept asking me the same questions.  He could not remember anything.  He states after 3 days without Ozempic his memory returned.  He is doing well with the new medication.  A1c is well-controlled.  Weight is stable today.  He also had a concern with  CPAP machine but that has all been resolved.  He has 2 machines that he may use.

## 2024-02-09 NOTE — PROGRESS NOTES
Subjective:   Kun Lisa is a 73 y.o. male here today for memory loss and history of diabetes.    Memory loss  This is a pleasant 73-year-old male who recently stopped Ozempic and I placed him on Jardiance.  Since that time his memory has returned.  During last appointment he kept asking me the same questions.  He could not remember anything.  He states after 3 days without Ozempic his memory returned.  He is doing well with the new medication.  A1c is well-controlled.  Weight is stable today.  He also had a concern with  CPAP machine but that has all been resolved.  He has 2 machines that he may use.       Current medicines (including changes today)  Current Outpatient Medications   Medication Sig Dispense Refill    JANUVIA 100 MG Tab TAKE 1 TABLET DAILY 90 Tablet 3    baclofen (LIORESAL) 20 MG tablet TAKE 1 TABLET BY MOUTH EVERY DAY 90 Tablet 1    apixaban (ELIQUIS) 5mg Tab Take 1 Tablet by mouth 2 times a day. 180 Tablet 3    metoprolol SR (TOPROL XL) 50 MG TABLET SR 24 HR Take 1 Tablet by mouth every day. 100 Tablet 3    ramipril (ALTACE) 10 MG capsule Take 1 Capsule by mouth every day. 90 Capsule 3    rosuvastatin (CRESTOR) 40 MG tablet Take 1 Tablet by mouth every day. 90 Tablet 3    ezetimibe (ZETIA) 10 MG Tab Take 1 Tablet by mouth every day. 90 Tablet 3    JARDIANCE 25 MG Tab TAKE 1 TABLET DAILY 90 Tablet 2     No current facility-administered medications for this visit.     He  has a past medical history of AAA (abdominal aortic aneurysm) ([facility]), Acid reflux, Arrhythmia, CAD (coronary artery disease) (1997/2017), Chronic anticoagulation, Diabetes ([facility]), Diabetic neuropathy ([facility]), Hyperlipidemia, Hypertension, Obstructive sleep apnea, Palpitations, Past heart attack (1997), Persistent atrial fibrillation ([facility]) (09/2021), Psychiatric problem, Sleep apnea, and Snoring.    Social History and Family History were reviewed and updated.    ROS   No chest pain, no shortness of breath, no abdominal pain and  "all other systems were reviewed and are negative.       Objective:     BP 94/50 (BP Location: Left arm, Patient Position: Sitting, BP Cuff Size: Adult)   Pulse 61   Temp 36 °C (96.8 °F) (Temporal)   Ht 1.905 m (6' 3\")   Wt 108 kg (238 lb)   SpO2 96%  Body mass index is 29.75 kg/m².   Physical Exam:  Constitutional: Alert, no distress.  Skin: Warm, dry, good turgor, no rashes in visible areas.  Eye: Equal, round and reactive, conjunctiva clear, lids normal.  ENMT: Lips without lesions, good dentition, oropharynx clear.  Neck: Trachea midline, no masses.   Psych: Alert and oriented x3, normal affect and mood.    Monofilament testing with a 10 gram force: sensation intact: intact bilaterally  Visual Inspection: Feet without maceration, ulcers, fissures.  Pedal pulses: intact bilaterally      Assessment and Plan:   The following treatment plan was discussed    1. Memory loss  Chronic condition.  Remarkable difference today with our discussion.  It appears that his memory has returned.  Discussed continuation of cessation of Ozempic.  Continue Jardiance and Januvia.    2. Type 2 diabetes mellitus with diabetic neuropathy, without long-term current use of insulin (HCC)  Chronic condition.  Stable.  Check A1c in April.  Nonfasting.  - HEMOGLOBIN A1C; Future    3. Obstructive sleep apnea syndrome  Chronic condition.  Well-controlled.  Continue CPAP use.  Last sleep study was in 2015.      4. Low platelet count (HCC)  Reviewed his CBC.  Platelet count is trending lower.  Will repeat in April.  Nonfasting.  - CBC WITH DIFFERENTIAL; Future         Followup: Return in about 6 months (around 8/9/2024), or if symptoms worsen or fail to improve.    Please note that this dictation was created using voice recognition software. I have made every reasonable attempt to correct obvious errors, but I expect that there are errors of grammar and possibly content that I did not discover before finalizing the note.             "

## 2024-02-12 RX ORDER — EMPAGLIFLOZIN 25 MG/1
TABLET, FILM COATED ORAL
Qty: 90 TABLET | Refills: 2 | Status: SHIPPED | OUTPATIENT
Start: 2024-02-12

## 2024-02-12 NOTE — TELEPHONE ENCOUNTER
Received request via: Pharmacy    Was the patient seen in the last year in this department? Yes    Does the patient have an active prescription (recently filled or refills available) for medication(s) requested? No    Pharm[facility] Name[facility]    Does the patient have half-way Plus and need 100 day supply (blood pressure, diabetes and cholesterol meds only)? Patient does not have SCP

## 2024-03-06 ENCOUNTER — OFFICE VISIT (OUTPATIENT)
Dept: CARDIOLOGY | Facility: PHYSICIAN GROUP | Age: 74
End: 2024-03-06
Payer: COMMERCIAL

## 2024-03-06 VITALS
RESPIRATION RATE: 15 BRPM | WEIGHT: 234.57 LBS | SYSTOLIC BLOOD PRESSURE: 112 MMHG | DIASTOLIC BLOOD PRESSURE: 58 MMHG | OXYGEN SATURATION: 95 % | HEART RATE: 64 BPM | HEIGHT: 75 IN | BODY MASS INDEX: 29.17 KG/M2

## 2024-03-06 DIAGNOSIS — I48.19 PERSISTENT ATRIAL FIBRILLATION (HCC): ICD-10-CM

## 2024-03-06 DIAGNOSIS — E11.40 TYPE 2 DIABETES MELLITUS WITH DIABETIC NEUROPATHY, WITHOUT LONG-TERM CURRENT USE OF INSULIN (HCC): ICD-10-CM

## 2024-03-06 DIAGNOSIS — G47.33 OBSTRUCTIVE SLEEP APNEA SYNDROME: ICD-10-CM

## 2024-03-06 DIAGNOSIS — I25.10 CORONARY ARTERY DISEASE DUE TO CALCIFIED CORONARY LESION: ICD-10-CM

## 2024-03-06 DIAGNOSIS — Z79.01 ANTICOAGULATED: ICD-10-CM

## 2024-03-06 DIAGNOSIS — I48.21 HYPERCOAGULABLE STATE DUE TO PERMANENT ATRIAL FIBRILLATION (HCC): ICD-10-CM

## 2024-03-06 DIAGNOSIS — Z98.890 STATUS POST ABDOMINAL AORTIC ANEURYSM (AAA) REPAIR: ICD-10-CM

## 2024-03-06 DIAGNOSIS — E78.5 DYSLIPIDEMIA: ICD-10-CM

## 2024-03-06 DIAGNOSIS — I10 ESSENTIAL HYPERTENSION: ICD-10-CM

## 2024-03-06 DIAGNOSIS — I48.21 PERMANENT ATRIAL FIBRILLATION (HCC): ICD-10-CM

## 2024-03-06 DIAGNOSIS — Z86.79 STATUS POST ABDOMINAL AORTIC ANEURYSM (AAA) REPAIR: ICD-10-CM

## 2024-03-06 DIAGNOSIS — D68.69 HYPERCOAGULABLE STATE DUE TO PERMANENT ATRIAL FIBRILLATION (HCC): ICD-10-CM

## 2024-03-06 DIAGNOSIS — I25.84 CORONARY ARTERY DISEASE DUE TO CALCIFIED CORONARY LESION: ICD-10-CM

## 2024-03-06 DIAGNOSIS — I71.40 ABDOMINAL AORTIC ANEURYSM (AAA) 3.0 CM TO 5.5 CM IN DIAMETER IN MALE (HCC): ICD-10-CM

## 2024-03-06 DIAGNOSIS — Z95.5 S/P DRUG ELUTING CORONARY STENT PLACEMENT: ICD-10-CM

## 2024-03-06 PROCEDURE — 3078F DIAST BP <80 MM HG: CPT | Performed by: NURSE PRACTITIONER

## 2024-03-06 PROCEDURE — 3074F SYST BP LT 130 MM HG: CPT | Performed by: NURSE PRACTITIONER

## 2024-03-06 PROCEDURE — 99214 OFFICE O/P EST MOD 30 MIN: CPT | Performed by: NURSE PRACTITIONER

## 2024-03-06 RX ORDER — RAMIPRIL 10 MG/1
10 CAPSULE ORAL
Qty: 100 CAPSULE | Refills: 3 | Status: SHIPPED | OUTPATIENT
Start: 2024-03-06

## 2024-03-06 RX ORDER — METOPROLOL SUCCINATE 50 MG/1
50 TABLET, EXTENDED RELEASE ORAL DAILY
Qty: 100 TABLET | Refills: 3 | Status: SHIPPED | OUTPATIENT
Start: 2024-03-06

## 2024-03-06 RX ORDER — ROSUVASTATIN CALCIUM 40 MG/1
40 TABLET, COATED ORAL DAILY
Qty: 100 TABLET | Refills: 3 | Status: SHIPPED | OUTPATIENT
Start: 2024-03-06

## 2024-03-06 RX ORDER — EZETIMIBE 10 MG/1
10 TABLET ORAL DAILY
Qty: 100 TABLET | Refills: 3 | Status: SHIPPED | OUTPATIENT
Start: 2024-03-06

## 2024-03-06 ASSESSMENT — ENCOUNTER SYMPTOMS
BACK PAIN: 1
PALPITATIONS: 0
BRUISES/BLEEDS EASILY: 0
FEVER: 0
ORTHOPNEA: 0
DIZZINESS: 0
COUGH: 0
HEADACHES: 0
CHILLS: 0
LOSS OF CONSCIOUSNESS: 0
INSOMNIA: 0
NAUSEA: 0
MYALGIAS: 0
PND: 0
ABDOMINAL PAIN: 0
SHORTNESS OF BREATH: 0

## 2024-03-06 ASSESSMENT — FIBROSIS 4 INDEX: FIB4 SCORE: 2.39

## 2024-03-06 NOTE — PROGRESS NOTES
Chief Complaint   Patient presents with    Follow-Up    Coronary Artery Disease    Atrial Fibrillation    Anticoagulation    Abdominal Aortic Aneurysm    HTN (Controlled)    Hyperlipidemia    Diabetes (Controlled)       Subjective     Kun Lisa is a 73 y.o. male who presents today for annual follow-up of CAD, persistent AFib, AAA status post repair, HTN, hyperlipidemia, DM and SID.    Kun is a 73 year old male with history of CAD, status post remote MI in 1997 with stents in the LAD and LCx, and PCI/JOE to the LCx in April 2017, persistent atrial fibrillation, anticoagulation with Eliquis, hypertension, hyperlipidemia, DM,  SID (on CPAP) and AAA status post EVAR in October 2021 (Dr. Pepper), last seen by Dr. Cornell in May 2023.     He is here today for annual follow-up. Generally, he is doing fine and denies any symptoms or problems. He still tries to exercise (walk) daily. He denies any chest pain, pressure, tightness or discomfort; very mild shortness of breath, but unchanged from previous; no dyspnea, orthopnea or PND; no dizziness or syncope; very mild, stable LE edema. BP is stable. He does use a CPAP. He is compliant with his medications.    He does admit to some depression symptoms, given he is quite isolated, and most of his friends live elsewhere. His PCP keeps tabs on this.    He does also have some mild, ongoing back pain.       Past Medical History:   Diagnosis Date    AAA (abdominal aortic aneurysm) ([facility]) 10/2021    Status post EVAR by Dr. Pepper.    Acid reflux     Arrhythmia     CAD (coronary artery disease) 1997/2017 1997: MI, stents to LAD and LCx. April 2017: PCI/JOE (Synergy 2.5 x 16mm) the distal LCx.    Chronic anticoagulation     Diabetes ([facility])     Oral meds    Diabetic neuropathy ([facility])     Bilateral feet    Hyperlipidemia     Hypertension     Obstructive sleep apnea     CPAP    Palpitations     Past heart attack 1997    Persistent atrial fibrillation ([facility]) 09/2021    Echocardiogram  with normal LV size, LVEF 65%. Normal RV. Enlarged RA and mildly dilated LA. Trace MR. Mild TR. RVSP 25mmHg.    Psychiatric problem     Depression    S/P drug eluting coronary stent placement     Sleep apnea     CPAP    Snoring      Past Surgical History:   Procedure Laterality Date    CATARACT EXTRACTION WITH IOL Bilateral     ZZZ CARDIAC CATH  17    Synergy stent to Circ    STENT PLACEMENT      LAD    ZZZ CARDIAC CATH      LAD stent    ANGIOPLASTY       Family History   Problem Relation Age of Onset    Stroke Father 57         at 62 of second stroke    Arthritis Mother     Stroke Mother     Alzheimer's Disease Mother      Social History     Socioeconomic History    Marital status:      Spouse name: Not on file    Number of children: Not on file    Years of education: Not on file    Highest education level: Not on file   Occupational History    Not on file   Tobacco Use    Smoking status: Former     Current packs/day: 0.00     Average packs/day: 1 pack/day for 32.0 years (32.0 ttl pk-yrs)     Types: Cigarettes     Start date: 1971     Quit date: 2003     Years since quittin.7    Smokeless tobacco: Never   Vaping Use    Vaping Use: Never used   Substance and Sexual Activity    Alcohol use: Not Currently     Alcohol/week: 0.0 oz    Drug use: Not Currently     Types: Marijuana, Inhaled     Comment: 2-3 times weekly of medical marijuana    Sexual activity: Not on file   Other Topics Concern    Not on file   Social History Narrative    Not on file     Social Determinants of Health     Financial Resource Strain: Not on file   Food Insecurity: Not on file   Transportation Needs: Not on file   Physical Activity: Not on file   Stress: Not on file   Social Connections: Not on file   Intimate Partner Violence: Not on file   Housing Stability: Not on file     Allergies   Allergen Reactions    Ozempic (0.25 Or 0.5 Mg-Dose) [Semaglutide]      Memory complaints     Outpatient  Encounter Medications as of 3/6/2024   Medication Sig Dispense Refill    metoprolol SR (TOPROL XL) 50 MG TABLET SR 24 HR Take 1 Tablet by mouth every day. 100 Tablet 3    apixaban (ELIQUIS) 5mg Tab Take 1 Tablet by mouth 2 times a day. 200 Tablet 3    rosuvastatin (CRESTOR) 40 MG tablet Take 1 Tablet by mouth every day. 100 Tablet 3    ramipril (ALTACE) 10 MG capsule Take 1 Capsule by mouth every day. 100 Capsule 3    ezetimibe (ZETIA) 10 MG Tab Take 1 Tablet by mouth every day. 100 Tablet 3    JARDIANCE 25 MG Tab TAKE 1 TABLET DAILY 90 Tablet 2    JANUVIA 100 MG Tab TAKE 1 TABLET DAILY 90 Tablet 3    baclofen (LIORESAL) 20 MG tablet TAKE 1 TABLET BY MOUTH EVERY DAY 90 Tablet 1    [DISCONTINUED] apixaban (ELIQUIS) 5mg Tab Take 1 Tablet by mouth 2 times a day. 180 Tablet 3    [DISCONTINUED] metoprolol SR (TOPROL XL) 50 MG TABLET SR 24 HR Take 1 Tablet by mouth every day. 100 Tablet 3    [DISCONTINUED] ramipril (ALTACE) 10 MG capsule Take 1 Capsule by mouth every day. 90 Capsule 3    [DISCONTINUED] rosuvastatin (CRESTOR) 40 MG tablet Take 1 Tablet by mouth every day. 90 Tablet 3    [DISCONTINUED] ezetimibe (ZETIA) 10 MG Tab Take 1 Tablet by mouth every day. 90 Tablet 3     No facility-administered encounter medications on file as of 3/6/2024.     Review of Systems   Constitutional:  Negative for chills and fever.   HENT:  Negative for congestion.    Respiratory:  Negative for cough and shortness of breath.    Cardiovascular:  Positive for leg swelling. Negative for chest pain, palpitations, orthopnea and PND.        Mild, stable.   Gastrointestinal:  Negative for abdominal pain and nausea.   Musculoskeletal:  Positive for back pain and joint pain. Negative for myalgias.   Skin:  Negative for rash.   Neurological:  Negative for dizziness, loss of consciousness and headaches.   Endo/Heme/Allergies:  Does not bruise/bleed easily.   Psychiatric/Behavioral:  The patient does not have insomnia.               Objective  "    /58 (BP Location: Left arm, Patient Position: Sitting, BP Cuff Size: Adult)   Pulse 64   Resp 15   Ht 1.905 m (6' 3\")   Wt 106 kg (234 lb 9.1 oz)   SpO2 95%   BMI 29.32 kg/m²     Physical Exam  Constitutional:       Appearance: He is well-developed.      Comments: BMI 29.32 (weight is down)   HENT:      Head: Normocephalic.   Neck:      Vascular: No JVD.   Cardiovascular:      Rate and Rhythm: Normal rate. Rhythm irregular.      Heart sounds: Normal heart sounds.      Comments: HR controlled  Pulmonary:      Effort: Pulmonary effort is normal. No respiratory distress.      Breath sounds: Normal breath sounds. No wheezing or rales.   Abdominal:      General: Bowel sounds are normal. There is no distension.      Palpations: Abdomen is soft.      Tenderness: There is no abdominal tenderness.   Musculoskeletal:         General: Normal range of motion.      Cervical back: Normal range of motion and neck supple.      Right lower leg: Edema present.      Left lower leg: Edema present.      Comments: 1+ LE edema to the ankles bilaterally, R>L   Skin:     General: Skin is warm and dry.      Findings: No rash.   Neurological:      Mental Status: He is alert and oriented to person, place, and time.       CONCLUSIONS OF ECHOCARDIOGRAM OF 9/22/2021:  The left ventricular ejection fraction is visually estimated to be 65%.  Mildly dilated left atrium.  Estimated right ventricular systolic pressure is 25 mmHg.  Compared to prior echo 03/03/17, no significant change, LVH not appreciated.     FINDINGS OF AORTA/ILIAC US OF 5/11/2021:  Widening of the infrarenal aorta to a maximum diameter of 5.3 cm.   Intraluminal thrombus noted along the proximal wall of the aneurysm.   No elevated velocities in the celiac and superior mesenteric arteries, though the origins are not visualized due to limitations.  Waveforms and velocities of the bilateral iliac arteries are normal. Outflow   Doppler waveforms are triphasic " bilaterally.  Ectasia noted of the proximal right common iliac artery.  Common iliac diameters (cm):    Right: Proximal- 1.8, Mid- 1.6, Distal- 1.4.  Left: Proximal-1.6, Distal-1.1.  External iliac diameters (cm):  Right: Proximal- 1.3, Mid- 1.3, Distal- 0.96.  Left: Proximal- 0.98, Mid- 1.3, Distal- 1.0.     POSTPROCEDURE DIAGNOSES OF [facility] OF 4/27/20217:  1.  Coronary artery disease including patent stent in the mid left anterior descending artery with nonobstructive in-stent restenosis, high-grade distal   circumflex artery of a codominant system supplying excellent collaterals to mid to distal right coronary artery with long chronic total occlusion of the   ostial to mid right coronary artery.  2.  Successful percutaneous transluminal coronary angioplasty/stent placement of the distal circumflex artery with 2.5x16 mm Synergy drug-eluting stent.     Component      Latest Ref Rng 12/27/2023   Cholesterol,Tot      100 - 199 mg/dL 109    Triglycerides      0 - 149 mg/dL 76    HDL      >39 mg/dL 50    VLDL Cholesterol Calc      5 - 40 mg/dL 16    LDL Chol Calc (NIH)      0 - 99 mg/dL 43      Lab Results   Component Value Date/Time    ASTSGOT 25 12/27/2023 04:14 AM    ASTSGOT 16 02/26/2020 09:22 AM    ALTSGPT 27 12/27/2023 04:14 AM    ALTSGPT 19 02/26/2020 09:22 AM       Lab Results   Component Value Date/Time    SODIUM 139 12/27/2023 04:14 AM    SODIUM 135 02/26/2020 09:22 AM    POTASSIUM 4.7 12/27/2023 04:14 AM    POTASSIUM 4.7 02/26/2020 09:22 AM    CHLORIDE 105 12/27/2023 04:14 AM    CHLORIDE 101 02/26/2020 09:22 AM    CO2 23 12/27/2023 04:14 AM    CO2 27 02/26/2020 09:22 AM    GLUCOSE 128 (H) 12/27/2023 04:14 AM    GLUCOSE 159 (H) 02/26/2020 09:22 AM    BUN 19 12/27/2023 04:14 AM    BUN 19 02/26/2020 09:22 AM    CREATININE 1.23 12/27/2023 04:14 AM    CREATININE 1.16 02/26/2020 09:22 AM    BUNCREATRAT 15 12/27/2023 04:14 AM        Assessment & Plan     1. Coronary artery disease due to calcified coronary lesion:  Stent to distal circumflex, patent LAD stent and  of RCA in April 2017  EC-ECHOCARDIOGRAM COMPLETE W/O CONT      2. S/P drug eluting coronary stent placement        3. Permanent atrial fibrillation (HCC)  apixaban (ELIQUIS) 5mg Tab      4. Persistent atrial fibrillation (HCC)        5. Hypercoagulable state due to permanent atrial fibrillation (HCC)  apixaban (ELIQUIS) 5mg Tab      6. Anticoagulated        7. History of abdominal aortic aneurysm (AAA) 3.0 cm to 5.5 cm in diameter in male  US-ABDOMINAL AORTA W/O DOPPLER      8. Status post abdominal aortic aneurysm (AAA) repair        9. Essential hypertension  EC-ECHOCARDIOGRAM COMPLETE W/O CONT    metoprolol SR (TOPROL XL) 50 MG TABLET SR 24 HR    ramipril (ALTACE) 10 MG capsule      10. Dyslipidemia  rosuvastatin (CRESTOR) 40 MG tablet    ramipril (ALTACE) 10 MG capsule    ezetimibe (ZETIA) 10 MG Tab      11. Type 2 diabetes mellitus with diabetic neuropathy, without long-term current use of insulin ([facility])        12. Obstructive sleep apnea syndrome            Medical Decision Making: Today's Assessment/Status/Plan:      1. CAD, status post remote MI in 1997 with stents x 2, and PCI/JOE to the LCx in 2017. No angina or shortness of breath. We will repeat echo.Continue:  Eliquis 5mg twice daily  Crestor 40mg once daily  Toprol XL 50mg once daily  Altace 10mg once daily  Zetia 10mg once daily     2. Persistent atrial fibrillation, rate controlled with Toprol XL, asymptomatic.  As above, we will repeat echo.     3. Chronic anticoagulation with Eliquis, no bleeding problems.    4. History of AAA, status post EVAR by Dr. Pepper in 2021. To repeat abdominal US.     5. Hypertension, treated with Toprol XL and Altace, stable. BP is good today.     6. Hyperlipidemia, treated with Crestor 40mg and Zetia 10mg once daily. Recent LDL was 43 (at goal).     7. Diabetes mellitus, treated with Januvia and Jardiace, followed by PCP, and well controlled. Last HgbA1c was 6.1.    8.  SID, treated with CPAP, stable.    9. Depression symptoms, followed by PCP. No worsening symptoms, but should continue to monitor.     Same medications for now. Repeat echo and abdominal US; we will make any changes based on these results.. Follow-up annually, sooner if clinical condition changes.

## 2024-03-08 RX ORDER — BACLOFEN 20 MG/1
TABLET ORAL
Qty: 90 TABLET | Refills: 1 | Status: SHIPPED | OUTPATIENT
Start: 2024-03-08

## 2024-03-08 NOTE — TELEPHONE ENCOUNTER
Received request via: Pharmacy    Was the patient seen in the last year in this department? Yes    Does the patient have an active prescription (recently filled or refills available) for medication(s) requested? No    [facility]    Does the patient have intermediate Plus and need 100 day supply (blood pressure, diabetes and cholesterol meds only)? Patient does not have SCP

## 2024-04-19 LAB
BASOPHILS # BLD AUTO: 0.1 X10E3/UL (ref 0–0.2)
BASOPHILS NFR BLD AUTO: 1 %
EOSINOPHIL # BLD AUTO: 0.2 X10E3/UL (ref 0–0.4)
EOSINOPHIL NFR BLD AUTO: 2 %
ERYTHROCYTE [DISTWIDTH] IN BLOOD BY AUTOMATED COUNT: 13.7 % (ref 11.6–15.4)
HBA1C MFR BLD: 6 % (ref 4.8–5.6)
HCT VFR BLD AUTO: 50.7 % (ref 37.5–51)
HGB BLD-MCNC: 16.4 G/DL (ref 13–17.7)
IMM GRANULOCYTES # BLD AUTO: 0 X10E3/UL (ref 0–0.1)
IMM GRANULOCYTES NFR BLD AUTO: 0 %
IMMATURE CELLS  115398: ABNORMAL
LYMPHOCYTES # BLD AUTO: 2.3 X10E3/UL (ref 0.7–3.1)
LYMPHOCYTES NFR BLD AUTO: 28 %
MCH RBC QN AUTO: 32.5 PG (ref 26.6–33)
MCHC RBC AUTO-ENTMCNC: 32.3 G/DL (ref 31.5–35.7)
MCV RBC AUTO: 100 FL (ref 79–97)
MONOCYTES # BLD AUTO: 0.6 X10E3/UL (ref 0.1–0.9)
MONOCYTES NFR BLD AUTO: 8 %
MORPHOLOGY BLD-IMP: ABNORMAL
NEUTROPHILS # BLD AUTO: 4.9 X10E3/UL (ref 1.4–7)
NEUTROPHILS NFR BLD AUTO: 61 %
NRBC BLD AUTO-RTO: ABNORMAL %
PLATELET # BLD AUTO: 150 X10E3/UL (ref 150–450)
RBC # BLD AUTO: 5.05 X10E6/UL (ref 4.14–5.8)
WBC # BLD AUTO: 8 X10E3/UL (ref 3.4–10.8)

## 2024-08-08 ENCOUNTER — HOSPITAL ENCOUNTER (OUTPATIENT)
Dept: RADIOLOGY | Facility: MEDICAL CENTER | Age: 74
End: 2024-08-08
Attending: NURSE PRACTITIONER
Payer: COMMERCIAL

## 2024-08-08 ENCOUNTER — HOSPITAL ENCOUNTER (OUTPATIENT)
Dept: CARDIOLOGY | Facility: MEDICAL CENTER | Age: 74
End: 2024-08-08
Attending: NURSE PRACTITIONER
Payer: COMMERCIAL

## 2024-08-08 DIAGNOSIS — I25.10 CORONARY ARTERY DISEASE DUE TO CALCIFIED CORONARY LESION: ICD-10-CM

## 2024-08-08 DIAGNOSIS — I10 ESSENTIAL HYPERTENSION: ICD-10-CM

## 2024-08-08 DIAGNOSIS — I25.84 CORONARY ARTERY DISEASE DUE TO CALCIFIED CORONARY LESION: ICD-10-CM

## 2024-08-08 LAB
LV EJECT FRACT  99904: 55
LV EJECT FRACT MOD 2C 99903: 66.19
LV EJECT FRACT MOD 4C 99902: 66.66
LV EJECT FRACT MOD BP 99901: 68.89

## 2024-08-08 PROCEDURE — 93306 TTE W/DOPPLER COMPLETE: CPT

## 2024-08-08 PROCEDURE — 76775 US EXAM ABDO BACK WALL LIM: CPT

## 2024-08-08 PROCEDURE — 93306 TTE W/DOPPLER COMPLETE: CPT | Mod: 26 | Performed by: INTERNAL MEDICINE

## 2024-08-10 DIAGNOSIS — E11.618 TYPE 2 DIABETES MELLITUS WITH OTHER DIABETIC ARTHROPATHY, WITHOUT LONG-TERM CURRENT USE OF INSULIN (HCC): ICD-10-CM

## 2024-08-12 ENCOUNTER — APPOINTMENT (OUTPATIENT)
Dept: MEDICAL GROUP | Facility: MEDICAL CENTER | Age: 74
End: 2024-08-12
Payer: COMMERCIAL

## 2024-08-12 VITALS
RESPIRATION RATE: 16 BRPM | TEMPERATURE: 97.4 F | SYSTOLIC BLOOD PRESSURE: 98 MMHG | HEART RATE: 54 BPM | BODY MASS INDEX: 29.37 KG/M2 | WEIGHT: 236.2 LBS | DIASTOLIC BLOOD PRESSURE: 54 MMHG | OXYGEN SATURATION: 97 % | HEIGHT: 75 IN

## 2024-08-12 DIAGNOSIS — R41.3 MEMORY LOSS: ICD-10-CM

## 2024-08-12 DIAGNOSIS — F32.9 REACTIVE DEPRESSION: ICD-10-CM

## 2024-08-12 DIAGNOSIS — E11.40 TYPE 2 DIABETES MELLITUS WITH DIABETIC NEUROPATHY, WITHOUT LONG-TERM CURRENT USE OF INSULIN (HCC): ICD-10-CM

## 2024-08-12 DIAGNOSIS — Z12.5 SCREENING PSA (PROSTATE SPECIFIC ANTIGEN): ICD-10-CM

## 2024-08-12 PROCEDURE — 3074F SYST BP LT 130 MM HG: CPT | Performed by: PHYSICIAN ASSISTANT

## 2024-08-12 PROCEDURE — 92250 FUNDUS PHOTOGRAPHY W/I&R: CPT | Mod: TC | Performed by: PHYSICIAN ASSISTANT

## 2024-08-12 PROCEDURE — 3078F DIAST BP <80 MM HG: CPT | Performed by: PHYSICIAN ASSISTANT

## 2024-08-12 PROCEDURE — 99214 OFFICE O/P EST MOD 30 MIN: CPT | Performed by: PHYSICIAN ASSISTANT

## 2024-08-12 RX ORDER — SITAGLIPTIN 100 MG/1
100 TABLET, FILM COATED ORAL DAILY
Qty: 90 TABLET | Refills: 2 | Status: SHIPPED | OUTPATIENT
Start: 2024-08-12

## 2024-08-12 ASSESSMENT — FIBROSIS 4 INDEX: FIB4 SCORE: 2.34

## 2024-08-12 NOTE — TELEPHONE ENCOUNTER
Is the patient due for a refill? Yes    Was the patient seen the past year? Yes    Date of last office visit: 03/06/2024    Does the patient have an upcoming appointment?  Yes   If yes, When? 033/2/2025    Provider to refill:AB    Does the patient have intermediate Plus and need 100-day supply? (This applies to ALL medications) Patient does not have SCP

## 2024-08-12 NOTE — PROGRESS NOTES
Subjective:     History of Present Illness  The patient presents for evaluation of multiple medical concerns.    He reports no recent retinal screening, although he has undergone one in the past.    He is managing his diabetes with daily doses of Januvia and Jardiance.    He discontinued ezetimibe prior to his last visit. He continues to take rosuvastatin.    His depression is intermittent but relatively stable. He experienced some cognitive issues after starting ecetimibe.    SOCIAL HISTORY  He plays golf once a week.      Current medicines (including changes today)  Current Outpatient Medications   Medication Sig Dispense Refill    baclofen (LIORESAL) 20 MG tablet TAKE 1 TABLET BY MOUTH EVERY DAY 90 Tablet 1    metoprolol SR (TOPROL XL) 50 MG TABLET SR 24 HR Take 1 Tablet by mouth every day. 100 Tablet 3    apixaban (ELIQUIS) 5mg Tab Take 1 Tablet by mouth 2 times a day. 200 Tablet 3    rosuvastatin (CRESTOR) 40 MG tablet Take 1 Tablet by mouth every day. 100 Tablet 3    ramipril (ALTACE) 10 MG capsule Take 1 Capsule by mouth every day. 100 Capsule 3    JARDIANCE 25 MG Tab TAKE 1 TABLET DAILY 90 Tablet 2    JANUVIA 100 MG Tab TAKE 1 TABLET DAILY 90 Tablet 3     No current facility-administered medications for this visit.     He  has a past medical history of AAA (abdominal aortic aneurysm) ([facility]) (10/2021), Acid reflux, Arrhythmia, CAD (coronary artery disease) (1997/2017), Chronic anticoagulation, Diabetes ([facility]), Diabetic neuropathy ([facility]), Hyperlipidemia, Hypertension, Obstructive sleep apnea, Palpitations, Past heart attack (1997), Persistent atrial fibrillation ([facility]) (08/2024), Psychiatric problem, S/P drug eluting coronary stent placement (2017), Sleep apnea, and Snoring.    ROS   No chest pain, no shortness of breath, no abdominal pain  Positive ROS as per HPI.  All other systems reviewed and are negative.     Objective:     BP 98/54 (BP Location: Left arm, Patient Position: Sitting, BP Cuff Size: Adult)    "Pulse (!) 54   Temp 36.3 °C (97.4 °F) (Temporal)   Resp 16   Ht 1.905 m (6' 3\")   Wt 107 kg (236 lb 3.2 oz)   SpO2 97%  Body mass index is 29.52 kg/m².   Physical Exam    Constitutional: Alert, no distress.  Skin: Warm, dry, good turgor, no rashes in visible areas.  Eye: Equal, round and reactive, conjunctiva clear, lids normal.  ENMT: Lips without lesions, good dentition, oropharynx clear.  Neck: Trachea midline, no masses, no thyromegaly.   Psych: Alert and oriented x3, normal affect and mood.      Results          Assessment and Plan:   The following treatment plan was discussed    Assessment & Plan  1. Type 2 diabetes.  This is a chronic condition, likely stable. I reviewed medications list. I ordered labs to be done prior to next appointment past 8 hours.    2. Reactive depression.  This is a chronic condition, but stable. He is not on any medications. We will continue to monitor.    3. Memory loss.  This is a chronic condition and currently doing well with his memory. He has had a couple memory lapses with certain medications. I took off Zetia from his medications list. He will continue to follow with cardiology.    4. PSA screening.  I ordered PSA screening to be done prior to next appointment, performed after 12/30/2023.    Follow-up  The patient will follow up in 6 months.      ORDERS:  1. Type 2 diabetes mellitus with diabetic neuropathy, without long-term current use of insulin (HCC)    - CBC WITH DIFFERENTIAL; Future  - Comp Metabolic Panel; Future  - HEMOGLOBIN A1C; Future  - Lipid Profile; Future  - MICROALBUMIN CREAT RATIO URINE; Future  - TSH WITH REFLEX TO FT4; Future  - POCT Retinal Eye Exam    2. Reactive depression      3. Memory loss      4. Screening PSA (prostate specific antigen)    - PROSTATE SPECIFIC AG SCREENING; Future        Please note that this dictation was created using voice recognition software. I have made every reasonable attempt to correct obvious errors, but I expect that " there are errors of grammar and possibly content that I did not discover before finalizing the note.      Attestation      Verbal consent was acquired by the patient to use [employer] ambient listening note generation during this visit Yes

## 2024-08-14 LAB — RETINAL SCREEN: NEGATIVE

## 2024-09-06 RX ORDER — BACLOFEN 20 MG/1
TABLET ORAL
Qty: 90 TABLET | Refills: 1 | Status: SHIPPED | OUTPATIENT
Start: 2024-09-06

## 2024-10-23 RX ORDER — EMPAGLIFLOZIN 25 MG/1
TABLET, FILM COATED ORAL
Qty: 90 TABLET | Refills: 2 | Status: SHIPPED | OUTPATIENT
Start: 2024-10-23

## 2024-12-30 DIAGNOSIS — R41.3 MEMORY LOSS: ICD-10-CM

## 2024-12-31 ENCOUNTER — OFFICE VISIT (OUTPATIENT)
Dept: MEDICAL GROUP | Facility: MEDICAL CENTER | Age: 74
End: 2024-12-31
Payer: COMMERCIAL

## 2024-12-31 VITALS
DIASTOLIC BLOOD PRESSURE: 60 MMHG | BODY MASS INDEX: 29.67 KG/M2 | SYSTOLIC BLOOD PRESSURE: 130 MMHG | TEMPERATURE: 97.9 F | HEART RATE: 89 BPM | WEIGHT: 238.6 LBS | HEIGHT: 75 IN | OXYGEN SATURATION: 97 %

## 2024-12-31 DIAGNOSIS — G89.29 CHRONIC RIGHT-SIDED LOW BACK PAIN WITH RIGHT-SIDED SCIATICA: ICD-10-CM

## 2024-12-31 DIAGNOSIS — R41.3 MEMORY LOSS: ICD-10-CM

## 2024-12-31 DIAGNOSIS — Z12.5 ENCOUNTER FOR SCREENING PROSTATE SPECIFIC ANTIGEN (PSA) MEASUREMENT: ICD-10-CM

## 2024-12-31 DIAGNOSIS — M54.41 CHRONIC RIGHT-SIDED LOW BACK PAIN WITH RIGHT-SIDED SCIATICA: ICD-10-CM

## 2024-12-31 DIAGNOSIS — E11.40 TYPE 2 DIABETES MELLITUS WITH DIABETIC NEUROPATHY, WITHOUT LONG-TERM CURRENT USE OF INSULIN (HCC): ICD-10-CM

## 2024-12-31 ASSESSMENT — FIBROSIS 4 INDEX: FIB4 SCORE: 2.34

## 2024-12-31 NOTE — PROGRESS NOTES
Subjective:     History of Present Illness  The patient presents for a follow-up visit.    His daughter has expressed concerns regarding his memory, which he acknowledges. He recalls successfully completing a recall words test but expresses doubt about his ability to do so currently. He mentions a family history of memory loss in his mother as she aged. He is open to consulting a neurologist for further evaluation. He had memory issues before, which were thought to be due to Ozempic. After stopping Ozempic, his memory did improve.    He was seen in the emergency room at [facility] yesterday for back pain and was prescribed muscle relaxers and narcotics. He reports that his back pain has improved since then. He has not been referred to physical therapy and does not wish to pursue it at this time. He has a history of back issues dating back to his teenage years, including a slipped disc and sciatica, which he experienced during high school. Currently, he reports no radiating pain down his right leg.    He has not yet completed the laboratory tests ordered during his last visit. He maintains regular contact with his daughter and typically utilizes [facility] for his lab work. He is uncertain about his recent lab visits.      Current medicines (including changes today)  Current Outpatient Medications   Medication Sig Dispense Refill    JARDIANCE 25 MG Tab TAKE 1 TABLET DAILY 90 Tablet 2    baclofen (LIORESAL) 20 MG tablet TAKE 1 TABLET BY MOUTH EVERY DAY 90 Tablet 1    JANUVIA 100 MG Tab TAKE 1 TABLET DAILY 90 Tablet 2    metoprolol SR (TOPROL XL) 50 MG TABLET SR 24 HR Take 1 Tablet by mouth every day. 100 Tablet 3    apixaban (ELIQUIS) 5mg Tab Take 1 Tablet by mouth 2 times a day. 200 Tablet 3    rosuvastatin (CRESTOR) 40 MG tablet Take 1 Tablet by mouth every day. 100 Tablet 3    ramipril (ALTACE) 10 MG capsule Take 1 Capsule by mouth every day. 100 Capsule 3     No current facility-administered medications for this  "visit.     He  has a past medical history of AAA (abdominal aortic aneurysm) ([facility]) (10/2021), Acid reflux, Arrhythmia, CAD (coronary artery disease) (1997/2017), Chronic anticoagulation, Diabetes ([facility]), Diabetic neuropathy ([facility]), Hyperlipidemia, Hypertension, Obstructive sleep apnea, Palpitations, Past heart attack (1997), Persistent atrial fibrillation ([facility]) (08/2024), Psychiatric problem, S/P drug eluting coronary stent placement (2017), Sleep apnea, and Snoring.    ROS   No chest pain, no shortness of breath, no abdominal pain  Positive ROS as per HPI.  All other systems reviewed and are negative.     Objective:     /60 (BP Location: Left arm, Patient Position: Sitting, BP Cuff Size: Adult)   Pulse 89   Temp 36.6 °C (97.9 °F) (Temporal)   Ht 1.905 m (6' 3\")   Wt 108 kg (238 lb 9.6 oz)   SpO2 97%  Body mass index is 29.82 kg/m².   Physical Exam  Vital Signs  Weight is 238.  Constitutional: Alert, no distress.  Skin: Warm, dry, good turgor, no rashes in visible areas.  Eye: Equal, round and reactive, conjunctiva clear, lids normal.  ENMT: Lips without lesions, good dentition, oropharynx clear.  Neck: Trachea midline, no masses, no thyromegaly.   Psych: Alert and oriented x3, normal affect and mood.      Results          Assessment and Plan:   The following treatment plan was discussed    Assessment & Plan  1. Memory impairment.  Chronic condition. His memory has shown significant improvement since discontinuing Ozempic. He successfully recalled all three words during the recall test, indicating a positive cognitive state. A referral to neurology has been initiated for a comprehensive memory evaluation. He is advised to contact the neurology department to schedule an appointment as referral was ordered yesterday. Contact information was provided.      2. Back pain.  Chronic condition with recent exacerbation. Stable. He was seen in the emergency room yesterday for back pain and was prescribed muscle " relaxers and narcotics. He reports that his back pain has improved since then.  Read the ER note. Will continue to monitor his symptoms.    3. Health maintenance.  He is due for laboratory tests, including A1c, lipid panel, microalbumin, and kidney function tests. These tests have been ordered to be conducted at [facility]. These are overdue so he may do them whenever he has the time. Fast 8 hours.    Follow-up  The patient will follow up in 3 months.      ORDERS:  1. Memory loss      2. Type 2 diabetes mellitus with diabetic neuropathy, without long-term current use of insulin ([facility])    - Comp Metabolic Panel; Future  - Hemoglobin A1c; Future  - Lipid Profile; Future  - Microalbumin Creat Ratio Urine - Lab Collect; Future    3. Chronic right-sided low back pain with right-sided sciatica      4. Encounter for screening prostate specific antigen (PSA) measurement    - PROSTATE SPECIFIC AG SCREENING; Future        Please note that this dictation was created using voice recognition software. I have made every reasonable attempt to correct obvious errors, but I expect that there are errors of grammar and possibly content that I did not discover before finalizing the note.      Attestation      Verbal consent was acquired by the patient to use [employer] ambient listening note generation during this visit Yes

## 2025-01-27 ENCOUNTER — HOSPITAL ENCOUNTER (OUTPATIENT)
Facility: MEDICAL CENTER | Age: 75
End: 2025-01-27
Attending: PHYSICIAN ASSISTANT
Payer: COMMERCIAL

## 2025-01-27 DIAGNOSIS — Z12.5 ENCOUNTER FOR SCREENING PROSTATE SPECIFIC ANTIGEN (PSA) MEASUREMENT: ICD-10-CM

## 2025-01-27 DIAGNOSIS — E11.40 TYPE 2 DIABETES MELLITUS WITH DIABETIC NEUROPATHY, WITHOUT LONG-TERM CURRENT USE OF INSULIN (HCC): ICD-10-CM

## 2025-01-27 LAB
ALBUMIN SERPL BCP-MCNC: 4.2 G/DL (ref 3.2–4.9)
ALBUMIN/GLOB SERPL: 1.6 G/DL
ALP SERPL-CCNC: 63 U/L (ref 30–99)
ALT SERPL-CCNC: 19 U/L (ref 2–50)
ANION GAP SERPL CALC-SCNC: 12 MMOL/L (ref 7–16)
AST SERPL-CCNC: 24 U/L (ref 12–45)
BILIRUB SERPL-MCNC: 0.9 MG/DL (ref 0.1–1.5)
BUN SERPL-MCNC: 14 MG/DL (ref 8–22)
CALCIUM ALBUM COR SERPL-MCNC: 8.8 MG/DL (ref 8.5–10.5)
CALCIUM SERPL-MCNC: 9 MG/DL (ref 8.5–10.5)
CHLORIDE SERPL-SCNC: 104 MMOL/L (ref 96–112)
CHOLEST SERPL-MCNC: 178 MG/DL (ref 100–199)
CO2 SERPL-SCNC: 22 MMOL/L (ref 20–33)
CREAT SERPL-MCNC: 1.23 MG/DL (ref 0.5–1.4)
EST. AVERAGE GLUCOSE BLD GHB EST-MCNC: 126 MG/DL
GFR SERPLBLD CREATININE-BSD FMLA CKD-EPI: 62 ML/MIN/1.73 M 2
GLOBULIN SER CALC-MCNC: 2.6 G/DL (ref 1.9–3.5)
GLUCOSE SERPL-MCNC: 111 MG/DL (ref 65–99)
HBA1C MFR BLD: 6 % (ref 4–5.6)
HDLC SERPL-MCNC: 58 MG/DL
LDLC SERPL CALC-MCNC: 100 MG/DL
POTASSIUM SERPL-SCNC: 4.8 MMOL/L (ref 3.6–5.5)
PROT SERPL-MCNC: 6.8 G/DL (ref 6–8.2)
PSA SERPL DL<=0.01 NG/ML-MCNC: 1.09 NG/ML (ref 0–4)
SODIUM SERPL-SCNC: 138 MMOL/L (ref 135–145)
TRIGL SERPL-MCNC: 100 MG/DL (ref 0–149)

## 2025-01-27 PROCEDURE — 82043 UR ALBUMIN QUANTITATIVE: CPT

## 2025-01-27 PROCEDURE — 80061 LIPID PANEL: CPT

## 2025-01-27 PROCEDURE — 36415 COLL VENOUS BLD VENIPUNCTURE: CPT

## 2025-01-27 PROCEDURE — 82570 ASSAY OF URINE CREATININE: CPT

## 2025-01-27 PROCEDURE — 83036 HEMOGLOBIN GLYCOSYLATED A1C: CPT

## 2025-01-27 PROCEDURE — 80053 COMPREHEN METABOLIC PANEL: CPT

## 2025-01-27 PROCEDURE — 84153 ASSAY OF PSA TOTAL: CPT

## 2025-01-28 LAB
CREAT UR-MCNC: 133 MG/DL
MICROALBUMIN UR-MCNC: 6.7 MG/DL
MICROALBUMIN/CREAT UR: 50 MG/G (ref 0–30)

## 2025-02-03 ENCOUNTER — APPOINTMENT (OUTPATIENT)
Dept: MEDICAL GROUP | Facility: MEDICAL CENTER | Age: 75
End: 2025-02-03
Payer: COMMERCIAL

## 2025-02-03 VITALS
HEIGHT: 75 IN | SYSTOLIC BLOOD PRESSURE: 100 MMHG | BODY MASS INDEX: 28.62 KG/M2 | OXYGEN SATURATION: 96 % | WEIGHT: 230.2 LBS | HEART RATE: 88 BPM | TEMPERATURE: 97.4 F | DIASTOLIC BLOOD PRESSURE: 50 MMHG

## 2025-02-03 DIAGNOSIS — Z12.12 SCREENING FOR COLORECTAL CANCER: ICD-10-CM

## 2025-02-03 DIAGNOSIS — R41.3 MEMORY LOSS: ICD-10-CM

## 2025-02-03 DIAGNOSIS — Z12.11 SCREENING FOR COLORECTAL CANCER: ICD-10-CM

## 2025-02-03 DIAGNOSIS — E78.5 DYSLIPIDEMIA: ICD-10-CM

## 2025-02-03 PROCEDURE — 3078F DIAST BP <80 MM HG: CPT | Performed by: PHYSICIAN ASSISTANT

## 2025-02-03 PROCEDURE — 99214 OFFICE O/P EST MOD 30 MIN: CPT | Performed by: PHYSICIAN ASSISTANT

## 2025-02-03 PROCEDURE — 3074F SYST BP LT 130 MM HG: CPT | Performed by: PHYSICIAN ASSISTANT

## 2025-02-03 ASSESSMENT — FIBROSIS 4 INDEX: FIB4 SCORE: 2.68

## 2025-02-03 ASSESSMENT — PATIENT HEALTH QUESTIONNAIRE - PHQ9
SUM OF ALL RESPONSES TO PHQ QUESTIONS 1-9: 2
CLINICAL INTERPRETATION OF PHQ2 SCORE: 1
5. POOR APPETITE OR OVEREATING: 0 - NOT AT ALL

## 2025-02-04 NOTE — PROGRESS NOTES
Subjective:     History of Present Illness  The patient presents for evaluation of memory loss. He is accompanied by his daughter, Jessica.    He has been experiencing memory issues, particularly during conversations where he forgets mid-sentence. Despite performing well on the word recall test, he struggles to remember significant details such as his daughter's school attendance for the past 2 years. His daughter reports that these memory issues do not interfere with his ability to perform daily activities independently. He also exhibits repetitive questioning and reliance on written notes for recall. He has an upcoming appointment with a neurologist in 05/2025. He has previously experienced delusions, such as believing his bank account had been drained. He recalls a period of confusion while on metoprolol succinate, which improved after discontinuation of the medication. His daughter notes that his condition improved significantly after discontinuing Ozempic, but he continues to experience memory issues. He is currently taking metoprolol tartrate and Januvia. His daughter expresses concern about potential delusions and fears related to his memory loss. He has previously undergone an MRI, but not within the last 6 months.    He has a history of depression, which was particularly severe a few years ago.     MEDICATIONS  Current: Metoprolol SR, Crestor, ramipril, Eliquis, Januvia, Jardiance, baclofen.  Discontinued: Ozempic, metoprolol succinate.      Current medicines (including changes today)  Current Outpatient Medications   Medication Sig Dispense Refill    JARDIANCE 25 MG Tab TAKE 1 TABLET DAILY 90 Tablet 2    baclofen (LIORESAL) 20 MG tablet TAKE 1 TABLET BY MOUTH EVERY DAY 90 Tablet 1    JANUVIA 100 MG Tab TAKE 1 TABLET DAILY 90 Tablet 2    metoprolol SR (TOPROL XL) 50 MG TABLET SR 24 HR Take 1 Tablet by mouth every day. 100 Tablet 3    apixaban (ELIQUIS) 5mg Tab Take 1 Tablet by mouth 2 times a day. 200 Tablet  "3    rosuvastatin (CRESTOR) 40 MG tablet Take 1 Tablet by mouth every day. 100 Tablet 3    ramipril (ALTACE) 10 MG capsule Take 1 Capsule by mouth every day. 100 Capsule 3     No current facility-administered medications for this visit.     He  has a past medical history of AAA (abdominal aortic aneurysm) ([facility]) (10/2021), Acid reflux, Arrhythmia, CAD (coronary artery disease) (1997/2017), Chronic anticoagulation, Diabetes ([facility]), Diabetic neuropathy ([facility]), Hyperlipidemia, Hypertension, Obstructive sleep apnea, Palpitations, Past heart attack (1997), Persistent atrial fibrillation ([facility]) (08/2024), Psychiatric problem, S/P drug eluting coronary stent placement (2017), Sleep apnea, and Snoring.    ROS   No chest pain, no shortness of breath, no abdominal pain  Positive ROS as per HPI.  All other systems reviewed and are negative.     Objective:     /50 (BP Location: Left arm, Patient Position: Sitting, BP Cuff Size: Adult)   Pulse 88   Temp 36.3 °C (97.4 °F) (Temporal)   Ht 1.905 m (6' 3\")   Wt 104 kg (230 lb 3.2 oz)   SpO2 96%  Body mass index is 28.77 kg/m².   Physical Exam    Constitutional: Alert, no distress.  Skin: Warm, dry, good turgor, no rashes in visible areas.  Eye: Equal, round and reactive, conjunctiva clear, lids normal.  ENMT: Lips without lesions, good dentition, oropharynx clear.  Neck: Trachea midline, no masses, no thyromegaly.  Psych: Alert and oriented x3, normal affect and mood.      Results  Laboratory Studies  LDL cholesterol is 100.    Imaging  MRI of the brain (2023) shows microscopic vascular changes, likely related to chronic microvascular ischemia.    Testing  PHQ-9 score is 2.        Assessment and Plan:   The following treatment plan was discussed    Assessment & Plan  1. Memory loss.  Chronic condition. His PHQ-9 score is low at 2, indicating no current depression. He has a history of severe depression a few years ago but exhibits no depression today. His memory loss could be " related to his high-dose rosuvastatin (Crestor) regimen, as post-marketing data suggests potential memory impairment. His recent lab results were generally good, although his cholesterol levels could be improved.  Appears that he may not be taking rosuvastatin.  His diabetes is well-managed. He will discontinue rosuvastatin until the next visit in the March. A complete blood count, vitamin B12, and thyroid level tests will be ordered to further investigate the cause of his memory loss. These tests can be conducted without fasting. He is scheduled to see Dr. Leonardo, who may recommend an MRI or CT scan of the brain again. He is advised to contact neurology to be placed on a cancellation list for an earlier appointment.    2. Depression.  Chronic condition but in remission. He has a history of severe depression a few years ago and is currently not on medication for it. His PHQ-9 score is low at 2, indicating no current depression.    3. Health maintenance.  He is due for colon cancer screening. A Cologuard test will be ordered as an alternative to a colonoscopy.    Follow-up  The patient will follow up in 1 month.      ORDERS:  1. Memory loss    - TSH WITH REFLEX TO FT4; Future  - VITAMIN B12; Future  - CBC WITHOUT DIFFERENTIAL; Future    2. Dyslipidemia      3. Screening for colorectal cancer    - Cologuard® colon cancer screening        Please note that this dictation was created using voice recognition software. I have made every reasonable attempt to correct obvious errors, but I expect that there are errors of grammar and possibly content that I did not discover before finalizing the note.      Attestation      Verbal consent was acquired by the patient to use [employer] ambient listening note generation during this visit Yes

## 2025-02-26 ENCOUNTER — RESULTS FOLLOW-UP (OUTPATIENT)
Dept: MEDICAL GROUP | Facility: MEDICAL CENTER | Age: 75
End: 2025-02-26
Payer: COMMERCIAL

## 2025-02-26 LAB — NONINV COLON CA DNA+OCC BLD SCRN STL QL: NEGATIVE

## 2025-03-07 RX ORDER — BACLOFEN 20 MG/1
20 TABLET ORAL
Qty: 90 TABLET | Refills: 1 | Status: SHIPPED | OUTPATIENT
Start: 2025-03-07

## 2025-03-07 NOTE — TELEPHONE ENCOUNTER
Received request via: Pharmacy    Was the patient seen in the last year in this department? Yes    Does the patient have an active prescription (recently filled or refills available) for medication(s) requested? No    Pharmacy Name: [facility]/pharmacy #[id number] - [place], [place] - [address]      Does the patient have [facility] Plus and need 100-day supply? (This applies to ALL medications) Patient does not have SCP

## 2025-03-12 ENCOUNTER — OFFICE VISIT (OUTPATIENT)
Dept: CARDIOLOGY | Facility: PHYSICIAN GROUP | Age: 75
End: 2025-03-12
Payer: COMMERCIAL

## 2025-03-12 VITALS
WEIGHT: 235.89 LBS | SYSTOLIC BLOOD PRESSURE: 106 MMHG | HEIGHT: 75 IN | BODY MASS INDEX: 29.33 KG/M2 | DIASTOLIC BLOOD PRESSURE: 62 MMHG | OXYGEN SATURATION: 95 % | RESPIRATION RATE: 12 BRPM | HEART RATE: 81 BPM

## 2025-03-12 DIAGNOSIS — I48.21 PERMANENT ATRIAL FIBRILLATION (HCC): ICD-10-CM

## 2025-03-12 DIAGNOSIS — Z86.79 STATUS POST ABDOMINAL AORTIC ANEURYSM (AAA) REPAIR: ICD-10-CM

## 2025-03-12 DIAGNOSIS — Z95.5 S/P DRUG ELUTING CORONARY STENT PLACEMENT: ICD-10-CM

## 2025-03-12 DIAGNOSIS — G47.33 OBSTRUCTIVE SLEEP APNEA SYNDROME: ICD-10-CM

## 2025-03-12 DIAGNOSIS — E11.40 TYPE 2 DIABETES MELLITUS WITH DIABETIC NEUROPATHY, WITHOUT LONG-TERM CURRENT USE OF INSULIN (HCC): ICD-10-CM

## 2025-03-12 DIAGNOSIS — I25.10 CORONARY ARTERY DISEASE DUE TO CALCIFIED CORONARY LESION: ICD-10-CM

## 2025-03-12 DIAGNOSIS — I48.19 PERSISTENT ATRIAL FIBRILLATION (HCC): ICD-10-CM

## 2025-03-12 DIAGNOSIS — I25.84 CORONARY ARTERY DISEASE DUE TO CALCIFIED CORONARY LESION: ICD-10-CM

## 2025-03-12 DIAGNOSIS — I48.21 HYPERCOAGULABLE STATE DUE TO PERMANENT ATRIAL FIBRILLATION (HCC): ICD-10-CM

## 2025-03-12 DIAGNOSIS — Z98.890 STATUS POST ABDOMINAL AORTIC ANEURYSM (AAA) REPAIR: ICD-10-CM

## 2025-03-12 DIAGNOSIS — I10 ESSENTIAL HYPERTENSION: ICD-10-CM

## 2025-03-12 DIAGNOSIS — D68.69 HYPERCOAGULABLE STATE DUE TO PERMANENT ATRIAL FIBRILLATION (HCC): ICD-10-CM

## 2025-03-12 DIAGNOSIS — E78.5 DYSLIPIDEMIA: ICD-10-CM

## 2025-03-12 DIAGNOSIS — I71.40 ABDOMINAL AORTIC ANEURYSM (AAA) 3.0 CM TO 5.5 CM IN DIAMETER IN MALE (HCC): ICD-10-CM

## 2025-03-12 DIAGNOSIS — Z79.01 ANTICOAGULATED: ICD-10-CM

## 2025-03-12 PROCEDURE — 99214 OFFICE O/P EST MOD 30 MIN: CPT | Performed by: NURSE PRACTITIONER

## 2025-03-12 PROCEDURE — 3078F DIAST BP <80 MM HG: CPT | Performed by: NURSE PRACTITIONER

## 2025-03-12 PROCEDURE — 3074F SYST BP LT 130 MM HG: CPT | Performed by: NURSE PRACTITIONER

## 2025-03-12 RX ORDER — ROSUVASTATIN CALCIUM 40 MG/1
40 TABLET, COATED ORAL DAILY
Qty: 100 TABLET | Refills: 3 | Status: SHIPPED | OUTPATIENT
Start: 2025-03-12

## 2025-03-12 RX ORDER — EZETIMIBE 10 MG/1
10 TABLET ORAL DAILY
Qty: 100 TABLET | Refills: 3 | Status: SHIPPED | OUTPATIENT
Start: 2025-03-12

## 2025-03-12 RX ORDER — RAMIPRIL 10 MG/1
10 CAPSULE ORAL
Qty: 100 CAPSULE | Refills: 3 | Status: SHIPPED | OUTPATIENT
Start: 2025-03-12

## 2025-03-12 RX ORDER — METOPROLOL SUCCINATE 50 MG/1
50 TABLET, EXTENDED RELEASE ORAL DAILY
Qty: 100 TABLET | Refills: 3 | Status: SHIPPED | OUTPATIENT
Start: 2025-03-12

## 2025-03-12 ASSESSMENT — ENCOUNTER SYMPTOMS
DIZZINESS: 0
BRUISES/BLEEDS EASILY: 0
PALPITATIONS: 0
LOSS OF CONSCIOUSNESS: 0
FEVER: 0
ABDOMINAL PAIN: 0
COUGH: 0
INSOMNIA: 0
HEADACHES: 0
PND: 0
NAUSEA: 0
CHILLS: 0
BACK PAIN: 1
SHORTNESS OF BREATH: 0
MYALGIAS: 0
ORTHOPNEA: 0

## 2025-03-12 ASSESSMENT — FIBROSIS 4 INDEX: FIB4 SCORE: 2.68

## 2025-03-12 NOTE — PROGRESS NOTES
Chief Complaint   Patient presents with    Follow-Up    Coronary Artery Disease    Atrial Fibrillation    Anticoagulation    Abdominal Aortic Aneurysm    HTN (Controlled)    Hyperlipidemia    Diabetes (Controlled)       Subjective     Kun Lisa is a 74 y.o. male who presents today for annual follow-up of CAD, permanent atrial fibrillation, anticoagulation, AAA, HTN, hyperlipidemia, DM and SID.    Kun is a 74 year old male with history of CAD, status post remote MI in 1997 with stents in the LAD and LCx, and PCI/JOE to the LCx in April 2017, persistent atrial fibrillation, anticoagulation with Eliquis, hypertension, hyperlipidemia, DM,  SID (on CPAP) and AAA status post EVAR in October 2021 (Dr. Pepper), last seen by me in March 2024.    In August 2024, echocardiogram showed normal LV size and function, dilated LA and RA, and mild MR/TR. Abdominal US showed distal aorta at 4.9cm; he was advised to follow-up with Dr. Pepper, which he has not done.  In December 2024, he was seen in the ER for back pain.     He is here today for annual follow-up. Generally, he is doing fine and denies any symptoms or problems. He still enjoys golfing in the warmer weather.    He denies any chest pain, pressure, tightness or discomfort; very mild shortness of breath, but unchanged from previous; no dyspnea, orthopnea or PND; no dizziness or syncope; very mild, stable LE edema. BP is stable. He does use a CPAP.      He does admit to some ongoing depression symptoms, given he is quite isolated, and most of his friends live elsewhere.     Past Medical History:   Diagnosis Date    AAA (abdominal aortic aneurysm) ([facility]) 10/2021    Status post EVAR by Dr. Pepper. August 2024:  AAA with proximal aorta 3.1 x 3.2cm, mid aorta 3.0 x 3.2cm, distal aorta 4.4 x 4.9cm.    Acid reflux     Arrhythmia     CAD (coronary artery disease) 1997/2017 1997: MI, stents to LAD and LCx. April 2017: PCI/JOE (Synergy 2.5 x 16mm) the distal LCx.    Chronic  anticoagulation     Diabetes (HCC)     Oral meds    Diabetic neuropathy (HCC)     Bilateral feet    Hyperlipidemia     Hypertension     Obstructive sleep apnea     CPAP    Palpitations     Past heart attack     Persistent atrial fibrillation (HCC) 2024    Echocardiogram with normal LV size, LVEF 55%. Normal RV. Enlarged RA, moderately dilated LA. mild MR, trace AR, mild TR. RVSP 43mmHg.    Psychiatric problem     Depression    S/P drug eluting coronary stent placement     Sleep apnea     CPAP    Snoring      Past Surgical History:   Procedure Laterality Date    CATARACT EXTRACTION WITH IOL Bilateral     ZZZ CARDIAC CATH  17    Synergy stent to Circ    STENT PLACEMENT      LAD    ZZZ CARDIAC CATH      LAD stent    ANGIOPLASTY       Family History   Problem Relation Age of Onset    Stroke Father 57         at 62 of second stroke    Arthritis Mother     Stroke Mother     Alzheimer's Disease Mother      Social History     Socioeconomic History    Marital status:      Spouse name: Not on file    Number of children: Not on file    Years of education: Not on file    Highest education level: Not on file   Occupational History    Not on file   Tobacco Use    Smoking status: Former     Current packs/day: 0.00     Average packs/day: 1 pack/day for 32.0 years (32.0 ttl pk-yrs)     Types: Cigarettes     Start date: 1971     Quit date: 2003     Years since quittin.7    Smokeless tobacco: Never   Vaping Use    Vaping status: Never Used   Substance and Sexual Activity    Alcohol use: Not Currently     Alcohol/week: 0.0 oz    Drug use: Not Currently     Types: Marijuana, Inhaled     Comment: 2-3 times weekly of medical marijuana    Sexual activity: Not on file   Other Topics Concern    Not on file   Social History Narrative    Not on file     Social Drivers of Health     Financial Resource Strain: Not on file   Food Insecurity: Not on file   Transportation Needs: Not on file    Physical Activity: Not on file   Stress: Not on file   Social Connections: Not on file   Intimate Partner Violence: Not on file   Housing Stability: Not on file     Allergies   Allergen Reactions    Ozempic (0.25 Or 0.5 Mg-Dose) [Semaglutide]      Memory complaints     Outpatient Encounter Medications as of 3/12/2025   Medication Sig Dispense Refill    ezetimibe (ZETIA) 10 MG Tab Take 1 Tablet by mouth every day. 100 Tablet 3    metoprolol SR (TOPROL XL) 50 MG TABLET SR 24 HR Take 1 Tablet by mouth every day. 100 Tablet 3    apixaban (ELIQUIS) 5mg Tab Take 1 Tablet by mouth 2 times a day. 200 Tablet 3    rosuvastatin (CRESTOR) 40 MG tablet Take 1 Tablet by mouth every day. 100 Tablet 3    ramipril (ALTACE) 10 MG capsule Take 1 Capsule by mouth every day. 100 Capsule 3    baclofen (LIORESAL) 20 MG tablet TAKE 1 TABLET BY MOUTH EVERY DAY 90 Tablet 1    JARDIANCE 25 MG Tab TAKE 1 TABLET DAILY 90 Tablet 2    JANUVIA 100 MG Tab TAKE 1 TABLET DAILY 90 Tablet 2    [DISCONTINUED] metoprolol SR (TOPROL XL) 50 MG TABLET SR 24 HR Take 1 Tablet by mouth every day. 100 Tablet 3    [DISCONTINUED] apixaban (ELIQUIS) 5mg Tab Take 1 Tablet by mouth 2 times a day. 200 Tablet 3    [DISCONTINUED] rosuvastatin (CRESTOR) 40 MG tablet Take 1 Tablet by mouth every day. 100 Tablet 3    [DISCONTINUED] ramipril (ALTACE) 10 MG capsule Take 1 Capsule by mouth every day. 100 Capsule 3     No facility-administered encounter medications on file as of 3/12/2025.     Review of Systems   Constitutional:  Negative for chills and fever.   HENT:  Negative for congestion.    Respiratory:  Negative for cough and shortness of breath.    Cardiovascular:  Negative for chest pain, palpitations, orthopnea, leg swelling and PND.   Gastrointestinal:  Negative for abdominal pain and nausea.   Musculoskeletal:  Positive for back pain and joint pain. Negative for myalgias.   Skin:  Negative for rash.   Neurological:  Negative for dizziness, loss of consciousness  "and headaches.   Endo/Heme/Allergies:  Does not bruise/bleed easily.   Psychiatric/Behavioral:  The patient does not have insomnia.               Objective     /62 (BP Location: Left arm, Patient Position: Sitting, BP Cuff Size: Adult)   Pulse 81   Resp 12   Ht 1.905 m (6' 3\")   Wt 107 kg (235 lb 14.3 oz)   SpO2 95%   BMI 29.48 kg/m²     Physical Exam  Constitutional:       Appearance: He is well-developed.      Comments: BMI 29.48   HENT:      Head: Normocephalic.   Neck:      Vascular: No JVD.   Cardiovascular:      Rate and Rhythm: Normal rate. Rhythm irregular.      Heart sounds: Normal heart sounds.      Comments: HR controlled  Pulmonary:      Effort: Pulmonary effort is normal. No respiratory distress.      Breath sounds: Normal breath sounds. No wheezing or rales.   Abdominal:      General: Bowel sounds are normal. There is no distension.      Palpations: Abdomen is soft.      Tenderness: There is no abdominal tenderness.   Musculoskeletal:         General: Normal range of motion.      Cervical back: Normal range of motion and neck supple.      Right lower leg: Edema present.      Left lower leg: Edema present.      Comments: Trace LE edema to the ankles bilaterally, R>L   Skin:     General: Skin is warm and dry.      Findings: No rash.   Neurological:      Mental Status: He is alert and oriented to person, place, and time.     ECHOCARDIOGRAPHY:    CONCLUSIONS OF TTE OF 8/8/2024:  Normal left ventricular systolic function.  Mild mitral regurgitation.  Moderately dilated left atrium.  Enlarged right atrium.  Right ventricular systolic pressure is estimated to be  43 mmHg.    CONCLUSIONS OF ECHOCARDIOGRAM OF 9/22/2021:  The left ventricular ejection fraction is visually estimated to be 65%.  Mildly dilated left atrium.  Estimated right ventricular systolic pressure is 25 mmHg.  Compared to prior echo 03/03/17, no significant change, LVH not appreciated.    ABDOMINAL ULTRASOUNDS:    IMPRESSION OF " ABDOMINAL US OF 8/8/2024:  1.  Infrarenal abdominal aortic aneurysm measuring 4.4 x 4.9 cm in diameter.  2.  Moderate atherosclerotic plaque.     FINDINGS OF AORTA/ILIAC US OF 5/11/2021:  Widening of the infrarenal aorta to a maximum diameter of 5.3 cm.   Intraluminal thrombus noted along the proximal wall of the aneurysm.   No elevated velocities in the celiac and superior mesenteric arteries, though the origins are not visualized due to limitations.  Waveforms and velocities of the bilateral iliac arteries are normal. Outflow   Doppler waveforms are triphasic bilaterally.  Ectasia noted of the proximal right common iliac artery.  Common iliac diameters (cm):    Right: Proximal- 1.8, Mid- 1.6, Distal- 1.4.  Left: Proximal-1.6, Distal-1.1.  External iliac diameters (cm):  Right: Proximal- 1.3, Mid- 1.3, Distal- 0.96.  Left: Proximal- 0.98, Mid- 1.3, Distal- 1.0.     CORONARY ANGIOGRAPHY:    POSTPROCEDURE DIAGNOSES OF [facility] OF 4/27/20217:  1.  Coronary artery disease including patent stent in the mid left anterior descending artery with nonobstructive in-stent restenosis, high-grade distal   circumflex artery of a codominant system supplying excellent collaterals to mid to distal right coronary artery with long chronic total occlusion of the   ostial to mid right coronary artery.  2.  Successful percutaneous transluminal coronary angioplasty/stent placement of the distal circumflex artery with 2.5x16 mm Synergy drug-eluting stent.     Lab Results   Component Value Date/Time    CHOLSTRLTOT 178 01/27/2025 11:55 AM     (H) 01/27/2025 11:55 AM    HDL 58 01/27/2025 11:55 AM    TRIGLYCERIDE 100 01/27/2025 11:55 AM        Lab Results   Component Value Date/Time    ASTSGOT 24 01/27/2025 11:55 AM    ALTSGPT 19 01/27/2025 11:55 AM       Lab Results   Component Value Date/Time    SODIUM 138 01/27/2025 11:55 AM    POTASSIUM 4.8 01/27/2025 11:55 AM    CHLORIDE 104 01/27/2025 11:55 AM    CO2 22 01/27/2025 11:55 AM    GLUCOSE  111 (H) 01/27/2025 11:55 AM    BUN 14 01/27/2025 11:55 AM    CREATININE 1.23 01/27/2025 11:55 AM    BUNCREATRAT 15 12/27/2023 04:14 AM        Assessment & Plan     1. Coronary artery disease due to calcified coronary lesion: Stent to distal circumflex, patent LAD stent and  of RCA in April 2017        2. S/P drug eluting coronary stent placement        3. Permanent atrial fibrillation (HCC)  apixaban (ELIQUIS) 5mg Tab      4. Persistent atrial fibrillation (HCC)        5. Hypercoagulable state due to permanent atrial fibrillation (HCC)  apixaban (ELIQUIS) 5mg Tab      6. Anticoagulated        7. History of abdominal aortic aneurysm (AAA) 3.0 cm to 5.5 cm in diameter in male        8. Status post abdominal aortic aneurysm (AAA) repair        9. Essential hypertension  metoprolol SR (TOPROL XL) 50 MG TABLET SR 24 HR    ramipril (ALTACE) 10 MG capsule      10. Dyslipidemia  ezetimibe (ZETIA) 10 MG Tab    Lipid Profile    rosuvastatin (CRESTOR) 40 MG tablet    ramipril (ALTACE) 10 MG capsule      11. Type 2 diabetes mellitus with diabetic neuropathy, without long-term current use of insulin ([facility])        12. Obstructive sleep apnea syndrome            Medical Decision Making: Today's Assessment/Status/Plan:        1. CAD, status post remote MI in 1997 with stents x 2, and PCI/JOE to the LCx in 2017. No angina or shortness of breath. Echocardiogram in August 2024 was stable from previous.Continue:  Eliquis 5mg twice daily  Crestor 40mg once daily  Toprol XL 50mg once daily  Altace 10mg once daily  RE-ADD Zetia 10mg once daily     2. Persistent atrial fibrillation, rate controlled with Toprol XL, asymptomatic.       3. Chronic anticoagulation with Eliquis, no bleeding problems.     4. History of AAA, status post EVAR by Dr. Pepper in 2021. Abdominal US in August 2024 showed increased infrarenal AAA at 4.9cm; urged to follow-up with Vascular Surgery for regular monitoring.     5. Hypertension, treated with Toprol XL and  Altace, stable. BP is good today.     6. Hyperlipidemia, treated with Crestor 40mg. LDL is 100, up from previous. He has not been taking Zetia. To resume this and repeat lipid panel in 1 month.     7. Diabetes mellitus, treated with Januvia and Jardiace, followed by PCP, and well controlled.     8. SID, treated with CPAP, stable.     9. Depression symptoms, followed by PCP. No worsening symptoms, but should continue to monitor.     Same medications for now.   Resume Zetia 10mg once daily, and repeat lipid panel in 1 month.  Reviewed findings of echocardiogram and abdominal US.  Urged to follow-up with both PCP and vascular surgery.     Follow-up annually, sooner if clinical condition changes.

## 2025-03-24 DIAGNOSIS — E78.5 DYSLIPIDEMIA: ICD-10-CM

## 2025-03-24 RX ORDER — ROSUVASTATIN CALCIUM 40 MG/1
40 TABLET, COATED ORAL DAILY
Qty: 90 TABLET | Refills: 3 | OUTPATIENT
Start: 2025-03-24

## 2025-03-24 NOTE — TELEPHONE ENCOUNTER
Is the patient due for a refill? Yes    Was the patient seen the last 15 months? Yes    Date of last office visit: 03/12/2025    Does the patient have an upcoming appointment?  No    Provider to refill:AB     Does the patient have [facility] Plus and need 100-day supply? (This applies to ALL medications) Patient does not have SCP

## 2025-03-27 ENCOUNTER — RESULTS FOLLOW-UP (OUTPATIENT)
Dept: MEDICAL GROUP | Facility: MEDICAL CENTER | Age: 75
End: 2025-03-27

## 2025-03-27 LAB
ALBUMIN SERPL-MCNC: 4.3 G/DL (ref 3.8–4.8)
ALBUMIN/CREAT UR: 25 MG/G CREAT (ref 0–29)
ALP SERPL-CCNC: 75 IU/L (ref 44–121)
ALT SERPL-CCNC: 26 IU/L (ref 0–44)
AST SERPL-CCNC: 18 IU/L (ref 0–40)
BILIRUB SERPL-MCNC: 1 MG/DL (ref 0–1.2)
BUN SERPL-MCNC: 22 MG/DL (ref 8–27)
BUN/CREAT SERPL: 19 (ref 10–24)
CALCIUM SERPL-MCNC: 9.2 MG/DL (ref 8.6–10.2)
CHLORIDE SERPL-SCNC: 101 MMOL/L (ref 96–106)
CHOLEST SERPL-MCNC: 152 MG/DL (ref 100–199)
CO2 SERPL-SCNC: 21 MMOL/L (ref 20–29)
CREAT SERPL-MCNC: 1.17 MG/DL (ref 0.76–1.27)
CREAT UR-MCNC: 80.3 MG/DL
EGFRCR SERPLBLD CKD-EPI 2021: 66 ML/MIN/1.73
GLOBULIN SER CALC-MCNC: 2.5 G/DL (ref 1.5–4.5)
GLUCOSE SERPL-MCNC: 118 MG/DL (ref 70–99)
HBA1C MFR BLD: 6 % (ref 4.8–5.6)
HDLC SERPL-MCNC: 50 MG/DL
LDL CALC COMMENT:: NORMAL
LDLC SERPL CALC-MCNC: 84 MG/DL (ref 0–99)
MICROALBUMIN UR-MCNC: 19.8 UG/ML
POTASSIUM SERPL-SCNC: 4.8 MMOL/L (ref 3.5–5.2)
PROT SERPL-MCNC: 6.8 G/DL (ref 6–8.5)
PSA SERPL-MCNC: 1.1 NG/ML (ref 0–4)
SODIUM SERPL-SCNC: 138 MMOL/L (ref 134–144)
TRIGL SERPL-MCNC: 94 MG/DL (ref 0–149)
VLDLC SERPL CALC-MCNC: 18 MG/DL (ref 5–40)

## 2025-03-31 ENCOUNTER — APPOINTMENT (OUTPATIENT)
Dept: MEDICAL GROUP | Facility: MEDICAL CENTER | Age: 75
End: 2025-03-31
Payer: COMMERCIAL

## 2025-03-31 ENCOUNTER — HOSPITAL ENCOUNTER (OUTPATIENT)
Facility: MEDICAL CENTER | Age: 75
End: 2025-03-31
Attending: PHYSICIAN ASSISTANT
Payer: COMMERCIAL

## 2025-03-31 VITALS
BODY MASS INDEX: 28.6 KG/M2 | DIASTOLIC BLOOD PRESSURE: 58 MMHG | HEIGHT: 75 IN | WEIGHT: 230 LBS | HEART RATE: 74 BPM | TEMPERATURE: 97.2 F | SYSTOLIC BLOOD PRESSURE: 104 MMHG | OXYGEN SATURATION: 97 %

## 2025-03-31 DIAGNOSIS — R41.3 MEMORY LOSS: ICD-10-CM

## 2025-03-31 DIAGNOSIS — M54.41 CHRONIC RIGHT-SIDED LOW BACK PAIN WITH RIGHT-SIDED SCIATICA: ICD-10-CM

## 2025-03-31 DIAGNOSIS — Z98.890 STATUS POST ABDOMINAL AORTIC ANEURYSM (AAA) REPAIR: ICD-10-CM

## 2025-03-31 DIAGNOSIS — G89.29 CHRONIC RIGHT-SIDED LOW BACK PAIN WITH RIGHT-SIDED SCIATICA: ICD-10-CM

## 2025-03-31 DIAGNOSIS — Z86.79 STATUS POST ABDOMINAL AORTIC ANEURYSM (AAA) REPAIR: ICD-10-CM

## 2025-03-31 DIAGNOSIS — E11.40 TYPE 2 DIABETES MELLITUS WITH DIABETIC NEUROPATHY, WITHOUT LONG-TERM CURRENT USE OF INSULIN (HCC): ICD-10-CM

## 2025-03-31 DIAGNOSIS — Z12.5 SCREENING PSA (PROSTATE SPECIFIC ANTIGEN): ICD-10-CM

## 2025-03-31 LAB
ALBUMIN SERPL BCP-MCNC: 4.1 G/DL (ref 3.2–4.9)
ALBUMIN/GLOB SERPL: 1.6 G/DL
ALP SERPL-CCNC: 63 U/L (ref 30–99)
ALT SERPL-CCNC: 20 U/L (ref 2–50)
ANION GAP SERPL CALC-SCNC: 14 MMOL/L (ref 7–16)
AST SERPL-CCNC: 23 U/L (ref 12–45)
BASOPHILS # BLD AUTO: 0.6 % (ref 0–1.8)
BASOPHILS # BLD: 0.05 K/UL (ref 0–0.12)
BILIRUB SERPL-MCNC: 1 MG/DL (ref 0.1–1.5)
BUN SERPL-MCNC: 22 MG/DL (ref 8–22)
CALCIUM ALBUM COR SERPL-MCNC: 9.1 MG/DL (ref 8.5–10.5)
CALCIUM SERPL-MCNC: 9.2 MG/DL (ref 8.5–10.5)
CHLORIDE SERPL-SCNC: 104 MMOL/L (ref 96–112)
CHOLEST SERPL-MCNC: 118 MG/DL (ref 100–199)
CO2 SERPL-SCNC: 20 MMOL/L (ref 20–33)
CREAT SERPL-MCNC: 1.33 MG/DL (ref 0.5–1.4)
EOSINOPHIL # BLD AUTO: 0.03 K/UL (ref 0–0.51)
EOSINOPHIL NFR BLD: 0.3 % (ref 0–6.9)
ERYTHROCYTE [DISTWIDTH] IN BLOOD BY AUTOMATED COUNT: 50.2 FL (ref 35.9–50)
EST. AVERAGE GLUCOSE BLD GHB EST-MCNC: 114 MG/DL
FASTING STATUS PATIENT QL REPORTED: NORMAL
GFR SERPLBLD CREATININE-BSD FMLA CKD-EPI: 56 ML/MIN/1.73 M 2
GLOBULIN SER CALC-MCNC: 2.5 G/DL (ref 1.9–3.5)
GLUCOSE SERPL-MCNC: 119 MG/DL (ref 65–99)
HBA1C MFR BLD: 5.6 % (ref 4–5.6)
HCT VFR BLD AUTO: 49.1 % (ref 42–52)
HDLC SERPL-MCNC: 52 MG/DL
HGB BLD-MCNC: 17 G/DL (ref 14–18)
IMM GRANULOCYTES # BLD AUTO: 0.02 K/UL (ref 0–0.11)
IMM GRANULOCYTES NFR BLD AUTO: 0.2 % (ref 0–0.9)
LDLC SERPL CALC-MCNC: 53 MG/DL
LYMPHOCYTES # BLD AUTO: 1.66 K/UL (ref 1–4.8)
LYMPHOCYTES NFR BLD: 18.9 % (ref 22–41)
MCH RBC QN AUTO: 34 PG (ref 27–33)
MCHC RBC AUTO-ENTMCNC: 34.6 G/DL (ref 32.3–36.5)
MCV RBC AUTO: 98.2 FL (ref 81.4–97.8)
MONOCYTES # BLD AUTO: 0.76 K/UL (ref 0–0.85)
MONOCYTES NFR BLD AUTO: 8.6 % (ref 0–13.4)
NEUTROPHILS # BLD AUTO: 6.27 K/UL (ref 1.82–7.42)
NEUTROPHILS NFR BLD: 71.4 % (ref 44–72)
NRBC # BLD AUTO: 0 K/UL
NRBC BLD-RTO: 0 /100 WBC (ref 0–0.2)
PLATELET # BLD AUTO: 170 K/UL (ref 164–446)
PMV BLD AUTO: 10.5 FL (ref 9–12.9)
POTASSIUM SERPL-SCNC: 4.8 MMOL/L (ref 3.6–5.5)
PROT SERPL-MCNC: 6.6 G/DL (ref 6–8.2)
PSA SERPL-MCNC: 1.09 NG/ML (ref 0–4)
RBC # BLD AUTO: 5 M/UL (ref 4.7–6.1)
SODIUM SERPL-SCNC: 138 MMOL/L (ref 135–145)
TRIGL SERPL-MCNC: 67 MG/DL (ref 0–149)
TSH SERPL DL<=0.005 MIU/L-ACNC: 1.5 UIU/ML (ref 0.38–5.33)
WBC # BLD AUTO: 8.8 K/UL (ref 4.8–10.8)

## 2025-03-31 PROCEDURE — 80053 COMPREHEN METABOLIC PANEL: CPT

## 2025-03-31 PROCEDURE — 84153 ASSAY OF PSA TOTAL: CPT

## 2025-03-31 PROCEDURE — 82570 ASSAY OF URINE CREATININE: CPT

## 2025-03-31 PROCEDURE — 83036 HEMOGLOBIN GLYCOSYLATED A1C: CPT

## 2025-03-31 PROCEDURE — 84443 ASSAY THYROID STIM HORMONE: CPT

## 2025-03-31 PROCEDURE — 36415 COLL VENOUS BLD VENIPUNCTURE: CPT

## 2025-03-31 PROCEDURE — 82043 UR ALBUMIN QUANTITATIVE: CPT

## 2025-03-31 PROCEDURE — 80061 LIPID PANEL: CPT

## 2025-03-31 PROCEDURE — 85025 COMPLETE CBC W/AUTO DIFF WBC: CPT

## 2025-03-31 RX ORDER — BACLOFEN 20 MG/1
20 TABLET ORAL 3 TIMES DAILY
COMMUNITY
End: 2025-03-31

## 2025-03-31 ASSESSMENT — FIBROSIS 4 INDEX: FIB4 SCORE: 1.72

## 2025-03-31 NOTE — PROGRESS NOTES
Subjective:     History of Present Illness  The patient presents for a follow-up visit.    He reports an improvement in his memory, attributing the previous issues to a medication, possibly Eliquis. He has been advised to resume ezetimibe by his surgeon. He continues to take Eliquis twice daily. He acknowledges the potential link between his depression and memory issues, noting that others have observed him repeating statements. He recalls discontinuing Ozempic due to its impact on his memory.    He expresses concern about his mental and emotional health, particularly during periods of isolation in the winter. His daughter, who resides in [place], provides frequent support through phone calls and visits. He anticipates relocating to [place] within the next year.    He has an upcoming appointment with Dr. Pepper, a vascular specialist, regarding an abdominal aortic aneurysm. He reports feeling well overall, both physically and mentally.    He has an appointment with neurology for memory loss on 05/27/2025.    He has chronic back pain from an injury when he was younger. He also has sciatica in the right leg, but exercising and walking help alleviate the pain.    He experienced a fall on ice during the winter while retrieving his mail. His physical activity has increased, with walking three times a week and golfing once a week. He expresses satisfaction with his current state.      Current medicines (including changes today)  Current Outpatient Medications   Medication Sig Dispense Refill    ezetimibe (ZETIA) 10 MG Tab Take 1 Tablet by mouth every day. 100 Tablet 3    metoprolol SR (TOPROL XL) 50 MG TABLET SR 24 HR Take 1 Tablet by mouth every day. 100 Tablet 3    apixaban (ELIQUIS) 5mg Tab Take 1 Tablet by mouth 2 times a day. 200 Tablet 3    rosuvastatin (CRESTOR) 40 MG tablet Take 1 Tablet by mouth every day. 100 Tablet 3    ramipril (ALTACE) 10 MG capsule Take 1 Capsule by mouth every day. 100 Capsule 3     "baclofen (LIORESAL) 20 MG tablet TAKE 1 TABLET BY MOUTH EVERY DAY 90 Tablet 1    JARDIANCE 25 MG Tab TAKE 1 TABLET DAILY 90 Tablet 2    JANUVIA 100 MG Tab TAKE 1 TABLET DAILY 90 Tablet 2     No current facility-administered medications for this visit.     He  has a past medical history of AAA (abdominal aortic aneurysm) ([facility]) (10/2021), Acid reflux, Arrhythmia, CAD (coronary artery disease) (1997/2017), Chronic anticoagulation, Diabetes ([facility]), Diabetic neuropathy ([facility]), Hyperlipidemia, Hypertension, Obstructive sleep apnea, Palpitations, Past heart attack (1997), Persistent atrial fibrillation ([facility]) (08/2024), Psychiatric problem, S/P drug eluting coronary stent placement (2017), Sleep apnea, and Snoring.    ROS   No chest pain, no shortness of breath, no abdominal pain  Positive ROS as per HPI.  All other systems reviewed and are negative.     Objective:     /58 (BP Location: Left arm, Patient Position: Sitting, BP Cuff Size: Adult)   Pulse 74   Temp 36.2 °C (97.2 °F) (Temporal)   Ht 1.905 m (6' 3\")   Wt 104 kg (230 lb)   SpO2 97%  Body mass index is 28.75 kg/m².   Physical Exam    Constitutional: Alert, no distress.  Skin: Warm, dry, good turgor, no rashes in visible areas.  Eye: Equal, round and reactive, conjunctiva clear, lids normal.  ENMT: Lips without lesions, good dentition, oropharynx clear.  Neck: Trachea midline, no masses, no thyromegaly. No cervical or supraclavicular lymphadenopathy  Respiratory: Unlabored respiratory effort, lungs clear to auscultation, no wheezes, no ronchi.  Cardiovascular: Normal S1, S2, no murmur, no edema.  Abdomen: Soft, non-tender, no masses, no hepatosplenomegaly.  Psych: Alert and oriented x3, normal affect and mood.      Results  Laboratory Studies  Cholesterol level was good. A1c is 6.0.        Assessment and Plan:   The following treatment plan was discussed    Assessment & Plan  1. Memory issues.  Chronic condition.  Stable.  He reports improvement in memory " but still experiences some issues, such as repeating himself. He suspects Eliquis might be contributing to his memory problems. He is currently taking Eliquis twice a day. If memory issues persist or worsen, discontinuation of certain medications may be considered.    2. Depression.  Chronic condition. He experiences depression, particularly during the winter months due to isolation. His daughter provides support through frequent calls and visits. He plans to move to [place] to be closer to her, which may help alleviate his symptoms.    3.  History of abdominal aortic aneurysm.  History. He has an appointment with Dr. Pepper, a vascular specialist, for follow-up on his abdominal aortic aneurysm. He should continue with the scheduled appointments and follow the specialist's recommendations.    4. Chronic back pain with sciatica.  Chronic condition.  Stable.  He has chronic back pain and sciatica in the right leg, which is managed with exercise and walking. He should continue his current exercise regimen as it helps alleviate his symptoms.    5. Diabetes mellitus.  Chronic condition.  Well-controlled.  His diabetes is well controlled with an A1c of 6.0. He should continue his current medication regimen, including Januvia and Jardiance.    6. Fall risk.  He reported a fall on ice during the winter. Continued monitoring and preventive measures to avoid future falls are recommended.    Follow-up  The patient will follow up in 3 months.      ORDERS:  1. Memory loss      2. Chronic right-sided low back pain with right-sided sciatica      3. Status post abdominal aortic aneurysm (AAA) repair      4. Type 2 diabetes mellitus with diabetic neuropathy, without long-term current use of insulin (HCC)          Please note that this dictation was created using voice recognition software. I have made every reasonable attempt to correct obvious errors, but I expect that there are errors of grammar and possibly content that I did not  discover before finalizing the note.      Attestation      Verbal consent was acquired by the patient to use [employer] ambient listening note generation during this visit Yes

## 2025-04-01 ENCOUNTER — RESULTS FOLLOW-UP (OUTPATIENT)
Dept: MEDICAL GROUP | Facility: MEDICAL CENTER | Age: 75
End: 2025-04-01

## 2025-04-01 LAB
CREAT UR-MCNC: 88.8 MG/DL
MICROALBUMIN UR-MCNC: 2.1 MG/DL
MICROALBUMIN/CREAT UR: 24 MG/G (ref 0–30)

## 2025-04-02 ENCOUNTER — SUPERVISING PHYSICIAN REVIEW (OUTPATIENT)
Dept: MEDICAL GROUP | Facility: MEDICAL CENTER | Age: 75
End: 2025-04-02
Payer: COMMERCIAL

## 2025-04-02 NOTE — PROGRESS NOTES
I have reviewed and agree with history, assessment and plan for clinic encounter on 3/31/2025 with Physician Assistant Kit Torres  Face to face encounter/direct observation: No  Suggested changes to plan or follow-up: None     Sharon Reinoso MD

## 2025-04-10 ENCOUNTER — TELEPHONE (OUTPATIENT)
Dept: MEDICAL GROUP | Facility: MEDICAL CENTER | Age: 75
End: 2025-04-10
Payer: COMMERCIAL

## 2025-04-10 DIAGNOSIS — E11.618 TYPE 2 DIABETES MELLITUS WITH OTHER DIABETIC ARTHROPATHY, WITHOUT LONG-TERM CURRENT USE OF INSULIN (HCC): ICD-10-CM

## 2025-05-13 ENCOUNTER — OFFICE VISIT (OUTPATIENT)
Dept: MEDICAL GROUP | Facility: MEDICAL CENTER | Age: 75
End: 2025-05-13
Payer: COMMERCIAL

## 2025-05-13 DIAGNOSIS — R41.3 MEMORY LOSS: ICD-10-CM

## 2025-05-13 DIAGNOSIS — I71.40 ABDOMINAL AORTIC ANEURYSM (AAA) 3.0 CM TO 5.5 CM IN DIAMETER IN MALE (HCC): ICD-10-CM

## 2025-05-13 DIAGNOSIS — E11.40 TYPE 2 DIABETES MELLITUS WITH DIABETIC NEUROPATHY, WITHOUT LONG-TERM CURRENT USE OF INSULIN (HCC): ICD-10-CM

## 2025-05-13 PROCEDURE — 99214 OFFICE O/P EST MOD 30 MIN: CPT | Performed by: PHYSICIAN ASSISTANT

## 2025-05-13 PROCEDURE — G2211 COMPLEX E/M VISIT ADD ON: HCPCS | Performed by: PHYSICIAN ASSISTANT

## 2025-05-13 RX ORDER — MEMANTINE HYDROCHLORIDE 5 MG/1
5 TABLET ORAL 2 TIMES DAILY
Qty: 60 TABLET | Refills: 3 | Status: SHIPPED | OUTPATIENT
Start: 2025-05-13 | End: 2025-06-12

## 2025-05-13 NOTE — PROGRESS NOTES
Subjective:     History of Present Illness  The patient presents for evaluation of memory issues.    He reports experiencing difficulties with memory retention, although he maintains a satisfactory physical health status. He has an upcoming appointment with Dr. Leonardo at the end of the month. He resides independently but maintains regular communication with his daughter. He is currently on a regimen of 11 medications daily, including those for cholesterol management. He did not bring a list of his medications with him today.    He has diabetes and believes his blood sugar levels are high, although recent lab results indicate well-controlled diabetes with an A1c of 5.6. He also reports good cholesterol levels.    He had an appointment with Dr. Peña for his aneurysm.  It was earlier this month.  It appears that he ordered an ultrasound to surveillance aneurysm.      Current medicines (including changes today)  Current Outpatient Medications   Medication Sig Dispense Refill    memantine (NAMENDA) 5 MG Tab Take 1 Tablet by mouth 2 times a day for 30 days. First week take one tablet daily. 60 Tablet 3    SITagliptin (JANUVIA) 100 MG Tab Take 1 Tablet by mouth every day. 90 Tablet 3    ezetimibe (ZETIA) 10 MG Tab Take 1 Tablet by mouth every day. 100 Tablet 3    metoprolol SR (TOPROL XL) 50 MG TABLET SR 24 HR Take 1 Tablet by mouth every day. 100 Tablet 3    apixaban (ELIQUIS) 5mg Tab Take 1 Tablet by mouth 2 times a day. 200 Tablet 3    rosuvastatin (CRESTOR) 40 MG tablet Take 1 Tablet by mouth every day. 100 Tablet 3    ramipril (ALTACE) 10 MG capsule Take 1 Capsule by mouth every day. 100 Capsule 3    baclofen (LIORESAL) 20 MG tablet TAKE 1 TABLET BY MOUTH EVERY DAY 90 Tablet 1    JARDIANCE 25 MG Tab TAKE 1 TABLET DAILY 90 Tablet 2     No current facility-administered medications for this visit.     He  has a past medical history of AAA (abdominal aortic aneurysm) ([facility]) (10/2021), Acid reflux, Arrhythmia, CAD (coronary  artery disease) (1997/2017), Chronic anticoagulation, Diabetes ([facility]), Diabetic neuropathy ([facility]), Hyperlipidemia, Hypertension, Obstructive sleep apnea, Palpitations, Past heart attack (1997), Persistent atrial fibrillation ([facility]) (08/2024), Psychiatric problem, S/P drug eluting coronary stent placement (2017), Sleep apnea, and Snoring.    ROS   No chest pain, no shortness of breath, no abdominal pain  Positive ROS as per HPI.  All other systems reviewed and are negative.     Objective:     There were no vitals taken for this visit. There is no height or weight on file to calculate BMI.   Physical Exam    Constitutional: Alert, no distress.  Skin: Warm, dry, good turgor, no rashes in visible areas.  Eye: Equal, round and reactive, conjunctiva clear, lids normal.  ENMT: Lips without lesions, good dentition, oropharynx clear.  Neck: Trachea midline, no masses, no thyromegaly.   Psych: Alert and oriented x3, normal affect and mood.      Results  Labs   - A1c: 5.6    Imaging   - Echocardiogram: Stable        Assessment and Plan:   The following treatment plan was discussed    Assessment & Plan  1. Memory impairment. Chronic condition.  - Reports trouble remembering things but feels great physically.  - Upcoming appointment with Dr. Leonardo at the end of the month for further evaluation.  - Prescription for Namenda 5 mg daily issued, with instructions to take one tablet daily for the first week, followed by two tablets daily thereafter. Prescription sent to pharmacy.  - Advised to ensure his daughter is aware of the appointment to avoid missing it and to carry a list of current medications in his wallet for reference during the neurology appointment.    2. Diabetes mellitus. Chronic condition.  - Diabetes is well-controlled with an A1c of 5.6.  - Blood sugar levels are excellent.  - Reassured that his diabetes management is effective.    3. Abdominal aneurysm.  - Mentioned having an aneurysm in his abdomen and is trying to  take care of it.  - Recently saw Dr. Peña, who deemed the condition stable.  - Recommended to repeat an ultrasound for further monitoring.    Follow-up  - Follow-up scheduled in 3 months.      ORDERS:  1. Memory loss    - memantine (NAMENDA) 5 MG Tab; Take 1 Tablet by mouth 2 times a day for 30 days. First week take one tablet daily.  Dispense: 60 Tablet; Refill: 3    2. History of abdominal aortic aneurysm (AAA) 3.0 cm to 5.5 cm in diameter in male    3. Type 2 diabetes      () Today's E/M visit is associated with medical care services that serve as the continuing focal point for all needed health care services and/or with medical care services that are part of ongoing care related to a patient's single, serious condition or a complex condition: This includes furnishing services to patients on an ongoing basis that result in care that is personalized to the patient. The services result in a comprehensive, longitudinal, and continuous relationship with the patient and involve delivery of team-based care that is accessible, coordinated with other practitioners and providers, and integrated with the broader health care landscape.     Please note that this dictation was created using voice recognition software. I have made every reasonable attempt to correct obvious errors, but I expect that there are errors of grammar and possibly content that I did not discover before finalizing the note.      Attestation      Verbal consent was acquired by the patient to use [place] ambient listening note generation during this visit Yes

## 2025-05-14 DIAGNOSIS — I48.21 HYPERCOAGULABLE STATE DUE TO PERMANENT ATRIAL FIBRILLATION (HCC): ICD-10-CM

## 2025-05-14 DIAGNOSIS — I48.21 PERMANENT ATRIAL FIBRILLATION (HCC): ICD-10-CM

## 2025-05-14 DIAGNOSIS — D68.69 HYPERCOAGULABLE STATE DUE TO PERMANENT ATRIAL FIBRILLATION (HCC): ICD-10-CM

## 2025-05-15 RX ORDER — APIXABAN 5 MG/1
5 TABLET, FILM COATED ORAL 2 TIMES DAILY
Qty: 180 TABLET | Refills: 3 | Status: SHIPPED | OUTPATIENT
Start: 2025-05-15

## 2025-05-21 ENCOUNTER — APPOINTMENT (OUTPATIENT)
Dept: MEDICAL GROUP | Facility: MEDICAL CENTER | Age: 75
End: 2025-05-21
Payer: COMMERCIAL

## 2025-05-27 ENCOUNTER — OFFICE VISIT (OUTPATIENT)
Dept: NEUROLOGY | Facility: MEDICAL CENTER | Age: 75
End: 2025-05-27
Attending: PSYCHIATRY & NEUROLOGY
Payer: COMMERCIAL

## 2025-05-27 ENCOUNTER — HOSPITAL ENCOUNTER (OUTPATIENT)
Dept: LAB | Facility: MEDICAL CENTER | Age: 75
End: 2025-05-27
Attending: PSYCHIATRY & NEUROLOGY
Payer: COMMERCIAL

## 2025-05-27 VITALS
HEIGHT: 75 IN | SYSTOLIC BLOOD PRESSURE: 114 MMHG | DIASTOLIC BLOOD PRESSURE: 62 MMHG | RESPIRATION RATE: 16 BRPM | BODY MASS INDEX: 29.3 KG/M2 | TEMPERATURE: 97.2 F | HEART RATE: 72 BPM | WEIGHT: 235.67 LBS | OXYGEN SATURATION: 94 %

## 2025-05-27 DIAGNOSIS — I48.19 PERSISTENT ATRIAL FIBRILLATION (HCC): ICD-10-CM

## 2025-05-27 DIAGNOSIS — G31.84 AMNESTIC MCI (MILD COGNITIVE IMPAIRMENT WITH MEMORY LOSS): ICD-10-CM

## 2025-05-27 DIAGNOSIS — R93.0 ABNORMAL MRI OF HEAD: ICD-10-CM

## 2025-05-27 DIAGNOSIS — Z79.01 ANTICOAGULATED: ICD-10-CM

## 2025-05-27 DIAGNOSIS — Z82.0 FAMILY HISTORY OF ALZHEIMER DISEASE: ICD-10-CM

## 2025-05-27 DIAGNOSIS — Z95.5 S/P DRUG ELUTING CORONARY STENT PLACEMENT: ICD-10-CM

## 2025-05-27 DIAGNOSIS — G47.33 OSA ON CPAP: ICD-10-CM

## 2025-05-27 DIAGNOSIS — G31.84 AMNESTIC MCI (MILD COGNITIVE IMPAIRMENT WITH MEMORY LOSS): Primary | ICD-10-CM

## 2025-05-27 LAB — FOLATE SERPL-MCNC: 13 NG/ML

## 2025-05-27 PROCEDURE — 3074F SYST BP LT 130 MM HG: CPT | Performed by: PSYCHIATRY & NEUROLOGY

## 2025-05-27 PROCEDURE — 82306 VITAMIN D 25 HYDROXY: CPT

## 2025-05-27 PROCEDURE — 82746 ASSAY OF FOLIC ACID SERUM: CPT

## 2025-05-27 PROCEDURE — 3078F DIAST BP <80 MM HG: CPT | Performed by: PSYCHIATRY & NEUROLOGY

## 2025-05-27 PROCEDURE — 36415 COLL VENOUS BLD VENIPUNCTURE: CPT

## 2025-05-27 PROCEDURE — 82607 VITAMIN B-12: CPT

## 2025-05-27 PROCEDURE — 99204 OFFICE O/P NEW MOD 45 MIN: CPT | Performed by: PSYCHIATRY & NEUROLOGY

## 2025-05-27 PROCEDURE — 84425 ASSAY OF VITAMIN B-1: CPT

## 2025-05-27 RX ORDER — MEMANTINE HYDROCHLORIDE 10 MG/1
TABLET ORAL
Qty: 120 TABLET | Refills: 6 | Status: SHIPPED | OUTPATIENT
Start: 2025-05-27 | End: 2026-05-22

## 2025-05-27 ASSESSMENT — PATIENT HEALTH QUESTIONNAIRE - PHQ9: CLINICAL INTERPRETATION OF PHQ2 SCORE: 0

## 2025-05-27 ASSESSMENT — FIBROSIS 4 INDEX: FIB4 SCORE: 2.21

## 2025-05-27 NOTE — PROGRESS NOTES
"Reason for Neurology Consult: memory disturbances for about 2-3 years or so.      History of present illness:  Kun Lisa 74 y.o. right handed gentleman who is here with her daughter and grew up near [place] and graduated from [employer]. 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 He retired around [id number].      Problem List Reviewed.    Kun has noticed that over the last 2 years that his short term memory is \"pretty much gone\" but if he is given a \"hint\" or ask him again, information can be retained sometimes.    His daughter recognized that when Ozempic was added about around 2022 Kun came up to visit for a concert and he seemed more confused and disoriented and once he was off Ozempic he seemed to improve.  He has to rely heavily on a calendar and if he has a doctor's appointment he can't remember his appointments.  His daughter agrees that the memory disturbances has gotten worse in the sense that he can forget information told to him within 10-15 minutes. He tends to ask the same question(s) over again and he has noticed this issue not only from his daughter here today but also from other family members and friends.    Speech-language- stable and good for over the last 1-2 years or so per his daughter (who lives in [place]).    No delusions,paranoia,hallucinations, behavioral or personality changes in the last 3 months.    There is no history of concussion(s), seizure(s) or stroke events.    Sleep: averages 7-8 hours most nights of the week in the recent months. Denies REM Sleep Behavioral type symptoms such as vivid dreaming.  Rare awakenings to urinate (at most 2 times per night in the recent few months). Denies Excessive Day Time Sleepiness or need for daily or frequent napping in the recent few months.      Uses CPAP and admits to strict compliance for over 10 years or so.    There is no history of parkinsonian features such as evolving gait slowness, " "shuffling,postural instability, softening of voice, involuntary movements of the body/limbs in the last 6 months.    No rapidly evolving weight loss, dysarthria,dysphagia, orthostatic dizziness/faintness in the last 6 months or so.     Quit smoking about 20 years ago (1 pack per x 30 years).  No significant alcohol use in adult life.      Mother: Alzheimer's type Dementia- late onset and was in a care home.  Sister: \"Dementia\"- onset in mid to late 60's and now age 84      Patient Active Problem List    Diagnosis Date Noted    S/P drug eluting coronary stent placement 03/06/2024    Memory loss 08/29/2023    Status post abdominal aortic aneurysm (AAA) repair 05/10/2023    Lower extremity edema 05/10/2023    Persistent atrial fibrillation (HCC) 05/10/2023    Chronic right-sided low back pain with right-sided sciatica 08/02/2022    Anticoagulated 09/29/2021    Old myocardial infarction 09/29/2021    Medicare annual wellness visit, initial 08/09/2021    Depression 02/10/2020    Joint pain 02/10/2020    Obesity (BMI 30-39.9) 02/10/2020    Muscle spasm of back 08/13/2019    Chronic thumb pain, bilateral 04/12/2019    History of acute pancreatitis 01/14/2019    History of abdominal aortic aneurysm (AAA) 3.0 cm to 5.5 cm in diameter in male 01/14/2019    Coronary artery disease due to calcified coronary lesion: Stent to distal circumflex, patent LAD stent and  of RCA in April 2017 01/20/2017    Obstructive sleep apnea syndrome 11/22/2016    Type 2 diabetes mellitus with neurologic complication (HCC) 10/25/2016    Dyslipidemia 10/25/2016    Essential hypertension 10/25/2016    Neuropathy (HCC) 10/25/2016       Past medical history:   Past Medical History[1]    Past surgical history:   Past Surgical History[2]      Social history:   Social History     Socioeconomic History    Marital status:      Spouse name: Not on file    Number of children: Not on file    Years of education: Not on file    Highest education " "level: Not on file   Occupational History    Not on file   Tobacco Use    Smoking status: Former     Current packs/day: 0.00     Average packs/day: 1 pack/day for 32.0 years (32.0 ttl pk-yrs)     Types: Cigarettes     Start date: 1971     Quit date: 2003     Years since quittin.0    Smokeless tobacco: Never   Vaping Use    Vaping status: Never Used   Substance and Sexual Activity    Alcohol use: Not Currently     Alcohol/week: 0.0 oz    Drug use: Not Currently     Types: Marijuana, Inhaled     Comment: 2-3 times weekly of medical marijuana    Sexual activity: Not on file   Other Topics Concern    Not on file   Social History Narrative    Not on file     Social Drivers of Health     Financial Resource Strain: Not on file   Food Insecurity: Not on file   Transportation Needs: Not on file   Physical Activity: Not on file   Stress: Not on file   Social Connections: Not on file   Intimate Partner Violence: Not on file   Housing Stability: Not on file       Family history:   Family History   Problem Relation Age of Onset    Stroke Father 57         at 62 of second stroke    Arthritis Mother     Stroke Mother     Alzheimer's Disease Mother          Current medications:   Current Outpatient Medications   Medication    ELIQUIS 5 MG Tab    memantine (NAMENDA) 5 MG Tab    SITagliptin (JANUVIA) 100 MG Tab    ezetimibe (ZETIA) 10 MG Tab    metoprolol SR (TOPROL XL) 50 MG TABLET SR 24 HR    rosuvastatin (CRESTOR) 40 MG tablet    ramipril (ALTACE) 10 MG capsule    baclofen (LIORESAL) 20 MG tablet    JARDIANCE 25 MG Tab     No current facility-administered medications for this visit.       Medication Allergy:  Allergies[3]        Physical examination:   Vitals:    25 1509   BP: 114/62   BP Location: Right arm   Patient Position: Sitting   BP Cuff Size: Adult   Pulse: 72   Resp: 16   Temp: 36.2 °C (97.2 °F)   TempSrc: Temporal   SpO2: 94%   Weight: 107 kg (235 lb 10.8 oz)   Height: 1.905 m (6' 3\")       Normal " cephalic atraumatic.  There is full range of movement around the neck in all directions without restrictions or discrete pain evoked triggers.  No lower extremity edema.      Neurological  Exam:      Gustavo Cognitive Assessment (MOCA) Version 7.1    Years of Education: 4 years of college ([employer])    TOTAL SCORE: 25/30  (to be scanned into the MEDIA section in the E.M.R.)      Mental status: Awake, alert and fully oriented to person, place, time, and situation. Normal attention and concentration.  Did not appear/act combative,irritable,anxious,paranoid/delusional or aggressive to or with me.    Speech and language: Speech is fluent without errors, clear, intact to repetition, and intact to naming.     Follows 3 step motor commands in sequence without significant delay and correctly.    Cranial nerve exam:  II: Pupils are equally round and reactive to light. Visual fields are intact by confrontation.  III, IV, VI: EOMI, no diplopia, no ptosis.  V: Sensation to light touch is normal over V1-3 distributions bilaterally.  .  VII: Facial movements are symmetrical. There is no facial droop. .  VIII: Hearing intact to soft speech and finger rub bilaterally  IX: Palate elevates symmetrically, uvula is midline. Dysarthria is not present.  XI: Shoulder shrug are symmetrical and strong.   XII: Tongue protrudes midline.      Motor exam:  Muscle tone is normal in all 4 limbs. and No abnormal movements appreciated.    Muscle strength:    Neck Flexors/Extensors: 5/5       Right  Left  Deltoid   5/5  5/5      Biceps   5/5  5/5  Triceps  5/5  5/5   Wrist extensors 5/5  5/5  Wrist flexors  5/5  5/5     5/5  5/5  Interossei  5/5  5/5  Thenar (APB)  5/5  5/5   Hip flexors  5/5  5/5  Quadriceps  5/5  5/5    Hamstrings  5/5  5/5  Dorsiflexors  5/5  5/5  Plantarflexors  5/5  5/5  Toe extension  5/5  5/5      Reflexes:       Right  Left  Biceps   2/4  2/4  Triceps              2/4  2/4  Brachioradialis 2/4  2/4  Knee  jerk  2/4  2/4  Ankle jerk  2/4  2/4     Frontal release signs are absent    bilaterally toes are downgoing to plantar stimulation..    Coordination (finger-to-nose, heel/knee/shin, rapid alternating movements) was normal.     There was no ataxia, no tremors, and no dysmetria.     Station and gait >  easily stands up from exam chair without retropulsion,veering,leaning,swaying (to either side).       Labs and Tests:     NEUROIMAGING:     MR-BRAIN-W/O  Order: [id number]   Status: Final result       Next appt: 08/13/2025 at 11:20 AM in [facility] ([facility])       Dx: Memory loss    Test Result Released: Yes (seen)       Messages: Seen    1 Result Note       1 Patient Communication  Details    Reading Physician Reading Date Result Priority   Pam Shah M.D.  [phone] 9/18/2023 Routine     Narrative & Impression     9/18/2023 8:35 AM     HISTORY/REASON FOR EXAM:  Memory loss/brain fogging     TECHNIQUE/EXAM DESCRIPTION:     T1 sagittal, T2 axial, flair coronal, T1 coronal, and diffusion-weighted axial images were obtained of the brain.     COMPARISON: None.     FINDINGS:     Gradient-echo images demonstrate an area of hemosiderin staining along the medial inferior right parietal cortex, likely related to remote hemorrhage. Small focus of gradient echo hypointensities noted within the left inferior cerebellum, likely related   to microhemorrhage or a small cavernoma.  Nonspecific T2 hyperintensities are noted in the periventricular and deep white matter, most likely related to chronic microvascular ischemia.  Visualized intracranial arterial flow voids are within normal limits.  Bone marrow signal in the calvarium is within normal limits.  Included portions of the paranasal sinuses are within normal limits.  Included portions of the mastoid air cells are within normal limits.  Included portions of the orbits are within normal limits     IMPRESSION:        Remote left inferior cerebellar  microhemorrhage/cavernoma.     Hemosiderin staining along the medial right parietal cortex likely related to remote hemorrhage.     Nonspecific T2 hyperintensities are noted in the periventricular and deep white matter, most likely related to chronic microvascular ischemia.           Exam Ended: 09/18/23  9:08 AM Last Resulted: 09/18/23  9:50 AM     Lipid Profile  Order: [id number]   Status: Final result       Next appt: 08/13/2025 at 11:20 AM in [facility] ([facility]       Dx: Type 2 diabetes mellitus with diabeti...    Test Result Released: Yes (seen)       Messages: Seen    1 Result Note       1 Patient Communication       View Follow-Up Encounter       1  Topic            Component  Ref Range & Units (hover) 1 mo ago  (3/31/25) 2 mo ago  (3/26/25) 4 mo ago  (1/27/25) 1 yr ago  (12/27/23) 2 yr ago  (11/14/22) 2 yr ago  (9/6/22) 4 yr ago  (4/29/21)   Cholesterol,Tot 118 152 178 109 150 145 150   Triglycerides 67 94 100 76 130 110 180 High    HDL 52 50 R 58 50 R 45 R 49 R 44 R   LDL 53  100 High        Resulting Agency V [facility]               Dx: Type 2 diabetes mellitus with diabeti...    Test Result Released: Yes (seen)       Messages: Seen    1 Result Note       1 Patient Communication       View Follow-Up Encounter       1  Topic              Component  Ref Range & Units (hover) 1 mo ago  (3/31/25) 2 mo ago  (3/26/25) 4 mo ago  (1/27/25) 1 yr ago  (4/18/24) 1 yr ago  (12/27/23) 2 yr ago  (4/4/23) 2 yr ago  (11/14/22)   Glycohemoglobin 5.6 6.0 High  R, CM 6.0 High  CM 6.0 High  R, CM 6.1 High  R, CM 5.7 High  R, CM 6.3 High  R, CM   Comment: Increased risk for diabetes:  5.7 -6.4%  Diabetes:  >6.4%  Glycemic control for adults with diabetes:  <7.0%    The above interpretations are per ADA guidelines.  Diagnosis  of diabetes mellitus on the basis of elevated Hemoglobin A1c  should be confirmed by repeating the Hb A1c test.   Est Avg Glucose 114  126 CM            TSH  WITH REFLEX TO FT4  Order: [id number]   Status: Final result       Next appt: 08/13/2025 at 11:20 AM in [facility] ([facility]       Dx: Type 2 diabetes mellitus with diabeti...    Test Result Released: Yes (seen)       Messages: Seen    1 Result Note       1 Patient Communication       View Follow-Up Encounter         Component  Ref Range & Units (hover) 1 mo ago  (3/31/25) 1 yr ago  (8/29/23) 2 yr ago  (11/14/22) 2 yr ago  (9/6/22)   TSH 1.500 1.600 CM 2.020 R 2.340 R   Comment: The 2011 American Thyroid Association ([place]) guidelines  recommended that the interpretation of thyroid function in  pregnancy be based on trimester specific reference ranges.    1st Trimester  0.100-2.500 mIU/L  2nd Trimester  0.200-3.000 mIU/L  3rd Trimester  0.300-3.500 mIU/L    These established reference rang        View Follow-Up Encounter       1  Topic            Component  Ref Range & Units (hover) 2 mo ago  (3/26/25) 4 mo ago  (1/27/25) 1 yr ago  (12/27/23) 2 yr ago  (4/4/23) 2 yr ago  (9/6/22) 3 yr ago  (11/18/21) 3 yr ago  (7/17/21)   Glucose 118 High  111 High  R 128 High  97 126 High  R 126 High  R    Bun 22 14 R 19 15 22 18 16 R   Creatinine 1.17 1.23 R 1.23 1.09 1.40 High  1.39 High  1.35 R   eGFR 66  62 73 54 Low      Bun-Creatinine Ratio 19  15 14 16 13    Sodium 138 138 R 139 141 138 140 135 Low  R   Potassium 4.8 4.8 R, CM 4.7 4.5 4.8 4.3 4.5 R   Chloride 101 104 R 105 103 105 104 104 R   Co2 21 22 R 23 24 21 24 24 R   Calcium 9.2 9.0 R 9.0 9.2 9.0 8.8 9.2 R   Total Protein 6.8 6.8 R 6.6 6.8 6.5 6.6 6.6 R   Albumin 4.3 4.2 R 4.3 4.4 R 4.3 R 4.1    Globulin 2.5 2.6 R 2.3 2.4 2.2 2.5    Total Bilirubin 1.0 0.9 R 0.6 0.9 0.6 0.5 0.8 R   Alkaline Phosphatase 75 63 R 54 60 55 67 CM 52 R   AST(SGOT) 18 24 R 25 18 14 14 17 R   ALT(SGPT) 26 19 R 27 12 14 10 16 Low  R   Resulting Agency [facility]                Impression/Plans/Recommendations:       Impression:    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    Driving has been well maintained per Kun and his daughter and without reports of any accidents or incidents in the last 6-12 months.      There are no clinical features of an evolving psychosis nor subacute encephalopathic features (seizure type behavior, psychosis, behavioral or personality changes)  or other evidence of a rapidly evolving gait-balance/ parkinsonian disorder.    The characteristics are concerning for a Neurodegenerative condition such Alzheimer's Disease given the changes in memory and general forgetfulness that are slowly evolving or progressing over time.    Medication list reviewed and no significant culprits identified.    MOCA score of 25/30     (see media section for specifics).  3 out of 5 word recall at 5 minutes.    Alzheimer's Questionnaire score today of 11 today per daughter   (see media for specifics).    Functional Activities Questionnaire score today of 7 per daughter today (see media section for specifics)    Plans:    1. Lifestyle factors discussed including the importance of blood pressure control (long term goal under 130/80, nutritional/diet considerations such as a low processed sugar (MIND type Diet).    2.   The topic of rather newly approved anti amyloid medications (Leqembi and Kinsunla) reviewed today with the assumption or with the possibility that this clinical scenario could be related to Alzheimer's Disease and  in terms of the risks of ARIA (Hemorrhage and/or Edema) and the associated additional risk(s) of accelerated brain atrophy reported in patients exposed to anti amyloid monoclonal antibody related medications.    https://pmc.ncbi.nlm.nih.gov/articles/SRN24345693/    After the above discussion of these  anti amyloid monoclonal antibody medications, the patient and accompanying family member confirmed and stated that such risks are too significant (risky) and thus the decision was  or would be  to not pursue such medications.     Discussed oral cognitive enhancing medication(s) today including Donepezil  (ie, the acetylcholinesterase inhibitor class)  and Memantine (ie, Namenda)  including the longer term goals of of using such  medications and their side effect profiles.    Will start<> continue Namenda 5 mg a day to 5 mg PO BID (10 mg a day) x 2 weeks, then 10 mg in am and 5 mg in pm x 1-2 weeks, then 10 mg PO BID (20 mg a day).   The decision after our discussion of the cognitive enhancing medications was to not proceed with such medications.    I also explained the present lack of clinical evidence based on the lack of clinical trial data for using Prevagen,Focus Factor,Neuriva or Neurivia Plus at this time.    4. Formal Neuropsychological testing with one of our PhD Neuropsychologists reviewed  to better clarify cognitive abilities and develop a baseline of cognitive function and at this time the decision was not to pursue such testing.    5. Discussed blood biomarker testing for Amyloid Beta (42/40) and total P-Tau 217 levels.  At this point he does not want to proceed with this analysis.    6. Caregiver issues and local resources reviewed.    7. Additional blood work to be done> Vitamin B1,B9,B12     8.  Will recheck Brain MRI to compare to Brain MRI in 2023 given concern or potential for C.A.A. (amyloid angiopathy). If more micro hemorrhages are seen then I would advocate for him to discuss with his cardiologist the consideration for a watchman device implantation given that he is or needs chronic anticoagulation due to his known atrial fibrillation condition.    I have performed  a history and physical exam and a directed /focused  ROS today.    Total time spent today or this patient's care was 45 minutes and included reviewing  the diagnostic workup to date (such as labs and imaging as well as interpreting such tests relevant to this patient's neurological condition),   reviewing/obtaining separately obtained history (from patient and/or accompanying daughter)   for today's neurological problem(s) ,counseling and educating the patient and family member on issues related to cognition/memory and cognitive health factors and documenting  the clinical information in the EMR.    Follow up in about 6-9 months or so        Alexander Leonardo MD  [facility].   [facility]         [1]   Past Medical History:  Diagnosis Date    AAA (abdominal aortic aneurysm) ([facility]) 10/2021    Status post EVAR by Dr. Pepper. August 2024:  AAA with proximal aorta 3.1 x 3.2cm, mid aorta 3.0 x 3.2cm, distal aorta 4.4 x 4.9cm.    Acid reflux     Arrhythmia     CAD (coronary artery disease) 1997/2017 1997: MI, stents to LAD and LCx. April 2017: PCI/JOE (Synergy 2.5 x 16mm) the distal LCx.    Chronic anticoagulation     Diabetes ([facility])     Oral meds    Diabetic neuropathy ([facility])     Bilateral feet    Hyperlipidemia     Hypertension     Obstructive sleep apnea     CPAP    Palpitations     Past heart attack 1997    Persistent atrial fibrillation ([facility]) 08/2024    Echocardiogram with normal LV size, LVEF 55%. Normal RV. Enlarged RA, moderately dilated LA. mild MR, trace AR, mild TR. RVSP 43mmHg.    Psychiatric problem     Depression    S/P drug eluting coronary stent placement 2017    Sleep apnea     CPAP    Snoring    [2]   Past Surgical History:  Procedure Laterality Date    CATARACT EXTRACTION WITH IOL Bilateral 2021    ZZZ CARDIAC CATH  4/27/17    Synergy stent to Circ    STENT PLACEMENT  1997    LAD    ZZZ CARDIAC CATH  1997    LAD stent    ANGIOPLASTY     [3]   Allergies  Allergen Reactions    Ozempic (0.25 Or 0.5 Mg-Dose) [Semaglutide]      Memory complaints

## 2025-05-28 LAB
25(OH)D3 SERPL-MCNC: 30 NG/ML (ref 30–100)
VIT B12 SERPL-MCNC: 271 PG/ML (ref 211–911)

## 2025-05-30 ENCOUNTER — RESULTS FOLLOW-UP (OUTPATIENT)
Dept: NEUROLOGY | Facility: MEDICAL CENTER | Age: 75
End: 2025-05-30

## 2025-05-30 LAB — VIT B1 BLD-MCNC: 191 NMOL/L (ref 70–180)

## 2025-07-07 ENCOUNTER — APPOINTMENT (OUTPATIENT)
Dept: RADIOLOGY | Facility: MEDICAL CENTER | Age: 75
End: 2025-07-07
Attending: PSYCHIATRY & NEUROLOGY
Payer: COMMERCIAL

## 2025-07-28 DIAGNOSIS — I10 ESSENTIAL HYPERTENSION: ICD-10-CM

## 2025-07-28 DIAGNOSIS — E78.5 DYSLIPIDEMIA: ICD-10-CM

## 2025-07-29 DIAGNOSIS — I10 ESSENTIAL HYPERTENSION: ICD-10-CM

## 2025-07-29 RX ORDER — RAMIPRIL 10 MG/1
10 CAPSULE ORAL DAILY
Qty: 90 CAPSULE | Refills: 2 | Status: SHIPPED | OUTPATIENT
Start: 2025-07-29

## 2025-07-31 RX ORDER — METOPROLOL SUCCINATE 50 MG/1
50 TABLET, EXTENDED RELEASE ORAL DAILY
Qty: 90 TABLET | Refills: 3 | OUTPATIENT
Start: 2025-07-31

## 2025-07-31 NOTE — TELEPHONE ENCOUNTER
Chart reviewed   Metoprolol last renewed 3/12/25 100 tabs 3/3 refills sent to [facility][id number]    Denied

## 2025-08-13 ENCOUNTER — OFFICE VISIT (OUTPATIENT)
Dept: MEDICAL GROUP | Facility: MEDICAL CENTER | Age: 75
End: 2025-08-13
Payer: COMMERCIAL

## 2025-08-13 VITALS
OXYGEN SATURATION: 96 % | HEART RATE: 78 BPM | TEMPERATURE: 97.6 F | HEIGHT: 74 IN | DIASTOLIC BLOOD PRESSURE: 52 MMHG | WEIGHT: 236 LBS | BODY MASS INDEX: 30.29 KG/M2 | SYSTOLIC BLOOD PRESSURE: 92 MMHG

## 2025-08-13 DIAGNOSIS — E11.40 TYPE 2 DIABETES MELLITUS WITH DIABETIC NEUROPATHY, WITHOUT LONG-TERM CURRENT USE OF INSULIN (HCC): ICD-10-CM

## 2025-08-13 DIAGNOSIS — G31.84 AMNESTIC MCI (MILD COGNITIVE IMPAIRMENT WITH MEMORY LOSS): Primary | ICD-10-CM

## 2025-08-13 DIAGNOSIS — I10 ESSENTIAL HYPERTENSION: ICD-10-CM

## 2025-08-13 PROCEDURE — 3078F DIAST BP <80 MM HG: CPT | Performed by: PHYSICIAN ASSISTANT

## 2025-08-13 PROCEDURE — 3074F SYST BP LT 130 MM HG: CPT | Performed by: PHYSICIAN ASSISTANT

## 2025-08-13 PROCEDURE — 99214 OFFICE O/P EST MOD 30 MIN: CPT | Performed by: PHYSICIAN ASSISTANT

## 2025-08-13 PROCEDURE — G2211 COMPLEX E/M VISIT ADD ON: HCPCS | Performed by: PHYSICIAN ASSISTANT

## 2025-08-13 RX ORDER — RAMIPRIL 5 MG/1
5 CAPSULE ORAL DAILY
Qty: 100 CAPSULE | Refills: 3 | Status: SHIPPED | OUTPATIENT
Start: 2025-08-13 | End: 2026-09-17

## 2025-08-13 ASSESSMENT — FIBROSIS 4 INDEX: FIB4 SCORE: 2.24

## 2025-08-25 ENCOUNTER — TELEPHONE (OUTPATIENT)
Dept: MEDICAL GROUP | Facility: MEDICAL CENTER | Age: 75
End: 2025-08-25
Payer: COMMERCIAL

## 2025-08-27 DIAGNOSIS — R41.3 MEMORY LOSS: ICD-10-CM

## 2025-08-27 RX ORDER — MEMANTINE HYDROCHLORIDE 5 MG/1
5 TABLET ORAL 2 TIMES DAILY
Qty: 180 TABLET | Refills: 1 | Status: SHIPPED | OUTPATIENT
Start: 2025-08-27 | End: 2026-02-23